# Patient Record
Sex: FEMALE | Race: BLACK OR AFRICAN AMERICAN | NOT HISPANIC OR LATINO
[De-identification: names, ages, dates, MRNs, and addresses within clinical notes are randomized per-mention and may not be internally consistent; named-entity substitution may affect disease eponyms.]

---

## 2018-05-15 ENCOUNTER — APPOINTMENT (OUTPATIENT)
Dept: SURGERY | Facility: CLINIC | Age: 48
End: 2018-05-15
Payer: COMMERCIAL

## 2018-05-15 VITALS
WEIGHT: 293 LBS | HEIGHT: 66.5 IN | BODY MASS INDEX: 46.53 KG/M2 | SYSTOLIC BLOOD PRESSURE: 146 MMHG | DIASTOLIC BLOOD PRESSURE: 86 MMHG

## 2018-05-15 PROCEDURE — SI006: CPT

## 2018-05-22 PROCEDURE — 97802 MEDICAL NUTRITION INDIV IN: CPT

## 2018-06-13 ENCOUNTER — APPOINTMENT (OUTPATIENT)
Dept: SURGERY | Facility: CLINIC | Age: 48
End: 2018-06-13
Payer: COMMERCIAL

## 2018-06-13 VITALS
WEIGHT: 293 LBS | HEIGHT: 66.5 IN | BODY MASS INDEX: 46.53 KG/M2 | SYSTOLIC BLOOD PRESSURE: 138 MMHG | DIASTOLIC BLOOD PRESSURE: 84 MMHG

## 2018-06-13 DIAGNOSIS — Z83.3 FAMILY HISTORY OF DIABETES MELLITUS: ICD-10-CM

## 2018-06-13 DIAGNOSIS — Z82.49 FAMILY HISTORY OF ISCHEMIC HEART DISEASE AND OTHER DISEASES OF THE CIRCULATORY SYSTEM: ICD-10-CM

## 2018-06-13 DIAGNOSIS — Z87.09 PERSONAL HISTORY OF OTHER DISEASES OF THE RESPIRATORY SYSTEM: ICD-10-CM

## 2018-06-13 PROCEDURE — 99204 OFFICE O/P NEW MOD 45 MIN: CPT

## 2018-06-13 RX ORDER — MELOXICAM 7.5 MG/1
7.5 TABLET ORAL
Qty: 14 | Refills: 0 | Status: COMPLETED | COMMUNITY
Start: 2017-11-30

## 2018-06-13 RX ORDER — LIDOCAINE 5 G/100G
5 OINTMENT TOPICAL
Qty: 35 | Refills: 0 | Status: COMPLETED | COMMUNITY
Start: 2018-02-16

## 2018-06-13 RX ORDER — AZITHROMYCIN 250 MG/1
250 TABLET, FILM COATED ORAL
Qty: 6 | Refills: 0 | Status: COMPLETED | COMMUNITY
Start: 2018-01-30

## 2018-06-13 RX ORDER — DULOXETINE HYDROCHLORIDE 20 MG/1
20 CAPSULE, DELAYED RELEASE PELLETS ORAL
Qty: 180 | Refills: 0 | Status: COMPLETED | COMMUNITY
Start: 2018-01-18

## 2018-06-13 RX ORDER — BECLOMETHASONE DIPROPIONATE 80 UG/1
AEROSOL, METERED RESPIRATORY (INHALATION) TWICE DAILY
Refills: 0 | Status: ACTIVE | COMMUNITY

## 2018-06-13 RX ORDER — AMITRIPTYLINE HYDROCHLORIDE 50 MG/1
50 TABLET, FILM COATED ORAL
Qty: 30 | Refills: 0 | Status: COMPLETED | COMMUNITY
Start: 2017-09-14

## 2018-06-13 RX ORDER — METHYLPREDNISOLONE 4 MG/1
4 TABLET ORAL
Qty: 21 | Refills: 0 | Status: COMPLETED | COMMUNITY
Start: 2018-02-01

## 2018-06-13 RX ORDER — IBUPROFEN 800 MG/1
800 TABLET, FILM COATED ORAL
Qty: 90 | Refills: 0 | Status: COMPLETED | COMMUNITY
Start: 2018-03-15

## 2018-06-13 RX ORDER — ALBUTEROL SULFATE 90 UG/1
108 (90 BASE) AEROSOL, METERED RESPIRATORY (INHALATION)
Qty: 18 | Refills: 0 | Status: COMPLETED | COMMUNITY
Start: 2018-02-27

## 2018-06-13 RX ORDER — GABAPENTIN 600 MG/1
600 TABLET, COATED ORAL
Qty: 90 | Refills: 0 | Status: COMPLETED | COMMUNITY
Start: 2018-01-06

## 2018-06-13 RX ORDER — GABAPENTIN 300 MG/1
300 CAPSULE ORAL
Qty: 90 | Refills: 0 | Status: COMPLETED | COMMUNITY
Start: 2017-12-07

## 2018-06-19 ENCOUNTER — APPOINTMENT (OUTPATIENT)
Dept: SURGERY | Facility: CLINIC | Age: 48
End: 2018-06-19
Payer: COMMERCIAL

## 2018-06-19 VITALS — WEIGHT: 293 LBS | BODY MASS INDEX: 46.53 KG/M2 | HEIGHT: 66.5 IN

## 2018-06-19 PROCEDURE — 97802 MEDICAL NUTRITION INDIV IN: CPT

## 2018-06-19 RX ORDER — FLURANDRENOLIDE 4 UG/CM2
4 TAPE TOPICAL
Qty: 1 | Refills: 0 | Status: DISCONTINUED | COMMUNITY
Start: 2017-12-11 | End: 2018-06-19

## 2018-06-19 RX ORDER — HYDROCODONE BITARTRATE AND ACETAMINOPHEN 10; 325 MG/1; MG/1
10-325 TABLET ORAL
Qty: 90 | Refills: 0 | Status: DISCONTINUED | COMMUNITY
Start: 2017-12-07 | End: 2018-06-19

## 2018-07-02 ENCOUNTER — OUTPATIENT (OUTPATIENT)
Dept: OUTPATIENT SERVICES | Facility: HOSPITAL | Age: 48
LOS: 1 days | Discharge: HOME | End: 2018-07-02

## 2018-07-02 DIAGNOSIS — R06.02 SHORTNESS OF BREATH: ICD-10-CM

## 2018-07-31 ENCOUNTER — APPOINTMENT (OUTPATIENT)
Dept: SURGERY | Facility: CLINIC | Age: 48
End: 2018-07-31

## 2018-08-07 ENCOUNTER — OUTPATIENT (OUTPATIENT)
Dept: OUTPATIENT SERVICES | Facility: HOSPITAL | Age: 48
LOS: 1 days | Discharge: HOME | End: 2018-08-07

## 2018-08-07 ENCOUNTER — APPOINTMENT (OUTPATIENT)
Dept: SURGERY | Facility: CLINIC | Age: 48
End: 2018-08-07
Payer: COMMERCIAL

## 2018-08-07 VITALS — WEIGHT: 293 LBS | BODY MASS INDEX: 46.53 KG/M2 | HEIGHT: 66.5 IN

## 2018-08-07 DIAGNOSIS — E66.01 MORBID (SEVERE) OBESITY DUE TO EXCESS CALORIES: ICD-10-CM

## 2018-08-07 PROCEDURE — 99212 OFFICE O/P EST SF 10 MIN: CPT

## 2018-08-21 ENCOUNTER — RESULT REVIEW (OUTPATIENT)
Age: 48
End: 2018-08-21

## 2018-09-25 ENCOUNTER — OUTPATIENT (OUTPATIENT)
Dept: OUTPATIENT SERVICES | Facility: HOSPITAL | Age: 48
LOS: 1 days | Discharge: HOME | End: 2018-09-25

## 2018-09-25 VITALS
WEIGHT: 293 LBS | TEMPERATURE: 99 F | HEART RATE: 90 BPM | OXYGEN SATURATION: 99 % | SYSTOLIC BLOOD PRESSURE: 128 MMHG | HEIGHT: 67 IN | DIASTOLIC BLOOD PRESSURE: 76 MMHG | RESPIRATION RATE: 16 BRPM

## 2018-09-25 DIAGNOSIS — Z98.890 OTHER SPECIFIED POSTPROCEDURAL STATES: Chronic | ICD-10-CM

## 2018-09-25 DIAGNOSIS — E66.01 MORBID (SEVERE) OBESITY DUE TO EXCESS CALORIES: ICD-10-CM

## 2018-09-25 DIAGNOSIS — Z98.891 HISTORY OF UTERINE SCAR FROM PREVIOUS SURGERY: Chronic | ICD-10-CM

## 2018-09-25 DIAGNOSIS — Z01.818 ENCOUNTER FOR OTHER PREPROCEDURAL EXAMINATION: ICD-10-CM

## 2018-09-25 LAB
ALBUMIN SERPL ELPH-MCNC: 4.3 G/DL — SIGNIFICANT CHANGE UP (ref 3.5–5.2)
ALP SERPL-CCNC: 103 U/L — SIGNIFICANT CHANGE UP (ref 30–115)
ALT FLD-CCNC: 13 U/L — SIGNIFICANT CHANGE UP (ref 0–41)
ANION GAP SERPL CALC-SCNC: 15 MMOL/L — HIGH (ref 7–14)
APPEARANCE UR: CLEAR — SIGNIFICANT CHANGE UP
APTT BLD: 36.7 SEC — SIGNIFICANT CHANGE UP (ref 27–39.2)
AST SERPL-CCNC: 12 U/L — SIGNIFICANT CHANGE UP (ref 0–41)
BASOPHILS # BLD AUTO: 0.02 K/UL — SIGNIFICANT CHANGE UP (ref 0–0.2)
BASOPHILS NFR BLD AUTO: 0.3 % — SIGNIFICANT CHANGE UP (ref 0–1)
BILIRUB SERPL-MCNC: 0.3 MG/DL — SIGNIFICANT CHANGE UP (ref 0.2–1.2)
BILIRUB UR-MCNC: NEGATIVE — SIGNIFICANT CHANGE UP
BLD GP AB SCN SERPL QL: SIGNIFICANT CHANGE UP
BUN SERPL-MCNC: 15 MG/DL — SIGNIFICANT CHANGE UP (ref 10–20)
CALCIUM SERPL-MCNC: 9.1 MG/DL — SIGNIFICANT CHANGE UP (ref 8.5–10.1)
CHLORIDE SERPL-SCNC: 97 MMOL/L — LOW (ref 98–110)
CO2 SERPL-SCNC: 28 MMOL/L — SIGNIFICANT CHANGE UP (ref 17–32)
COLOR SPEC: YELLOW — SIGNIFICANT CHANGE UP
CREAT SERPL-MCNC: 0.8 MG/DL — SIGNIFICANT CHANGE UP (ref 0.7–1.5)
DIFF PNL FLD: NEGATIVE — SIGNIFICANT CHANGE UP
EOSINOPHIL # BLD AUTO: 0.3 K/UL — SIGNIFICANT CHANGE UP (ref 0–0.7)
EOSINOPHIL NFR BLD AUTO: 4.3 % — SIGNIFICANT CHANGE UP (ref 0–8)
GLUCOSE SERPL-MCNC: 81 MG/DL — SIGNIFICANT CHANGE UP (ref 70–99)
GLUCOSE UR QL: NEGATIVE MG/DL — SIGNIFICANT CHANGE UP
HCT VFR BLD CALC: 38.1 % — SIGNIFICANT CHANGE UP (ref 37–47)
HGB BLD-MCNC: 11.4 G/DL — LOW (ref 12–16)
IMM GRANULOCYTES NFR BLD AUTO: 0.4 % — HIGH (ref 0.1–0.3)
INR BLD: 1.26 RATIO — SIGNIFICANT CHANGE UP (ref 0.65–1.3)
KETONES UR-MCNC: NEGATIVE — SIGNIFICANT CHANGE UP
LEUKOCYTE ESTERASE UR-ACNC: NEGATIVE — SIGNIFICANT CHANGE UP
LYMPHOCYTES # BLD AUTO: 1.78 K/UL — SIGNIFICANT CHANGE UP (ref 1.2–3.4)
LYMPHOCYTES # BLD AUTO: 25.4 % — SIGNIFICANT CHANGE UP (ref 20.5–51.1)
MCHC RBC-ENTMCNC: 24.2 PG — LOW (ref 27–31)
MCHC RBC-ENTMCNC: 29.9 G/DL — LOW (ref 32–37)
MCV RBC AUTO: 80.9 FL — LOW (ref 81–99)
MONOCYTES # BLD AUTO: 0.53 K/UL — SIGNIFICANT CHANGE UP (ref 0.1–0.6)
MONOCYTES NFR BLD AUTO: 7.6 % — SIGNIFICANT CHANGE UP (ref 1.7–9.3)
NEUTROPHILS # BLD AUTO: 4.34 K/UL — SIGNIFICANT CHANGE UP (ref 1.4–6.5)
NEUTROPHILS NFR BLD AUTO: 62 % — SIGNIFICANT CHANGE UP (ref 42.2–75.2)
NITRITE UR-MCNC: NEGATIVE — SIGNIFICANT CHANGE UP
NRBC # BLD: 0 /100 WBCS — SIGNIFICANT CHANGE UP (ref 0–0)
PH UR: 6.5 — SIGNIFICANT CHANGE UP (ref 5–8)
PLATELET # BLD AUTO: 304 K/UL — SIGNIFICANT CHANGE UP (ref 130–400)
POTASSIUM SERPL-MCNC: 4.2 MMOL/L — SIGNIFICANT CHANGE UP (ref 3.5–5)
POTASSIUM SERPL-SCNC: 4.2 MMOL/L — SIGNIFICANT CHANGE UP (ref 3.5–5)
PROT SERPL-MCNC: 8.2 G/DL — HIGH (ref 6–8)
PROT UR-MCNC: NEGATIVE MG/DL — SIGNIFICANT CHANGE UP
PROTHROM AB SERPL-ACNC: 13.6 SEC — HIGH (ref 9.95–12.87)
RBC # BLD: 4.71 M/UL — SIGNIFICANT CHANGE UP (ref 4.2–5.4)
RBC # FLD: 15.1 % — HIGH (ref 11.5–14.5)
SODIUM SERPL-SCNC: 140 MMOL/L — SIGNIFICANT CHANGE UP (ref 135–146)
SP GR SPEC: 1.01 — SIGNIFICANT CHANGE UP (ref 1.01–1.03)
TYPE + AB SCN PNL BLD: SIGNIFICANT CHANGE UP
UROBILINOGEN FLD QL: 0.2 MG/DL — SIGNIFICANT CHANGE UP (ref 0.2–0.2)
WBC # BLD: 7 K/UL — SIGNIFICANT CHANGE UP (ref 4.8–10.8)
WBC # FLD AUTO: 7 K/UL — SIGNIFICANT CHANGE UP (ref 4.8–10.8)

## 2018-09-25 NOTE — H&P PST ADULT - PMH
Arthritis  CHR PAIN B/L KNEES  Asthma  STABLE, EPISODIC -AVERAGE USE OF ALBUTEROL ABOUT ONCE A MONTH  Fatty liver    GERD (gastroesophageal reflux disease)    HTN (hypertension)    Obesity    Obstructive sleep apnea on CPAP

## 2018-09-25 NOTE — H&P PST ADULT - HISTORY OF PRESENT ILLNESS
48yr old female with morbid obesity presents for sleeve gastrectomy. Denies c/o CP, PALP, SOB, URI ,FEVER ,RASH OR UTI SYMPTOMS. Exercise kirill 2 flat bocks LTD by pain B/ L KNEES.

## 2018-09-25 NOTE — H&P PST ADULT - PSH
H/O arthroscopic knee surgery  LT AND RT  H/O  section    History of tonsillectomy and adenoidectomy  AND REMOVAL UVULA

## 2018-09-26 LAB
ESTIMATED AVERAGE GLUCOSE: 114 MG/DL — SIGNIFICANT CHANGE UP (ref 68–114)
HBA1C BLD-MCNC: 5.6 % — SIGNIFICANT CHANGE UP (ref 4–5.6)

## 2018-09-28 DIAGNOSIS — I10 ESSENTIAL (PRIMARY) HYPERTENSION: ICD-10-CM

## 2018-09-28 DIAGNOSIS — J45.909 UNSPECIFIED ASTHMA, UNCOMPLICATED: ICD-10-CM

## 2018-09-28 DIAGNOSIS — E66.9 OBESITY, UNSPECIFIED: ICD-10-CM

## 2018-09-28 DIAGNOSIS — G47.30 SLEEP APNEA, UNSPECIFIED: ICD-10-CM

## 2018-10-02 PROBLEM — K76.0 FATTY (CHANGE OF) LIVER, NOT ELSEWHERE CLASSIFIED: Chronic | Status: ACTIVE | Noted: 2018-09-25

## 2018-10-02 PROBLEM — I10 ESSENTIAL (PRIMARY) HYPERTENSION: Chronic | Status: ACTIVE | Noted: 2018-09-25

## 2018-10-02 PROBLEM — M19.90 UNSPECIFIED OSTEOARTHRITIS, UNSPECIFIED SITE: Chronic | Status: ACTIVE | Noted: 2018-09-25

## 2018-10-02 PROBLEM — G47.33 OBSTRUCTIVE SLEEP APNEA (ADULT) (PEDIATRIC): Chronic | Status: ACTIVE | Noted: 2018-09-25

## 2018-10-02 PROBLEM — J45.909 UNSPECIFIED ASTHMA, UNCOMPLICATED: Chronic | Status: ACTIVE | Noted: 2018-09-25

## 2018-10-02 PROBLEM — E66.9 OBESITY, UNSPECIFIED: Chronic | Status: ACTIVE | Noted: 2018-09-25

## 2018-10-02 PROBLEM — K21.9 GASTRO-ESOPHAGEAL REFLUX DISEASE WITHOUT ESOPHAGITIS: Chronic | Status: ACTIVE | Noted: 2018-09-25

## 2018-10-03 ENCOUNTER — APPOINTMENT (OUTPATIENT)
Dept: SURGERY | Facility: CLINIC | Age: 48
End: 2018-10-03
Payer: COMMERCIAL

## 2018-10-03 VITALS
HEIGHT: 66.5 IN | WEIGHT: 293 LBS | BODY MASS INDEX: 46.53 KG/M2 | DIASTOLIC BLOOD PRESSURE: 74 MMHG | SYSTOLIC BLOOD PRESSURE: 126 MMHG

## 2018-10-03 PROCEDURE — 99215 OFFICE O/P EST HI 40 MIN: CPT

## 2018-10-03 RX ORDER — METHOCARBAMOL 750 MG/1
750 TABLET, FILM COATED ORAL
Qty: 21 | Refills: 0 | Status: COMPLETED | COMMUNITY
Start: 2018-02-02 | End: 2018-10-03

## 2018-10-03 RX ORDER — LACTOBACILLUS ACIDOPHILUS/PECT 30 MG-20MG
TABLET ORAL
Refills: 0 | Status: COMPLETED | COMMUNITY
End: 2018-10-03

## 2018-10-03 RX ORDER — DICLOFENAC SODIUM 10 MG/G
1 GEL TOPICAL
Qty: 100 | Refills: 0 | Status: COMPLETED | COMMUNITY
Start: 2017-08-17 | End: 2018-10-03

## 2018-10-03 RX ORDER — CLINDAMYCIN PHOSPHATE 1 G/10ML
1 GEL TOPICAL
Qty: 60 | Refills: 0 | Status: COMPLETED | COMMUNITY
Start: 2017-05-08 | End: 2018-10-03

## 2018-10-03 RX ORDER — BETAMETHASONE DIPROPIONATE 0.5 MG/G
0.05 OINTMENT, AUGMENTED TOPICAL
Qty: 50 | Refills: 0 | Status: COMPLETED | COMMUNITY
Start: 2018-02-01 | End: 2018-10-03

## 2018-10-03 RX ORDER — CLOTRIMAZOLE AND BETAMETHASONE DIPROPIONATE 10; .5 MG/G; MG/G
1-0.05 CREAM TOPICAL
Qty: 45 | Refills: 0 | Status: COMPLETED | COMMUNITY
Start: 2018-01-30 | End: 2018-10-03

## 2018-10-03 RX ORDER — CLOBETASOL PROPIONATE 0.5 MG/ML
0.05 SOLUTION TOPICAL
Qty: 50 | Refills: 0 | Status: COMPLETED | COMMUNITY
Start: 2017-12-11 | End: 2018-10-03

## 2018-10-09 ENCOUNTER — INPATIENT (INPATIENT)
Facility: HOSPITAL | Age: 48
LOS: 1 days | Discharge: HOME | End: 2018-10-11
Attending: SURGERY | Admitting: SURGERY
Payer: COMMERCIAL

## 2018-10-09 ENCOUNTER — APPOINTMENT (OUTPATIENT)
Dept: SURGERY | Facility: HOSPITAL | Age: 48
End: 2018-10-09
Payer: COMMERCIAL

## 2018-10-09 ENCOUNTER — RESULT REVIEW (OUTPATIENT)
Age: 48
End: 2018-10-09

## 2018-10-09 VITALS
HEIGHT: 67 IN | WEIGHT: 293 LBS | DIASTOLIC BLOOD PRESSURE: 73 MMHG | SYSTOLIC BLOOD PRESSURE: 117 MMHG | HEART RATE: 86 BPM | RESPIRATION RATE: 20 BRPM | TEMPERATURE: 99 F

## 2018-10-09 DIAGNOSIS — Z98.890 OTHER SPECIFIED POSTPROCEDURAL STATES: Chronic | ICD-10-CM

## 2018-10-09 DIAGNOSIS — Z98.891 HISTORY OF UTERINE SCAR FROM PREVIOUS SURGERY: Chronic | ICD-10-CM

## 2018-10-09 LAB
ANION GAP SERPL CALC-SCNC: 16 MMOL/L — HIGH (ref 7–14)
BASOPHILS # BLD AUTO: 0.01 K/UL — SIGNIFICANT CHANGE UP (ref 0–0.2)
BASOPHILS NFR BLD AUTO: 0.1 % — SIGNIFICANT CHANGE UP (ref 0–1)
BUN SERPL-MCNC: 6 MG/DL — LOW (ref 10–20)
CALCIUM SERPL-MCNC: 8.7 MG/DL — SIGNIFICANT CHANGE UP (ref 8.5–10.1)
CHLORIDE SERPL-SCNC: 100 MMOL/L — SIGNIFICANT CHANGE UP (ref 98–110)
CO2 SERPL-SCNC: 25 MMOL/L — SIGNIFICANT CHANGE UP (ref 17–32)
CREAT SERPL-MCNC: 0.7 MG/DL — SIGNIFICANT CHANGE UP (ref 0.7–1.5)
EOSINOPHIL # BLD AUTO: 0 K/UL — SIGNIFICANT CHANGE UP (ref 0–0.7)
EOSINOPHIL NFR BLD AUTO: 0 % — SIGNIFICANT CHANGE UP (ref 0–8)
GLUCOSE SERPL-MCNC: 108 MG/DL — HIGH (ref 70–99)
HCT VFR BLD CALC: 34.7 % — LOW (ref 37–47)
HGB BLD-MCNC: 10.8 G/DL — LOW (ref 12–16)
IMM GRANULOCYTES NFR BLD AUTO: 0.2 % — SIGNIFICANT CHANGE UP (ref 0.1–0.3)
LYMPHOCYTES # BLD AUTO: 1.06 K/UL — LOW (ref 1.2–3.4)
LYMPHOCYTES # BLD AUTO: 13 % — LOW (ref 20.5–51.1)
MCHC RBC-ENTMCNC: 24.4 PG — LOW (ref 27–31)
MCHC RBC-ENTMCNC: 31.1 G/DL — LOW (ref 32–37)
MCV RBC AUTO: 78.5 FL — LOW (ref 81–99)
MONOCYTES # BLD AUTO: 0.68 K/UL — HIGH (ref 0.1–0.6)
MONOCYTES NFR BLD AUTO: 8.4 % — SIGNIFICANT CHANGE UP (ref 1.7–9.3)
NEUTROPHILS # BLD AUTO: 6.37 K/UL — SIGNIFICANT CHANGE UP (ref 1.4–6.5)
NEUTROPHILS NFR BLD AUTO: 78.3 % — HIGH (ref 42.2–75.2)
NRBC # BLD: 0 /100 WBCS — SIGNIFICANT CHANGE UP (ref 0–0)
PLATELET # BLD AUTO: 310 K/UL — SIGNIFICANT CHANGE UP (ref 130–400)
POTASSIUM SERPL-MCNC: 3.5 MMOL/L — SIGNIFICANT CHANGE UP (ref 3.5–5)
POTASSIUM SERPL-SCNC: 3.5 MMOL/L — SIGNIFICANT CHANGE UP (ref 3.5–5)
RBC # BLD: 4.42 M/UL — SIGNIFICANT CHANGE UP (ref 4.2–5.4)
RBC # FLD: 15.1 % — HIGH (ref 11.5–14.5)
SODIUM SERPL-SCNC: 141 MMOL/L — SIGNIFICANT CHANGE UP (ref 135–146)
WBC # BLD: 8.14 K/UL — SIGNIFICANT CHANGE UP (ref 4.8–10.8)
WBC # FLD AUTO: 8.14 K/UL — SIGNIFICANT CHANGE UP (ref 4.8–10.8)

## 2018-10-09 PROCEDURE — 43775 LAP SLEEVE GASTRECTOMY: CPT

## 2018-10-09 PROCEDURE — 43775 LAP SLEEVE GASTRECTOMY: CPT | Mod: 82

## 2018-10-09 RX ORDER — HEPARIN SODIUM 5000 [USP'U]/ML
5000 INJECTION INTRAVENOUS; SUBCUTANEOUS ONCE
Qty: 0 | Refills: 0 | Status: DISCONTINUED | OUTPATIENT
Start: 2018-10-09 | End: 2018-10-09

## 2018-10-09 RX ORDER — MEPERIDINE HYDROCHLORIDE 50 MG/ML
12.5 INJECTION INTRAMUSCULAR; INTRAVENOUS; SUBCUTANEOUS
Qty: 0 | Refills: 0 | Status: DISCONTINUED | OUTPATIENT
Start: 2018-10-09 | End: 2018-10-10

## 2018-10-09 RX ORDER — MORPHINE SULFATE 50 MG/1
2 CAPSULE, EXTENDED RELEASE ORAL
Qty: 0 | Refills: 0 | Status: DISCONTINUED | OUTPATIENT
Start: 2018-10-09 | End: 2018-10-10

## 2018-10-09 RX ORDER — MORPHINE SULFATE 50 MG/1
30 CAPSULE, EXTENDED RELEASE ORAL
Qty: 0 | Refills: 0 | Status: DISCONTINUED | OUTPATIENT
Start: 2018-10-09 | End: 2018-10-10

## 2018-10-09 RX ORDER — CEFOTETAN DISODIUM 1 G
2 VIAL (EA) INJECTION EVERY 12 HOURS
Qty: 0 | Refills: 0 | Status: COMPLETED | OUTPATIENT
Start: 2018-10-09 | End: 2018-10-10

## 2018-10-09 RX ORDER — ACETAMINOPHEN 500 MG
1000 TABLET ORAL ONCE
Qty: 0 | Refills: 0 | Status: DISCONTINUED | OUTPATIENT
Start: 2018-10-09 | End: 2018-10-09

## 2018-10-09 RX ORDER — SODIUM CHLORIDE 9 MG/ML
1000 INJECTION, SOLUTION INTRAVENOUS
Qty: 0 | Refills: 0 | Status: DISCONTINUED | OUTPATIENT
Start: 2018-10-09 | End: 2018-10-10

## 2018-10-09 RX ORDER — NALOXONE HYDROCHLORIDE 4 MG/.1ML
0.1 SPRAY NASAL
Qty: 0 | Refills: 0 | Status: DISCONTINUED | OUTPATIENT
Start: 2018-10-09 | End: 2018-10-10

## 2018-10-09 RX ORDER — ONDANSETRON 8 MG/1
4 TABLET, FILM COATED ORAL EVERY 6 HOURS
Qty: 0 | Refills: 0 | Status: DISCONTINUED | OUTPATIENT
Start: 2018-10-09 | End: 2018-10-11

## 2018-10-09 RX ORDER — HEPARIN SODIUM 5000 [USP'U]/ML
7500 INJECTION INTRAVENOUS; SUBCUTANEOUS EVERY 8 HOURS
Qty: 0 | Refills: 0 | Status: DISCONTINUED | OUTPATIENT
Start: 2018-10-09 | End: 2018-10-11

## 2018-10-09 RX ORDER — BUPIVACAINE 13.3 MG/ML
20 INJECTION, SUSPENSION, LIPOSOMAL INFILTRATION ONCE
Qty: 0 | Refills: 0 | Status: DISCONTINUED | OUTPATIENT
Start: 2018-10-09 | End: 2018-10-09

## 2018-10-09 RX ORDER — PANTOPRAZOLE SODIUM 20 MG/1
40 TABLET, DELAYED RELEASE ORAL EVERY 24 HOURS
Qty: 0 | Refills: 0 | Status: DISCONTINUED | OUTPATIENT
Start: 2018-10-09 | End: 2018-10-11

## 2018-10-09 RX ORDER — MORPHINE SULFATE 50 MG/1
4 CAPSULE, EXTENDED RELEASE ORAL
Qty: 0 | Refills: 0 | Status: DISCONTINUED | OUTPATIENT
Start: 2018-10-09 | End: 2018-10-10

## 2018-10-09 RX ORDER — ONDANSETRON 8 MG/1
4 TABLET, FILM COATED ORAL ONCE
Qty: 0 | Refills: 0 | Status: DISCONTINUED | OUTPATIENT
Start: 2018-10-09 | End: 2018-10-10

## 2018-10-09 RX ORDER — SODIUM CHLORIDE 9 MG/ML
1000 INJECTION, SOLUTION INTRAVENOUS
Qty: 0 | Refills: 0 | Status: DISCONTINUED | OUTPATIENT
Start: 2018-10-09 | End: 2018-10-09

## 2018-10-09 RX ORDER — ONDANSETRON 8 MG/1
4 TABLET, FILM COATED ORAL EVERY 8 HOURS
Qty: 0 | Refills: 0 | Status: COMPLETED | OUTPATIENT
Start: 2018-10-09 | End: 2018-10-10

## 2018-10-09 RX ADMIN — MORPHINE SULFATE 30 MILLILITER(S): 50 CAPSULE, EXTENDED RELEASE ORAL at 12:01

## 2018-10-09 RX ADMIN — Medication 100 GRAM(S): at 18:14

## 2018-10-09 RX ADMIN — Medication 1.25 MILLIGRAM(S): at 21:55

## 2018-10-09 RX ADMIN — ONDANSETRON 4 MILLIGRAM(S): 8 TABLET, FILM COATED ORAL at 15:20

## 2018-10-09 RX ADMIN — SODIUM CHLORIDE 125 MILLILITER(S): 9 INJECTION, SOLUTION INTRAVENOUS at 12:00

## 2018-10-09 RX ADMIN — MORPHINE SULFATE 4 MILLIGRAM(S): 50 CAPSULE, EXTENDED RELEASE ORAL at 11:23

## 2018-10-09 RX ADMIN — MORPHINE SULFATE 4 MILLIGRAM(S): 50 CAPSULE, EXTENDED RELEASE ORAL at 11:18

## 2018-10-09 RX ADMIN — HEPARIN SODIUM 7500 UNIT(S): 5000 INJECTION INTRAVENOUS; SUBCUTANEOUS at 15:23

## 2018-10-09 RX ADMIN — MORPHINE SULFATE 4 MILLIGRAM(S): 50 CAPSULE, EXTENDED RELEASE ORAL at 11:48

## 2018-10-09 RX ADMIN — SODIUM CHLORIDE 125 MILLILITER(S): 9 INJECTION, SOLUTION INTRAVENOUS at 10:41

## 2018-10-09 RX ADMIN — HEPARIN SODIUM 7500 UNIT(S): 5000 INJECTION INTRAVENOUS; SUBCUTANEOUS at 21:56

## 2018-10-09 RX ADMIN — PANTOPRAZOLE SODIUM 40 MILLIGRAM(S): 20 TABLET, DELAYED RELEASE ORAL at 13:38

## 2018-10-09 RX ADMIN — ONDANSETRON 4 MILLIGRAM(S): 8 TABLET, FILM COATED ORAL at 21:56

## 2018-10-09 RX ADMIN — MORPHINE SULFATE 4 MILLIGRAM(S): 50 CAPSULE, EXTENDED RELEASE ORAL at 11:08

## 2018-10-09 NOTE — CONSULT NOTE ADULT - ASSESSMENT
ASSESSMENT:  48y Female s/p Lap Sleeve Gastrectomy, SCOTT, b/l tap Block presents to ICU for hemodynamic monitoring    PLAN:   Neurologic: Intact. Pain control with Morphine PCA  Respiratory: Hx of DON on cpap. on 3L NC, wean off as tolerated.  Encourage IS. Encourage cpap at night  Cardiovascular: Hx of HTN, takes amlodipine at home. keep normotensive.    Gastrointestinal/Nutrition: NPO, LR @ 125, PPI  Renal/Genitourinary: no de leon, voided & making good urine. f/u lytes and replete as needed  Hematologic: HSQ @ 7500u  Infectious Disease: romel-op Ancef   Lines/Tubes: peripheral IV's   Endocrine: no issues   Disposition: Admit to ICU     --------------------------------------------------------------------------------------    Critical Care Diagnoses:

## 2018-10-09 NOTE — CHART NOTE - NSCHARTNOTEFT_GEN_A_CORE
PA post op note  Pt seen and examined c/o incisional pain.   pt states that she voided and ambulated  Denies chest pain , shortness of breath , dizziness, or vomiting    Vital Signs Last 24 Hrs  T(C): 36.7 (09 Oct 2018 13:00), Max: 37 (09 Oct 2018 06:21)  T(F): 98 (09 Oct 2018 13:00), Max: 98.6 (09 Oct 2018 06:21)  HR: 91 (09 Oct 2018 13:30) (86 - 105)  BP: 128/67 (09 Oct 2018 13:30) (117/73 - 159/83)  BP(mean): 86 (09 Oct 2018 13:30) (86 - 122)  RR: 18 (09 Oct 2018 13:30) (9 - 24)  SpO2: 93% (09 Oct 2018 13:30) (93% - 100%)    On examination pt in no acute distress  CV: [id number]  lungs: + air entry bilat  abd: soft  Obese, incisions:  dressings d/i   ext: no calf tenderness  neuro alert and oriented      A/P 48yFemale s/p laparoscopic sleeve gastrectomy secondary to morbid obesity BMI: 56.7 POD#0  pt doing well   Continue current management   strict I&O  f/u labs tonight   Encourage incentive spirometry use and ambulation  Continue close observation

## 2018-10-09 NOTE — BRIEF OPERATIVE NOTE - PROCEDURE
<<-----Click on this checkbox to enter Procedure Laparoscopic sleeve gastrectomy  10/09/2018    Active  [email or web address]

## 2018-10-09 NOTE — CHART NOTE - NSCHARTNOTEFT_GEN_A_CORE
PACU ANESTHESIA ADMISSION NOTE      Procedure:   Post op diagnosis:      ____  Intubated  TV:______       Rate: ______      FiO2: ______    __x__  Patent Airway    __x__  Full return of protective reflexes    __x__  Full recovery from anesthesia / back to baseline status    Vitals: see anesth record    Mental Status:  ___x_ Awake   _____ Alert   _____ Drowsy   _____ Sedated    Nausea/Vomiting:  ___x_ NO  ______Yes,   See Post - Op Orders          Pain Scale (0-10):  ___0__    Treatment: ____ None    ___x_ See Post - Op/PCA Orders    Post - Operative Fluids:   ____ Oral   ___x_ See Post - Op Orders    Plan: Discharge:   ____Home       _____Floor     ____x_Critical Care    _____  Other:_________________    Comments: no anesthetic related complications. d/c when criteria met.

## 2018-10-09 NOTE — CONSULT NOTE ADULT - SUBJECTIVE AND OBJECTIVE BOX
SICU Consultation Note  =====================================================  HPI:   48y Female with PMhx below, BMI 58.73, GERD, DON on cpap, presented electively for lap sleeve gastrectomy, SCOTT, b/l tap block after failed medical management.  The procedure was uneventful requiring no pushes of pressors. Intra-op pt received 1g IV tylenol, 2g cefotetam. She was easily extubated post-op, seen and examined in pacu. Pt is neurologically intact, in NAD and hemodynamically stable.        Surgery Information  OR time:   2.5h   EBL:   5cc       IV Fluids:   2.3L    Blood Products:  None  UOP:   no de leon       PAST MEDICAL & SURGICAL HISTORY:  Fatty liver  Arthritis: CHR PAIN B/L KNEES  HTN (hypertension)  GERD (gastroesophageal reflux disease)  Obesity  Obstructive sleep apnea on CPAP  Asthma: STABLE, EPISODIC -AVERAGE USE OF ALBUTEROL ABOUT ONCE A MONTH  H/O arthroscopic knee surgery: LT AND RT  History of tonsillectomy and adenoidectomy: AND REMOVAL UVULA  H/O  section    Home Meds: Home Medications:  amLODIPine 10 mg oral tablet: 1 tab(s) orally once a day (09 Oct 2018 13:31)  HYDROcodone 10 mg oral capsule, extended release: 1 cap(s) orally 4 times a day, As Needed - for moderate pain (09 Oct 2018 13:31)  omeprazole 40 mg oral delayed release capsule: 1 cap(s) orally once a day (09 Oct 2018 13:31)  Qvar 80 mcg/inh inhalation aerosol: 2  inhaled 2 times a day (09 Oct 2018 13:31)  tiZANidine 4 mg oral tablet: 4 milligram(s) orally once a day (at bedtime) (09 Oct 2018 13:31)  Ventolin 90 mcg/inh inhalation aerosol with adapter:  (09 Oct 2018 13:31)    Allergies:   No Known Allergies    Soc:   Advanced Directives: Presumed Full Code     ROS:    REVIEW OF SYSTEMS    [X ] A ten-point review of systems was otherwise negative except as noted.      CURRENT MEDICATIONS:   --------------------------------------------------------------------------------------  Neurologic Medications  meperidine     Injectable 12.5 milliGRAM(s) IV Push every 10 minutes PRN Shivering  morphine  - Injectable 4 milliGRAM(s) IV Push every 10 minutes PRN Severe Pain (7 - 10)  morphine  - Injectable 2 milliGRAM(s) IV Push every 10 minutes PRN Moderate Pain (4 - 6)  morphine PCA (5 mG/mL) 30 milliLiter(s) PCA Continuous PCA Continuous  ondansetron Injectable 4 milliGRAM(s) IV Push every 6 hours PRN Nausea  ondansetron Injectable 4 milliGRAM(s) IV Push once PRN Nausea and/or Vomiting  ondansetron Injectable 4 milliGRAM(s) IV Push every 8 hours    Respiratory Medications    Cardiovascular Medications    Gastrointestinal Medications  lactated ringers. 1000 milliLiter(s) IV Continuous <Continuous>  lactated ringers. 1000 milliLiter(s) IV Continuous <Continuous>  pantoprazole  Injectable 40 milliGRAM(s) IV Push every 24 hours    Genitourinary Medications    Hematologic/Oncologic Medications  heparin  Injectable 7500 Unit(s) SubCutaneous every 8 hours    Antimicrobial/Immunologic Medications  cefoTEtan  IVPB 2 Gram(s) IV Intermittent every 12 hours    Endocrine/Metabolic Medications    Topical/Other Medications  naloxone Injectable 0.1 milliGRAM(s) IV Push every 3 minutes PRN For ANY of the following changes in patient status:  A. RR LESS THAN 10 breaths per minute, B. Oxygen saturation LESS THAN 90%, C. Sedation score of 6    --------------------------------------------------------------------------------------    VITAL SIGNS, INS/OUTS (last 24 hours):  --------------------------------------------------------------------------------------  ICU Vital Signs Last 24 Hrs  T(C): 36.7 (09 Oct 2018 13:00), Max: 37 (09 Oct 2018 06:21)  T(F): 98 (09 Oct 2018 13:00), Max: 98.6 (09 Oct 2018 06:21)  HR: 97 (09 Oct 2018 13:00) (86 - 105)  BP: 159/83 (09 Oct 2018 13:00) (117/73 - 159/83)  BP(mean): 111 (09 Oct 2018 13:00) (91 - 122)  ABP: --  ABP(mean): --  RR: 19 (09 Oct 2018 13:00) (9 - 24)  SpO2: 98% (09 Oct 2018 13:00) (95% - 100%)    I&O's Summary    --------------------------------------------------------------------------------------    EXAM:  General/Neuro  Exam: Normal, NAD, alert, oriented x 3, no focal deficits. Follows commands, moving all extremities  GCS: 15  Eye:  PERRLA      Respiratory  Exam: Lungs clear to auscultation, Normal expansion, no increase works of breathing. no respiratory distress    Cardiovascular  Exam: S1, S2.  Regular rate and rhythm.  No Peripheral edema    Cardiac Rhythm: Normal Sinus Rhythm  ECHO: 57% on 18    GI  Exam: Abdomen soft, appropriately tender for post-op procedure Non-distended.    Wound: 5 port-sites c/d/i, no hematoma  Current Diet:  NPO    Extremities  Exam: Extremities warm, pink, well-perfused.  Pulses palpable b/l     :   Exam: No de leon catheter in place.     Tubes/Lines/Drains  ***  [x] Peripheral IV        LABS  --------------------------------------------------------------------------------------  Labs:  CAPILLARY BLOOD GLUCOSE    --------------------------------------------------------------------------------------    OTHER LABS    IMAGING RESULTS

## 2018-10-10 ENCOUNTER — TRANSCRIPTION ENCOUNTER (OUTPATIENT)
Age: 48
End: 2018-10-10

## 2018-10-10 LAB
HCT VFR BLD CALC: 37 % — SIGNIFICANT CHANGE UP (ref 37–47)
HGB BLD-MCNC: 11.2 G/DL — LOW (ref 12–16)
MCHC RBC-ENTMCNC: 24.1 PG — LOW (ref 27–31)
MCHC RBC-ENTMCNC: 30.3 G/DL — LOW (ref 32–37)
MCV RBC AUTO: 79.6 FL — LOW (ref 81–99)
NRBC # BLD: 0 /100 WBCS — SIGNIFICANT CHANGE UP (ref 0–0)
PLATELET # BLD AUTO: 313 K/UL — SIGNIFICANT CHANGE UP (ref 130–400)
RBC # BLD: 4.65 M/UL — SIGNIFICANT CHANGE UP (ref 4.2–5.4)
RBC # FLD: 15.1 % — HIGH (ref 11.5–14.5)
WBC # BLD: 8.22 K/UL — SIGNIFICANT CHANGE UP (ref 4.8–10.8)
WBC # FLD AUTO: 8.22 K/UL — SIGNIFICANT CHANGE UP (ref 4.8–10.8)

## 2018-10-10 PROCEDURE — 99233 SBSQ HOSP IP/OBS HIGH 50: CPT | Mod: 24

## 2018-10-10 RX ORDER — INFLUENZA VIRUS VACCINE 15; 15; 15; 15 UG/.5ML; UG/.5ML; UG/.5ML; UG/.5ML
0.5 SUSPENSION INTRAMUSCULAR ONCE
Qty: 0 | Refills: 0 | Status: COMPLETED | OUTPATIENT
Start: 2018-10-10 | End: 2018-10-10

## 2018-10-10 RX ORDER — POTASSIUM CHLORIDE 20 MEQ
20 PACKET (EA) ORAL ONCE
Qty: 0 | Refills: 0 | Status: COMPLETED | OUTPATIENT
Start: 2018-10-10 | End: 2018-10-10

## 2018-10-10 RX ORDER — POTASSIUM CHLORIDE 20 MEQ
20 PACKET (EA) ORAL
Qty: 0 | Refills: 0 | Status: DISCONTINUED | OUTPATIENT
Start: 2018-10-10 | End: 2018-10-10

## 2018-10-10 RX ORDER — SODIUM CHLORIDE 9 MG/ML
500 INJECTION, SOLUTION INTRAVENOUS ONCE
Qty: 0 | Refills: 0 | Status: COMPLETED | OUTPATIENT
Start: 2018-10-10 | End: 2018-10-10

## 2018-10-10 RX ADMIN — Medication 100 GRAM(S): at 07:23

## 2018-10-10 RX ADMIN — HEPARIN SODIUM 7500 UNIT(S): 5000 INJECTION INTRAVENOUS; SUBCUTANEOUS at 13:44

## 2018-10-10 RX ADMIN — PANTOPRAZOLE SODIUM 40 MILLIGRAM(S): 20 TABLET, DELAYED RELEASE ORAL at 14:24

## 2018-10-10 RX ADMIN — ONDANSETRON 4 MILLIGRAM(S): 8 TABLET, FILM COATED ORAL at 05:36

## 2018-10-10 RX ADMIN — SODIUM CHLORIDE 500 MILLILITER(S): 9 INJECTION, SOLUTION INTRAVENOUS at 05:35

## 2018-10-10 RX ADMIN — HEPARIN SODIUM 7500 UNIT(S): 5000 INJECTION INTRAVENOUS; SUBCUTANEOUS at 22:23

## 2018-10-10 RX ADMIN — Medication 1.25 MILLIGRAM(S): at 23:00

## 2018-10-10 RX ADMIN — Medication 50 MILLIEQUIVALENT(S): at 03:25

## 2018-10-10 RX ADMIN — HEPARIN SODIUM 7500 UNIT(S): 5000 INJECTION INTRAVENOUS; SUBCUTANEOUS at 05:37

## 2018-10-10 NOTE — PROGRESS NOTE ADULT - ASSESSMENT
ASSESSMENT:  48y Female s/p Lap Sleeve Gastrectomy, SCOTT, b/l tap Block presents to ICU for hemodynamic monitoring, POD 1    PLAN:   Neurologic: Intact. Acute post-op pain, Pain control with Morphine PCA  Respiratory: Hx of DON on CPAP overnight. on 3L NC, wean off as tolerated.  Using IS.   Cardiovascular: Hx of HTN, takes amlodipine at home. keep normotensive.  Enalapril IV ordered by primary team post op  Gastrointestinal/Nutrition: NPO, LR @ 125, PPI, will advance as per bariatric protocol in AM  Renal/Genitourinary: no de leon, voided & making good urine. f/u lytes and replete as needed  Hematologic: HSQ @ 7500u q8h, monitor H/H  Infectious Disease: romel-op Ancef, monitor for fevers, leukocytosis  Lines/Tubes: peripheral IV's   Endocrine: no diagnoses  MSK: ambulate as tolerated, OOBTC    Disposition: Possible DG in AM to surgical floor ASSESSMENT:  48y Female s/p Lap Sleeve Gastrectomy, SCOTT, b/l tap Block presents to ICU for hemodynamic monitoring, POD 1    PLAN:   Neurologic: Intact. Acute post-op pain. Pain control with Morphine PCA  Respiratory: Hx of DON on CPAP overnight. on 2L NC, wean off as tolerated.  Pulling 1500 on IS    Cardiovascular: Hx of HTN, takes amlodipine at home. keep normotensive.  Enalapril IV ordered by primary team post op  Pt hypotensive o/n, sbp in the 80's---bolused 500x1    Gastrointestinal/Nutrition: Started on bariatric phase 1 in the am.  LR @ 125, PPI.  Renal/Genitourinary: no de leon, voided & making good urine. Hypokalemia 3.5---repleted   Hematologic: HSQ @ 7500u q8h, H/H 10.8...post-op 11.4  Infectious Disease: romel-op Ancef, afebrile, wbc 8.14   Lines/Tubes: peripheral IV's   Endocrine: no diagnoses  MSK: ambulate as tolerated, OOBTC    Disposition: Possible DG in AM to surgical floor

## 2018-10-10 NOTE — DISCHARGE NOTE ADULT - CARE PLAN
Principal Discharge DX:	S/P laparoscopic sleeve gastrectomy  Goal:	Complete recovery  Assessment and plan of treatment:	As per protocol.

## 2018-10-10 NOTE — DISCHARGE NOTE ADULT - CARE PROVIDER_API CALL
Cally Carrizales), Surgery  [address] [place] [address]  [address] [place] [address]  Phone: [phone]  Fax: [phone]

## 2018-10-10 NOTE — DISCHARGE NOTE ADULT - HOSPITAL COURSE
48y Female with PMhx of HTN (hypertension), GERD (gastroesophageal reflux disease), Obesity, Obstructive sleep apnea on CPAP, Asthma, Fatty liver, of BMI 58.73, presented electively for lap sleeve gastrectomy, SCOTT, b/l tap block after failed medical management. The procedure was uneventful. Pt toleratd procedure well. Following procedure, the bariatric protocol was used and pt tolerated clears, no active complaint, ambulated, pain well controlled and using incentive spirometer (>1L). As per surgery team, pt seems ready to be discharged with instructions and follow up as per protocol.

## 2018-10-10 NOTE — PROGRESS NOTE ADULT - SUBJECTIVE AND OBJECTIVE BOX
ANNMARIE MCCULLOUGH  [id number]  48y Female    Indication for ICU admission: Hemodynamic monitoring   Admit Date:10-09-18  ICU Date: 10-09-18  OR Date: 10-09-18    No Known Allergies    PAST MEDICAL & SURGICAL HISTORY:  Fatty liver  Arthritis: CHR PAIN B/L KNEES  HTN (hypertension)  GERD (gastroesophageal reflux disease)  Obesity  Obstructive sleep apnea on CPAP  Asthma: STABLE, EPISODIC -AVERAGE USE OF ALBUTEROL ABOUT ONCE A MONTH  H/O arthroscopic knee surgery: LT AND RT  History of tonsillectomy and adenoidectomy: AND REMOVAL UVULA  H/O  section    Home Medications:  amLODIPine 10 mg oral tablet: 1 tab(s) orally once a day (09 Oct 2018 13:31)  HYDROcodone 10 mg oral capsule, extended release: 1 cap(s) orally 4 times a day, As Needed - for moderate pain (09 Oct 2018 13:31)  omeprazole 40 mg oral delayed release capsule: 1 cap(s) orally once a day (09 Oct 2018 13:31)  Qvar 80 mcg/inh inhalation aerosol: 2  inhaled 2 times a day (09 Oct 2018 13:31)  tiZANidine 4 mg oral tablet: 4 milligram(s) orally once a day (at bedtime) (09 Oct 2018 13:31)  Ventolin 90 mcg/inh inhalation aerosol with adapter:  (09 Oct 2018 13:31)    24HRS EVENT:  48y Female with PMhx below, BMI 58.73, GERD, DON on cpap, presented electively for lap sleeve gastrectomy, SCOTT, b/l tap block after failed medical management.  The procedure was uneventful. Pt is neurologically intact, in NAD and hemodynamically stable.    Surgery Information  OR time:   2.5h   EBL:   5cc       IV Fluids:   2.3L    Blood Products:  None  UOP:   no de leon     Neurologic: Intact. Pain control with Morphine PCA  Respiratory: Hx of DON on cpap. on 3L NC, wean off as tolerated.  Encourage IS. Encourage cpap at night  Cardiovascular: Hx of HTN, takes amlodipine at home. keep normotensive.    Gastrointestinal/Nutrition: NPO, LR @ 125, PPI.  Ambulated  Renal/Genitourinary: no de leon, voided & making good urine. f/u lytes and replete as needed  Hematologic: HSQ @ 7500u  Infectious Disease: romel-op Ancef   Lines/Tubes: peripheral IV's   Endocrine: no diagnoses    Overnight:  On CPAP, maintaining Sat low 90s, high 80s, switched back to O2NC with improvement in saturation. Patient ambulated multiple times in PACU and ICU overnight. K+ 3.5 repleted. no complaints of nausea or vomiting, no tahcycardia    DVT PTX: HSQ 7500  GI PTX: PPI    ***Tubes/Lines/Drains  ***  Peripheral IV    REVIEW OF SYSTEMS  [ X] A ten-point review of systems was otherwise negative except as noted. ANNMARIE MCCULLOUGH  [id number]  48y Female    Indication for ICU admission: Hemodynamic monitoring   Admit Date:10-09-18  ICU Date: 10-09-18  OR Date: 10-09-18    No Known Allergies    PAST MEDICAL & SURGICAL HISTORY:  Fatty liver  Arthritis: CHR PAIN B/L KNEES  HTN (hypertension)  GERD (gastroesophageal reflux disease)  Obesity  Obstructive sleep apnea on CPAP  Asthma: STABLE, EPISODIC -AVERAGE USE OF ALBUTEROL ABOUT ONCE A MONTH  H/O arthroscopic knee surgery: LT AND RT  History of tonsillectomy and adenoidectomy: AND REMOVAL UVULA  H/O  section    Home Medications:  amLODIPine 10 mg oral tablet: 1 tab(s) orally once a day (09 Oct 2018 13:31)  HYDROcodone 10 mg oral capsule, extended release: 1 cap(s) orally 4 times a day, As Needed - for moderate pain (09 Oct 2018 13:31)  omeprazole 40 mg oral delayed release capsule: 1 cap(s) orally once a day (09 Oct 2018 13:31)  Qvar 80 mcg/inh inhalation aerosol: 2  inhaled 2 times a day (09 Oct 2018 13:31)  tiZANidine 4 mg oral tablet: 4 milligram(s) orally once a day (at bedtime) (09 Oct 2018 13:31)  Ventolin 90 mcg/inh inhalation aerosol with adapter:  (09 Oct 2018 13:31)    24HRS EVENT:  48y Female with PMhx below, BMI 58.73, GERD, DON on cpap, presented electively for lap sleeve gastrectomy, SCOTT, b/l tap block after failed medical management.  The procedure was uneventful. Pt is neurologically intact, in NAD and hemodynamically stable.    Surgery Information  OR time:   2.5h   EBL:   5cc       IV Fluids:   2.3L    Blood Products:  None  UOP:   no de leon     Neurologic: Intact. Pain control with Morphine PCA  Respiratory: Hx of DON on cpap. on 3L NC, wean off as tolerated.  Encourage IS. Encourage cpap at night  Cardiovascular: Hx of HTN, takes amlodipine at home. BP 90s/60s overnight, MAP upper 60s-70s, bolused x1 500mL LR over 1 h for BP 80s/50s, no tacycardia  Gastrointestinal/Nutrition: NPO, LR @ 125, PPI, will advance diet in AM as per bariatric protocol  Renal/Genitourinary: no de leon, voided & making good urine. f/u lytes and replete as needed  Hematologic: HSQ @ 7500u  Infectious Disease: romel-op Ancef   Lines/Tubes: peripheral IV's   Endocrine: no diagnoses    Overnight:  On CPAP, maintaining Sat low 90s, high 80s, switched back to O2NC with improvement in saturation. Patient ambulated multiple times in PACU and ICU overnight. K+ 3.5 repleted. no complaints of nausea or vomiting, no tahcycardia    DVT PTX: HSQ 7500  GI PTX: PPI    ***Tubes/Lines/Drains  ***  Peripheral IV    REVIEW OF SYSTEMS  [ X] A ten-point review of systems was otherwise negative except as noted. ANNMARIE MCCULLOUGH  [id number]  48y Female    Indication for ICU admission: Hemodynamic monitoring   Admit Date:10-09-18  ICU Date: 10-09-18  OR Date: 10-09-18    No Known Allergies    PAST MEDICAL & SURGICAL HISTORY:  Fatty liver  Arthritis: CHR PAIN B/L KNEES  HTN (hypertension)  GERD (gastroesophageal reflux disease)  Obesity  Obstructive sleep apnea on CPAP  Asthma: STABLE, EPISODIC -AVERAGE USE OF ALBUTEROL ABOUT ONCE A MONTH  H/O arthroscopic knee surgery: LT AND RT  History of tonsillectomy and adenoidectomy: AND REMOVAL UVULA  H/O  section    Home Medications:  amLODIPine 10 mg oral tablet: 1 tab(s) orally once a day (09 Oct 2018 13:31)  HYDROcodone 10 mg oral capsule, extended release: 1 cap(s) orally 4 times a day, As Needed - for moderate pain (09 Oct 2018 13:31)  omeprazole 40 mg oral delayed release capsule: 1 cap(s) orally once a day (09 Oct 2018 13:31)  Qvar 80 mcg/inh inhalation aerosol: 2  inhaled 2 times a day (09 Oct 2018 13:31)  tiZANidine 4 mg oral tablet: 4 milligram(s) orally once a day (at bedtime) (09 Oct 2018 13:31)  Ventolin 90 mcg/inh inhalation aerosol with adapter:  (09 Oct 2018 13:31)    24HRS EVENT:  48y Female with PMhx below, BMI 58.73, GERD, DON on cpap, presented electively for lap sleeve gastrectomy, SCOTT, b/l tap block after failed medical management.  The procedure was uneventful. Pt is neurologically intact, in NAD and hemodynamically stable.    Surgery Information  OR time:   2.5h   EBL:   5cc       IV Fluids:   2.3L    Blood Products:  None  UOP:   no de leon     Neurologic: Intact. Pain control with Morphine PCA  Respiratory: Hx of DON on cpap. on 3L NC, wean off as tolerated.  Encourage IS. Encourage cpap at night  Cardiovascular: Hx of HTN, takes amlodipine at home. BP 90s/60s overnight, MAP upper 60s-70s, bolused x1 500mL LR over 1 h for BP 80s/50s, no tacycardia  Gastrointestinal/Nutrition: NPO, LR @ 125, PPI, will advance diet in AM as per bariatric protocol  Renal/Genitourinary: no de leon, voided & making good urine. f/u lytes and replete as needed  Hematologic: HSQ @ 7500u  Infectious Disease: romel-op Ancef   Lines/Tubes: peripheral IV's   Endocrine: no diagnoses    Overnight:  On CPAP, maintaining Sat low 90s, high 80s, switched back to O2NC with improvement in saturation. Patient ambulated multiple times in PACU and ICU overnight. K+ 3.5 repleted. no complaints of nausea or vomiting, no tahcycardia    DVT PTX: HSQ 7500  GI PTX: PPI    ***Tubes/Lines/Drains  ***  Peripheral IV    REVIEW OF SYSTEMS  [ X] A ten-point review of systems was otherwise negative except as noted.    Daily     Daily     Diet, Clear Liquid:   Bariatric Clear Liquid (BARICLLIQ) (10-10-18 @ 07:44)      CURRENT MEDS:  Neurologic Medications  morphine PCA (5 mG/mL) 30 milliLiter(s) PCA Continuous PCA Continuous  ondansetron Injectable 4 milliGRAM(s) IV Push every 6 hours PRN Nausea    Respiratory Medications    Cardiovascular Medications  enalaprilat Injectable 1.25 milliGRAM(s) IV Push every 6 hours PRN SBP > 130    Gastrointestinal Medications  lactated ringers. 1000 milliLiter(s) IV Continuous <Continuous>  pantoprazole  Injectable 40 milliGRAM(s) IV Push every 24 hours    Genitourinary Medications    Hematologic/Oncologic Medications  heparin  Injectable 7500 Unit(s) SubCutaneous every 8 hours  influenza   Vaccine 0.5 milliLiter(s) IntraMuscular once    Antimicrobial/Immunologic Medications    Endocrine/Metabolic Medications    Topical/Other Medications  naloxone Injectable 0.1 milliGRAM(s) IV Push every 3 minutes PRN For ANY of the following changes in patient status:  A. RR LESS THAN 10 breaths per minute, B. Oxygen saturation LESS THAN 90%, C. Sedation score of 6      ICU Vital Signs Last 24 Hrs  T(C): 35.9 (10 Oct 2018 08:00), Max: 36.8 (09 Oct 2018 10:41)  T(F): 96.7 (10 Oct 2018 08:00), Max: 98.2 (09 Oct 2018 10:41)  HR: 82 (10 Oct 2018 08:00) (64 - 105)  BP: 83/49 (10 Oct 2018 08:00) (74/47 - 163/68)  BP(mean): 60 (10 Oct 2018 08:00) (59 - 122)  ABP: --  ABP(mean): --  RR: 21 (10 Oct 2018 08:00) (9 - 35)  SpO2: 99% (10 Oct 2018 08:) (84% - 100%)        I&O's Summary    09 Oct 2018 07:  -  10 Oct 2018 07:00  --------------------------------------------------------  IN: 3175 mL / OUT: 2625 mL / NET: 550 mL    10 Oct 2018 07  -  10 Oct 2018 08:17  --------------------------------------------------------  IN: 155 mL / OUT: 0 mL / NET: 155 mL      I&O's Detail    09 Oct 2018 07:  -  10 Oct 2018 07:00  --------------------------------------------------------  IN:    IV PiggyBack: 150 mL    Lactated Ringers IV Bolus: 500 mL    lactated ringers.: 2375 mL    lactated ringers.: 150 mL  Total IN: 3175 mL    OUT:    Voided: 2625 mL  Total OUT: 2625 mL    Total NET: 550 mL      10 Oct 2018 07  -  10 Oct 2018 08:17  --------------------------------------------------------  IN:    lactated ringers.: 125 mL    Oral Fluid: 30 mL  Total IN: 155 mL    OUT:  Total OUT: 0 mL    Total NET: 155 mL          PHYSICAL EXAM:    General/Neuro  Exam:  alert & oriented x 3, no focal deficits                         Pupils: PERRLA     Lungs: clear to auscultation, Normal expansion/effort. no wheezing, rales and rhonchi    Cardiovascular : S1, S2.  Regular rate and rhythm.  No peripheral edema   Cardiac Rhythm: Normal Sinus Rhythm    GI: Abdomen soft, appropriately tender for post-op status, Non-distended.    Wound: 5 port-site c/d/i, no hematoma,     Extremities: Extremities warm, pink, well-perfused. Pulses: palpable b/l     Derm: Good skin turgor, no skin breakdown.      : no de leon catheter in place.      CXR:     LABS:  CAPILLARY BLOOD GLUCOSE                              10.8   8.14  )-----------( 310      ( 09 Oct 2018 23:00 )             34.7       10-09    141  |  100  |  6<L>  ----------------------------<  108<H>  3.5   |  25  |  0.7    Ca    8.7      09 Oct 2018 23:00 ANNMARIE MCCULLOUGH  [id number]  48y Female    Indication for ICU admission: Hemodynamic monitoring   Admit Date:10-09-18  ICU Date: 10-09-18  OR Date: 10-09-18    No Known Allergies    PAST MEDICAL & SURGICAL HISTORY:  Fatty liver  Arthritis: CHR PAIN B/L KNEES  HTN (hypertension)  GERD (gastroesophageal reflux disease)  Obesity  Obstructive sleep apnea on CPAP  Asthma: STABLE, EPISODIC -AVERAGE USE OF ALBUTEROL ABOUT ONCE A MONTH  H/O arthroscopic knee surgery: LT AND RT  History of tonsillectomy and adenoidectomy: AND REMOVAL UVULA  H/O  section    Home Medications:  amLODIPine 10 mg oral tablet: 1 tab(s) orally once a day (09 Oct 2018 13:31)  HYDROcodone 10 mg oral capsule, extended release: 1 cap(s) orally 4 times a day, As Needed - for moderate pain (09 Oct 2018 13:31)  omeprazole 40 mg oral delayed release capsule: 1 cap(s) orally once a day (09 Oct 2018 13:31)  Qvar 80 mcg/inh inhalation aerosol: 2  inhaled 2 times a day (09 Oct 2018 13:31)  tiZANidine 4 mg oral tablet: 4 milligram(s) orally once a day (at bedtime) (09 Oct 2018 13:31)  Ventolin 90 mcg/inh inhalation aerosol with adapter:  (09 Oct 2018 13:31)    24HRS EVENT:  48y Female with PMhx below, BMI 58.73, GERD, DON on cpap, presented electively for lap sleeve gastrectomy, SCOTT, b/l tap block after failed medical management.  The procedure was uneventful. Pt is neurologically intact, in NAD and hemodynamically stable.    Surgery Information  OR time:   2.5h   EBL:   5cc       IV Fluids:   2.3L    Blood Products:  None  UOP:   no de leon     Overnight:-  - On CPAP, maintaining Sat low 90s, high 80s, switched back to O2NC with improvement in saturation.   - Patient ambulated multiple times in PACU and ICU overnight.   - K+ 3.5 repleted. no complaints of nausea or vomiting, no tahcycardia    DVT PTX: HSQ 7500  GI PTX: PPI    ***Tubes/Lines/Drains  ***  Peripheral IV    REVIEW OF SYSTEMS  [ X] A ten-point review of systems was otherwise negative except as noted.      Diet, Clear Liquid:   Bariatric Clear Liquid (BARICLLIQ) (10-10-18 @ 07:44)      CURRENT MEDS:  Neurologic Medications  morphine PCA (5 mG/mL) 30 milliLiter(s) PCA Continuous PCA Continuous  ondansetron Injectable 4 milliGRAM(s) IV Push every 6 hours PRN Nausea    Respiratory Medications    Cardiovascular Medications  enalaprilat Injectable 1.25 milliGRAM(s) IV Push every 6 hours PRN SBP > 130    Gastrointestinal Medications  lactated ringers. 1000 milliLiter(s) IV Continuous <Continuous>  pantoprazole  Injectable 40 milliGRAM(s) IV Push every 24 hours    Genitourinary Medications    Hematologic/Oncologic Medications  heparin  Injectable 7500 Unit(s) SubCutaneous every 8 hours  influenza   Vaccine 0.5 milliLiter(s) IntraMuscular once    Antimicrobial/Immunologic Medications    Endocrine/Metabolic Medications    Topical/Other Medications  naloxone Injectable 0.1 milliGRAM(s) IV Push every 3 minutes PRN For ANY of the following changes in patient status:  A. RR LESS THAN 10 breaths per minute, B. Oxygen saturation LESS THAN 90%, C. Sedation score of 6      ICU Vital Signs Last 24 Hrs  T(C): 35.9 (10 Oct 2018 08:00), Max: 36.8 (09 Oct 2018 10:41)  T(F): 96.7 (10 Oct 2018 08:00), Max: 98.2 (09 Oct 2018 10:41)  HR: 82 (10 Oct 2018 08:00) (64 - 105)  BP: 83/49 (10 Oct 2018 08:00) (74/47 - 163/68)  BP(mean): 60 (10 Oct 2018 08:00) (59 - 122)  ABP: --  ABP(mean): --  RR: 21 (10 Oct 2018 08:00) (9 - 35)  SpO2: 99% (10 Oct 2018 08:00) (84% - 100%)        I&O's Summary    09 Oct 2018 07:01  -  10 Oct 2018 07:00  --------------------------------------------------------  IN: 3175 mL / OUT: 2625 mL / NET: 550 mL    10 Oct 2018 07:01  -  10 Oct 2018 08:17  --------------------------------------------------------  IN: 155 mL / OUT: 0 mL / NET: 155 mL      I&O's Detail    09 Oct 2018 07:  -  10 Oct 2018 07:00  --------------------------------------------------------  IN:    IV PiggyBack: 150 mL    Lactated Ringers IV Bolus: 500 mL    lactated ringers.: 2375 mL    lactated ringers.: 150 mL  Total IN: 3175 mL    OUT:    Voided: 2625 mL  Total OUT: 2625 mL    Total NET: 550 mL      10 Oct 2018 07:01  -  10 Oct 2018 08:17  --------------------------------------------------------  IN:    lactated ringers.: 125 mL    Oral Fluid: 30 mL  Total IN: 155 mL    OUT:  Total OUT: 0 mL    Total NET: 155 mL          PHYSICAL EXAM:    General/Neuro  Exam:  alert & oriented x 3, no focal deficits                         Pupils: PERRLA     Lungs: clear to auscultation, Normal expansion/effort. no wheezing, rales and rhonchi    Cardiovascular : S1, S2.  Regular rate and rhythm.  No peripheral edema   Cardiac Rhythm: Normal Sinus Rhythm    GI: Abdomen soft, appropriately tender for post-op status, Non-distended.    Wound: 5 port-site c/d/i, no hematoma,     Extremities: Extremities warm, pink, well-perfused. Pulses: palpable b/l     Derm: Good skin turgor, no skin breakdown.      : no de leon catheter in place.      CXR:     LABS:  CAPILLARY BLOOD GLUCOSE                              10.8   8.14  )-----------( 310      ( 09 Oct 2018 23:00 )             34.7       10    141  |  100  |  6<L>  ----------------------------<  108<H>  3.5   |  25  |  0.7    Ca    8.7      09 Oct 2018 23:00

## 2018-10-10 NOTE — PROGRESS NOTE ADULT - SUBJECTIVE AND OBJECTIVE BOX
Progress Note: Surgery  Patient: ANNMARIE MCCULLOUGH , 48y (1970)Female   MRN: [id number]  Location: [place] [address] A  Visit: 10-09-18 Inpatient  Date: 10-10-18 @ 00:45    Hospital Day: 2  Post-op Day: 1    Procedure/Injury: laparoscopic sleeve gastrectomy   Events over 24h: HUONG, Patient doing well, Pain is well controlled, She is ambulating and pulling almost 1 L on IS, no gas or BM     Vitals: T(F): 97.8 (10-09-18 @ 22:00), Max: 98.6 (10-09-18 @ 06:21)  HR: 78 (10-09-18 @ 23:00)  BP: 145/78 (10-09-18 @ 22:00) (117/73 - 163/68)  RR: 18 (10-09-18 @ 23:00)  SpO2: 97% (10-09-18 @ 23:00)    Diet: Diet, NPO (10-09-18 @ 10:56)      Bowel function:   Bowel movement []   Flatus []      In:   10-09-18 @ 07:01  -  10-10-18 @ 00:45  --------------------------------------------------------  IN: 1525 mL      Out:   10-09-18 @ 07:01  -  10-10-18 @ 00:45  --------------------------------------------------------  OUT:    Voided: 1525 mL  Total OUT: 1525 mL        Net:   10-09-18 @ 07:01  -  10-10-18 @ 00:45  --------------------------------------------------------  NET: 0 mL          Physical Examination:  General: NAD, lying in bed comfortably   HEENT: NCAT,   Heart: S1 S2, No MRG RRR   Lungs: CTAB  Abdomen: Soft, nondistended, tender   MSK/Extremities: ROM intact BL UE/LE. Normal gait. No significant deformity or joint abnormality.   Incisions/Wounds: Dressings in place, clean, dry and intact, no signs of infection/active bleeding/drainage    Medications: [Standing]  cefoTEtan  IVPB 2 Gram(s) IV Intermittent every 12 hours  heparin  Injectable 7500 Unit(s) SubCutaneous every 8 hours  lactated ringers. 1000 milliLiter(s) (125 mL/Hr) IV Continuous <Continuous>  morphine PCA (5 mG/mL) 30 milliLiter(s) PCA Continuous PCA Continuous  ondansetron Injectable 4 milliGRAM(s) IV Push every 8 hours  pantoprazole  Injectable 40 milliGRAM(s) IV Push every 24 hours    Medications:[PRN]  enalaprilat Injectable 1.25 milliGRAM(s) IV Push every 6 hours PRN  naloxone Injectable 0.1 milliGRAM(s) IV Push every 3 minutes PRN  ondansetron Injectable 4 milliGRAM(s) IV Push every 6 hours PRN    Labs:                        10.8   8.14  )-----------( 310      ( 09 Oct 2018 23:00 )             34.7     10-09    141  |  100  |  6<L>  ----------------------------<  108<H>  3.5   |  25  |  0.7    Ca    8.7      09 Oct 2018 23:00      Imaging:  None/24h    Assessment:  48y Female patient admitted S/P lap sleeve gastrectomy doing well     Plan:  - advance to isis phase 1 diet this AM  - Pain control   - Ambulate  - IS   - If tolerating diet will switch pain control to crushed percocet on 3rd row of 3oz  - if tolerating diet well can go home later this afternoon     Dispo:  Seen and evaluated by PT: Yes [] No []  Physiatry reccomendations: Home with VNS [  ] , SNF/STR [  ] , Inpatient rehab [  ] , No needs + D/C home [  ]  SW:     Date/Time: 10-10-18 @ 00:45 Progress Note: Surgery  Patient: ANNMARIE MCCULLOUGH , 48y (1970)Female   MRN: [id number]  Location: [place] [address] A  Visit: 10-09-18 Inpatient  Date: 10-10-18 @ 00:45    Hospital Day: 2  Post-op Day: 1    Procedure/Injury: laparoscopic sleeve gastrectomy   Events over 24h: HUONG, Patient doing well, Pain is well controlled, She is ambulating and pulling almost 1 L on IS.    Vitals: T(F): 97.8 (10-09-18 @ 22:00), Max: 98.6 (10-09-18 @ 06:21)  HR: 78 (10-09-18 @ 23:00)  BP: 145/78 (10-09-18 @ 22:00) (117/73 - 163/68)  RR: 18 (10-09-18 @ 23:00)  SpO2: 97% (10-09-18 @ 23:00)    Diet: Diet, NPO (10-09-18 @ 10:56)      In:   10-09-18 @ 07:01  -  10-10-18 @ 00:45  --------------------------------------------------------  IN: 1525 mL      Out:   10-09-18 @ 07:01  -  10-10-18 @ 00:45  --------------------------------------------------------  OUT:    Voided: 1525 mL  Total OUT: 1525 mL        Net:   10-09-18 @ 07:01  -  10-10-18 @ 00:45  --------------------------------------------------------  NET: 0 mL          Physical Examination:  General: NAD, lying in bed comfortably   HEENT: NCAT,   Heart: S1 S2, No MRG RRR   Lungs: CTAB  Abdomen: Soft, nondistended, mild incisional tenderness, no guarding or rebound   MSK/Extremities: ROM intact BL UE/LE. Normal gait. No significant deformity or joint abnormality.   Incisions/Wounds: Dressings in place, clean, dry and intact, no signs of infection/active bleeding/drainage    Medications: [Standing]  cefoTEtan  IVPB 2 Gram(s) IV Intermittent every 12 hours  heparin  Injectable 7500 Unit(s) SubCutaneous every 8 hours  lactated ringers. 1000 milliLiter(s) (125 mL/Hr) IV Continuous <Continuous>  morphine PCA (5 mG/mL) 30 milliLiter(s) PCA Continuous PCA Continuous  ondansetron Injectable 4 milliGRAM(s) IV Push every 8 hours  pantoprazole  Injectable 40 milliGRAM(s) IV Push every 24 hours    Medications:[PRN]  enalaprilat Injectable 1.25 milliGRAM(s) IV Push every 6 hours PRN  naloxone Injectable 0.1 milliGRAM(s) IV Push every 3 minutes PRN  ondansetron Injectable 4 milliGRAM(s) IV Push every 6 hours PRN    Labs:                        10.8   8.14  )-----------( 310      ( 09 Oct 2018 23:00 )             34.7     10-09    141  |  100  |  6<L>  ----------------------------<  108<H>  3.5   |  25  |  0.7    Ca    8.7      09 Oct 2018 23:00            Assessment:  48y Female patient admitted POD#1 s/p lap sleeve gastrectomy doing well     Plan:  - advance to isis phase 1 diet this AM  - Pain control   - Ambulate  - IS   - If tolerating diet will switch pain control to crushed percocet on 3rd row of 3oz          Date/Time: 10-10-18 @ 00:45

## 2018-10-10 NOTE — DISCHARGE NOTE ADULT - MEDICATION SUMMARY - MEDICATIONS TO TAKE
I will START or STAY ON the medications listed below when I get home from the hospital:    HYDROcodone 10 mg oral capsule, extended release  -- 1 cap(s) by mouth 4 times a day, As Needed - for moderate pain  -- Indication: For Pain    Ventolin 90 mcg/inh inhalation aerosol with adapter  -- Indication: For Obstructive airway dis    amLODIPine 10 mg oral tablet  -- 1 tab(s) by mouth once a day  -- Indication: For HTN    tiZANidine 4 mg oral tablet  -- 4 milligram(s) by mouth once a day (at bedtime)  -- Indication: For HTN    omeprazole 40 mg oral delayed release capsule  -- 1 cap(s) by mouth once a day  -- Indication: For Stomach upset    Qvar 80 mcg/inh inhalation aerosol  -- 2  inhaled 2 times a day  -- Indication: For Obstructive airway dis

## 2018-10-10 NOTE — CHART NOTE - NSCHARTNOTEFT_GEN_A_CORE
ANNMARIE MCCULLOUGH  [id number]  48F BMI 58.7, DON on CPAPA with PMH below present s/p elective sleeve gastrectomy, SCOTT, b/l Tap Block.  The patient was observed in ICU overnight.  The patient ambulated and voided  multiple times, no complaints of nausea or vomiting, no tachycardia.  Her SBP was reading in the 70-80's, she was bolused with 500 nsx1 and sbp improved onernight. During AM rounds, we changed the blood pressure cuff, her SBP reading improved to 120. She was started on phase 1 bariatric diet at 6:30 am and currently drinking 2 cups.  Repeat cbc was stable at Hgb ------------.  The patient is doing well and hemodynamically stable for transfer.     Plan  Neuro: pain control with Morphine PCA  Resp: pulling 1500 on IS. sating well on 2L  CVS: Hx of HTN; Enalapril IV ordered   GI: started on phase 1 bariatric diet at 6:30AM.  Currently on ---row of ----cups  Renal: Hypokalemic 3.5 - repleted   : Voided  Heme: HSQ @ 7500.  repeat cbc   Endo: no diagnosis  MSK: ambulating  Lines: peripheral IV's    Disposition:  Transfer to floor    Indication for ICU admission:  Admit Date:10-09-18  ICU Date: 10-09-18  OR Date: 10-09-18    No Known Allergies    PAST MEDICAL & SURGICAL HISTORY:  Fatty liver  Arthritis: CHR PAIN B/L KNEES  HTN (hypertension)  GERD (gastroesophageal reflux disease)  Obesity  Obstructive sleep apnea on CPAP  Asthma: STABLE, EPISODIC -AVERAGE USE OF ALBUTEROL ABOUT ONCE A MONTH  H/O arthroscopic knee surgery: LT AND RT  History of tonsillectomy and adenoidectomy: AND REMOVAL UVULA  H/O  section    Home Medications:  amLODIPine 10 mg oral tablet: 1 tab(s) orally once a day (10 Oct 2018 11:33)  HYDROcodone 10 mg oral capsule, extended release: 1 cap(s) orally 4 times a day, As Needed - for moderate pain (10 Oct 2018 11:33)  omeprazole 40 mg oral delayed release capsule: 1 cap(s) orally once a day (10 Oct 2018 11:33)  Qvar 80 mcg/inh inhalation aerosol: 2  inhaled 2 times a day (10 Oct 2018 11:33)  tiZANidine 4 mg oral tablet: 4 milligram(s) orally once a day (at bedtime) (10 Oct 2018 11:33)  Ventolin 90 mcg/inh inhalation aerosol with adapter:  (10 Oct 2018 11:            DVT PTX: HSQ 7500     GI PTX: PPI    ***Tubes/Lines/Drains  ***  Peripheral IV's ANNMARIE MCCULLOUGH  [id number]  48F BMI 58.7, DON on CPAPA with PMH below present s/p elective sleeve gastrectomy, SCOTT, b/l Tap Block.  The patient was observed in ICU overnight.  The patient ambulated and voided  multiple times, no complaints of nausea or vomiting, no tachycardia.  Her SBP was reading in the 70-80's, she was bolused with 500 nsx1 and sbp improved onernight. During AM rounds, we changed the blood pressure cuff, her SBP reading improved to 120. She was started on phase 1 bariatric diet at 6:30 am. Repeat cbc stable at Hgb 11.2.  The patient is doing well and hemodynamically stable for transfer.     Plan  Neuro: pain control with Morphine PCA  Resp: pulling 1500 on IS. sating well on 2L  CVS: Hx of HTN; Enalapril IV ordered   GI: started on phase 1 bariatric diet at 6:30AM.  Currently on 3 row of 1 cup  Renal: Hypokalemic 3.5 - replete   : Voided  Heme: HSQ @ 7500.  repeat cbc 11.2 ( admission Hg 11.4)  Endo: no diagnosis  MSK: ambulating  Lines: peripheral IV's    Disposition:  Stable for transfer to floor.  Sign out to (----)     Indication for ICU admission:  Admit Date:10-09-18  ICU Date: 10-09-18  OR Date: 10-09-18    No Known Allergies    PAST MEDICAL & SURGICAL HISTORY:  Fatty liver  Arthritis: CHR PAIN B/L KNEES  HTN (hypertension)  GERD (gastroesophageal reflux disease)  Obesity  Obstructive sleep apnea on CPAP  Asthma: STABLE, EPISODIC -AVERAGE USE OF ALBUTEROL ABOUT ONCE A MONTH  H/O arthroscopic knee surgery: LT AND RT  History of tonsillectomy and adenoidectomy: AND REMOVAL UVULA  H/O  section    Home Medications:  amLODIPine 10 mg oral tablet: 1 tab(s) orally once a day (10 Oct 2018 11:33)  HYDROcodone 10 mg oral capsule, extended release: 1 cap(s) orally 4 times a day, As Needed - for moderate pain (10 Oct 2018 11:33)  omeprazole 40 mg oral delayed release capsule: 1 cap(s) orally once a day (10 Oct 2018 11:33)  Qvar 80 mcg/inh inhalation aerosol: 2  inhaled 2 times a day (10 Oct 2018 11:33)  tiZANidine 4 mg oral tablet: 4 milligram(s) orally once a day (at bedtime) (10 Oct 2018 11:33)  Ventolin 90 mcg/inh inhalation aerosol with adapter:  (10 Oct 2018 11:            DVT PTX: HSQ 7500     GI PTX: PPI    ***Tubes/Lines/Drains  ***  Peripheral IV's ANNMARIE MCCULLOUGH  [id number]  48F BMI 58.7, DON on CPAPA with PMH below present s/p elective sleeve gastrectomy, SCOTT, b/l Tap Block.  The patient was observed in ICU overnight.  The patient ambulated and voided  multiple times, no complaints of nausea or vomiting, no tachycardia.  Her SBP was reading in the 70-80's, she was bolused with 500 nsx1 and sbp improved overnight. During AM rounds, we changed the blood pressure cuff, her SBP reading improved to 120. She was started on phase 1 bariatric diet at 6:30 am. Repeat cbc stable at Hgb 11.2.  The patient is doing well and hemodynamically stable for transfer.     Plan  Neuro: pain control with Morphine PCA  Resp: pulling 1500 on IS. sating well on 2L  CVS: Hx of HTN; Enalapril IV ordered   GI: started on phase 1 bariatric diet at 6:30AM.  Currently on 3 row of 1 cup  Renal: Hypokalemic 3.5 - replete   : Voided  Heme: HSQ @ 7500.  repeat cbc 11.2 ( admission Hg 11.4)  Endo: no diagnosis  MSK: ambulating  Lines: peripheral IV's    Disposition:  Stable for transfer to floor.  Sign out to Danielle SOLORZANO at 1:40pm    Indication for ICU admission:  Admit Date:10-09-18  ICU Date: 10-09-18  OR Date: 10-09-18    No Known Allergies    PAST MEDICAL & SURGICAL HISTORY:  Fatty liver  Arthritis: CHR PAIN B/L KNEES  HTN (hypertension)  GERD (gastroesophageal reflux disease)  Obesity  Obstructive sleep apnea on CPAP  Asthma: STABLE, EPISODIC -AVERAGE USE OF ALBUTEROL ABOUT ONCE A MONTH  H/O arthroscopic knee surgery: LT AND RT  History of tonsillectomy and adenoidectomy: AND REMOVAL UVULA  H/O  section    Home Medications:  amLODIPine 10 mg oral tablet: 1 tab(s) orally once a day (10 Oct 2018 11:33)  HYDROcodone 10 mg oral capsule, extended release: 1 cap(s) orally 4 times a day, As Needed - for moderate pain (10 Oct 2018 11:33)  omeprazole 40 mg oral delayed release capsule: 1 cap(s) orally once a day (10 Oct 2018 11:33)  Qvar 80 mcg/inh inhalation aerosol: 2  inhaled 2 times a day (10 Oct 2018 11:33)  tiZANidine 4 mg oral tablet: 4 milligram(s) orally once a day (at bedtime) (10 Oct 2018 11:33)  Ventolin 90 mcg/inh inhalation aerosol with adapter:  (10 Oct 2018 11:      DVT PTX: HSQ 7500     GI PTX: PPI

## 2018-10-10 NOTE — CHART NOTE - NSCHARTNOTEFT_GEN_A_CORE
47 YO F s/p lap sleeve gastrectomy - POD #1.  Tolerating isis clear liquid diet at 2 oz/hr - c/o mild nausea.  Has protein shakes and chewable vits/mins available at home.  Patient verbalized understanding of Phase I diet to begin upon discharge.  DROP sheet reviewed with patient and all questions were answered.  Will follow up in office next week.  Call [phone] with questions/concerns.

## 2018-10-10 NOTE — DISCHARGE NOTE ADULT - PATIENT PORTAL LINK FT
You can access the [facility], offered by [facility], by registering with the following website: [email or web address]

## 2018-10-10 NOTE — PROGRESS NOTE ADULT - ATTENDING COMMENTS
Patient clinically stable, has now started bariatric diet.  Will follow.
Doing well.  Headed to floor.

## 2018-10-11 VITALS
DIASTOLIC BLOOD PRESSURE: 58 MMHG | HEART RATE: 84 BPM | RESPIRATION RATE: 20 BRPM | TEMPERATURE: 98 F | SYSTOLIC BLOOD PRESSURE: 117 MMHG

## 2018-10-11 LAB
ANION GAP SERPL CALC-SCNC: 16 MMOL/L — HIGH (ref 7–14)
BUN SERPL-MCNC: 5 MG/DL — LOW (ref 10–20)
CALCIUM SERPL-MCNC: 8.9 MG/DL — SIGNIFICANT CHANGE UP (ref 8.5–10.1)
CHLORIDE SERPL-SCNC: 97 MMOL/L — LOW (ref 98–110)
CO2 SERPL-SCNC: 26 MMOL/L — SIGNIFICANT CHANGE UP (ref 17–32)
CREAT SERPL-MCNC: 0.7 MG/DL — SIGNIFICANT CHANGE UP (ref 0.7–1.5)
GLUCOSE SERPL-MCNC: 83 MG/DL — SIGNIFICANT CHANGE UP (ref 70–99)
HCT VFR BLD CALC: 35.6 % — LOW (ref 37–47)
HGB BLD-MCNC: 10.8 G/DL — LOW (ref 12–16)
MAGNESIUM SERPL-MCNC: 1.7 MG/DL — LOW (ref 1.8–2.4)
MCHC RBC-ENTMCNC: 24.3 PG — LOW (ref 27–31)
MCHC RBC-ENTMCNC: 30.3 G/DL — LOW (ref 32–37)
MCV RBC AUTO: 80 FL — LOW (ref 81–99)
NRBC # BLD: 0 /100 WBCS — SIGNIFICANT CHANGE UP (ref 0–0)
PHOSPHATE SERPL-MCNC: 3 MG/DL — SIGNIFICANT CHANGE UP (ref 2.1–4.9)
PLATELET # BLD AUTO: 298 K/UL — SIGNIFICANT CHANGE UP (ref 130–400)
POTASSIUM SERPL-MCNC: 3.5 MMOL/L — SIGNIFICANT CHANGE UP (ref 3.5–5)
POTASSIUM SERPL-SCNC: 3.5 MMOL/L — SIGNIFICANT CHANGE UP (ref 3.5–5)
RBC # BLD: 4.45 M/UL — SIGNIFICANT CHANGE UP (ref 4.2–5.4)
RBC # FLD: 15.2 % — HIGH (ref 11.5–14.5)
SODIUM SERPL-SCNC: 139 MMOL/L — SIGNIFICANT CHANGE UP (ref 135–146)
SURGICAL PATHOLOGY STUDY: SIGNIFICANT CHANGE UP
WBC # BLD: 7.51 K/UL — SIGNIFICANT CHANGE UP (ref 4.8–10.8)
WBC # FLD AUTO: 7.51 K/UL — SIGNIFICANT CHANGE UP (ref 4.8–10.8)

## 2018-10-11 RX ORDER — ONDANSETRON 8 MG/1
1 TABLET, FILM COATED ORAL
Qty: 12 | Refills: 0 | OUTPATIENT
Start: 2018-10-11

## 2018-10-11 RX ORDER — MORPHINE SULFATE 50 MG/1
2 CAPSULE, EXTENDED RELEASE ORAL ONCE
Qty: 0 | Refills: 0 | Status: DISCONTINUED | OUTPATIENT
Start: 2018-10-11 | End: 2018-10-11

## 2018-10-11 RX ORDER — POTASSIUM CHLORIDE 20 MEQ
20 PACKET (EA) ORAL
Qty: 0 | Refills: 0 | Status: DISCONTINUED | OUTPATIENT
Start: 2018-10-11 | End: 2018-10-11

## 2018-10-11 RX ORDER — KETOROLAC TROMETHAMINE 30 MG/ML
15 SYRINGE (ML) INJECTION ONCE
Qty: 0 | Refills: 0 | Status: DISCONTINUED | OUTPATIENT
Start: 2018-10-11 | End: 2018-10-11

## 2018-10-11 RX ORDER — ONDANSETRON 8 MG/1
8 TABLET, FILM COATED ORAL DAILY
Qty: 0 | Refills: 0 | Status: DISCONTINUED | OUTPATIENT
Start: 2018-10-11 | End: 2018-10-11

## 2018-10-11 RX ORDER — ONDANSETRON 8 MG/1
1 TABLET, FILM COATED ORAL
Qty: 12 | Refills: 0
Start: 2018-10-11

## 2018-10-11 RX ADMIN — MORPHINE SULFATE 2 MILLIGRAM(S): 50 CAPSULE, EXTENDED RELEASE ORAL at 04:23

## 2018-10-11 RX ADMIN — Medication 50 MILLIEQUIVALENT(S): at 06:14

## 2018-10-11 RX ADMIN — ONDANSETRON 8 MILLIGRAM(S): 8 TABLET, FILM COATED ORAL at 12:17

## 2018-10-11 RX ADMIN — ONDANSETRON 4 MILLIGRAM(S): 8 TABLET, FILM COATED ORAL at 04:55

## 2018-10-11 RX ADMIN — Medication 15 MILLIGRAM(S): at 06:52

## 2018-10-11 RX ADMIN — MORPHINE SULFATE 2 MILLIGRAM(S): 50 CAPSULE, EXTENDED RELEASE ORAL at 06:49

## 2018-10-11 RX ADMIN — HEPARIN SODIUM 7500 UNIT(S): 5000 INJECTION INTRAVENOUS; SUBCUTANEOUS at 05:00

## 2018-10-11 NOTE — PROGRESS NOTE ADULT - SUBJECTIVE AND OBJECTIVE BOX
Progress Note: Surgery  Patient: ANNMARIE MCCULLOUGH , 48y (1970)Female   MRN: [id number]  Location: [address]  Visit: 10-09-18 Inpatient  Date: 10-11-18 @ 05:32    Hospital Day: 3  Post-op Day: 2    Procedure/Injury:  laparoscopic sleeve gastrectomy   Events over 24h: HUONG, patient is doing well. she reached the 3rd row of her 3oz. and her PCA and IVF was d/c'd. She was ordered for crushed percocet for pain control but she declined as she prefers vicodin as percocet gives her icthyness and she didn't ewant to be scrathing her incisions. Vicodin is non-formulary so she got a 1x dose of 2 mg morphine which helped control her pain overnight. She is ambulating well and pulling 1L on IS      Vitals: T(F): 97.5 (10-11-18 @ 02:22), Max: 98.5 (10-10-18 @ 20:05)  HR: 80 (10-11-18 @ 02:22)  BP: 119/65 (10-11-18 @ 02:22) (62/42 - 154/104)  RR: 18 (10-11-18 @ 02:22)  SpO2: 96% (10-10-18 @ 23:00)    Diet: Diet, Clear Liquid:   Bariatric Clear Liquid (BARICLLIQ) (10-10-18 @ 07:44)      Bowel function:   Bowel movement []   Flatus []      In:   10-09-18 @ 07:01  -  10-10-18 @ 07:00  --------------------------------------------------------  IN: 3175 mL    10-10-18 @ 07:01  -  10-11-18 @ 05:32  --------------------------------------------------------  IN: 2243.5 mL      Out:   10-09-18 @ 07:01  -  10-10-18 @ 07:00  --------------------------------------------------------  OUT:    Voided: 2625 mL  Total OUT: 2625 mL      10-10-18 @ 07:01  -  10-11-18 @ 05:32  --------------------------------------------------------  OUT:    Voided: 2100 mL  Total OUT: 2100 mL        Net:   10-09-18 @ 07:01  -  10-10-18 @ 07:00  --------------------------------------------------------  NET: 550 mL    10-10-18 @ 07:01  -  10-11-18 @ 05:32  --------------------------------------------------------  NET: 143.5 mL      Physical Examination:  General: NAD, lying in bed comfortably   HEENT: NCAT,   Heart: S1 S2, No MRG RRR   Lungs: CTAB  Abdomen: Soft, nondistended, mild incisional tenderness, no guarding or rebound   MSK/Extremities: ROM intact BL UE/LE. Normal gait. No significant deformity or joint abnormality.   Incisions/Wounds: Dressings in place, clean, dry and intact, no signs of infection/active bleeding/drainage      Medications: [Standing]  heparin  Injectable 7500 Unit(s) SubCutaneous every 8 hours  influenza   Vaccine 0.5 milliLiter(s) IntraMuscular once  pantoprazole  Injectable 40 milliGRAM(s) IV Push every 24 hours  potassium chloride  20 mEq/100 mL IVPB 20 milliEquivalent(s) IV Intermittent every 2 hours    Medications:[PRN]  enalaprilat Injectable 1.25 milliGRAM(s) IV Push every 6 hours PRN  ondansetron Injectable 4 milliGRAM(s) IV Push every 6 hours PRN    Labs:                        10.8   7.51  )-----------( 298      ( 10 Oct 2018 23:34 )             35.6     10-10    139  |  97<L>  |  5<L>  ----------------------------<  83  3.5   |  26  |  0.7    Ca    8.9      10 Oct 2018 23:34  Phos  3.0     10-10  Mg     1.7     10-10      Assessment:  48y Female patient admitted POD#2 s/p lap sleeve gastrectomy doing well     Plan:  - continue isis phase 1 diet  - Pain control   - Ambulate  - IS   - If tolerating diet and pain under control may go home later today      Dispo:  Seen and evaluated by PT: Yes [] No []  Physiatry reccomendations: Home with VNS [  ] , SNF/STR [  ] , Inpatient rehab [  ] , No needs + D/C home [  ]  SW:     Date/Time: 10-11-18 @ 05:32 Progress Note: Surgery  Patient: ANNMARIE MCCULLOUGH , 48y (1970)Female   MRN: [id number]  Location: [address]  Visit: 10-09-18 Inpatient  Date: 10-11-18 @ 05:32    Hospital Day: 3  Post-op Day: 2    Procedure/Injury:  laparoscopic sleeve gastrectomy   Events over 24h: HUONG, patient is doing well. she reached the 3rd row of her 3oz. and her PCA and IVF was d/c'd. She was ordered for crushed percocet for pain control but she declined as she prefers vicodin as percocet gives her icthyness and she didn't ewant to be scrathing her incisions. Vicodin is non-formulary so she got a 1x dose of 2 mg morphine which helped control her pain overnight. She is ambulating well and pulling 1L on IS      Vitals: T(F): 97.5 (10-11-18 @ 02:22), Max: 98.5 (10-10-18 @ 20:05)  HR: 80 (10-11-18 @ 02:22)  BP: 119/65 (10-11-18 @ 02:22) (62/42 - 154/104)  RR: 18 (10-11-18 @ 02:22)  SpO2: 96% (10-10-18 @ 23:00)    Diet: Diet, Clear Liquid:   Bariatric Clear Liquid (BARICLLIQ) (10-10-18 @ 07:44)    In:   10-09-18 @ 07:01  -  10-10-18 @ 07:00  --------------------------------------------------------  IN: 3175 mL    10-10-18 @ 07:01  -  10-11-18 @ 05:32  --------------------------------------------------------  IN: 2243.5 mL      Out:   10-09-18 @ 07:01  -  10-10-18 @ 07:00  --------------------------------------------------------  OUT:    Voided: 2625 mL  Total OUT: 2625 mL      10-10-18 @ 07:01  -  10-11-18 @ 05:32  --------------------------------------------------------  OUT:    Voided: 2100 mL  Total OUT: 2100 mL        Net:   10-09-18 @ 07:01  -  10-10-18 @ 07:00  --------------------------------------------------------  NET: 550 mL    10-10-18 @ 07:01  -  10-11-18 @ 05:32  --------------------------------------------------------  NET: 143.5 mL      Physical Examination:  General: NAD, lying in bed comfortably   HEENT: NCAT,   Heart: S1 S2, No MRG RRR   Lungs: CTAB  Abdomen: Soft, nondistended, mild incisional tenderness, no guarding or rebound   MSK/Extremities: ROM intact BL UE/LE. Normal gait. No significant deformity or joint abnormality.   Incisions/Wounds: Dressings in place, clean, dry and intact, no signs of infection/active bleeding/drainage      Medications: [Standing]  heparin  Injectable 7500 Unit(s) SubCutaneous every 8 hours  influenza   Vaccine 0.5 milliLiter(s) IntraMuscular once  pantoprazole  Injectable 40 milliGRAM(s) IV Push every 24 hours  potassium chloride  20 mEq/100 mL IVPB 20 milliEquivalent(s) IV Intermittent every 2 hours    Medications:[PRN]  enalaprilat Injectable 1.25 milliGRAM(s) IV Push every 6 hours PRN  ondansetron Injectable 4 milliGRAM(s) IV Push every 6 hours PRN    Labs:                        10.8   7.51  )-----------( 298      ( 10 Oct 2018 23:34 )             35.6     10-10    139  |  97<L>  |  5<L>  ----------------------------<  83  3.5   |  26  |  0.7    Ca    8.9      10 Oct 2018 23:34  Phos  3.0     10-10  Mg     1.7     10-10      Assessment:  48y Female patient admitted POD#2 s/p lap sleeve gastrectomy doing well     Plan:  - continue isis phase 1 diet  - Pain control   - Ambulate  - IS   - If tolerating diet and pain under control may go home later today      Dispo:  Seen and evaluated by PT: Yes [] No []  Physiatry reccomendations: Home with VNS [  ] , SNF/STR [  ] , Inpatient rehab [  ] , No needs + D/C home [  ]  SW:     Date/Time: 10-11-18 @ 05:32

## 2018-10-11 NOTE — PROGRESS NOTE ADULT - ASSESSMENT
Patient clinically stable, tolerating diet. Toradol given for pain.  Doing well overall. Plan for discharge.    Plan of care d/w patient.

## 2018-10-12 ENCOUNTER — CLINICAL ADVICE (OUTPATIENT)
Age: 48
End: 2018-10-12

## 2018-10-15 ENCOUNTER — FORM ENCOUNTER (OUTPATIENT)
Age: 48
End: 2018-10-15

## 2018-10-16 ENCOUNTER — OUTPATIENT (OUTPATIENT)
Dept: OUTPATIENT SERVICES | Facility: HOSPITAL | Age: 48
LOS: 1 days | Discharge: HOME | End: 2018-10-16

## 2018-10-16 DIAGNOSIS — E66.01 MORBID (SEVERE) OBESITY DUE TO EXCESS CALORIES: ICD-10-CM

## 2018-10-16 DIAGNOSIS — Z98.891 HISTORY OF UTERINE SCAR FROM PREVIOUS SURGERY: Chronic | ICD-10-CM

## 2018-10-16 DIAGNOSIS — Z98.890 OTHER SPECIFIED POSTPROCEDURAL STATES: Chronic | ICD-10-CM

## 2018-10-17 ENCOUNTER — APPOINTMENT (OUTPATIENT)
Dept: SURGERY | Facility: CLINIC | Age: 48
End: 2018-10-17
Payer: COMMERCIAL

## 2018-10-17 VITALS — HEIGHT: 66.5 IN | WEIGHT: 293 LBS | BODY MASS INDEX: 46.53 KG/M2

## 2018-10-17 DIAGNOSIS — K76.0 FATTY (CHANGE OF) LIVER, NOT ELSEWHERE CLASSIFIED: ICD-10-CM

## 2018-10-17 DIAGNOSIS — I10 ESSENTIAL (PRIMARY) HYPERTENSION: ICD-10-CM

## 2018-10-17 DIAGNOSIS — M17.0 BILATERAL PRIMARY OSTEOARTHRITIS OF KNEE: ICD-10-CM

## 2018-10-17 DIAGNOSIS — K21.9 GASTRO-ESOPHAGEAL REFLUX DISEASE WITHOUT ESOPHAGITIS: ICD-10-CM

## 2018-10-17 DIAGNOSIS — G47.33 OBSTRUCTIVE SLEEP APNEA (ADULT) (PEDIATRIC): ICD-10-CM

## 2018-10-17 DIAGNOSIS — Z99.89 DEPENDENCE ON OTHER ENABLING MACHINES AND DEVICES: ICD-10-CM

## 2018-10-17 DIAGNOSIS — E66.01 MORBID (SEVERE) OBESITY DUE TO EXCESS CALORIES: ICD-10-CM

## 2018-10-17 DIAGNOSIS — J45.909 UNSPECIFIED ASTHMA, UNCOMPLICATED: ICD-10-CM

## 2018-10-17 PROCEDURE — 99024 POSTOP FOLLOW-UP VISIT: CPT

## 2018-10-17 RX ORDER — VITAMIN A 10000 UNIT
TABLET ORAL
Refills: 0 | Status: COMPLETED | COMMUNITY
End: 2018-10-17

## 2018-11-07 ENCOUNTER — APPOINTMENT (OUTPATIENT)
Dept: SURGERY | Facility: CLINIC | Age: 48
End: 2018-11-07
Payer: COMMERCIAL

## 2018-11-07 VITALS — HEIGHT: 66.5 IN | BODY MASS INDEX: 46.53 KG/M2 | WEIGHT: 293 LBS

## 2018-11-07 PROCEDURE — 99024 POSTOP FOLLOW-UP VISIT: CPT

## 2018-11-07 RX ORDER — OXYCODONE AND ACETAMINOPHEN 10; 325 MG/1; MG/1
10-325 TABLET ORAL
Refills: 0 | Status: DISCONTINUED | COMMUNITY
Start: 2018-06-19 | End: 2018-11-07

## 2018-11-07 RX ORDER — CICLOPIROX 10 MG/.96ML
1 SHAMPOO TOPICAL
Qty: 120 | Refills: 0 | Status: DISCONTINUED | COMMUNITY
Start: 2017-12-11 | End: 2018-11-07

## 2018-11-07 RX ORDER — IRON/IRON ASP GLY/FA/MV-MIN 38 125-25-1MG
TABLET ORAL
Refills: 0 | Status: DISCONTINUED | COMMUNITY
End: 2018-11-07

## 2018-11-08 ENCOUNTER — TRANSCRIPTION ENCOUNTER (OUTPATIENT)
Age: 48
End: 2018-11-08

## 2018-11-12 ENCOUNTER — OTHER (OUTPATIENT)
Age: 48
End: 2018-11-12

## 2018-12-07 ENCOUNTER — APPOINTMENT (OUTPATIENT)
Dept: SURGERY | Facility: CLINIC | Age: 48
End: 2018-12-07
Payer: COMMERCIAL

## 2018-12-07 VITALS — HEIGHT: 66.5 IN | BODY MASS INDEX: 46.53 KG/M2 | WEIGHT: 293 LBS

## 2018-12-07 DIAGNOSIS — Z00.00 ENCOUNTER FOR GENERAL ADULT MEDICAL EXAMINATION W/OUT ABNORMAL FINDINGS: ICD-10-CM

## 2018-12-07 PROCEDURE — 99024 POSTOP FOLLOW-UP VISIT: CPT

## 2019-01-11 ENCOUNTER — APPOINTMENT (OUTPATIENT)
Dept: SURGERY | Facility: CLINIC | Age: 49
End: 2019-01-11
Payer: COMMERCIAL

## 2019-01-11 VITALS — BODY MASS INDEX: 46.53 KG/M2 | WEIGHT: 293 LBS | HEIGHT: 66.5 IN

## 2019-01-11 DIAGNOSIS — N39.3 STRESS INCONTINENCE (FEMALE) (MALE): ICD-10-CM

## 2019-01-11 PROCEDURE — 99213 OFFICE O/P EST LOW 20 MIN: CPT

## 2019-01-11 RX ORDER — IRON SUCROSE 20 MG/ML
20 INJECTION, SOLUTION INTRAVENOUS WEEKLY
Refills: 0 | Status: COMPLETED | COMMUNITY
Start: 2018-11-07

## 2019-01-11 NOTE — REVIEW OF SYSTEMS
Problem: Patient Care Overview  Goal: Plan of Care Review  Outcome: Ongoing (interventions implemented as appropriate)   10/17/18 2023   Coping/Psychosocial   Plan of Care Reviewed With patient   OTHER   Outcome Summary Pain well controlled. Standby assist with ambulation. Patient doing exercises in bed independently. Will maintain pain control throughout the night.   Plan of Care Review   Progress improving     Goal: Individualization and Mutuality  Outcome: Ongoing (interventions implemented as appropriate)   10/17/18 2023   Individualization   Patient Specific Goals (Include Timeframe) pain to stay managed throughout the night   Patient Specific Interventions RN to give pt pain meds q4h to keep pain under control for therapy in the morning     Goal: Discharge Needs Assessment  Outcome: Ongoing (interventions implemented as appropriate)   10/17/18 2023   Discharge Needs Assessment   Concerns to be Addressed denies needs/concerns at this time     Goal: Interprofessional Rounds/Family Conf  Outcome: Ongoing (interventions implemented as appropriate)      Problem: Fall Risk (Adult)  Goal: Identify Related Risk Factors and Signs and Symptoms  Outcome: Outcome(s) achieved Date Met: 10/17/18   10/17/18 2023   Fall Risk (Adult)   Related Risk Factors (Fall Risk) gait/mobility problems;environment unfamiliar;polypharmacy   Signs and Symptoms (Fall Risk) presence of risk factors     Goal: Absence of Fall  Outcome: Ongoing (interventions implemented as appropriate)   10/17/18 2023   Fall Risk (Adult)   Absence of Fall making progress toward outcome       Problem: Knee Arthroplasty (Total, Partial) (Adult)  Goal: Signs and Symptoms of Listed Potential Problems Will be Absent, Minimized or Managed (Knee Arthroplasty)  Outcome: Ongoing (interventions implemented as appropriate)   10/17/18 2023   Goal/Outcome Evaluation   Problems Assessed (Knee Arthroplasty) all   Problems Present (Knee Arthroplasty) functional  deficit;pain;situational response     Goal: Anesthesia/Sedation Recovery  Outcome: Outcome(s) achieved Date Met: 10/17/18   10/17/18 2023   Goal/Outcome Evaluation   Anesthesia/Sedation Recovery criteria met for transfer;criteria met for discharge       Problem: Skin Injury Risk (Adult)  Goal: Identify Related Risk Factors and Signs and Symptoms  Outcome: Outcome(s) achieved Date Met: 10/17/18   10/17/18 2023   Skin Injury Risk (Adult)   Related Risk Factors (Skin Injury Risk) advanced age;medical devices;medication;mobility impaired     Goal: Skin Health and Integrity  Outcome: Ongoing (interventions implemented as appropriate)   10/17/18 2023   Skin Injury Risk (Adult)   Skin Health and Integrity making progress toward outcome          Dry/Warm [Joint Pain] : joint pain [Joint Stiffness] : joint stiffness [Negative] : Allergic/Immunologic

## 2019-01-15 NOTE — ASSESSMENT
[FreeTextEntry1] : ANNMARIE MCCULLOUGH is a 48 year female seen today for Bariatric follow up visit. Patient states that she feels well.\par Patient is compliant with dietary guidelines.\par She eats 3 meals/day with an occasional healthful snack. Breakfast 1 egg/oatmeal/yogurt.  Lunch tuna fish salad/chicken.  Dinner - chicken/fish/ground beef with softly cooked vegetables.   Snack - protein shake.\par Fluid intake is adequate with mainly water. diluted juice and teas.\par She use a  2 days/week for 45 minutes and goes to the gym 2 more days on her own.  She added weight bearing exercises.\par \par

## 2019-01-15 NOTE — REASON FOR VISIT
[Follow-Up Visit] : a follow-up visit for [Morbid Obesity (BMI>40)] : morbid obesity (bmi>40) [FreeTextEntry2] : Sleeve Gastrectomy on 10/9/2018

## 2019-01-15 NOTE — PLAN
[FreeTextEntry1] : PLAN:  Return to office in 3 months with blood work.\par             Call with concerns.

## 2019-01-15 NOTE — HISTORY OF PRESENT ILLNESS
[Procedure: ___] : Procedure performed: [unfilled]  [Date of Surgery: ___] : Date of Surgery:   [unfilled] [Pre-Op Weight ___] : Pre-op weight was [unfilled] lbs [___ Months Post Op] : [unfilled] months [de-identified] : Patient had surgery approximately 3 months ago. \par She denies heartburn/reflux/vomiting and food intolerance. She is c/o  constipation and gets relief with MiraLax\par Her hypertension knee and back pain improved. She is not using her CPAP machine.  Patient states that she followed up with pulmonary who told her that it was her choice whether or not she wanted to use the machine.  He will retest her in April.

## 2019-01-15 NOTE — DATA REVIEWED
[FreeTextEntry1] : Wt. change since last visit: -10.8 lbs\par %EWL: 24\par TWL: 56 lbs\par BMI: 48.6\par \par Blood work reviewed - Potassium 3.3, will give patient 3 days of potassium.  Will monitor her cholesterol.

## 2019-04-09 ENCOUNTER — TRANSCRIPTION ENCOUNTER (OUTPATIENT)
Age: 49
End: 2019-04-09

## 2019-04-12 ENCOUNTER — APPOINTMENT (OUTPATIENT)
Dept: SURGERY | Facility: CLINIC | Age: 49
End: 2019-04-12
Payer: COMMERCIAL

## 2019-04-12 VITALS
DIASTOLIC BLOOD PRESSURE: 80 MMHG | WEIGHT: 286.3 LBS | HEIGHT: 66.5 IN | BODY MASS INDEX: 45.47 KG/M2 | SYSTOLIC BLOOD PRESSURE: 130 MMHG

## 2019-04-12 DIAGNOSIS — K59.00 CONSTIPATION, UNSPECIFIED: ICD-10-CM

## 2019-04-12 PROCEDURE — 99213 OFFICE O/P EST LOW 20 MIN: CPT

## 2019-04-12 RX ORDER — IRON/IRON ASP GLY/FA/MV-MIN 38 125-25-1MG
TABLET ORAL DAILY
Refills: 0 | Status: ACTIVE | COMMUNITY

## 2019-04-12 RX ORDER — POTASSIUM CHLORIDE 1.5 G/1
20 POWDER, FOR SOLUTION ORAL DAILY
Qty: 3 | Refills: 0 | Status: DISCONTINUED | COMMUNITY
Start: 2019-01-11 | End: 2019-04-12

## 2019-04-12 RX ORDER — URSODIOL 300 MG/1
300 CAPSULE ORAL
Qty: 60 | Refills: 5 | Status: DISCONTINUED | COMMUNITY
Start: 2018-11-07 | End: 2019-04-12

## 2019-04-17 NOTE — DATA REVIEWED
[FreeTextEntry1] : \par Wt. change since last visit: -19.9 lbs\par %EWL: 33\par TWL: 76 lbs\par BMI: 45.5\par \par Blood work reviewed with patient.  Except for a slight elevation in his LDL and PTH all other values were normal. Will monitor.

## 2019-04-17 NOTE — ASSESSMENT
[FreeTextEntry1] : ANNMARIE MCCULLOUGH is a 48 year female seen today for Bariatric follow up visit. Patient is not at expected weight loss for surgery but she appears to be compliant with diet and exercise.\par Patient is compliant with dietary guidelines.\par She eats 3 meals/day with an occasional healthful snack. Breakfast - oatmeal, yogurt or an egg.  Lunch - chicken or fish with vegetables.  Dinner is similar to lunch except that she would have a green salad with shrimp or tuna fish. Snack - protein bar, nuts, a piece of fruit or a sugar free pudding \par Fluid intake is adequate with mainly water and zero calorie drinks.\par She uses a  2 days week for 45 minutes and then she walks daily for 30 minutes.  She added weight bearing exercises.\par \par

## 2019-04-17 NOTE — HISTORY OF PRESENT ILLNESS
[Procedure: ___] : Procedure performed: [unfilled]  [Date of Surgery: ___] : Date of Surgery:   [unfilled] [Pre-Op Weight ___] : Pre-op weight was [unfilled] lbs [___ Months Post Op] : [unfilled] months [de-identified] : Patient had surgery approximately 6 months ago. \par She denies heartburn/reflux/vomiting and food intolerance.\par Her hypertension and back pain improved.  Her insurance company took her CPAP machine as she was not using the appliance.  She has a pulmonary appointment scheduled.

## 2019-04-17 NOTE — PLAN
[FreeTextEntry1] : PLAN:  Return to office in 3 months with blood work.\par             Pb with concerns.

## 2019-07-12 ENCOUNTER — APPOINTMENT (OUTPATIENT)
Dept: SURGERY | Facility: CLINIC | Age: 49
End: 2019-07-12
Payer: COMMERCIAL

## 2019-07-12 VITALS
BODY MASS INDEX: 43.86 KG/M2 | SYSTOLIC BLOOD PRESSURE: 136 MMHG | WEIGHT: 276.2 LBS | DIASTOLIC BLOOD PRESSURE: 72 MMHG | HEIGHT: 66.5 IN

## 2019-07-12 PROCEDURE — 99213 OFFICE O/P EST LOW 20 MIN: CPT

## 2019-07-12 RX ORDER — OMEPRAZOLE 40 MG/1
40 CAPSULE, DELAYED RELEASE ORAL
Refills: 0 | Status: DISCONTINUED | COMMUNITY
End: 2019-07-12

## 2019-07-12 NOTE — PLAN
[FreeTextEntry1] : PLAN:  Add proteins at each meal.\par             Increase fluids to 64 ounces/day.\par             Increase exercise.\par             Follow up with pulmonary.\par             RTO in 3 months - her preference as she is travelling.\par             Call with concerns.

## 2019-07-12 NOTE — ASSESSMENT
[FreeTextEntry1] : ANNMARIE MCCULLOUGH is a 48 year female seen today for Bariatric follow up visit. Patient states that she feels well but is not at expected weight loss for surgery.\par Patient is compliant with dietary guidelines.\par She eats 3 meals/day with an occasional healthful snack. Breakfast - oatmeal 4-5 days week, egg or a piece of toast with jelly. Lunch - tuna fish or turkey with spinach salad.  Dinner - chicken, fish or hamburger with vegetables and a small serving of rice.  Snacks - nuts, fruits or protein bar.\par Recommend that she decrease oatmeal to no more than 2 days/week and to add a slice of cheese or peanut butter to bread instead of jelly.\par Fluid intake is not adequate.  Recommend increasing fluids to 64 ounces of calorie free drinks daily.\par She is exercising 2 days/week for 45 minutes with a . Made patient aware that she has a very limited amount of time in which to lose weight and she has to increase her exercise to 4-5 days/week for 45 minutes and to incorporate weight bearing exercises.\par \par

## 2019-07-12 NOTE — HISTORY OF PRESENT ILLNESS
[Date of Surgery: ___] : Date of Surgery:   [unfilled] [Procedure: ___] : Procedure performed: [unfilled]  [Pre-Op Weight ___] : Pre-op weight was [unfilled] lbs [___ Months Post Op] : [unfilled] months [de-identified] : Patient had surgery approximately 9 months ago. \par She denies heartburn/reflux/vomiting and food intolerance. Constipation improved.\par Hypertension, asthma, lower back and knee pain improved.  She is still not using her CPAP machine she is in the process of rescheduling a sleep study.

## 2019-07-12 NOTE — DATA REVIEWED
[FreeTextEntry1] : \par Wt. change since last visit: -10.1 lbs\par %EWL: 37\par TWL: 85.8 lbs\par BMI: 43\par \par Blood work reviewed, recommend that she add a protein shake daily as her albumin was slightly below our recommended value.

## 2019-07-12 NOTE — REASON FOR VISIT
[Follow-Up Visit] : a follow-up visit for [Morbid Obesity (BMI>40)] : morbid obesity (bmi>40) [FreeTextEntry2] : Sleeve Gastrectomy on 10/9/1018

## 2019-10-16 ENCOUNTER — RESULT REVIEW (OUTPATIENT)
Age: 49
End: 2019-10-16

## 2019-10-18 ENCOUNTER — APPOINTMENT (OUTPATIENT)
Dept: SURGERY | Facility: CLINIC | Age: 49
End: 2019-10-18

## 2019-11-01 ENCOUNTER — APPOINTMENT (OUTPATIENT)
Dept: SURGERY | Facility: CLINIC | Age: 49
End: 2019-11-01
Payer: COMMERCIAL

## 2019-11-01 VITALS
SYSTOLIC BLOOD PRESSURE: 158 MMHG | WEIGHT: 274.8 LBS | BODY MASS INDEX: 43.64 KG/M2 | HEIGHT: 66.5 IN | DIASTOLIC BLOOD PRESSURE: 96 MMHG

## 2019-11-01 PROCEDURE — 99213 OFFICE O/P EST LOW 20 MIN: CPT

## 2019-11-01 NOTE — PLAN
[FreeTextEntry1] : PLAN: RTO in 2 months.\par            Follow up with pulmonary.\par            Call with concerns.

## 2019-11-01 NOTE — ASSESSMENT
[FreeTextEntry1] : ANNMARIE MCCULLOUGH is a 49 year female seen today for Bariatric follow up visit. Patient is not at expected weight loss for surgery. She would like to lose more weight but also feels that she needs to enjoy her life as her weight was a barrier for many years.  Made patient aware that she has a very narrow window in which to lose the weight and that it gets harder the further out she gets from surgery. \par Patient states that she is compliant with dietary guidelines at home but has been travelling and socializing quite a bit and this is when it becomes challenging. \par She eats 2-3 meals/day with an occasional healthful snack. Breakfast - 1 egg, oatmeal or Greek yogurt.  Lunch - mixed greens with beef or chicken.  Dinner - fish, chicken or ground beef with vegetables and a small serving of rice. Snack - protein bars, apple or 2 pretzel sticks. \par She drinks mainly water,1 cup of coffee and teas.\par She walks 5 days/week for 20-30 minutes and she will use the stationary bike at work 1-2 days week.  She added weight bearing exercise.\par \par

## 2019-11-01 NOTE — HISTORY OF PRESENT ILLNESS
[Procedure: ___] : Procedure performed: [unfilled]  [Date of Surgery: ___] : Date of Surgery:   [unfilled] [Pre-Op Weight ___] : Pre-op weight was [unfilled] lbs [___ Years Post Op] : [unfilled] years [de-identified] : Patient had surgery approximately 1 year ago. \par She denies heartburn/reflux/vomiting and food intolerance.\par Hypertension, asthma, lower back pain and knee pain all improved.  She did not follow up with pulmonary and is still not using her CPAP machine.  I again discussed the risks of DON extensively with patient.

## 2019-11-01 NOTE — DATA REVIEWED
[FreeTextEntry1] : \par Wt. change since last visit: -1.4 lbs\par %EWL: 38\par TWL: 87.4 lbs\par BMI: 43.6\par \par

## 2019-11-01 NOTE — REASON FOR VISIT
[Follow-Up Visit] : a follow-up visit for [Morbid Obesity (BMI>40)] : morbid obesity (bmi>40) [FreeTextEntry2] : Sleeve Gastrectomy 10/9/2018

## 2020-01-07 ENCOUNTER — APPOINTMENT (OUTPATIENT)
Dept: SURGERY | Facility: CLINIC | Age: 50
End: 2020-01-07
Payer: COMMERCIAL

## 2020-01-07 VITALS — WEIGHT: 258 LBS | HEIGHT: 66.5 IN | BODY MASS INDEX: 40.98 KG/M2

## 2020-01-07 DIAGNOSIS — E56.9 VITAMIN DEFICIENCY, UNSPECIFIED: ICD-10-CM

## 2020-01-07 PROCEDURE — 99213 OFFICE O/P EST LOW 20 MIN: CPT

## 2020-01-07 NOTE — REVIEW OF SYSTEMS
[Joint Stiffness] : joint stiffness [Muscle Pain] : muscle pain [Negative] : Heme/Lymph [FreeTextEntry9] : lower back

## 2020-01-07 NOTE — DATA REVIEWED
[FreeTextEntry1] : \par Wt. change since last visit: -16 lbs\par %EWL: 46\par TWL: 104.2 lbs\par BMI: 41\par \par

## 2020-01-07 NOTE — HISTORY OF PRESENT ILLNESS
[Date of Surgery: ___] : Date of Surgery:   [unfilled] [Pre-Op Weight ___] : Pre-op weight was [unfilled] lbs [Procedure: ___] : Procedure performed: [unfilled]  [___ Years Post Op] : [unfilled] years [de-identified] : Patient had surgery approximately 1+ years ago. She had lower abdominal pain over 1 month ago which resolved without intervention. Patient states that she has been suffering from multiple stressors on the job.\par She denies heartburn/reflux/vomiting and food intolerance.\par Hypertension, asthma, lower back pain and knee pain all improved. She did not follow up with pulmonary and is still not using her CPAP machine. I again discussed the risks of DON extensively with patient. \par \par

## 2020-01-07 NOTE — ASSESSMENT
[FreeTextEntry1] : ANNMARIE MCCULLOUGH is a 49 year female seen today for Bariatric follow up visit. Patient lost 16 lbs since her last visit.\par Patient states that she is compliant with dietary guidelines. \par She eats 3 meals/day with an occasional healthful snack. Breakfast - 1 egg, oatmeal or Greek yogurt. Lunch - mixed greens with meatball or chicken. Dinner - fish, chicken or ground beef with vegetables or a protein bar. She has an occasional serving of rice. Snacks - protein bars and peanuts. \par She drinks mainly water, 1/2 cup of coffee and teas.\par She walks 5 days/week for 20-30 minutes and she also use the stationary bike at work 1-2 days week. She added weight bearing exercises.

## 2020-01-07 NOTE — PLAN
[FreeTextEntry1] : PLAN:  Continue with diet and exercise.\par             RTO in 3 months with blood work.\par             Call with concerns.

## 2020-02-28 ENCOUNTER — OUTPATIENT (OUTPATIENT)
Dept: OUTPATIENT SERVICES | Facility: HOSPITAL | Age: 50
LOS: 1 days | Discharge: HOME | End: 2020-02-28

## 2020-02-28 DIAGNOSIS — Z98.890 OTHER SPECIFIED POSTPROCEDURAL STATES: Chronic | ICD-10-CM

## 2020-02-28 DIAGNOSIS — Z98.891 HISTORY OF UTERINE SCAR FROM PREVIOUS SURGERY: Chronic | ICD-10-CM

## 2020-03-02 DIAGNOSIS — G47.33 OBSTRUCTIVE SLEEP APNEA (ADULT) (PEDIATRIC): ICD-10-CM

## 2020-04-01 NOTE — ASU PATIENT PROFILE, ADULT - NS PRO LAST MENSTRUAL
COVID-19 Screening Follow-up Note    Patient contacted regarding COVID-19 risk and screening. Care Transition Nurse/ Ambulatory Care Manager contacted the patient by telephone to perform follow-up assessment. Verified name and  with patient as identifiers. Symptoms reviewed with patient who verbalized the following symptoms: no new/worsening symptoms       Due to no new or worsening symptoms encounter was not routed to provider for escalation. Reinforced exposure protocols and quarantine with CDC Guidelines What to do if you are sick with coronavirus disease 2019 Patient who was given an opportunity for questions and concerns. Education provided regarding infection prevention, and signs and symptoms of COVID-19 and when to seek medical attention with patient who verbalized understanding. Discussed exposure protocols and quarantine from [address] you at higher risk for severe illness 2019 and given an opportunity for questions and concerns. The patient agrees to contact the COVID-19 hotline [phone] or PCP office for questions related to their healthcare. CTN/ACM provided contact information for future reference. From CDC: Are you at higher risk for severe illness?  Wash your hands often.  Avoid close contact (6 feet, which is about two arm lengths) with people who are sick.  Put distance between yourself and other people if COVID-19 is spreading in your community.  Clean and disinfect frequently touched surfaces.  Avoid all cruise travel and non-essential air travel.  Call your healthcare professional if you have concerns about COVID-19 and your underlying condition or if you are sick.     For more information on steps you can take to protect yourself, see CDC's How to [place]har[place] for follow-up call in 3-5 days based on severity of symptoms and risk factors unknown

## 2020-06-02 ENCOUNTER — APPOINTMENT (OUTPATIENT)
Dept: SURGERY | Facility: CLINIC | Age: 50
End: 2020-06-02
Payer: COMMERCIAL

## 2020-06-02 VITALS
SYSTOLIC BLOOD PRESSURE: 130 MMHG | WEIGHT: 239 LBS | HEIGHT: 66.5 IN | TEMPERATURE: 98.6 F | DIASTOLIC BLOOD PRESSURE: 90 MMHG | BODY MASS INDEX: 37.96 KG/M2

## 2020-06-02 DIAGNOSIS — Z86.69 PERSONAL HISTORY OF OTHER DISEASES OF THE NERVOUS SYSTEM AND SENSE ORGANS: ICD-10-CM

## 2020-06-02 DIAGNOSIS — E66.01 MORBID (SEVERE) OBESITY DUE TO EXCESS CALORIES: ICD-10-CM

## 2020-06-02 PROCEDURE — 99213 OFFICE O/P EST LOW 20 MIN: CPT

## 2020-06-02 NOTE — ASSESSMENT
[FreeTextEntry1] : ANNMARIE MCCULLOUGH is a 49 year female seen today for Bariatric follow up visit. Patient states that she feels well.\par She has been focusing more on meal preparation now that she has been working from home. \par She eats 3 meals/day with an occasional healthful snack. Breakfast - 1 egg, oatmeal, multigrain cereal with high protein milk or Greek yogurt. Lunch - mixed greens with meatball or chicken. Dinner - fish, chicken or ground beef with vegetables or a protein bar. She has an occasional serving of rice. Snacks - protein bars and peanuts. \par She drinks mainly water, 1/2 cup of coffee and teas.\par She walks 3-4 days week for approximately 1 hour and she plans to start to ride her bike 2-3 days/week. She added weight bearing exercises. \par \par

## 2020-06-02 NOTE — PLAN
[FreeTextEntry1] : PLAN:  Continue with diet and exercise.\par             RTO in October with blood work.\par             Call with concerns.

## 2020-06-02 NOTE — HISTORY OF PRESENT ILLNESS
[de-identified] : Patient had surgery approximately 1 1/2 years ago. She had another episode of right lower abdominal pain and followed up with Dr. Rodriguez who did an EGD. She will email a copy of the results. \par She denies heartburn/reflux/vomiting and food intolerance.\par Hypertension, asthma, lower back pain and knee pain all improved. She followed up with pulmonary and had a sleep study which showed resolution of DON as per patient. [Procedure: ___] : Procedure performed: [unfilled]  [Date of Surgery: ___] : Date of Surgery:   [unfilled] [Pre-Op Weight ___] : Pre-op weight was [unfilled] lbs [___ Years Post Op] : [unfilled] years

## 2020-06-02 NOTE — REASON FOR VISIT
[Follow-Up Visit] : a follow-up visit for [FreeTextEntry2] : Sleeve Gastrectomy on 10/9/2018 [Other___] : [unfilled]

## 2020-06-02 NOTE — DATA REVIEWED
[FreeTextEntry1] : \par Wt. change since last visit: -19 lbs\par %EWL: 54\par TWL: 123.2 lbs\par BMI: 38\par \par

## 2020-10-13 ENCOUNTER — APPOINTMENT (OUTPATIENT)
Dept: SURGERY | Facility: CLINIC | Age: 50
End: 2020-10-13
Payer: COMMERCIAL

## 2020-10-13 VITALS — BODY MASS INDEX: 36.84 KG/M2 | WEIGHT: 232 LBS | TEMPERATURE: 97.1 F | HEIGHT: 66.5 IN

## 2020-10-13 DIAGNOSIS — M25.561 PAIN IN RIGHT KNEE: ICD-10-CM

## 2020-10-13 PROCEDURE — 99213 OFFICE O/P EST LOW 20 MIN: CPT

## 2020-10-13 RX ORDER — SUCRALFATE 1 G/1
1 TABLET ORAL
Refills: 0 | Status: ACTIVE | COMMUNITY

## 2020-10-13 NOTE — ASSESSMENT
[FreeTextEntry1] : ANNMARIE MCCULLOUGH is a 50 year female seen today for Bariatric follow up visit. Patient is doing well with her weight loss goals.\par She has been focusing on meal preparation now that she has been working from home. \par She eats 3 meals/day with an occasional healthful snack. Breakfast - 1 egg, oatmeal, multigrain cereal with high protein milk or Greek yogurt. Lunch - mixed greens with meatball or chicken. Dinner - fish, chicken or ground beef with vegetables or a protein bar. She has an occasional serving of rice. Snacks - protein bars and peanuts. \par She drinks mainly water, 1/2 cup of coffee and teas.\par She walks/jog daily either across the bridges or in the park for approximately 1 hour and she was riding her bike over the summer as she planned. She added weight bearing exercises and stays motivated as she also works out with a [employer].\par

## 2020-10-13 NOTE — HISTORY OF PRESENT ILLNESS
[Procedure: ___] : Procedure performed: [unfilled]  [Date of Surgery: ___] : Date of Surgery:   [unfilled] [Pre-Op Weight ___] : Pre-op weight was [unfilled] lbs [___ Years Post Op] : [unfilled] years [de-identified] : Patient had surgery approximately 2 years ago. Patient states that she feels great and more motivated that ever to reach her weight loss goals. She is considering plastic surgery in the near future. \par She denies heartburn/reflux/vomiting and food intolerance.\par Hypertension, asthma, lower back pain and knee pain all improved. DON resolved.

## 2020-10-13 NOTE — DATA REVIEWED
[FreeTextEntry1] : Wt. change since last visit: - 7 lbs\par %EWL: 57\par TWL: 130 lbs\par BMI: 36\par \par

## 2020-10-13 NOTE — PLAN
[FreeTextEntry1] : PLAN: Continue with behavioral changes.\par            RTO in 6 months.\par            Blood work is up to date.\par            Call with concerns. \par

## 2020-10-13 NOTE — REASON FOR VISIT
[Follow-Up Visit] : a follow-up visit for [S/P Bariatric Surgery] : s/p bariatric surgery [FreeTextEntry2] : Sleeve Gastrectomy on 10/9/2018

## 2020-10-21 DIAGNOSIS — Z82.5 FAMILY HISTORY OF ASTHMA AND OTHER CHRONIC LOWER RESPIRATORY DISEASES: ICD-10-CM

## 2020-10-21 RX ORDER — PNV NO.95/FERROUS FUM/FOLIC AC 28MG-0.8MG
TABLET ORAL
Refills: 0 | Status: ACTIVE | COMMUNITY

## 2020-11-20 ENCOUNTER — APPOINTMENT (OUTPATIENT)
Dept: PEDIATRIC ALLERGY IMMUNOLOGY | Facility: CLINIC | Age: 50
End: 2020-11-20

## 2020-12-15 ENCOUNTER — APPOINTMENT (OUTPATIENT)
Dept: PLASTIC SURGERY | Facility: CLINIC | Age: 50
End: 2020-12-15
Payer: COMMERCIAL

## 2020-12-15 VITALS — WEIGHT: 232 LBS | BODY MASS INDEX: 36.41 KG/M2 | HEIGHT: 67 IN

## 2020-12-15 DIAGNOSIS — E65 LOCALIZED ADIPOSITY: ICD-10-CM

## 2020-12-15 PROCEDURE — 99203 OFFICE O/P NEW LOW 30 MIN: CPT

## 2020-12-15 PROCEDURE — 99072 ADDL SUPL MATRL&STAF TM PHE: CPT

## 2020-12-15 NOTE — ASSESSMENT
[FreeTextEntry1] : 49 yo F with h/o morbid obesity with MWL s/p sleeve gastrectomy who is a good candidate for panniculectomy. \par \par - recommend additional weight loss\par \par max weight 395lbs--currently 231 lbs\par here for panniculectomy consult\par \par as above\par had CT scan that she will drop off (not at [facility])\par \par Patient is a good candidate for a panniculectomy.  Regarding the procedure, we discussed the risks of poor wound healing, seroma formation, infection, bleeding, keloid/hypertrophic scarring, dissatisfaction with the outcome, need for readmission, venous thromboembolic complications with possible pulmonary embolism.  We discussed the need for postop use of an abdominal binder and the limited activity after surgery.\par All the patient's questions were answered and all risks were well understood by the patient.\par \par pt to call and let us know when she would like surgery in the new year--has to coordinate after her sons LE orthopedic surgery\par \par photos taken\par \par all ?s answered

## 2020-12-15 NOTE — HISTORY OF PRESENT ILLNESS
[FreeTextEntry1] : 49 yo F with PMHx of HTN,  Asthma, Anemia, PUD/GERD and morbid obesity with max weight of 395lbs s/p sleeve gastrectomy with Dr. Carrizales in 2018 now at 231 lbs stable for over 1 year who presents today to discuss panniculectomy. Patient is concerned with hanging abdominal pannus causing back pain, poor posture and difficulty with personal hygiene, skin changes and frequent rash in skin folds. \par Inquiring about body contouring surgery. \par \par Occupation- \par Nonsmoker

## 2020-12-15 NOTE — PHYSICAL EXAM
[de-identified] : well-developed female, NAD [de-identified] : NC/AT [de-identified] : PERRL [de-identified] : supple [de-identified] : unlabored breathing, good inspiratory effort  [de-identified] : PATRICIAR [de-identified] : Abdominal pannus hanging below symphysis pubis with vertical and horizontal skin laxity, extensive striae, no palpable hernia or rectus diastasis, port site healed with hypertrophic scars [de-identified] : FROM

## 2021-01-05 ENCOUNTER — NON-APPOINTMENT (OUTPATIENT)
Age: 51
End: 2021-01-05

## 2021-02-26 ENCOUNTER — INPATIENT (INPATIENT)
Facility: HOSPITAL | Age: 51
LOS: 8 days | Discharge: NURSING, REHAB OR LONG-TERM CARE | End: 2021-03-07
Attending: INTERNAL MEDICINE | Admitting: INTERNAL MEDICINE
Payer: COMMERCIAL

## 2021-02-26 VITALS
OXYGEN SATURATION: 96 % | RESPIRATION RATE: 18 BRPM | TEMPERATURE: 99 F | WEIGHT: 227.96 LBS | HEIGHT: 67 IN | HEART RATE: 88 BPM | DIASTOLIC BLOOD PRESSURE: 89 MMHG | SYSTOLIC BLOOD PRESSURE: 184 MMHG

## 2021-02-26 DIAGNOSIS — Z99.89 DEPENDENCE ON OTHER ENABLING MACHINES AND DEVICES: ICD-10-CM

## 2021-02-26 DIAGNOSIS — G95.9 DISEASE OF SPINAL CORD, UNSPECIFIED: ICD-10-CM

## 2021-02-26 DIAGNOSIS — K21.9 GASTRO-ESOPHAGEAL REFLUX DISEASE WITHOUT ESOPHAGITIS: ICD-10-CM

## 2021-02-26 DIAGNOSIS — Z98.890 OTHER SPECIFIED POSTPROCEDURAL STATES: Chronic | ICD-10-CM

## 2021-02-26 DIAGNOSIS — G62.9 POLYNEUROPATHY, UNSPECIFIED: ICD-10-CM

## 2021-02-26 DIAGNOSIS — K59.00 CONSTIPATION, UNSPECIFIED: ICD-10-CM

## 2021-02-26 DIAGNOSIS — Z87.891 PERSONAL HISTORY OF NICOTINE DEPENDENCE: ICD-10-CM

## 2021-02-26 DIAGNOSIS — R53.1 WEAKNESS: ICD-10-CM

## 2021-02-26 DIAGNOSIS — E66.9 OBESITY, UNSPECIFIED: ICD-10-CM

## 2021-02-26 DIAGNOSIS — G43.909 MIGRAINE, UNSPECIFIED, NOT INTRACTABLE, WITHOUT STATUS MIGRAINOSUS: ICD-10-CM

## 2021-02-26 DIAGNOSIS — Z98.891 HISTORY OF UTERINE SCAR FROM PREVIOUS SURGERY: Chronic | ICD-10-CM

## 2021-02-26 DIAGNOSIS — E87.6 HYPOKALEMIA: ICD-10-CM

## 2021-02-26 DIAGNOSIS — G47.33 OBSTRUCTIVE SLEEP APNEA (ADULT) (PEDIATRIC): ICD-10-CM

## 2021-02-26 DIAGNOSIS — R29.898 OTHER SYMPTOMS AND SIGNS INVOLVING THE MUSCULOSKELETAL SYSTEM: ICD-10-CM

## 2021-02-26 DIAGNOSIS — I10 ESSENTIAL (PRIMARY) HYPERTENSION: ICD-10-CM

## 2021-02-26 DIAGNOSIS — J45.909 UNSPECIFIED ASTHMA, UNCOMPLICATED: ICD-10-CM

## 2021-02-26 LAB
ALBUMIN SERPL ELPH-MCNC: 4 G/DL — SIGNIFICANT CHANGE UP (ref 3.5–5.2)
ALP SERPL-CCNC: 83 U/L — SIGNIFICANT CHANGE UP (ref 30–115)
ALT FLD-CCNC: 13 U/L — SIGNIFICANT CHANGE UP (ref 0–41)
ANION GAP SERPL CALC-SCNC: 12 MMOL/L — SIGNIFICANT CHANGE UP (ref 7–14)
APTT BLD: 35.4 SEC — SIGNIFICANT CHANGE UP (ref 27–39.2)
AST SERPL-CCNC: 23 U/L — SIGNIFICANT CHANGE UP (ref 0–41)
BASOPHILS # BLD AUTO: 0.02 K/UL — SIGNIFICANT CHANGE UP (ref 0–0.2)
BASOPHILS NFR BLD AUTO: 0.3 % — SIGNIFICANT CHANGE UP (ref 0–1)
BILIRUB SERPL-MCNC: 0.4 MG/DL — SIGNIFICANT CHANGE UP (ref 0.2–1.2)
BUN SERPL-MCNC: 14 MG/DL — SIGNIFICANT CHANGE UP (ref 10–20)
CALCIUM SERPL-MCNC: 9.2 MG/DL — SIGNIFICANT CHANGE UP (ref 8.5–10.1)
CHLORIDE SERPL-SCNC: 107 MMOL/L — SIGNIFICANT CHANGE UP (ref 98–110)
CO2 SERPL-SCNC: 22 MMOL/L — SIGNIFICANT CHANGE UP (ref 17–32)
CREAT SERPL-MCNC: 0.9 MG/DL — SIGNIFICANT CHANGE UP (ref 0.7–1.5)
EOSINOPHIL # BLD AUTO: 0.06 K/UL — SIGNIFICANT CHANGE UP (ref 0–0.7)
EOSINOPHIL NFR BLD AUTO: 1 % — SIGNIFICANT CHANGE UP (ref 0–8)
GLUCOSE SERPL-MCNC: 88 MG/DL — SIGNIFICANT CHANGE UP (ref 70–99)
HCG SERPL QL: NEGATIVE — SIGNIFICANT CHANGE UP
HCT VFR BLD CALC: 37.4 % — SIGNIFICANT CHANGE UP (ref 37–47)
HGB BLD-MCNC: 12.1 G/DL — SIGNIFICANT CHANGE UP (ref 12–16)
IMM GRANULOCYTES NFR BLD AUTO: 0.2 % — SIGNIFICANT CHANGE UP (ref 0.1–0.3)
INR BLD: 1.23 RATIO — SIGNIFICANT CHANGE UP (ref 0.65–1.3)
LYMPHOCYTES # BLD AUTO: 1.08 K/UL — LOW (ref 1.2–3.4)
LYMPHOCYTES # BLD AUTO: 18.8 % — LOW (ref 20.5–51.1)
MCHC RBC-ENTMCNC: 28.6 PG — SIGNIFICANT CHANGE UP (ref 27–31)
MCHC RBC-ENTMCNC: 32.4 G/DL — SIGNIFICANT CHANGE UP (ref 32–37)
MCV RBC AUTO: 88.4 FL — SIGNIFICANT CHANGE UP (ref 81–99)
MONOCYTES # BLD AUTO: 0.37 K/UL — SIGNIFICANT CHANGE UP (ref 0.1–0.6)
MONOCYTES NFR BLD AUTO: 6.4 % — SIGNIFICANT CHANGE UP (ref 1.7–9.3)
NEUTROPHILS # BLD AUTO: 4.22 K/UL — SIGNIFICANT CHANGE UP (ref 1.4–6.5)
NEUTROPHILS NFR BLD AUTO: 73.3 % — SIGNIFICANT CHANGE UP (ref 42.2–75.2)
NRBC # BLD: 0 /100 WBCS — SIGNIFICANT CHANGE UP (ref 0–0)
PLATELET # BLD AUTO: 222 K/UL — SIGNIFICANT CHANGE UP (ref 130–400)
POTASSIUM SERPL-MCNC: 3.3 MMOL/L — LOW (ref 3.5–5)
POTASSIUM SERPL-SCNC: 3.3 MMOL/L — LOW (ref 3.5–5)
PROT SERPL-MCNC: 7.1 G/DL — SIGNIFICANT CHANGE UP (ref 6–8)
PROTHROM AB SERPL-ACNC: 14.2 SEC — HIGH (ref 9.95–12.87)
RAPID RVP RESULT: SIGNIFICANT CHANGE UP
RBC # BLD: 4.23 M/UL — SIGNIFICANT CHANGE UP (ref 4.2–5.4)
RBC # FLD: 13.2 % — SIGNIFICANT CHANGE UP (ref 11.5–14.5)
SARS-COV-2 RNA SPEC QL NAA+PROBE: SIGNIFICANT CHANGE UP
SODIUM SERPL-SCNC: 141 MMOL/L — SIGNIFICANT CHANGE UP (ref 135–146)
WBC # BLD: 5.76 K/UL — SIGNIFICANT CHANGE UP (ref 4.8–10.8)
WBC # FLD AUTO: 5.76 K/UL — SIGNIFICANT CHANGE UP (ref 4.8–10.8)

## 2021-02-26 PROCEDURE — 99285 EMERGENCY DEPT VISIT HI MDM: CPT

## 2021-02-26 PROCEDURE — 93010 ELECTROCARDIOGRAM REPORT: CPT

## 2021-02-26 PROCEDURE — 71045 X-RAY EXAM CHEST 1 VIEW: CPT | Mod: 26

## 2021-02-26 PROCEDURE — 99223 1ST HOSP IP/OBS HIGH 75: CPT | Mod: AI

## 2021-02-26 RX ORDER — GABAPENTIN 400 MG/1
100 CAPSULE ORAL THREE TIMES A DAY
Refills: 0 | Status: DISCONTINUED | OUTPATIENT
Start: 2021-02-26 | End: 2021-03-07

## 2021-02-26 RX ORDER — DEXAMETHASONE 0.5 MG/5ML
10 ELIXIR ORAL ONCE
Refills: 0 | Status: COMPLETED | OUTPATIENT
Start: 2021-02-26 | End: 2021-02-26

## 2021-02-26 RX ORDER — PANTOPRAZOLE SODIUM 20 MG/1
40 TABLET, DELAYED RELEASE ORAL
Refills: 0 | Status: DISCONTINUED | OUTPATIENT
Start: 2021-02-26 | End: 2021-03-07

## 2021-02-26 RX ORDER — AMLODIPINE BESYLATE 2.5 MG/1
10 TABLET ORAL DAILY
Refills: 0 | Status: DISCONTINUED | OUTPATIENT
Start: 2021-02-26 | End: 2021-03-05

## 2021-02-26 RX ORDER — ALBUTEROL 90 UG/1
1 AEROSOL, METERED ORAL EVERY 6 HOURS
Refills: 0 | Status: DISCONTINUED | OUTPATIENT
Start: 2021-02-26 | End: 2021-03-07

## 2021-02-26 RX ORDER — POTASSIUM CHLORIDE 20 MEQ
20 PACKET (EA) ORAL
Refills: 0 | Status: COMPLETED | OUTPATIENT
Start: 2021-02-26 | End: 2021-02-26

## 2021-02-26 RX ORDER — TIZANIDINE 4 MG/1
4 TABLET ORAL
Qty: 0 | Refills: 0 | DISCHARGE

## 2021-02-26 RX ORDER — HYDROCODONE BITARTRATE 50 MG/1
1 CAPSULE, EXTENDED RELEASE ORAL
Qty: 0 | Refills: 0 | DISCHARGE

## 2021-02-26 RX ORDER — CHLORHEXIDINE GLUCONATE 213 G/1000ML
1 SOLUTION TOPICAL
Refills: 0 | Status: DISCONTINUED | OUTPATIENT
Start: 2021-02-26 | End: 2021-03-07

## 2021-02-26 RX ORDER — DEXAMETHASONE 0.5 MG/5ML
4 ELIXIR ORAL EVERY 6 HOURS
Refills: 0 | Status: DISCONTINUED | OUTPATIENT
Start: 2021-02-26 | End: 2021-03-02

## 2021-02-26 RX ORDER — ENOXAPARIN SODIUM 100 MG/ML
40 INJECTION SUBCUTANEOUS DAILY
Refills: 0 | Status: DISCONTINUED | OUTPATIENT
Start: 2021-02-26 | End: 2021-03-01

## 2021-02-26 RX ORDER — TOPIRAMATE 25 MG
200 TABLET ORAL
Refills: 0 | Status: DISCONTINUED | OUTPATIENT
Start: 2021-02-26 | End: 2021-03-07

## 2021-02-26 RX ORDER — BUDESONIDE AND FORMOTEROL FUMARATE DIHYDRATE 160; 4.5 UG/1; UG/1
2 AEROSOL RESPIRATORY (INHALATION)
Refills: 0 | Status: DISCONTINUED | OUTPATIENT
Start: 2021-02-26 | End: 2021-03-07

## 2021-02-26 RX ADMIN — Medication 20 MILLIEQUIVALENT(S): at 21:00

## 2021-02-26 RX ADMIN — Medication 20 MILLIEQUIVALENT(S): at 23:35

## 2021-02-26 RX ADMIN — Medication 102 MILLIGRAM(S): at 21:00

## 2021-02-26 NOTE — CONSULT NOTE ADULT - SUBJECTIVE AND OBJECTIVE BOX
HPI: Patient is a 50 year-old female w PMH HTN, DON, and asthma who is presenting s/p b/l LE weakness x 2 weeks. Patient claims she began having weakness about two weeks ago that she describes as beginning in her feet and coming up her legs b/l. She claims that the weakness has been progressively getting worse and says it's so bad that she can barely move her right leg and has to drag it when walking. Patient was seen and examined at bedside in ED. She is sitting upright in bed and following all commands. She endorses LE weakness and also claims to have decreased sensation in her LE's R>L. Patient claims she had her first dose of COVID vaccine a few weeks ago. Denies back pain, being sick recently, denies n/v/d, vision problems (pain, diplopia, blurry vision), family history of neurological conditions.         PAST MEDICAL & SURGICAL HISTORY:  Fatty liver    Arthritis  CHR PAIN B/L KNEES    HTN (hypertension)    GERD (gastroesophageal reflux disease)    Obesity    Obstructive sleep apnea on CPAP    Asthma  STABLE, EPISODIC -AVERAGE USE OF ALBUTEROL ABOUT ONCE A MONTH    H/O arthroscopic knee surgery  LT AND RT    History of tonsillectomy and adenoidectomy  AND REMOVAL UVULA    H/O  section        Home Medications:  amLODIPine 10 mg oral tablet: 1 tab(s) orally once a day (10 Oct 2018 17:05)  HYDROcodone 10 mg oral capsule, extended release: 1 cap(s) orally 4 times a day, As Needed - for moderate pain (10 Oct 2018 17:05)  omeprazole 40 mg oral delayed release capsule: 1 cap(s) orally once a day (10 Oct 2018 17:05)  Qvar 80 mcg/inh inhalation aerosol: 2  inhaled 2 times a day (10 Oct 2018 17:05)  tiZANidine 4 mg oral tablet: 4 milligram(s) orally once a day (at bedtime) (10 Oct 2018 17:05)  Ventolin 90 mcg/inh inhalation aerosol with adapter:  (10 Oct 2018 17:05)      Allergies    No Known Allergies    Intolerances        ROS:  [X] A ten-point review of systems is negative except as noted   [  ] Due to altered mental status/intubation, subjective information were not able to be obtained from the patient. History was obtained, to the extent possible, from review of the chart and collateral sources of information    MEDICATIONS  (STANDING):    MEDICATIONS  (PRN):      ICU Vital Signs Last 24 Hrs  T(C): 37 (2021 17:22), Max: 37 (2021 17:22)  T(F): 98.6 (2021 17:22), Max: 98.6 (2021 17:22)  HR: 88 (2021 17:22) (88 - 88)  BP: 184/89 (2021 17:22) (184/89 - 184/89)  BP(mean): --  ABP: --  ABP(mean): --  RR: 18 (2021 17:22) (18 - 18)  SpO2: 96% (2021 17:22) (96% - 96%)      I&O's Detail                      Physical Exam:  General: Lying in bed, following all commands, NAD  AAOX3. Verbal function intact  Facial motions symmetric  EOMI  Motor: 5/5 power in b/l UE's and LLE  Decreased movement of RLE - unable to plantar/dorsiflex  Reflexes: 2+ patellar b/l and symmetric   Sensation: decreased sensation to RLE          Assessment/Plan:  50 year-old female presenting w 2 weeks of b/l LE weakness R>L.   -MRI thoracic and lumbar spine with and without contrast if no contraindications  -Neurology consult  -Discuss with attending

## 2021-02-26 NOTE — H&P ADULT - ASSESSMENT
ANNMARIE MCCULLOUGH is a 50y Female with a past medical history as described above who presented for weakness    # Weakness   - Differential includes lumbar radiculopathy, disc herniation with cord impingement, vaccine side-effect/GBS (less likely), and transverse myelitis, or other demyelinating neuropathy   - Follow-up MRI T-spine and L-spine  - Follow-up ESR, CRP, AURORA, RPR, and TSH  - Decadron 4mg PO q6   - Follow-up Neurology and Neurosurgery   - PT/OT/Rehab eval     # HTN  - Amlodipine 10mg daily  - DASH diet    # Neuropathy  - Check hbA1c  - Continue Topamax 200mg twice daily and gabapentin 100mg three times daily     # DON  - CPAP if needed at night  - Outpt follow-up     # Asthma  - Continue Symbicort twice daily and Ventolin PRN    # GERD  - Protonix 40mg PO daily     DVT ppx: Lovenox 40 mg SubQ daily    GI ppx: Protonix 40mg daily   Diet: DASH/TLC  Activity: IAT   Dispo: From home   Code: FULL

## 2021-02-26 NOTE — H&P ADULT - HISTORY OF PRESENT ILLNESS
Chief Complaint: Weakness    Past Medical History: Lumbar radiculopathy, HTN, GERD, asthma, DON    Past Surgical History: None Relevant     Ms. Collins is a 50F with a past medical history as described above who presented to the hospital for weakness in both of her legs. She states that she began to experience mild weakness about 2 weeks ago. Her right side is significantly worse than her left. She described it in an ascending manner. The weakness got progressively worse, now ambulating by dragging her right leg. She says the weakness stops in upper abdomen/lower chest. It is associated with neuralgia, and occasional jolts of pain. She complains of upper extremity/hand neuralgia as well. She did have urinary incontinence 2-3 times, and fecal incontinence once. Of note, she received the Moderna COVID-19 vaccination (only 1 dose) on 1/18/21, and was concerned if this was a side-effect.      In the ED, she was hypertensive, other vitals were stable. Neurology and neurosurgery were consulted MRI T and L-spine to be ordered. Started on decadron. Admitted to medicine for further management.     ROS: Denies headache, changes in vision, chest pain, palpitations, shortness of breath, cough, fever, chills, nausea, vomiting, diarrhea, and constipation. +weakness, +"numbness/pin&needles" BLE, +fecal/urinary incontinence

## 2021-02-26 NOTE — H&P ADULT - NSICDXPASTMEDICALHX_GEN_ALL_CORE_FT
PAST MEDICAL HISTORY:  Arthritis CHR PAIN B/L KNEES    Asthma STABLE, EPISODIC -AVERAGE USE OF ALBUTEROL ABOUT ONCE A MONTH    Fatty liver     GERD (gastroesophageal reflux disease)     HTN (hypertension)     Obesity     Obstructive sleep apnea on CPAP

## 2021-02-26 NOTE — ED PROVIDER NOTE - PHYSICAL EXAMINATION
HYSICAL EXAM:  GENERAL: NAD, lying in bed comfortably  HEAD:  Atraumatic, Normocephalic  EYES: EOMI, PERRLA, conjunctiva and sclera clear  ENT: Moist mucous membranes  NECK: Supple, No JVD  CHEST/LUNG: Clear to auscultation bilaterally; No rales, rhonchi, wheezing, or rubs. Unlabored respirations  HEART: Regular rate and rhythm; No murmurs, rubs, or gallops  ABDOMEN: Bowel sounds present; Soft, Nontender, Nondistended. No hepatomegaly  EXTREMITIES:  2+ Peripheral Pulses, brisk capillary refill. No clubbing, cyanosis, or edema  NERVOUS SYSTEM:  Alert & Oriented X3, speech clear. No deficits   MSK: BUE FROM, LLE 4/5, RLE 1-2/5  SKIN: No rashes or lesions

## 2021-02-26 NOTE — ED PROVIDER NOTE - ATTENDING CONTRIBUTION TO CARE
I personally evaluated the patient. I reviewed the Resident’s or Physician Assistant’s note (as assigned above), and agree with the findings and plan except as documented in my note.  51 yo woman with recent back injury and exacerbation of her pain over time that led to weakness and decreased sensation in her right leg.  Neurology and neurosurgery called.  IV dexamethasone and admission for inpatient MRI and definitive care.

## 2021-02-26 NOTE — ED PROVIDER NOTE - OBJECTIVE STATEMENT
Ms. Collins is a 50F with a past medical history as described above who presented to the hospital for weakness in both of her legs. She states that she began to experience mild weakness about 2 weeks ago. Her right side is significantly worse than her left. She described it in an ascending manner. The weakness got progressively worse, now ambulating by dragging her right leg. She says the weakness stops in upper abdomen/lower chest. It is associated with neuralgia, and occasional jolts of pain. She complains of upper extremity/hand neuralgia as well. She did have urinary incontinence 2-3 times, and fecal incontinence once. Of note, she received the Moderna COVID-19 vaccination (only 1 dose) on 1/18/21, and was concerned if this was a side-effect.

## 2021-02-26 NOTE — ED ADULT NURSE NOTE - OBJECTIVE STATEMENT
C/o bilateral LE weakness and numbness x 2 weeks.  weakness is ascending. denies fever/chills. reports 2 episodes of urinary incontinence

## 2021-02-26 NOTE — H&P ADULT - NSHPLABSRESULTS_GEN_ALL_CORE
(02-26 @ 18:23)                      12.1  5.76 )-----------( 222                 37.4    Neutrophils = 4.22 (73.3%)  Lymphocytes = 1.08 (18.8%)  Eosinophils = 0.06 (1.0%)  Basophils = 0.02 (0.3%)  Monocytes = 0.37 (6.4%)  Bands = --%    02-26    141  |  107  |  14  ----------------------------<  88  3.3<L>   |  22  |  0.9    Ca    9.2      26 Feb 2021 18:23    TPro  7.1  /  Alb  4.0  /  TBili  0.4  /  DBili  x   /  AST  23  /  ALT  13  /  AlkPhos  83  02-26    ( 26 Feb 2021 18:23 )   PT: 14.20 sec;   INR: 1.23 ratio;       PTT:35.4 sec      RVP:(02-26 @ 18:26)  Wilverte            Tox:

## 2021-02-26 NOTE — H&P ADULT - NSICDXPASTSURGICALHX_GEN_ALL_CORE_FT
PAST SURGICAL HISTORY:  H/O arthroscopic knee surgery LT AND RT    H/O  section     History of tonsillectomy and adenoidectomy AND REMOVAL UVULA

## 2021-02-26 NOTE — CONSULT NOTE ADULT - SUBJECTIVE AND OBJECTIVE BOX
Neurology Consult    Patient is a 50y old  Female who presents with a chief complaint of Weakness (2021 22:09)      HPI:  Chief Complaint: Weakness    Past Medical History: Lumbar radiculopathy, HTN, GERD, asthma, DON    Past Surgical History: None Relevant     Ms. Collins is a 50F with a past medical history as described above who presented to the hospital for weakness in both of her legs. She states that she began to experience mild weakness about 2 weeks ago. Her right side is significantly worse than her left. She described it in an ascending manner. The weakness got progressively worse, now ambulating by dragging her right leg. She says the weakness stops in upper abdomen/lower chest. It is associated with neuralgia, and occasional jolts of pain. She complains of upper extremity/hand neuralgia as well. She did have urinary incontinence 2-3 times, and fecal incontinence once. Of note, she received the Moderna COVID-19 vaccination (only 1 dose) on 21, and was concerned if this was a side-effect.      In the ED, she was hypertensive, other vitals were stable. Neurology and neurosurgery were consulted MRI T and L-spine to be ordered. Started on decadron. Admitted to medicine for further management.     ROS: Denies headache, changes in vision, chest pain, palpitations, shortness of breath, cough, fever, chills, nausea, vomiting, diarrhea, and constipation. +weakness, +"numbness/pin&needles" BLE, +fecal/urinary incontinence  (2021 22:09)      PAST MEDICAL & SURGICAL HISTORY:  Fatty liver    Arthritis  CHR PAIN B/L KNEES    HTN (hypertension)    GERD (gastroesophageal reflux disease)    Obesity    Obstructive sleep apnea on CPAP    Asthma  STABLE, EPISODIC -AVERAGE USE OF ALBUTEROL ABOUT ONCE A MONTH    H/O arthroscopic knee surgery  LT AND RT    History of tonsillectomy and adenoidectomy  AND REMOVAL UVULA    H/O  section        FAMILY HISTORY:  No pertinent family history in first degree relatives        Social History: (-) x 3    Allergies    No Known Allergies    Intolerances        MEDICATIONS  (STANDING):  amLODIPine   Tablet 10 milliGRAM(s) Oral daily  budesonide  80 MICROgram(s)/formoterol 4.5 MICROgram(s) Inhaler 2 Puff(s) Inhalation two times a day  chlorhexidine 4% Liquid 1 Application(s) Topical <User Schedule>  enoxaparin Injectable 40 milliGRAM(s) SubCutaneous daily  gabapentin 100 milliGRAM(s) Oral three times a day  pantoprazole    Tablet 40 milliGRAM(s) Oral before breakfast  potassium chloride    Tablet ER 20 milliEquivalent(s) Oral every 2 hours  topiramate 200 milliGRAM(s) Oral two times a day    MEDICATIONS  (PRN):  ALBUTerol    90 MICROgram(s) HFA Inhaler 1 Puff(s) Inhalation every 6 hours PRN Shortness of Breath and/or Wheezing      Review of systems:    Constitutional: No fever, weight loss or fatigue    Eyes: No eye pain or discharge  ENMT:  No difficulty hearing; No sinus or throat pain  Neck: No pain or stiffness  Respiratory: No cough, wheezing, chills or hemoptysis  Cardiovascular: No chest pain, palpitations, shortness of breath, dyspnea on exertion  Gastrointestinal: No abdominal pain, nausea, vomiting or hematemesis; No diarrhea or constipation.   Genitourinary: No dysuria, frequency, hematuria or incontinence  Neurological: As per HPI  Skin: No rashes or lesions   Endocrine: No heat or cold intolerance; No hair loss  Musculoskeletal: No joint pain or swelling  Psychiatric: No depression, anxiety, mood swings  Heme/Lymph: No easy bruising or bleeding gums    Vital Signs Last 24 Hrs  T(C): 36.8 (2021 22:00), Max: 37 (2021 17:22)  T(F): 98.3 (2021 22:00), Max: 98.6 (2021 17:22)  HR: 73 (2021 22:00) (73 - 88)  BP: 116/68 (2021 22:00) (116/68 - 184/89)  BP(mean): --  RR: 19 (2021 22:00) (18 - 19)  SpO2: 98% (2021 22:00) (96% - 98%)    Neurologic Examination:  General:  Appearance is consistent with chronologic age.  No abnormal facies.   General: The patient is oriented to person, place, time and date.  Recent and remote memory intact.  Fund of knowledge is intact and normal.  Language with normal repetition, comprehension and naming.  Nondysarthric.    Cranial nerves: intact VA, VFF.  EOMI w/o nystagmus, skew or reported double vision.  PERRL.  No ptosis/weakness of eyelid closure.  Facial sensation is normal with normal bite.  No facial asymmetry.  Hearing grossly intact b/l.  Palate elevates midline.  Tongue midline.  Motor examination:   Normal tone, bulk and range of motion.  No tenderness, twitching, tremors or involuntary movements.  Formal Muscle Strength Testing: (MRC grade R/L) RLE: limited ROM due to weakness 3/5 proximal, distal 1/5 not able to plantar or dorsiflex. LLE 5/5  Reflexes:   2+ b/l pectoralis, biceps, triceps, brachioradialis, patella and Achilles.  Plantar response downgoing b/l.  Jaw jerk, S[place], clonus absent.  Sensory examination:   diminished to light touch and pinprick, pain, temperature and proprioception and vibration in Left LE in comparison to the right  Cerebellum:   FTN/HKS intact with normal MILE in all limbs.  No dysmetria or dysdiadokinesia.      Labs:   CBC Full  -  ( 2021 18:23 )  WBC Count : 5.76 K/uL  RBC Count : 4.23 M/uL  Hemoglobin : 12.1 g/dL  Hematocrit : 37.4 %  Platelet Count - Automated : 222 K/uL  Mean Cell Volume : 88.4 fL  Mean Cell Hemoglobin : 28.6 pg  Mean Cell Hemoglobin Concentration : 32.4 g/dL  Auto Neutrophil # : 4.22 K/uL  Auto Lymphocyte # : 1.08 K/uL  Auto Monocyte # : 0.37 K/uL  Auto Eosinophil # : 0.06 K/uL  Auto Basophil # : 0.02 K/uL  Auto Neutrophil % : 73.3 %  Auto Lymphocyte % : 18.8 %  Auto Monocyte % : 6.4 %  Auto Eosinophil % : 1.0 %  Auto Basophil % : 0.3 %        141  |  107  |  14  ----------------------------<  88  3.3<L>   |  22  |  0.9    Ca    9.2      2021 18:23    TPro  7.1  /  Alb  4.0  /  TBili  0.4  /  DBili  x   /  AST  23  /  ALT  13  /  AlkPhos  83      LIVER FUNCTIONS - ( 2021 18:23 )  Alb: 4.0 g/dL / Pro: 7.1 g/dL / ALK PHOS: 83 U/L / ALT: 13 U/L / AST: 23 U/L / GGT: x           PT/INR - ( 2021 18:23 )   PT: 14.20 sec;   INR: 1.23 ratio         PTT - ( 2021 18:23 )  PTT:35.4 sec        Neuroimaging:  NCHCT:   CT Angiography/Perfusion:  MRI Brain NC:  MRA Head/Neck:  EEG:    Assessment:  This is a 50y Female with h/o     Plan:   21 @ 22:44

## 2021-02-26 NOTE — H&P ADULT - ATTENDING COMMENTS
49 YO M with a PMH of Lumbar radiculopathy, HTN, GERD, asthma, obesity, and DON who presents to the hospital with a c/o B/L LE weakness (R>>L). Associated with decreased sensation (R>>L). Pt states she slipped on ice ~ 2 weeks ago, landing on right hip but the pain didn't start until she felt a "twist" in her back while doing Jazzercise ~1.5 weeks ago. Denies any bowel/bladder incontinence or saddle paresthesia. No recent viral infections or episodes of diarrhea. Denies any fevers/chills, CP, SOB, ABD pain, N/V/D, or rashes. Of note, pt has been seen multiple times at [facility] for same and MRIS only showed lumbar herniation (as per pt).     In the ED, seen by neurology and started on IV decadron, recommending MRIs of lumbar spine, and neurology consult.     Physical exam shows pt in NAD. VSS, afebrile, not hypoxic on RA. A&Ox3. 5/5 strength in B/L UEs and LLE, 1/5 strength in RLE. Sensation intact in all extremities except for RLE. CTA B/L with no W/C/R. RRR, no M/G/R. ABD is soft and non-tender, normoactive BSs. LEs without swelling. No rashes. Labs and radiology as above.     RLE weakness/numbness, suspect lumbar radiculopathy vs transverse myelitis; less likely demyelinating disorder vs GBS. Doubt cord compression. Neurosurgery and Neurology are following. Obtain MRIs. Send ESR/CRP, [facility]A, RPR, and TSH. IV Steroids. PRN pain meds. PT eval. Fall precautions.    Hypokalemia. Replace. Repeat BMP in the AM.     HX of Lumbar radiculopathy, HTN, GERD, asthma, obesity, and DON.

## 2021-02-26 NOTE — CONSULT NOTE ADULT - ATTENDING COMMENTS
I have personally seen and examined this patient on 2/28.  I have fully participated in the care of this patient.  I have reviewed all pertinent clinical information, including history, physical exam, plan and note. Patient with history of lumbar radiculopathy presents with right leg weakness for the past month. MRI thoracic spine showed thoracic spine increased signa suspected for transverse myelitis. Differentials include infectious, inflammatory, demyelinating, autoimmune and vaccination.   Recommend CSF study:   Check wbc, rbc, Protein, Glu, gram stain and culture, lyme ab, ACE, encephalitis panel including HSV. EBV, CMV and [place].  Check CSF for OCB, oligoclonal band, IgG index, NMO and anti MOG.    Check serum for HIV, Hepatitis B, ACE level, anti aquaporin 4 and myelin oligodendrocyte glycoprotein (MOG)  Brain MRI w.wo contrast   Steroid treatment once infectious etiology ruled out.    I have reviewed all pertinent clinical information and reviewed all relevant imaging and diagnostic studies personally.  Recommendations as above.  Agree with above assessment except as noted.

## 2021-02-26 NOTE — H&P ADULT - NSHPPHYSICALEXAM_GEN_ALL_CORE
PHYSICAL EXAM:  GENERAL: NAD, lying in bed comfortably  HEAD:  Atraumatic, Normocephalic  EYES: EOMI, PERRLA, conjunctiva and sclera clear  ENT: Moist mucous membranes  NECK: Supple, No JVD  CHEST/LUNG: Clear to auscultation bilaterally; No rales, rhonchi, wheezing, or rubs. Unlabored respirations  HEART: Regular rate and rhythm; No murmurs, rubs, or gallops  ABDOMEN: Bowel sounds present; Soft, Nontender, Nondistended. No hepatomegaly  EXTREMITIES:  2+ Peripheral Pulses, brisk capillary refill. No clubbing, cyanosis, or edema  NERVOUS SYSTEM:  Alert & Oriented X3, speech clear. No deficits   MSK: BUE FROM, LLE 4/5, RLE 1-2/5  SKIN: No rashes or lesions

## 2021-02-26 NOTE — ED PROVIDER NOTE - NS ED ROS FT
Eyes:  No visual changes, eye pain or discharge.  ENMT:  No hearing changes, pain, no sore throat or runny nose, no difficulty swallowing  Cardiac:  No chest pain, SOB or edema. No chest pain with exertion.  Respiratory:  No cough or respiratory distress. No hemoptysis. No history of asthma or RAD.  GI:  No nausea, vomiting, diarrhea or abdominal pain.  :  No dysuria, frequency or burning.  MS:  see HPI  Neuro:  see HPI  Endocrine: No history of thyroid disease or diabetes.

## 2021-02-26 NOTE — ED PROVIDER NOTE - CLINICAL SUMMARY MEDICAL DECISION MAKING FREE TEXT BOX
51 yo woman with recent back injury.  Progressive worsening eventually leading to weakness and numbness to right leg.  Will need admission for neurology and neurosurgery evaluations.  IV dexamethasone.

## 2021-02-27 LAB
A1C WITH ESTIMATED AVERAGE GLUCOSE RESULT: 5.1 % — SIGNIFICANT CHANGE UP (ref 4–5.6)
ALBUMIN SERPL ELPH-MCNC: 4.2 G/DL — SIGNIFICANT CHANGE UP (ref 3.5–5.2)
ALP SERPL-CCNC: 89 U/L — SIGNIFICANT CHANGE UP (ref 30–115)
ALT FLD-CCNC: 12 U/L — SIGNIFICANT CHANGE UP (ref 0–41)
ANION GAP SERPL CALC-SCNC: 11 MMOL/L — SIGNIFICANT CHANGE UP (ref 7–14)
AST SERPL-CCNC: 14 U/L — SIGNIFICANT CHANGE UP (ref 0–41)
BASOPHILS # BLD AUTO: 0.01 K/UL — SIGNIFICANT CHANGE UP (ref 0–0.2)
BASOPHILS NFR BLD AUTO: 0.2 % — SIGNIFICANT CHANGE UP (ref 0–1)
BILIRUB SERPL-MCNC: 0.4 MG/DL — SIGNIFICANT CHANGE UP (ref 0.2–1.2)
BUN SERPL-MCNC: 12 MG/DL — SIGNIFICANT CHANGE UP (ref 10–20)
CALCIUM SERPL-MCNC: 9.5 MG/DL — SIGNIFICANT CHANGE UP (ref 8.5–10.1)
CHLORIDE SERPL-SCNC: 108 MMOL/L — SIGNIFICANT CHANGE UP (ref 98–110)
CO2 SERPL-SCNC: 24 MMOL/L — SIGNIFICANT CHANGE UP (ref 17–32)
CREAT SERPL-MCNC: 0.8 MG/DL — SIGNIFICANT CHANGE UP (ref 0.7–1.5)
CRP SERPL-MCNC: 0.16 MG/DL — SIGNIFICANT CHANGE UP (ref 0–0.4)
EOSINOPHIL # BLD AUTO: 0 K/UL — SIGNIFICANT CHANGE UP (ref 0–0.7)
EOSINOPHIL NFR BLD AUTO: 0 % — SIGNIFICANT CHANGE UP (ref 0–8)
ERYTHROCYTE [SEDIMENTATION RATE] IN BLOOD: 20 MM/HR — SIGNIFICANT CHANGE UP (ref 0–20)
ESTIMATED AVERAGE GLUCOSE: 100 MG/DL — SIGNIFICANT CHANGE UP (ref 68–114)
GLUCOSE SERPL-MCNC: 98 MG/DL — SIGNIFICANT CHANGE UP (ref 70–99)
HCT VFR BLD CALC: 38.8 % — SIGNIFICANT CHANGE UP (ref 37–47)
HGB BLD-MCNC: 12.5 G/DL — SIGNIFICANT CHANGE UP (ref 12–16)
IMM GRANULOCYTES NFR BLD AUTO: 0.2 % — SIGNIFICANT CHANGE UP (ref 0.1–0.3)
LYMPHOCYTES # BLD AUTO: 0.71 K/UL — LOW (ref 1.2–3.4)
LYMPHOCYTES # BLD AUTO: 16.6 % — LOW (ref 20.5–51.1)
MAGNESIUM SERPL-MCNC: 2.3 MG/DL — SIGNIFICANT CHANGE UP (ref 1.8–2.4)
MCHC RBC-ENTMCNC: 28.8 PG — SIGNIFICANT CHANGE UP (ref 27–31)
MCHC RBC-ENTMCNC: 32.2 G/DL — SIGNIFICANT CHANGE UP (ref 32–37)
MCV RBC AUTO: 89.4 FL — SIGNIFICANT CHANGE UP (ref 81–99)
MONOCYTES # BLD AUTO: 0.18 K/UL — SIGNIFICANT CHANGE UP (ref 0.1–0.6)
MONOCYTES NFR BLD AUTO: 4.2 % — SIGNIFICANT CHANGE UP (ref 1.7–9.3)
NEUTROPHILS # BLD AUTO: 3.38 K/UL — SIGNIFICANT CHANGE UP (ref 1.4–6.5)
NEUTROPHILS NFR BLD AUTO: 78.8 % — HIGH (ref 42.2–75.2)
NRBC # BLD: 0 /100 WBCS — SIGNIFICANT CHANGE UP (ref 0–0)
PLATELET # BLD AUTO: 232 K/UL — SIGNIFICANT CHANGE UP (ref 130–400)
POTASSIUM SERPL-MCNC: 4 MMOL/L — SIGNIFICANT CHANGE UP (ref 3.5–5)
POTASSIUM SERPL-SCNC: 4 MMOL/L — SIGNIFICANT CHANGE UP (ref 3.5–5)
PROT SERPL-MCNC: 7.3 G/DL — SIGNIFICANT CHANGE UP (ref 6–8)
RBC # BLD: 4.34 M/UL — SIGNIFICANT CHANGE UP (ref 4.2–5.4)
RBC # FLD: 13.2 % — SIGNIFICANT CHANGE UP (ref 11.5–14.5)
SODIUM SERPL-SCNC: 143 MMOL/L — SIGNIFICANT CHANGE UP (ref 135–146)
T PALLIDUM AB TITR SER: NEGATIVE — SIGNIFICANT CHANGE UP
TSH SERPL-MCNC: 1.51 UIU/ML — SIGNIFICANT CHANGE UP (ref 0.27–4.2)
WBC # BLD: 4.29 K/UL — LOW (ref 4.8–10.8)
WBC # FLD AUTO: 4.29 K/UL — LOW (ref 4.8–10.8)

## 2021-02-27 PROCEDURE — 72141 MRI NECK SPINE W/O DYE: CPT | Mod: 26

## 2021-02-27 PROCEDURE — 72158 MRI LUMBAR SPINE W/O & W/DYE: CPT | Mod: 26

## 2021-02-27 PROCEDURE — 72157 MRI CHEST SPINE W/O & W/DYE: CPT | Mod: 26

## 2021-02-27 RX ORDER — KETOROLAC TROMETHAMINE 30 MG/ML
15 SYRINGE (ML) INJECTION ONCE
Refills: 0 | Status: DISCONTINUED | OUTPATIENT
Start: 2021-02-27 | End: 2021-02-27

## 2021-02-27 RX ORDER — NYSTATIN CREAM 100000 [USP'U]/G
1 CREAM TOPICAL
Refills: 0 | Status: DISCONTINUED | OUTPATIENT
Start: 2021-02-27 | End: 2021-03-07

## 2021-02-27 RX ORDER — METHOCARBAMOL 500 MG/1
500 TABLET, FILM COATED ORAL AT BEDTIME
Refills: 0 | Status: DISCONTINUED | OUTPATIENT
Start: 2021-02-27 | End: 2021-02-28

## 2021-02-27 RX ADMIN — CHLORHEXIDINE GLUCONATE 1 APPLICATION(S): 213 SOLUTION TOPICAL at 06:09

## 2021-02-27 RX ADMIN — ENOXAPARIN SODIUM 40 MILLIGRAM(S): 100 INJECTION SUBCUTANEOUS at 11:19

## 2021-02-27 RX ADMIN — Medication 4 MILLIGRAM(S): at 18:34

## 2021-02-27 RX ADMIN — Medication 200 MILLIGRAM(S): at 00:19

## 2021-02-27 RX ADMIN — GABAPENTIN 100 MILLIGRAM(S): 400 CAPSULE ORAL at 06:10

## 2021-02-27 RX ADMIN — GABAPENTIN 100 MILLIGRAM(S): 400 CAPSULE ORAL at 12:23

## 2021-02-27 RX ADMIN — Medication 2 MILLIGRAM(S): at 15:38

## 2021-02-27 RX ADMIN — Medication 200 MILLIGRAM(S): at 18:34

## 2021-02-27 RX ADMIN — GABAPENTIN 100 MILLIGRAM(S): 400 CAPSULE ORAL at 00:19

## 2021-02-27 RX ADMIN — METHOCARBAMOL 500 MILLIGRAM(S): 500 TABLET, FILM COATED ORAL at 00:58

## 2021-02-27 RX ADMIN — Medication 4 MILLIGRAM(S): at 06:10

## 2021-02-27 RX ADMIN — Medication 15 MILLIGRAM(S): at 06:35

## 2021-02-27 RX ADMIN — Medication 4 MILLIGRAM(S): at 11:19

## 2021-02-27 RX ADMIN — AMLODIPINE BESYLATE 10 MILLIGRAM(S): 2.5 TABLET ORAL at 06:12

## 2021-02-27 RX ADMIN — NYSTATIN CREAM 1 APPLICATION(S): 100000 CREAM TOPICAL at 11:15

## 2021-02-27 RX ADMIN — Medication 200 MILLIGRAM(S): at 06:12

## 2021-02-27 RX ADMIN — GABAPENTIN 100 MILLIGRAM(S): 400 CAPSULE ORAL at 21:34

## 2021-02-27 RX ADMIN — PANTOPRAZOLE SODIUM 40 MILLIGRAM(S): 20 TABLET, DELAYED RELEASE ORAL at 06:12

## 2021-02-27 NOTE — PHYSICAL THERAPY INITIAL EVALUATION ADULT - MANUAL MUSCLE TESTING RESULTS, REHAB EVAL
RLE DF 1/5, knee ext 2-/5 hip flexion 2/5, LLE grossly 4-/5, light touch sensation to right foot diminished

## 2021-02-27 NOTE — PHYSICAL THERAPY INITIAL EVALUATION ADULT - PERTINENT HX OF CURRENT PROBLEM, REHAB EVAL
Pt was admitted with LE weakness, reports that she hurt her back a week ago walking in the snow.  She has a history of low back pain

## 2021-02-27 NOTE — CONSULT NOTE ADULT - SUBJECTIVE AND OBJECTIVE BOX
HPI:  Chief Complaint: Weakness    Past Medical History: Lumbar radiculopathy, HTN, GERD, asthma, DON    Past Surgical History: None Relevant     Ms. Collins is a 50F with a past medical history as described above who presented to the hospital for weakness in both of her legs. She states that she began to experience mild weakness about 2 weeks ago. Her right side is significantly worse than her left. She described it in an ascending manner. The weakness got progressively worse, now ambulating by dragging her right leg. She says the weakness stops in upper abdomen/lower chest. It is associated with neuralgia, and occasional jolts of pain. She complains of upper extremity/hand neuralgia as well. She did have urinary incontinence 2-3 times, and fecal incontinence once. Of note, she received the Moderna COVID-19 vaccination (only 1 dose) on 21, and was concerned if this was a side-effect.      In the ED, she was hypertensive, other vitals were stable. Neurology and neurosurgery were consulted MRI T and L-spine to be ordered. Started on decadron. Admitted to medicine for further management.     ROS: Denies headache, changes in vision, chest pain, palpitations, shortness of breath, cough, fever, chills, nausea, vomiting, diarrhea, and constipation. +weakness, +"numbness/pin&needles" BLE, +fecal/urinary incontinence  (2021 22:09)      PAST MEDICAL & SURGICAL HISTORY:  Fatty liver    Arthritis  CHR PAIN B/L KNEES    HTN (hypertension)    GERD (gastroesophageal reflux disease)    Obesity    Obstructive sleep apnea on CPAP    Asthma  STABLE, EPISODIC -AVERAGE USE OF ALBUTEROL ABOUT ONCE A MONTH    H/O arthroscopic knee surgery  LT AND RT    History of tonsillectomy and adenoidectomy  AND REMOVAL UVULA    H/O  section        Hospital Course:  Neurosurgery and Neurology consults appreciated. For MRI's of T and L spine. She had had an outpatient lumbar MRI that was OK. She felt onset of low back pain on  after walking in the snow and agrivated when more active.   She has midback pain.   TODAY'S SUBJECTIVE & REVIEW OF SYMPTOMS:     Constitutional WNL   Cardio WNL   Resp WNL   GI WNL  Heme WNL  Endo WNL  Skin WNL  MSK midback and low back pain  Neuro WNL  Cognitive WNL  Psych WNL      MEDICATIONS  (STANDING):  amLODIPine   Tablet 10 milliGRAM(s) Oral daily  budesonide  80 MICROgram(s)/formoterol 4.5 MICROgram(s) Inhaler 2 Puff(s) Inhalation two times a day  chlorhexidine 4% Liquid 1 Application(s) Topical <User Schedule>  dexAMETHasone     Tablet 4 milliGRAM(s) Oral every 6 hours  enoxaparin Injectable 40 milliGRAM(s) SubCutaneous daily  gabapentin 100 milliGRAM(s) Oral three times a day  pantoprazole    Tablet 40 milliGRAM(s) Oral before breakfast  topiramate 200 milliGRAM(s) Oral two times a day    MEDICATIONS  (PRN):  ALBUTerol    90 MICROgram(s) HFA Inhaler 1 Puff(s) Inhalation every 6 hours PRN Shortness of Breath and/or Wheezing  methocarbamol 500 milliGRAM(s) Oral at bedtime PRN Muscle Spasm  nystatin Powder 1 Application(s) Topical two times a day PRN itching      FAMILY HISTORY:  No pertinent family history in first degree relatives        Allergies    No Known Allergies    Intolerances        SOCIAL HISTORY:    [  ] Etoh  [  ] Smoking  [  ] Substance abuse     Home Environment:  [  ] Home Alone  [  ] Lives with Family  [  ] Home Health Aid    Dwelling:  [  ] Apartment  [  ] Private House  [  ] Adult Home  [  ] Skilled Nursing Facility      [  ] Short Term  [  ] Long Term  [  ] Stairs       Elevator [  ]    FUNCTIONAL STATUS PTA: (Check all that apply)  Ambulation: [   ]Independent    [  ] Dependent     [  ] Non-Ambulatory  Assistive Device: [  ] SA Cane  [  ]  Q Cane  [  ] Walker  [  ]  Wheelchair  ADL : [  ] Independent  [  ]  Dependent       Vital Signs Last 24 Hrs  T(C): 37 (2021 14:00), Max: 37 (2021 17:22)  T(F): 98.6 (2021 14:00), Max: 98.6 (2021 17:22)  HR: 84 (2021 14:00) (73 - 88)  BP: 101/61 (2021 14:00) (101/61 - 184/89)  BP(mean): 75 (2021 14:00) (75 - 75)  RR: 19 (2021 14:00) (18 - 19)  SpO2: 100% (2021 00:20) (96% - 100%)      PHYSICAL EXAM: Alert & Oriented X3  GENERAL: NAD, well-groomed, well-developed  HEAD:  Atraumatic, Normocephalic  EYES: EOMI, PERRLA, conjunctiva and sclera clear  NECK: Supple, No JVD, Normal thyroid  CHEST/LUNG: Clear to percussion bilaterally; No rales, rhonchi, wheezing, or rubs  HEART: Regular rate and rhythm; No murmurs, rubs, or gallops  ABDOMEN: Soft, Nontender, Nondistended; Bowel sounds present  EXTREMITIES:  2+ Peripheral Pulses, No clubbing, cyanosis, or edema    NERVOUS SYSTEM:  Cranial Nerves 2-12 intact [  ] Abnormal  [  ]  ROM: WFL all extremities [  ]  Abnormal [  ]  Motor Strength: WFL all extremities  [  ]  Abnormal [  ]  Sensation: intact to light touch [  ] Abnormal [x  ] decreased PP LLE  Reflexes: Symmetric [  ]  Abnormal [  ]    FUNCTIONAL STATUS:  Bed Mobility: Independent [  ]  Supervision [  ]  Needs Assistance [  ]  N/A [  ]  Transfers: Independent [  ]  Supervision [  ]  Needs Assistance [  ]  N/A [  ]   Ambulation: Independent [  ]  Supervision [  ]  Needs Assistance [  ]  N/A [  ]  ADL: Independent [  ] Requires Assistance [  ] N/A [  ]      LABS:                        12.5   4.29  )-----------( 232      ( 2021 08:05 )             38.8         143  |  108  |  12  ----------------------------<  98  4.0   |  24  |  0.8    Ca    9.5      2021 08:05  Mg     2.3         TPro  7.3  /  Alb  4.2  /  TBili  0.4  /  DBili  x   /  AST  14  /  ALT  12  /  AlkPhos  89      PT/INR - ( 2021 18:23 )   PT: 14.20 sec;   INR: 1.23 ratio         PTT - ( 2021 18:23 )  PTT:35.4 sec      RADIOLOGY & ADDITIONAL STUDIES:    Assesment: HPI:  Chief Complaint: Weakness    Past Medical History: Lumbar radiculopathy, HTN, GERD, asthma, DON    Past Surgical History: None Relevant     Ms. Collins is a 50F with a past medical history as described above who presented to the hospital for weakness in both of her legs. She states that she began to experience mild weakness about 2 weeks ago. Her right side is significantly worse than her left. She described it in an ascending manner. The weakness got progressively worse, now ambulating by dragging her right leg. She says the weakness stops in upper abdomen/lower chest. It is associated with neuralgia, and occasional jolts of pain. She complains of upper extremity/hand neuralgia as well. She did have urinary incontinence 2-3 times, and fecal incontinence once. Of note, she received the Moderna COVID-19 vaccination (only 1 dose) on 21, and was concerned if this was a side-effect.      In the ED, she was hypertensive, other vitals were stable. Neurology and neurosurgery were consulted MRI T and L-spine to be ordered. Started on decadron. Admitted to medicine for further management.     ROS: Denies headache, changes in vision, chest pain, palpitations, shortness of breath, cough, fever, chills, nausea, vomiting, diarrhea, and constipation. +weakness, +"numbness/pin&needles" BLE, +fecal/urinary incontinence  (2021 22:09)      PAST MEDICAL & SURGICAL HISTORY:  Fatty liver    Arthritis  CHR PAIN B/L KNEES    HTN (hypertension)    GERD (gastroesophageal reflux disease)    Obesity    Obstructive sleep apnea on CPAP    Asthma  STABLE, EPISODIC -AVERAGE USE OF ALBUTEROL ABOUT ONCE A MONTH    H/O arthroscopic knee surgery  LT AND RT    History of tonsillectomy and adenoidectomy  AND REMOVAL UVULA    H/O  section        Hospital Course:  Neurosurgery and Neurology consults appreciated. For MRI's of T and L spine. She had had an outpatient lumbar MRI that was OK. She felt onset of low back pain on  after walking in the snow and agrivated when more active.   She has midback pain.   TODAY'S SUBJECTIVE & REVIEW OF SYMPTOMS:     Constitutional WNL   Cardio WNL   Resp WNL   GI WNL  Heme WNL  Endo WNL  Skin WNL  MSK midback and low back pain  Neuro WNL  Cognitive WNL  Psych WNL      MEDICATIONS  (STANDING):  amLODIPine   Tablet 10 milliGRAM(s) Oral daily  budesonide  80 MICROgram(s)/formoterol 4.5 MICROgram(s) Inhaler 2 Puff(s) Inhalation two times a day  chlorhexidine 4% Liquid 1 Application(s) Topical <User Schedule>  dexAMETHasone     Tablet 4 milliGRAM(s) Oral every 6 hours  enoxaparin Injectable 40 milliGRAM(s) SubCutaneous daily  gabapentin 100 milliGRAM(s) Oral three times a day  pantoprazole    Tablet 40 milliGRAM(s) Oral before breakfast  topiramate 200 milliGRAM(s) Oral two times a day    MEDICATIONS  (PRN):  ALBUTerol    90 MICROgram(s) HFA Inhaler 1 Puff(s) Inhalation every 6 hours PRN Shortness of Breath and/or Wheezing  methocarbamol 500 milliGRAM(s) Oral at bedtime PRN Muscle Spasm  nystatin Powder 1 Application(s) Topical two times a day PRN itching      FAMILY HISTORY:  No pertinent family history in first degree relatives        Allergies    No Known Allergies    Intolerances        SOCIAL HISTORY:    [  ] Etoh  [  ] Smoking  [  ] Substance abuse     Home Environment:  [  ] Home Alone  [  ] Lives with Family  [  ] Home Health Aid    Dwelling:  [  ] Apartment  [  ] Private House  [  ] Adult Home  [  ] Skilled Nursing Facility      [  ] Short Term  [  ] Long Term  [ x ] Stairs-2 to enter and 26 inside       Elevator [  ]    FUNCTIONAL STATUS PTA: (Check all that apply)  Ambulation: [ x  ]Independent    [  ] Dependent     [  ] Non-Ambulatory  Assistive Device: [x  ] SA Cane  [  ]  Q Cane  [  ] Walker  [  ]  Wheelchair  ADL : [  ] Independent  [  ]  Dependent       Vital Signs Last 24 Hrs  T(C): 37 (2021 14:00), Max: 37 (2021 17:22)  T(F): 98.6 (2021 14:00), Max: 98.6 (2021 17:22)  HR: 84 (2021 14:00) (73 - 88)  BP: 101/61 (2021 14:00) (101/61 - 184/89)  BP(mean): 75 (2021 14:00) (75 - 75)  RR: 19 (2021 14:00) (18 - 19)  SpO2: 100% (2021 00:20) (96% - 100%)      PHYSICAL EXAM: Alert & Oriented X3  GENERAL: NAD, well-groomed, well-developed  HEAD:  Atraumatic, Normocephalic  EYES: EOMI, PERRLA, conjunctiva and sclera clear  NECK: Supple, No JVD, Normal thyroid  CHEST/LUNG: Clear bilaterally; No rales, rhonchi, wheezing, or rubs  HEART: Regular rate and rhythm; No murmurs, rubs, or gallops  ABDOMEN: Soft, Nontender, Nondistended; Bowel sounds present  EXTREMITIES:  2+ Peripheral Pulses, No clubbing, cyanosis, or edema    NERVOUS SYSTEM:  Cranial Nerves 2-12 intact [  ] Abnormal  [  ]  ROM: WFL all extremities [  ]  Abnormal [  ]  Motor Strength: WFL all extremities  [  ]  Abnormal [  ]  Sensation: intact to light touch [  ] Abnormal [x  ] decreased PP LLE  Reflexes: Symmetric [  ]  Abnormal [  ]    FUNCTIONAL STATUS:  Bed Mobility: Independent [  ]  Supervision [  ]  Needs Assistance [  ]  N/A [  ]  Transfers: Independent [  ]  Supervision [  ]  Needs Assistance [  ]  N/A [  ]   Ambulation: Independent [  ]  Supervision [  ]  Needs Assistance [  ]  N/A [  ]  ADL: Independent [  ] Requires Assistance [  ] N/A [  ]      LABS:                        12.5   4.29  )-----------( 232      ( 2021 08:05 )             38.8         143  |  108  |  12  ----------------------------<  98  4.0   |  24  |  0.8    Ca    9.5      2021 08:05  Mg     2.3         TPro  7.3  /  Alb  4.2  /  TBili  0.4  /  DBili  x   /  AST  14  /  ALT  12  /  AlkPhos  89      PT/INR - ( 2021 18:23 )   PT: 14.20 sec;   INR: 1.23 ratio         PTT - ( 2021 18:23 )  PTT:35.4 sec      RADIOLOGY & ADDITIONAL STUDIES:    Assesment: HPI:  Chief Complaint: Weakness    Past Medical History: Lumbar radiculopathy, HTN, GERD, asthma, DON    Past Surgical History: None Relevant     Ms. Collins is a 50F with a past medical history as described above who presented to the hospital for weakness in both of her legs. She states that she began to experience mild weakness about 2 weeks ago. Her right side is significantly worse than her left. She described it in an ascending manner. The weakness got progressively worse, now ambulating by dragging her right leg. She says the weakness stops in upper abdomen/lower chest. It is associated with neuralgia, and occasional jolts of pain. She complains of upper extremity/hand neuralgia as well. She did have urinary incontinence 2-3 times, and fecal incontinence once. Of note, she received the Moderna COVID-19 vaccination (only 1 dose) on 21, and was concerned if this was a side-effect.      In the ED, she was hypertensive, other vitals were stable. Neurology and neurosurgery were consulted MRI T and L-spine to be ordered. Started on decadron. Admitted to medicine for further management.     ROS: Denies headache, changes in vision, chest pain, palpitations, shortness of breath, cough, fever, chills, nausea, vomiting, diarrhea, and constipation. +weakness, +"numbness/pin&needles" BLE, +fecal/urinary incontinence  (2021 22:09)      PAST MEDICAL & SURGICAL HISTORY:  Fatty liver    Arthritis  CHR PAIN B/L KNEES    HTN (hypertension)    GERD (gastroesophageal reflux disease)    Obesity    Obstructive sleep apnea on CPAP    Asthma  STABLE, EPISODIC -AVERAGE USE OF ALBUTEROL ABOUT ONCE A MONTH    H/O arthroscopic knee surgery  LT AND RT    History of tonsillectomy and adenoidectomy  AND REMOVAL UVULA    H/O  section        Hospital Course:  Neurosurgery and Neurology consults appreciated. For MRI's of T and L spine. She had had an outpatient lumbar MRI that was OK. She felt onset of low back pain on  after walking in the snow and agrivated when more active.   She has midback pain.   TODAY'S SUBJECTIVE & REVIEW OF SYMPTOMS:     Constitutional WNL   Cardio WNL   Resp WNL   GI WNL  Heme WNL  Endo WNL  Skin WNL  MSK midback and low back pain  Neuro WNL  Cognitive WNL  Psych WNL      MEDICATIONS  (STANDING):  amLODIPine   Tablet 10 milliGRAM(s) Oral daily  budesonide  80 MICROgram(s)/formoterol 4.5 MICROgram(s) Inhaler 2 Puff(s) Inhalation two times a day  chlorhexidine 4% Liquid 1 Application(s) Topical <User Schedule>  dexAMETHasone     Tablet 4 milliGRAM(s) Oral every 6 hours  enoxaparin Injectable 40 milliGRAM(s) SubCutaneous daily  gabapentin 100 milliGRAM(s) Oral three times a day  pantoprazole    Tablet 40 milliGRAM(s) Oral before breakfast  topiramate 200 milliGRAM(s) Oral two times a day    MEDICATIONS  (PRN):  ALBUTerol    90 MICROgram(s) HFA Inhaler 1 Puff(s) Inhalation every 6 hours PRN Shortness of Breath and/or Wheezing  methocarbamol 500 milliGRAM(s) Oral at bedtime PRN Muscle Spasm  nystatin Powder 1 Application(s) Topical two times a day PRN itching      FAMILY HISTORY:  No pertinent family history in first degree relatives        Allergies    No Known Allergies    Intolerances        SOCIAL HISTORY:    [  ] Etoh  [  ] Smoking  [  ] Substance abuse     Home Environment:  [  ] Home Alone  [  ] Lives with Family  [  ] Home Health Aid    Dwelling:  [  ] Apartment  [  ] Private House  [  ] Adult Home  [  ] Skilled Nursing Facility      [  ] Short Term  [  ] Long Term  [ x ] Stairs-2 to enter and 26 inside       Elevator [  ]    FUNCTIONAL STATUS PTA: (Check all that apply)  Ambulation: [ x  ]Independent    [  ] Dependent     [  ] Non-Ambulatory  Assistive Device: [x  ] SA Cane  [  ]  Q Cane  [  ] Walker  [  ]  Wheelchair  ADL : [  ] Independent  [  ]  Dependent       Vital Signs Last 24 Hrs  T(C): 37 (2021 14:00), Max: 37 (2021 17:22)  T(F): 98.6 (2021 14:00), Max: 98.6 (2021 17:22)  HR: 84 (2021 14:00) (73 - 88)  BP: 101/61 (2021 14:00) (101/61 - 184/89)  BP(mean): 75 (2021 14:00) (75 - 75)  RR: 19 (2021 14:00) (18 - 19)  SpO2: 100% (2021 00:20) (96% - 100%)      PHYSICAL EXAM: Alert & Oriented X3  GENERAL: NAD, well-groomed, well-developed  HEAD:  Atraumatic, Normocephalic  EYES: EOMI, PERRLA, conjunctiva and sclera clear  NECK: Supple, No JVD, Normal thyroid  CHEST/LUNG: Clear bilaterally; No rales, rhonchi, wheezing, or rubs  HEART: Regular rate and rhythm; No murmurs, rubs, or gallops  ABDOMEN: Soft, Nontender, Nondistended; Bowel sounds present  EXTREMITIES:  2+ Peripheral Pulses, No clubbing, cyanosis, or edema    NERVOUS SYSTEM:  Cranial Nerves 2-12 intact [  ] Abnormal  [  ]  ROM: WFL all extremities [  ]  Abnormal [  ]  Motor Strength: WFL all extremities  [  ]  Abnormal [ x ] right ankle DF/PF 1 to 2-/5; right knee ext 4-/5  Sensation: intact to light touch [  ] Abnormal [x  ] decreased PP LLE  Reflexes: Symmetric [  ]  Abnormal [  ]    FUNCTIONAL STATUS:  Bed Mobility: Independent [  ]  Supervision [  ]  Needs Assistance [  ]  N/A [  ]  Transfers: Independent [  ]  Supervision [  ]  Needs Assistance [  ]  N/A [  ]   Ambulation: Independent [  ]  Supervision [  ]  Needs Assistance [  ]  N/A [  ]  ADL: Independent [  ] Requires Assistance [  ] N/A [  ]      LABS:                        12.5   4.29  )-----------( 232      ( 2021 08:05 )             38.8         143  |  108  |  12  ----------------------------<  98  4.0   |  24  |  0.8    Ca    9.5      2021 08:05  Mg     2.3         TPro  7.3  /  Alb  4.2  /  TBili  0.4  /  DBili  x   /  AST  14  /  ALT  12  /  AlkPhos  89      PT/INR - ( 2021 18:23 )   PT: 14.20 sec;   INR: 1.23 ratio         PTT - ( 2021 18:23 )  PTT:35.4 sec      RADIOLOGY & ADDITIONAL STUDIES:    Assesment:

## 2021-02-27 NOTE — PROGRESS NOTE ADULT - ASSESSMENT
ANNMARIE MCCULLOUGH is a 50y Female with a past medical history as described above who presented for weakness    # Weakness   - Differential includes lumbar radiculopathy, disc herniation with cord impingement, vaccine side-effect/GBS (less likely), and transverse myelitis, or other demyelinating neuropathy   - Follow-up MRI T-spine and L-spine  - ESR 20, f/u CRP, AURORA, RPR, and TSH  - c/w Decadron 4mg PO q6   - Neurology and Neurosurgery is following  - c/w Robaxin 500 mg at night time  - PT/OT/Rehab eval     # HTN  - c/w Amlodipine 10mg daily  - DASH diet    # Neuropathy  - Check hbA1c  - Continue Topamax 200mg twice daily and gabapentin 100mg three times daily     # DON  - CPAP if needed at night  - Outpt follow-up     # Asthma  - Continue Symbicort twice daily and Ventolin PRN    # GERD  - Protonix 40mg PO daily     DVT ppx: Lovenox 40 mg SubQ daily    GI ppx: Protonix 40mg daily   Diet: DASH/TLC  Activity: IAT   Dispo: From home   Code: FULL     Pending: MRI of T and L spine, PT/ Rehab eval

## 2021-02-27 NOTE — CHART NOTE - NSCHARTNOTEFT_GEN_A_CORE
Spoke to Zee( ) - updated him about patient's medical status, answered all questions related to medical care.

## 2021-02-27 NOTE — PROGRESS NOTE ADULT - SUBJECTIVE AND OBJECTIVE BOX
SUBJECTIVE:    Patient is a 50y old Female who presents with a chief complaint of Weakness (2021 22:09)    Currently admitted to medicine with the primary diagnosis of Weakness.     Today is hospital day 1d. This morning she is resting comfortably in bed and reports that her spasms are better controlled with Robaxin. Pt still reports that she is not able to move her right leg. She reports that her spasms are coming from upper back, and travels down to her legs. No overnight events.     PAST MEDICAL & SURGICAL HISTORY  Fatty liver    Arthritis  CHR PAIN B/L KNEES    HTN (hypertension)    GERD (gastroesophageal reflux disease)    Obesity    Obstructive sleep apnea on CPAP    Asthma  STABLE, EPISODIC -AVERAGE USE OF ALBUTEROL ABOUT ONCE A MONTH    H/O arthroscopic knee surgery  LT AND RT    History of tonsillectomy and adenoidectomy  AND REMOVAL UVULA    H/O  section    ALLERGIES:  No Known Allergies    MEDICATIONS:  STANDING MEDICATIONS  amLODIPine   Tablet 10 milliGRAM(s) Oral daily  budesonide  80 MICROgram(s)/formoterol 4.5 MICROgram(s) Inhaler 2 Puff(s) Inhalation two times a day  chlorhexidine 4% Liquid 1 Application(s) Topical <User Schedule>  dexAMETHasone     Tablet 4 milliGRAM(s) Oral every 6 hours  enoxaparin Injectable 40 milliGRAM(s) SubCutaneous daily  gabapentin 100 milliGRAM(s) Oral three times a day  pantoprazole    Tablet 40 milliGRAM(s) Oral before breakfast  topiramate 200 milliGRAM(s) Oral two times a day    PRN MEDICATIONS  ALBUTerol    90 MICROgram(s) HFA Inhaler 1 Puff(s) Inhalation every 6 hours PRN  methocarbamol 500 milliGRAM(s) Oral at bedtime PRN  nystatin Powder 1 Application(s) Topical two times a day PRN    VITALS:   T(F): 96.3  HR: 73  BP: 129/70  RR: 18  SpO2: 100%    LABS:                        12.1   5.76  )-----------( 222      ( 2021 18:23 )             37.4     02-    141  |  107  |  14  ----------------------------<  88  3.3<L>   |  22  |  0.9    Ca    9.2      2021 18:23    TPro  7.1  /  Alb  4.0  /  TBili  0.4  /  DBili  x   /  AST  23  /  ALT  13  /  AlkPhos  83      PT/INR - ( 2021 18:23 )   PT: 14.20 sec;   INR: 1.23 ratio         PTT - ( 2021 18:23 )  PTT:35.4 sec      Sedimentation Rate, Erythrocyte: 20 mm/Hr (21 @ 23:30)      PHYSICAL EXAM:  GEN: No acute distress  LUNGS: Clear to auscultation bilaterally , no wheezes, rales, rhonchi  HEART: Regular rate and rhythm, +s1 s2  ABD: Soft, non-tender, non-distended.   EXT: no edema, no cyanosis on LE. 2+dorsalis pedes pulse b/l. Skin Intact. muscle strength 2/5 on RLE, 4/5 on LLE. Muscle strength 5/5 on Upper extremities b/l.   NEURO: AAOX3

## 2021-02-27 NOTE — PHYSICAL THERAPY INITIAL EVALUATION ADULT - GAIT TRAINING, PT EVAL
pt will ambulate 25 feet with RW and min assist by discharge.  Pt will climb 14 steps with moderate assistance by discharge

## 2021-02-28 LAB
APTT BLD: 33.9 SEC — SIGNIFICANT CHANGE UP (ref 27–39.2)
INR BLD: 1.18 RATIO — SIGNIFICANT CHANGE UP (ref 0.65–1.3)
PROTHROM AB SERPL-ACNC: 13.6 SEC — HIGH (ref 9.95–12.87)

## 2021-02-28 PROCEDURE — 99233 SBSQ HOSP IP/OBS HIGH 50: CPT

## 2021-02-28 PROCEDURE — 99221 1ST HOSP IP/OBS SF/LOW 40: CPT

## 2021-02-28 RX ORDER — ACETAMINOPHEN 500 MG
325 TABLET ORAL ONCE
Refills: 0 | Status: COMPLETED | OUTPATIENT
Start: 2021-02-28 | End: 2021-02-28

## 2021-02-28 RX ORDER — POLYETHYLENE GLYCOL 3350 17 G/17G
17 POWDER, FOR SOLUTION ORAL DAILY
Refills: 0 | Status: DISCONTINUED | OUTPATIENT
Start: 2021-02-28 | End: 2021-03-07

## 2021-02-28 RX ORDER — METHOCARBAMOL 500 MG/1
500 TABLET, FILM COATED ORAL THREE TIMES A DAY
Refills: 0 | Status: DISCONTINUED | OUTPATIENT
Start: 2021-02-28 | End: 2021-03-05

## 2021-02-28 RX ORDER — ACETAMINOPHEN 500 MG
650 TABLET ORAL EVERY 6 HOURS
Refills: 0 | Status: DISCONTINUED | OUTPATIENT
Start: 2021-02-28 | End: 2021-03-07

## 2021-02-28 RX ADMIN — ENOXAPARIN SODIUM 40 MILLIGRAM(S): 100 INJECTION SUBCUTANEOUS at 11:10

## 2021-02-28 RX ADMIN — METHOCARBAMOL 500 MILLIGRAM(S): 500 TABLET, FILM COATED ORAL at 17:44

## 2021-02-28 RX ADMIN — CHLORHEXIDINE GLUCONATE 1 APPLICATION(S): 213 SOLUTION TOPICAL at 05:44

## 2021-02-28 RX ADMIN — Medication 4 MILLIGRAM(S): at 17:05

## 2021-02-28 RX ADMIN — GABAPENTIN 100 MILLIGRAM(S): 400 CAPSULE ORAL at 13:46

## 2021-02-28 RX ADMIN — METHOCARBAMOL 500 MILLIGRAM(S): 500 TABLET, FILM COATED ORAL at 00:14

## 2021-02-28 RX ADMIN — Medication 4 MILLIGRAM(S): at 00:14

## 2021-02-28 RX ADMIN — Medication 200 MILLIGRAM(S): at 17:44

## 2021-02-28 RX ADMIN — GABAPENTIN 100 MILLIGRAM(S): 400 CAPSULE ORAL at 21:21

## 2021-02-28 RX ADMIN — Medication 4 MILLIGRAM(S): at 11:11

## 2021-02-28 RX ADMIN — AMLODIPINE BESYLATE 10 MILLIGRAM(S): 2.5 TABLET ORAL at 05:44

## 2021-02-28 RX ADMIN — PANTOPRAZOLE SODIUM 40 MILLIGRAM(S): 20 TABLET, DELAYED RELEASE ORAL at 05:44

## 2021-02-28 RX ADMIN — Medication 200 MILLIGRAM(S): at 05:44

## 2021-02-28 RX ADMIN — Medication 325 MILLIGRAM(S): at 11:10

## 2021-02-28 RX ADMIN — BUDESONIDE AND FORMOTEROL FUMARATE DIHYDRATE 2 PUFF(S): 160; 4.5 AEROSOL RESPIRATORY (INHALATION) at 21:21

## 2021-02-28 RX ADMIN — Medication 4 MILLIGRAM(S): at 05:44

## 2021-02-28 RX ADMIN — GABAPENTIN 100 MILLIGRAM(S): 400 CAPSULE ORAL at 05:44

## 2021-02-28 RX ADMIN — Medication 650 MILLIGRAM(S): at 08:20

## 2021-02-28 RX ADMIN — Medication 4 MILLIGRAM(S): at 23:38

## 2021-02-28 RX ADMIN — BUDESONIDE AND FORMOTEROL FUMARATE DIHYDRATE 2 PUFF(S): 160; 4.5 AEROSOL RESPIRATORY (INHALATION) at 11:09

## 2021-02-28 NOTE — CONSULT NOTE ADULT - ASSESSMENT
49yo female presents with 2 week history of progressive R>L lower extremities weakness with  associated with neuralgia and spasming/cramping. Admits to breaking a fall 2 weeks ago and twisting her back. On current exam with marked distal>proximal R LE weakness, diminished sensory on the Left LE but +reflexes.      Plan:  MRI C and T spine No lorie  MRI L spine wwo LORIE  PT eval  may use Robaxin 500 mg at night time for spasming discomfort        Plan discussed with Dr. Plata
IMPRESSION: Rehab of RLE weakness; Low back pain; decreased PP left lower extremity; R/O Brown-Sequard Syndrome Thorasic spine ;  MRI's of Thorasic and Lumbar spines are pending     PRECAUTIONS: [ x ] Cardiac  [  ] Respiratory  [  ] Seizures [  ] Contact Isolation  [  ] Droplet Isolation  [  ] Other    Weight Bearing Status:     RECOMMENDATION:   Neurosurgery is following.    Out of Bed to Chair     DVT/Decubiti Prophylaxis    REHAB PLAN:     [x   ] Bedside P/T 3-5 times a week   [   ]   Bedside O/T  2-3 times a week             [   ] No Rehab Therapy Indicated                   [   ]  Speech Therapy   Conditioning/ROM                                    ADL  Bed Mobility                                               Conditioning/ROM  Transfers                                                     Bed Mobility  Sitting /Standing Balance                         Transfers                                        Gait Training                                               Sitting/Standing Balance  Stair Training [   ]Applicable                    Home equipment Eval                                                                        Splinting  [   ] Only      GOALS:   ADL   [ x  ]   Independent                    Transfers  [ x  ] Independent                          Ambulation  [  x ] Independent     [  x ] With device                            [   ]  CG                                                         [   ]  CG                                                                  [   ] CG                            [    ] Min A                                                   [   ] Min A                                                              [   ] Min  A          DISCHARGE PLAN:   [   ]  Good candidate for Intensive Rehabilitation/Hospital based-[facility]A [facility]                                             Will tolerate 3hrs Intensive Rehab Daily                                       [  X  ]  Short Term Rehab in Skilled Nursing Facility; inpatient rehab is currently closed.                                       [    ]  Home with Outpatient or VN services                                         [    ]  Possible Candidate for Intensive Hospital based Rehab                                       
Ms. Collins is a 50F with a past medical history lumbar radiculopathy, HTN, GERD, asthma, DON who presented to the hospital for weakness in both of her legs. Radiological evidence Increased spinal cord signal to T2-T3 level without mass effect, which may be compatible with acute transverse myelitis or viral myelitis.  .    - Continue oral steroid and topiramate regimen  - May consider increasing gabapentin to 300mg TID  - May consider increasing methocarbamol to 500mg TID PRN  - The above plans were discussed with the patient  - Pain team will follow

## 2021-02-28 NOTE — CHART NOTE - NSCHARTNOTEFT_GEN_A_CORE
called by neurology for patient's symptoms:  neuro want LP under IR.   unable to contact IR to place the patient on the schedule.  please call tomorrow morning as soon as possible.   will get coags and rest of labs requested by Neurology. called by neurology for patient's symptoms:  neuro want LP under IR.   unable to contact IR to place the patient on the schedule.  please call tomorrow morning as soon as possible.   will get coags.   please order rest of labs requested by Neurology ( note not in chart yet)

## 2021-02-28 NOTE — PROGRESS NOTE ADULT - ASSESSMENT
A/P:  B/L LE weakness with associated sensory loss - MRI of spine without evidence of cord compression.  However, there is increase SC signal T2 to T3 level without mass effect and ddx includes acute transverse myelitis or viral myelitis.  Neuro and pain management evaluations and recommendations as noted - to f/up with increase in Gabapentin to 300 mg po TID and methocarbanol to 500 mg TID prn.  Continue decadron with rest of txs.  F/up abn labs.  PT to continue to f/up and SW to assist with dc planning as STR is needed upon dc. A/P:  B/L LE weakness with associated sensory loss - MRI of spine without evidence of cord compression.  However, there is increase SC signal T2 to T3 level without mass effect and ddx includes acute transverse myelitis or viral myelitis.  Neuro and pain management evaluations and recommendations as noted - to f/up with adjustment of meds. Continue decadron with rest of txs.  F/up abn labs.  PT to continue to f/up and SW to assist with dc planning as STR is needed upon dc.

## 2021-02-28 NOTE — CONSULT NOTE ADULT - SUBJECTIVE AND OBJECTIVE BOX
HPI:  Chief Complaint: Weakness    Past Medical History: Lumbar radiculopathy, HTN, GERD, asthma, DON    Past Surgical History: None Relevant     Ms. Collins is a 50F with a past medical history as described above who presented to the hospital for weakness in both of her legs. She states that she began to experience mild weakness about 2 weeks ago. Her right side is significantly worse than her left. She described it in an ascending manner. The weakness got progressively worse, now ambulating by dragging her right leg. She says the weakness stops in upper abdomen/lower chest. It is associated with neuralgia, and occasional jolts of pain. She complains of upper extremity/hand neuralgia as well. She did have urinary incontinence 2-3 times, and fecal incontinence once. Of note, she received the Moderna COVID-19 vaccination (only 1 dose) on 1/18/21, and was concerned if this was a side-effect.      In the ED, she was hypertensive, other vitals were stable. Neurology and neurosurgery were consulted MRI T and L-spine to be ordered. Started on decadron. Admitted to medicine for further management.     ROS: Denies headache, changes in vision, chest pain, palpitations, shortness of breath, cough, fever, chills, nausea, vomiting, diarrhea, and constipation. +weakness, +"numbness/pin&needles" BLE (R>>L), -fecal/urinary incontinence      Interval Hx: The patient was seen and examined at the beside. The patient reported lumbar radicular pain into bilateral lower extremities and intermittent spasm in the her low and mid back. She was previously followed by Dr. Ramirez at [facility] for neuropathy management. She had previously tolerated gabapentin but due to increased appetite, she was started on topiramate 200mg BID. Robaxin 500 QHS PRN helps reducing her spasm intensity    Home Medications:  amLODIPine 10 mg oral tablet: 1 tab(s) orally once a day (26 Feb 2021 22:30)  gabapentin 100 mg oral capsule: 1 cap(s) orally 3 times a day (26 Feb 2021 22:30)  omeprazole 40 mg oral delayed release capsule: 1 cap(s) orally once a day (26 Feb 2021 22:30)  Qvar 80 mcg/inh inhalation aerosol: 2  inhaled 2 times a day (26 Feb 2021 22:30)  Topamax 200 mg oral tablet: 1 tab(s) orally 2 times a day (26 Feb 2021 22:30)  Ventolin 90 mcg/inh inhalation aerosol with adapter:  (26 Feb 2021 22:30)    MEDICATIONS  (STANDING):  acetaminophen   Tablet .. 325 milliGRAM(s) Oral once  amLODIPine   Tablet 10 milliGRAM(s) Oral daily  budesonide  80 MICROgram(s)/formoterol 4.5 MICROgram(s) Inhaler 2 Puff(s) Inhalation two times a day  chlorhexidine 4% Liquid 1 Application(s) Topical <User Schedule>  dexAMETHasone     Tablet 4 milliGRAM(s) Oral every 6 hours  enoxaparin Injectable 40 milliGRAM(s) SubCutaneous daily  gabapentin 100 milliGRAM(s) Oral three times a day  pantoprazole    Tablet 40 milliGRAM(s) Oral before breakfast  topiramate 200 milliGRAM(s) Oral two times a day    MEDICATIONS  (PRN):  acetaminophen   Tablet .. 650 milliGRAM(s) Oral every 6 hours PRN Moderate Pain (4 - 6)  ALBUTerol    90 MICROgram(s) HFA Inhaler 1 Puff(s) Inhalation every 6 hours PRN Shortness of Breath and/or Wheezing  methocarbamol 500 milliGRAM(s) Oral at bedtime PRN Muscle Spasm  nystatin Powder 1 Application(s) Topical two times a day PRN itching    EXAM:  MR SPINE LUMBAR WAW IC        EXAM:  MR SPINE CERVICAL        EXAM:  MR SPINE THORACIC WAW IC            PROCEDURE DATE:  02/27/2021        INTERPRETATION:  CLINICAL INDICATION: Demyelinating disease. Bilateral leg weakness.    TECHNIQUE: Noncontrast cervical MRI, pre and postcontrast thoracic and lumbar spine was performed.    Sagittal T1, T2, and STIR, and axial T2 and T1 sequences were obtained of the cervical, thoracic, and lumbar spine. Postcontrast axial sagittal T1-weighted images were obtained of the thoracic and lumbar spine. 10 cc Gadavist was administered. 0 cc was discarded.    COMPARISON: None.    FINDINGS:    CERVICAL SPINE:  Structures at the craniocervical and cervicomedullary junction are intact. Vertebrae demonstrate normal height and signal without fracture, dislocation or marrow edema.    The spinal cord demonstrates normal course and caliber without intrinsic cord signal abnormality.    There is multilevel disc desiccation. Evaluation of individual levels demonstrates:    C2-C3: There is no spinal canal or neuroforaminal narrowing.    C3-C4: There is mild disc bulging as well as right uncinate joint hypertrophy resulting in mild right neuroforaminal narrowing. There is no spinal canal stenosis at this level.    C4-C5: There is mild disc bulging as well as bilateral uncinate joint hypertrophy and mild bilateral facet arthrosis resulting in mild right neuroforaminal narrowing. There is no spinal canal stenosis at this level.    C5-C6: There is mild disc bulging as well as bilateral uncinate joint hypertrophy and mild bilateral facet arthrosis without significant spinal canal or neuroforaminal narrowing.    C6-C7: There is mild disc bulging indenting on the ventral thecal sac without significant spinal canal or neuroforaminal narrowing.    C7-T1: There is no spinal canal or neuroforaminal narrowing.    There is no soft tissue neck mass or fluid collection.      THORACIC SPINE:  VERTEBRAL BODIES/ALIGNMENT: Normal.    SPINAL CORD: There is abnormally increased signal within the spinal cord which involves the central and right aspect of the cord predominantly without cord expansion from the level of T1-T3. There is no discrete abnormal enhancement associated with this region of signal abnormality.    SPINAL CANAL:  The T10-T11 there is disc bulging as well as right facet arthrosis with posterior ligamentous spurring resulting in moderate spinal canal stenosis and mass effect upon the right dorsal aspect of the spinal cord.    At T11-T12 there is bilateral facet arthrosis with mild bilateral posterior ligamentous hypertrophy resulting in mild spinal canal stenosis.    INTERVERTEBRAL DISCS:  Normal.    MISCELLANEOUS:  None      LUMBAR SPINE:  VERTEBRAL BODIES/ALIGNMENT:  There are endplate degenerative changes noted at L4-5 and L5-S1.    CONUS/CAUDA EQUINA: Unremarkable without abnormal signal.    INTERVERTEBRAL DISCS: There is multilevel disc desiccation involving L3-L4, L4-5 and L5-S1.    L1-L2:  Normal.  L2-L3:  There is mild disc bulging as well as posterior ligamentous hypertrophy and mild bilateral facet arthrosis resulting in mild spinal canal stenosis. There is no neuroforaminal narrowing..  L3-L4:  There is disc bulging as well as bilateral facet arthrosis as well as posterior ligamentous hypertrophy resulting in moderate spinal canal stenosis and mild bilateral neuroforaminal narrowing.  L4-L5:  There is disc bulging with superimposed central disc herniation resulting in moderate spinal canal stenosis and mild bilateral neuroforaminal narrowing.  L5-S1:  There is disc bulging with superimposed central herniation resulting in mild spinal canal stenosis and mild bilateral neuroforaminal narrowing.    MISCELLANEOUS:  None.      IMPRESSION:    CERVICAL SPINE:  No evidence of abnormal spinal cord signal.    Mild multilevel degenerative changes without significant spinal canal or neuroforaminal narrowing.    THORACIC SPINE:  Abnormal signal noted in the spinal cord from T1 through T3 without evidence of enhancement or cord expansion. Finding is nonspecific and may represent demyelination, acute transverse myelitis or viral myelitis.    At T10-T11 there is moderate spinal canal stenosis as well as mild mass effect upon the right dorsal aspect of the spinal cord due to osseous spurring involving the ligamentum flavum.    LUMBAR SPINE:  Multilevel degenerative changes and disc bulges disc bulges with superimposed central herniations at L4-5 and L5-S1 resulting in mild to moderate spinal canal stenosis as well as mild bilateral neuroforaminal narrowing.    No abnormal signal or enhancement noted of the cauda equina or nerve roots.    Dr. Tim Mayfield discussed preliminary findings with Dr. CINDY PUGA on 2/28/2021 4:32 AM with readback.    TIM MAYFIELD M.D., RESIDENT RADIOLOGIST  This document has been electronically signed.  JENNIFER DEVINE M.D., ATTENDING RADIOLOGIST  This document has been electronically signed. Feb 28 2021 10:24AM    Vital Signs Last 24 Hrs  T(C): 36 (28 Feb 2021 07:50), Max: 37 (27 Feb 2021 14:00)  T(F): 96.8 (28 Feb 2021 07:50), Max: 98.6 (27 Feb 2021 14:00)  HR: 55 (28 Feb 2021 07:50) (55 - 84)  BP: 122/77 (28 Feb 2021 07:50) (101/61 - 137/79)  BP(mean): 75 (27 Feb 2021 14:00) (75 - 75)  RR: 18 (28 Feb 2021 07:50) (18 - 19)  SpO2: --  NAD, A+Ox3  EMOI  Bilateral UE MS and LLE 5/5. RLE at least 2/5  Sensation to light touch reduced in the right LE

## 2021-02-28 NOTE — PROGRESS NOTE ADULT - SUBJECTIVE AND OBJECTIVE BOX
T(C): 36 (02-28-21 @ 07:50), Max: 37 (02-27-21 @ 14:00)  HR: 55 (02-28-21 @ 07:50) (55 - 84)  BP: 122/77 (02-28-21 @ 07:50) (101/61 - 137/79)  RR: 18 (02-28-21 @ 07:50) (18 - 19)  SpO2: --  Wt(kg): --    CBC Full  -  ( 27 Feb 2021 08:05 )  WBC Count : 4.29 K/uL  RBC Count : 4.34 M/uL  Hemoglobin : 12.5 g/dL  Hematocrit : 38.8 %  Platelet Count - Automated : 232 K/uL  Mean Cell Volume : 89.4 fL  Mean Cell Hemoglobin : 28.8 pg  Mean Cell Hemoglobin Concentration : 32.2 g/dL  Auto Neutrophil # : 3.38 K/uL  Auto Lymphocyte # : 0.71 K/uL  Auto Monocyte # : 0.18 K/uL  Auto Eosinophil # : 0.00 K/uL  Auto Basophil # : 0.01 K/uL  Auto Neutrophil % : 78.8 %  Auto Lymphocyte % : 16.6 %  Auto Monocyte % : 4.2 %  Auto Eosinophil % : 0.0 %  Auto Basophil % : 0.2 %    02-27    143  |  108  |  12  ----------------------------<  98  4.0   |  24  |  0.8    Ca    9.5      27 Feb 2021 08:05  Mg     2.3     02-27    TPro  7.3  /  Alb  4.2  /  TBili  0.4  /  DBili  x   /  AST  14  /  ALT  12  /  AlkPhos  89  02-27    PT/INR - ( 26 Feb 2021 18:23 )   PT: 14.20 sec;   INR: 1.23 ratio         PTT - ( 26 Feb 2021 18:23 )  PTT:35.4     Imaging: < from: MR Cervical Spine No Cont (02.27.21 @ 17:29) >  IMPRESSION:    CERVICAL SPINE:  No evidence of abnormal spinal cord signal.    Mild multilevel degenerative changes without significant spinal canal or neuroforaminal narrowing.    THORACIC SPINE:  Abnormal signal noted in the spinal cord from T1 through T3 without evidence of enhancement or cord expansion. Finding is nonspecific and may represent demyelination, acute transverse myelitis or viral myelitis.    At T10-T11 there is moderate spinal canal stenosis as well as mild mass effect upon the right dorsal aspect of the spinal cord due to osseous spurring involving the ligamentum flavum.    LUMBAR SPINE:  Multilevel degenerative changes and disc bulges disc bulges with superimposed central herniations at L4-5 and L5-S1 resulting in mild to moderate spinal canal stenosis as well as mild bilateral neuroforaminal narrowing.    No abnormal signal or enhancement noted of the cauda equina or nerve roots.    < end of copied text >      Assessment/Plan: Case discussed, MRI reviewed with Dr Camarillo, No Neurosurgical intervention, Neurology for further management.

## 2021-02-28 NOTE — PROGRESS NOTE ADULT - ASSESSMENT
49yo female presents with 2 week history of progressive R>L lower extremities weakness with  associated with neuralgia and spasming/cramping. Admits to breaking a fall 2 weeks ago and twisting her back. On current exam with marked distal>proximal R LE weakness, diminished sensory on the Left LE but +reflexes.      Plan:  MRI C and T spine No lorie  MRI L spine wwo LORIE  PT eval  may use Robaxin 500 mg at night time for spasming discomfort        Plan discussed with Dr. Plata  51yo female presents with 2 week history of progressive R>L lower extremities weakness with  associated with neuralgia and spasming/cramping. Admits to breaking a fall 2 weeks ago and twisting her back. On current exam with marked distal>proximal R LE weakness, diminished sensory on the Left LE but +reflexes.  MRI thoracic spine showed evidence of transverse myelitis.   may use Robaxin 500 mg at night time for spasming discomfort  Recommend CSF study:   Check wbc, rbc, Protein, Glu, gram stain and culture, lyme ab, ACE, encephalitis panel including HSV. EBV, CMV and [place].  Check CSF for OCB, oligoclonal band, IgG index, NMO and anti MOG.    Check serum for HIV, Hepatitis B, ACE level, anti aquaporin 4 and myelin oligodendrocyte glycoprotein (MOG)  Brain MRI w.wo contrast   Steroid treatment once infectious etiology ruled out.

## 2021-02-28 NOTE — PROGRESS NOTE ADULT - SUBJECTIVE AND OBJECTIVE BOX
Follow up of this 49 yo obese F with hx of lumbar radiculopathy, OA, chronic knee pain, HTN, GERD, asthma and DON on cpap at home admitted with progressive weakness of b/l LE with RLE worst than LLE.  + Reported urinary and fecal incontinence as well as neuralgia of both UE and LE.  Prior hx of falls with difficulty ambulating due to above.  Rec'd Moderna covid-19 vaccine on 1/18 and she was concerned whether her symptoms are related to vaccination.  No fever.  No other c/o's noted.    ICU Vital Signs Last 24 Hrs  T(C): 36 (28 Feb 2021 07:50), Max: 37 (27 Feb 2021 14:00)  T(F): 96.8 (28 Feb 2021 07:50), Max: 98.6 (27 Feb 2021 14:00)  HR: 55 (28 Feb 2021 07:50) (55 - 84)  BP: 122/77 (28 Feb 2021 07:50) (101/61 - 137/79)  BP(mean): 75 (27 Feb 2021 14:00) (75 - 75)  RR: 18 (28 Feb 2021 07:50) (18 - 19)    EOMI.  No facial weakness.  Neck supple.  No c-spine tenderness.  Back no point tenderness.  + SLR LLE.  Unable to lift RLE.  Motor LLE 4-5/5; RLE 3/5.  Sensory overall dec b/l LE with RLE worst than LLE.  DTRs equal b/l.  Chest CTA b/l.  Cor Reg S1S2.  Abd not distended, + BS, soft and NT.                          12.5   4.29  )-----------( 232      ( 27 Feb 2021 08:05 )             38.8   02-27    143  |  108  |  12  ----------------------------<  98  4.0   |  24  |  0.8    Ca    9.5      27 Feb 2021 08:05  Mg     2.3     02-27    TPro  7.3  /  Alb  4.2  /  TBili  0.4  /  DBili  x   /  AST  14  /  ALT  12  /  AlkPhos  89  02-27    MRI of C / T/ L spine reviewed.

## 2021-02-28 NOTE — PROGRESS NOTE ADULT - SUBJECTIVE AND OBJECTIVE BOX
Neurology   Patient is a 50y old  Female who presents with a chief complaint of Weakness (26 Feb 2021 22:09)    Interval: Reviewed MRI images, then discussed with radiology overnight.    HPI:  Chief Complaint: Weakness    Past Medical History: Lumbar radiculopathy, HTN, GERD, asthma, DON    Past Surgical History: None Relevant     Ms. Collins is a 50F with a past medical history as described above who presented to the hospital for weakness in both of her legs. She states that she began to experience mild weakness about 2 weeks ago. Her right side is significantly worse than her left. She described it in an ascending manner. The weakness got progressively worse, now ambulating by dragging her right leg. She says the weakness stops in upper abdomen/lower chest. It is associated with neuralgia, and occasional jolts of pain. She complains of upper extremity/hand neuralgia as well. She did have urinary incontinence 2-3 times, and fecal incontinence once. Of note, she received the Moderna COVID-19 vaccination (only 1 dose) on 1/18/21, and was concerned if this was a side-effect.      In the ED, she was hypertensive, other vitals were stable. Neurology and neurosurgery were consulted MRI T and L-spine to be ordered. Started on decadron. Admitted to medicine for further management.     ROS: Denies headache, changes in vision, chest pain, palpitations, shortness of breath, cough, fever, chills, nausea, vomiting, diarrhea, and constipation. +weakness, +"numbness/pin&needles" BLE, +fecal/urinary incontinence  (26 Feb 2021 22:09)      MEDICATIONS  (STANDING):  amLODIPine   Tablet 10 milliGRAM(s) Oral daily  budesonide  80 MICROgram(s)/formoterol 4.5 MICROgram(s) Inhaler 2 Puff(s) Inhalation two times a day  chlorhexidine 4% Liquid 1 Application(s) Topical <User Schedule>  enoxaparin Injectable 40 milliGRAM(s) SubCutaneous daily  gabapentin 100 milliGRAM(s) Oral three times a day  pantoprazole    Tablet 40 milliGRAM(s) Oral before breakfast  potassium chloride    Tablet ER 20 milliEquivalent(s) Oral every 2 hours  topiramate 200 milliGRAM(s) Oral two times a day    MEDICATIONS  (PRN):  ALBUTerol    90 MICROgram(s) HFA Inhaler 1 Puff(s) Inhalation every 6 hours PRN Shortness of Breath and/or Wheezing      Review of systems:    Constitutional: No fever, weight loss or fatigue    Eyes: No eye pain or discharge  ENMT:  No difficulty hearing; No sinus or throat pain  Neck: No pain or stiffness  Respiratory: No cough, wheezing, chills or hemoptysis  Cardiovascular: No chest pain, palpitations, shortness of breath, dyspnea on exertion  Gastrointestinal: No abdominal pain, nausea, vomiting or hematemesis; No diarrhea or constipation.   Genitourinary: No dysuria, frequency, hematuria or incontinence  Neurological: As per HPI  Skin: No rashes or lesions   Endocrine: No heat or cold intolerance; No hair loss  Musculoskeletal: No joint pain or swelling  Psychiatric: No depression, anxiety, mood swings  Heme/Lymph: No easy bruising or bleeding gums    Vital Signs Last 24 Hrs  T(C): 36.8 (26 Feb 2021 22:00), Max: 37 (26 Feb 2021 17:22)  T(F): 98.3 (26 Feb 2021 22:00), Max: 98.6 (26 Feb 2021 17:22)  HR: 73 (26 Feb 2021 22:00) (73 - 88)  BP: 116/68 (26 Feb 2021 22:00) (116/68 - 184/89)  BP(mean): --  RR: 19 (26 Feb 2021 22:00) (18 - 19)  SpO2: 98% (26 Feb 2021 22:00) (96% - 98%)    Neurologic Examination:  General:  Appearance is consistent with chronologic age.  No abnormal facies.   General: The patient is oriented to person, place, time and date.  Recent and remote memory intact.  Fund of knowledge is intact and normal.  Language with normal repetition, comprehension and naming.  Nondysarthric.    Cranial nerves: intact VA, VFF.  EOMI w/o nystagmus, skew or reported double vision.  PERRL.  No ptosis/weakness of eyelid closure.  Facial sensation is normal with normal bite.  No facial asymmetry.  Hearing grossly intact b/l.  Palate elevates midline.  Tongue midline.  Motor examination:   Normal tone, bulk and range of motion.  No tenderness, twitching, tremors or involuntary movements.  Formal Muscle Strength Testing: (MRC grade R/L) RLE: limited ROM due to weakness 3/5 proximal, distal 1/5 not able to plantar or dorsiflex. LLE 5/5  Reflexes:   2+ b/l pectoralis, biceps, triceps, brachioradialis, patella and Achilles.  Plantar response downgoing b/l.  Jaw jerk, Sintia, clonus absent.  Sensory examination:   diminished to light touch and pinprick, pain, temperature and proprioception and vibration in Left LE in comparison to the right  Cerebellum:   FTN/HKS intact with normal MILE in all limbs.  No dysmetria or dysdiadokinesia.        Labs:   CBC Full  -  ( 26 Feb 2021 18:23 )  WBC Count : 5.76 K/uL  RBC Count : 4.23 M/uL  Hemoglobin : 12.1 g/dL  Hematocrit : 37.4 %  Platelet Count - Automated : 222 K/uL  Mean Cell Volume : 88.4 fL  Mean Cell Hemoglobin : 28.6 pg  Mean Cell Hemoglobin Concentration : 32.4 g/dL  Auto Neutrophil # : 4.22 K/uL  Auto Lymphocyte # : 1.08 K/uL  Auto Monocyte # : 0.37 K/uL  Auto Eosinophil # : 0.06 K/uL  Auto Basophil # : 0.02 K/uL  Auto Neutrophil % : 73.3 %  Auto Lymphocyte % : 18.8 %  Auto Monocyte % : 6.4 %  Auto Eosinophil % : 1.0 %  Auto Basophil % : 0.3 %    02-26    141  |  107  |  14  ----------------------------<  88  3.3<L>   |  22  |  0.9    Ca    9.2      26 Feb 2021 18:23    TPro  7.1  /  Alb  4.0  /  TBili  0.4  /  DBili  x   /  AST  23  /  ALT  13  /  AlkPhos  83  02-26    LIVER FUNCTIONS - ( 26 Feb 2021 18:23 )  Alb: 4.0 g/dL / Pro: 7.1 g/dL / ALK PHOS: 83 U/L / ALT: 13 U/L / AST: 23 U/L / GGT: x           PT/INR - ( 26 Feb 2021 18:23 )   PT: 14.20 sec;   INR: 1.23 ratio         PTT - ( 26 Feb 2021 18:23 )  PTT:35.4 sec        Neuroimaging:  NCHCT:   CT Angiography/Perfusion:  MRI   < from: MR Thoracic Spine w/wo IV Cont (02.27.21 @ 17:56) >  Impression:    Increased spinal cord signal to T2-T3 level without mass effect, which may be compatible with demyelinating disease.      < end of copied text >        Assessment:      - T2/T3 level Transverse Myelitis    Plan:   - Pulse dose steroids; Increase steroids to Solu-medrol 1000 mg IV for 5 days or Decadron 200 mg IV daily for 5 days  - Continue neuro checks  - Please start SSI and Protonix while on pulse dose steroids  - PT/OT  - Continue Robaxin

## 2021-02-28 NOTE — CONSULT NOTE ADULT - CONSULT REASON
LE weakness
mid back and low back pain  RLE weakness
Lower extremity and back spasms
RLE weakness

## 2021-03-01 LAB
ALBUMIN SERPL ELPH-MCNC: 4 G/DL — SIGNIFICANT CHANGE UP (ref 3.5–5.2)
ALP SERPL-CCNC: 86 U/L — SIGNIFICANT CHANGE UP (ref 30–115)
ALT FLD-CCNC: 13 U/L — SIGNIFICANT CHANGE UP (ref 0–41)
ANA PAT FLD IF-IMP: ABNORMAL
ANA TITR SER: ABNORMAL
ANION GAP SERPL CALC-SCNC: 12 MMOL/L — SIGNIFICANT CHANGE UP (ref 7–14)
AST SERPL-CCNC: 13 U/L — SIGNIFICANT CHANGE UP (ref 0–41)
BASOPHILS # BLD AUTO: 0.01 K/UL — SIGNIFICANT CHANGE UP (ref 0–0.2)
BASOPHILS NFR BLD AUTO: 0.2 % — SIGNIFICANT CHANGE UP (ref 0–1)
BILIRUB SERPL-MCNC: 0.3 MG/DL — SIGNIFICANT CHANGE UP (ref 0.2–1.2)
BUN SERPL-MCNC: 17 MG/DL — SIGNIFICANT CHANGE UP (ref 10–20)
CALCIUM SERPL-MCNC: 9.5 MG/DL — SIGNIFICANT CHANGE UP (ref 8.5–10.1)
CHLORIDE SERPL-SCNC: 107 MMOL/L — SIGNIFICANT CHANGE UP (ref 98–110)
CO2 SERPL-SCNC: 24 MMOL/L — SIGNIFICANT CHANGE UP (ref 17–32)
CREAT SERPL-MCNC: 0.8 MG/DL — SIGNIFICANT CHANGE UP (ref 0.7–1.5)
EOSINOPHIL # BLD AUTO: 0.01 K/UL — SIGNIFICANT CHANGE UP (ref 0–0.7)
EOSINOPHIL NFR BLD AUTO: 0.2 % — SIGNIFICANT CHANGE UP (ref 0–8)
GLUCOSE SERPL-MCNC: 96 MG/DL — SIGNIFICANT CHANGE UP (ref 70–99)
HCT VFR BLD CALC: 38.6 % — SIGNIFICANT CHANGE UP (ref 37–47)
HGB BLD-MCNC: 12.3 G/DL — SIGNIFICANT CHANGE UP (ref 12–16)
HIV 1+2 AB+HIV1 P24 AG SERPL QL IA: SIGNIFICANT CHANGE UP
IMM GRANULOCYTES NFR BLD AUTO: 0.5 % — HIGH (ref 0.1–0.3)
LYMPHOCYTES # BLD AUTO: 0.8 K/UL — LOW (ref 1.2–3.4)
LYMPHOCYTES # BLD AUTO: 13.4 % — LOW (ref 20.5–51.1)
MAGNESIUM SERPL-MCNC: 2.2 MG/DL — SIGNIFICANT CHANGE UP (ref 1.8–2.4)
MCHC RBC-ENTMCNC: 28.9 PG — SIGNIFICANT CHANGE UP (ref 27–31)
MCHC RBC-ENTMCNC: 31.9 G/DL — LOW (ref 32–37)
MCV RBC AUTO: 90.6 FL — SIGNIFICANT CHANGE UP (ref 81–99)
MONOCYTES # BLD AUTO: 0.35 K/UL — SIGNIFICANT CHANGE UP (ref 0.1–0.6)
MONOCYTES NFR BLD AUTO: 5.8 % — SIGNIFICANT CHANGE UP (ref 1.7–9.3)
NEUTROPHILS # BLD AUTO: 4.79 K/UL — SIGNIFICANT CHANGE UP (ref 1.4–6.5)
NEUTROPHILS NFR BLD AUTO: 79.9 % — HIGH (ref 42.2–75.2)
NRBC # BLD: 0 /100 WBCS — SIGNIFICANT CHANGE UP (ref 0–0)
PLATELET # BLD AUTO: 210 K/UL — SIGNIFICANT CHANGE UP (ref 130–400)
POTASSIUM SERPL-MCNC: 4.4 MMOL/L — SIGNIFICANT CHANGE UP (ref 3.5–5)
POTASSIUM SERPL-SCNC: 4.4 MMOL/L — SIGNIFICANT CHANGE UP (ref 3.5–5)
PROT SERPL-MCNC: 6.8 G/DL — SIGNIFICANT CHANGE UP (ref 6–8)
RBC # BLD: 4.26 M/UL — SIGNIFICANT CHANGE UP (ref 4.2–5.4)
RBC # FLD: 13.2 % — SIGNIFICANT CHANGE UP (ref 11.5–14.5)
SODIUM SERPL-SCNC: 143 MMOL/L — SIGNIFICANT CHANGE UP (ref 135–146)
WBC # BLD: 5.99 K/UL — SIGNIFICANT CHANGE UP (ref 4.8–10.8)
WBC # FLD AUTO: 5.99 K/UL — SIGNIFICANT CHANGE UP (ref 4.8–10.8)

## 2021-03-01 PROCEDURE — 99231 SBSQ HOSP IP/OBS SF/LOW 25: CPT

## 2021-03-01 PROCEDURE — 70553 MRI BRAIN STEM W/O & W/DYE: CPT | Mod: 26

## 2021-03-01 PROCEDURE — 99232 SBSQ HOSP IP/OBS MODERATE 35: CPT

## 2021-03-01 RX ORDER — SENNA PLUS 8.6 MG/1
2 TABLET ORAL AT BEDTIME
Refills: 0 | Status: DISCONTINUED | OUTPATIENT
Start: 2021-03-01 | End: 2021-03-07

## 2021-03-01 RX ADMIN — SENNA PLUS 2 TABLET(S): 8.6 TABLET ORAL at 00:33

## 2021-03-01 RX ADMIN — GABAPENTIN 100 MILLIGRAM(S): 400 CAPSULE ORAL at 21:57

## 2021-03-01 RX ADMIN — AMLODIPINE BESYLATE 10 MILLIGRAM(S): 2.5 TABLET ORAL at 05:35

## 2021-03-01 RX ADMIN — PANTOPRAZOLE SODIUM 40 MILLIGRAM(S): 20 TABLET, DELAYED RELEASE ORAL at 05:35

## 2021-03-01 RX ADMIN — GABAPENTIN 100 MILLIGRAM(S): 400 CAPSULE ORAL at 13:14

## 2021-03-01 RX ADMIN — Medication 200 MILLIGRAM(S): at 05:35

## 2021-03-01 RX ADMIN — CHLORHEXIDINE GLUCONATE 1 APPLICATION(S): 213 SOLUTION TOPICAL at 05:36

## 2021-03-01 RX ADMIN — Medication 4 MILLIGRAM(S): at 23:03

## 2021-03-01 RX ADMIN — Medication 650 MILLIGRAM(S): at 21:59

## 2021-03-01 RX ADMIN — Medication 650 MILLIGRAM(S): at 11:39

## 2021-03-01 RX ADMIN — Medication 30 MILLILITER(S): at 00:33

## 2021-03-01 RX ADMIN — METHOCARBAMOL 500 MILLIGRAM(S): 500 TABLET, FILM COATED ORAL at 22:58

## 2021-03-01 RX ADMIN — Medication 4 MILLIGRAM(S): at 11:10

## 2021-03-01 RX ADMIN — METHOCARBAMOL 500 MILLIGRAM(S): 500 TABLET, FILM COATED ORAL at 10:00

## 2021-03-01 RX ADMIN — Medication 200 MILLIGRAM(S): at 17:39

## 2021-03-01 RX ADMIN — Medication 0.5 MILLIGRAM(S): at 15:07

## 2021-03-01 RX ADMIN — Medication 4 MILLIGRAM(S): at 05:35

## 2021-03-01 RX ADMIN — Medication 4 MILLIGRAM(S): at 17:39

## 2021-03-01 RX ADMIN — SENNA PLUS 2 TABLET(S): 8.6 TABLET ORAL at 22:02

## 2021-03-01 RX ADMIN — GABAPENTIN 100 MILLIGRAM(S): 400 CAPSULE ORAL at 05:35

## 2021-03-01 NOTE — PROGRESS NOTE ADULT - ASSESSMENT
A/P:    B/L LE weakness with associated sensory loss - MRI of spine without evidence of cord compression.    However, there is increase SC signal T2 to T3 level without mass effect and ddx includes acute transverse myelitis or viral myelitis.    Neuro and pain management evaluations and recommendations as noted   F/u with adjustment of meds  Continue decadron with rest of txs. F/up abn labs  PT f/u     Dispo: Acute, Needs LP - will be done tomorrow (d/c Lovenox today), MRI Brain pending

## 2021-03-01 NOTE — PROGRESS NOTE ADULT - ASSESSMENT
49yo female presents with 2 week history of progressive R>L lower extremities weakness with  associated with neuralgia and spasming/cramping. Admits to breaking a fall 2 weeks ago and twisting her back. On current exam strength and sensation equal in b/l UE, sensation RLE>LLE, sensation reduced in LE compared to UE, RLE weaker than LLE, R patellar reflex 3+, L Patellar reflex 2+      Assessment/Plan    Perform LP   Check wbc, rbc, Protein, Glu, gram stain and culture, lyme ab, ACE, encephalitis panel including HSV. EBV, CMV and [place].  Check CSF for OCB, oligoclonal band, IgG index, NMO and anti MOG.    Check serum for HIV, Hepatitis B, ACE level, anti aquaporin 4 and myelin oligodendrocyte glycoprotein (MOG)  Brain MRI w.wo contrast   Continue steroid treatment   Call Neuro PRN

## 2021-03-01 NOTE — PROGRESS NOTE ADULT - SUBJECTIVE AND OBJECTIVE BOX
Patient is a 50y old  Female who presents with a chief complaint of Weakness (28 Feb 2021 12:04)    INTERVAL HPI/OVERNIGHT EVENTS: No acute overnight events. Pt notes continued pain and weakness, especially in the R LE and back area that has not significantly improved. Pt is able to ambulate but is limited by R>L LE weakness. Pt notes having had BM yesterday and no issues with urination. Pt denies any other sxs on ros or any other active complaints at this time.     REVIEW OF SYSTEMS:  CONSTITUTIONAL: No fever, weight loss, or fatigue  RESPIRATORY: No cough, wheezing, chills or hemoptysis; No shortness of breath  CARDIOVASCULAR: No chest pain, palpitations, dizziness, or leg swelling  GASTROINTESTINAL: No abdominal or epigastric pain. No nausea, vomiting, or hematemesis; No diarrhea or constipation. No melena or hematochezia.  GENITOURINARY: No dysuria, frequency, hematuria, or incontinence  NEUROLOGICAL: +LE tremors, weakness R>LLE  SKIN: + itching  MUSCULOSKELETAL: LE pain radiating to lower back   FAMILY HISTORY:  No pertinent family history in first degree relatives      T(C): 36.3 (03-01-21 @ 07:20), Max: 36.3 (02-28-21 @ 17:02)  HR: 60 (03-01-21 @ 07:20) (56 - 71)  BP: 117/59 (03-01-21 @ 07:20) (107/77 - 129/69)  RR: 20 (03-01-21 @ 07:20) (19 - 20)  SpO2: --  Wt(kg): --Vital Signs Last 24 Hrs  T(C): 36.3 (01 Mar 2021 07:20), Max: 36.3 (28 Feb 2021 17:02)  T(F): 97.3 (01 Mar 2021 07:20), Max: 97.4 (28 Feb 2021 17:02)  HR: 60 (01 Mar 2021 07:20) (56 - 71)  BP: 117/59 (01 Mar 2021 07:20) (107/77 - 129/69)  BP(mean): --  RR: 20 (01 Mar 2021 07:20) (19 - 20)  SpO2: --    PHYSICAL EXAM:  GENERAL: NAD, well-groomed, well-developed  HEAD:  Atraumatic, Normocephalic head; EOMI, PERRLA, conjunctiva and sclera clear; Moist mucous membranes, No lesions  NECK: Supple, No JVD  NERVOUS SYSTEM:  Alert & Oriented X3, Good concentration; Motor Strength 5/5 B/L upper and lower extremities; DTRs 2+ intact and symmetric  CHEST/LUNG: Clear to percussion bilaterally; No rales, rhonchi, wheezing, or rubs  HEART: Regular rate and rhythm; No murmurs, rubs, or gallops  ABDOMEN: Soft, Nontender, Nondistended; Bowel sounds present  EXTREMITIES:  2+ Peripheral Pulses, No clubbing, cyanosis, or edema  LYMPH: No lymphadenopathy noted  SKIN: No rashes or lesions    Consultant(s) Notes Reviewed:  [x ] YES  [ ] NO  Care Discussed with Consultants/Other Providers [ x] YES  [ ] NO    LABS:          RADIOLOGY & ADDITIONAL TESTS:    Imaging Personally Reviewed:  [ ] YES  [ ] NO  acetaminophen   Tablet .. 650 milliGRAM(s) Oral every 6 hours PRN  ALBUTerol    90 MICROgram(s) HFA Inhaler 1 Puff(s) Inhalation every 6 hours PRN  aluminum hydroxide/magnesium hydroxide/simethicone Suspension 30 milliLiter(s) Oral every 4 hours PRN  amLODIPine   Tablet 10 milliGRAM(s) Oral daily  budesonide  80 MICROgram(s)/formoterol 4.5 MICROgram(s) Inhaler 2 Puff(s) Inhalation two times a day  chlorhexidine 4% Liquid 1 Application(s) Topical <User Schedule>  dexAMETHasone     Tablet 4 milliGRAM(s) Oral every 6 hours  gabapentin 100 milliGRAM(s) Oral three times a day  methocarbamol 500 milliGRAM(s) Oral three times a day PRN  nystatin Powder 1 Application(s) Topical two times a day PRN  pantoprazole    Tablet 40 milliGRAM(s) Oral before breakfast  polyethylene glycol 3350 17 Gram(s) Oral daily PRN  senna 2 Tablet(s) Oral at bedtime  topiramate 200 milliGRAM(s) Oral two times a day      HEALTH ISSUES - PROBLEM Dx:         Patient is a 50y old  Female who presents with a chief complaint of Weakness (28 Feb 2021 12:04)    INTERVAL HPI/OVERNIGHT EVENTS: No acute overnight events. Pt notes continued pain and weakness, especially in the R LE and back area that has not significantly improved. Pt is able to ambulate but is limited by R>L LE weakness. Pt notes having had BM yesterday and no issues with urination. Pt denies any other sxs on ros or any other active complaints at this time.     REVIEW OF SYSTEMS:  CONSTITUTIONAL: No fever, weight loss, or fatigue  RESPIRATORY: No cough, wheezing, chills or hemoptysis; No shortness of breath  CARDIOVASCULAR: No chest pain, palpitations, dizziness, or leg swelling  GASTROINTESTINAL: No abdominal or epigastric pain. No nausea, vomiting, or hematemesis; No diarrhea or constipation. No melena or hematochezia.  GENITOURINARY: No dysuria, frequency, hematuria, or incontinence  NEUROLOGICAL: +LE tremors, weakness R>LLE  SKIN: + itching  MUSCULOSKELETAL: LE pain radiating to lower back   FAMILY HISTORY:  No pertinent family history in first degree relatives      T(C): 36.3 (03-01-21 @ 07:20), Max: 36.3 (02-28-21 @ 17:02)  HR: 60 (03-01-21 @ 07:20) (56 - 71)  BP: 117/59 (03-01-21 @ 07:20) (107/77 - 129/69)  RR: 20 (03-01-21 @ 07:20) (19 - 20)  SpO2: --  Wt(kg): --Vital Signs Last 24 Hrs  T(C): 36.3 (01 Mar 2021 07:20), Max: 36.3 (28 Feb 2021 17:02)  T(F): 97.3 (01 Mar 2021 07:20), Max: 97.4 (28 Feb 2021 17:02)  HR: 60 (01 Mar 2021 07:20) (56 - 71)  BP: 117/59 (01 Mar 2021 07:20) (107/77 - 129/69)  BP(mean): --  RR: 20 (01 Mar 2021 07:20) (19 - 20)  SpO2: --    PHYSICAL EXAM:  GENERAL: NAD, well-groomed, well-developed  HEAD:  Atraumatic, Normocephalic head; EOMI, PERRLA, conjunctiva and sclera clear; Moist mucous membranes, No lesions  NECK: Supple, No JVD  NERVOUS SYSTEM:  Alert & Oriented X3, Good concentration; Motor Strength 5/5 B/L upper and lower extremities; DTRs 2+ intact and symmetric  CHEST/LUNG: Clear to percussion bilaterally; No rales, rhonchi, wheezing, or rubs  HEART: Regular rate and rhythm; No murmurs, rubs, or gallops  ABDOMEN: Soft, Nontender, Nondistended; Bowel sounds present  EXTREMITIES:  2+ Peripheral Pulses, No clubbing, cyanosis, or edema  LYMPH: No lymphadenopathy noted  SKIN: No rashes or lesions    Consultant(s) Notes Reviewed:  [x ] YES  [ ] NO  Care Discussed with Consultants/Other Providers [ x] YES  [ ] NO    LABS:             12.3   5.99  )-----------( 210      ( 01 Mar 2021 06:20 )             38.6     03-01    143  |  107  |  17  ----------------------------<  96  4.4   |  24  |  0.8    Ca    9.5      01 Mar 2021 06:20  Mg     2.2     03-01    TPro  6.8  /  Alb  4.0  /  TBili  0.3  /  DBili  x   /  AST  13  /  ALT  13  /  AlkPhos  86  03-01    PT/INR - ( 28 Feb 2021 20:41 )   PT: 13.60 sec;   INR: 1.18 ratio         PTT - ( 28 Feb 2021 20:41 )  PTT:33.9 sec    RADIOLOGY & ADDITIONAL TESTS:  < from: MR Lumbar Spine w/wo IV Cont (02.27.21 @ 17:56) >  IMPRESSION:    CERVICAL SPINE:  No evidence of abnormal spinal cord signal.    Mild multilevel degenerative changes without significant spinal canal or neuroforaminal narrowing.    THORACIC SPINE:  Abnormal signal noted in the spinal cord from T1 through T3 without evidence of enhancement or cord expansion. Finding is nonspecific and may represent demyelination, acute transverse myelitis or viral myelitis.    At T10-T11 there is moderate spinal canal stenosis as well as mild mass effect upon the right dorsal aspect of the spinal cord due to osseous spurring involving the ligamentum flavum.    LUMBAR SPINE:  Multilevel degenerative changes and disc bulges disc bulges with superimposed central herniations at L4-5 and L5-S1 resulting in mild to moderate spinal canal stenosis as well as mild bilateral neuroforaminal narrowing.    No abnormal signal or enhancement noted of the cauda equina or nerve roots.    < end of copied text >      < from: Xray Chest 1 View AP/PA (02.26.21 @ 18:49) >  Impression:    No radiographic evidence of acute cardiopulmonary disease.      < end of copied text >  < from: 12 Lead ECG (02.26.21 @ 17:49) >  Diagnosis Line Normal sinus rhythm  Normal ECG      < end of copied text >      Imaging Personally Reviewed:  [ ] YES  [ ] NO  acetaminophen   Tablet .. 650 milliGRAM(s) Oral every 6 hours PRN  ALBUTerol    90 MICROgram(s) HFA Inhaler 1 Puff(s) Inhalation every 6 hours PRN  aluminum hydroxide/magnesium hydroxide/simethicone Suspension 30 milliLiter(s) Oral every 4 hours PRN  amLODIPine   Tablet 10 milliGRAM(s) Oral daily  budesonide  80 MICROgram(s)/formoterol 4.5 MICROgram(s) Inhaler 2 Puff(s) Inhalation two times a day  chlorhexidine 4% Liquid 1 Application(s) Topical <User Schedule>  dexAMETHasone     Tablet 4 milliGRAM(s) Oral every 6 hours  gabapentin 100 milliGRAM(s) Oral three times a day  methocarbamol 500 milliGRAM(s) Oral three times a day PRN  nystatin Powder 1 Application(s) Topical two times a day PRN  pantoprazole    Tablet 40 milliGRAM(s) Oral before breakfast  polyethylene glycol 3350 17 Gram(s) Oral daily PRN  senna 2 Tablet(s) Oral at bedtime  topiramate 200 milliGRAM(s) Oral two times a day

## 2021-03-01 NOTE — PROGRESS NOTE ADULT - ASSESSMENT
ANNMARIE MCCULLOUGH is a 50y Female with a past medical history as described above who presented for weakness    # LE Weakness  - MRI T-spine and L-spine: Abnormal signal noted in the spinal cord from T1 through T3 without evidence of enhancement or cord expansion  - CRP: 0.16, AURORA: 1:640 , RPR: negative, TSH: 1.51   - c/w Decadron 4mg PO q6   - Neurology: LP under IR, brain MRI wwo contrast   - Neurosurgery: no surgical intervention at this time   - c/w Robaxin 500 mg at night time  - PT/OT/Rehab eval     # HTN  - c/w Amlodipine 10mg daily  - DASH diet    # Neuropathy  - Check hbA1c  - Continue Topamax 200mg twice daily and gabapentin 100mg three times daily     # DON  - CPAP if needed at night  - Outpt follow-up     # Asthma  - Continue Symbicort twice daily and Ventolin PRN    # GERD  - Protonix 40mg PO daily     DVT ppx: Lovenox 40 mg SubQ daily    GI ppx: Protonix 40mg daily   Diet: DASH/TLC  Activity: IAT   Dispo: From home   Code: FULL     Pending: MRI of T and L spine, PT/ Rehab eval ANNMARIE MCCULLOUGH is a 50y Female with a past medical history as described above who presented for weakness    # LE Weakness  - MRI T-spine and L-spine: Abnormal signal noted in the spinal cord from T1 through T3 without evidence of enhancement or cord expansion  - CRP: 0.16, AURORA: 1:640 , RPR: negative, TSH: 1.51   - c/w Decadron 4mg PO q6   - Neurology: LP under IR, brain MRI wwo contrast   - HIV, Hepatitis B and ACE level pending   - Neurosurgery: no surgical intervention at this time   - c/w Robaxin 500 mg at night time  - PT/OT/Rehab eval     # HTN  - c/w Amlodipine 10mg daily  - DASH diet    # Neuropathy  - Check hbA1c  - Continue Topamax 200mg twice daily and gabapentin 100mg three times daily     # DON  - CPAP if needed at night  - Outpt follow-up     # Asthma  - Continue Symbicort twice daily and Ventolin PRN    # GERD  - Protonix 40mg PO daily     DVT ppx: Lovenox 40 mg SubQ daily    GI ppx: Protonix 40mg daily   Diet: DASH/TLC  Activity: IAT   Dispo: From home   Code: FULL     Pending: MRI of Brain, PT/ Rehab eval, LP

## 2021-03-01 NOTE — PRE-OP CHECKLIST - BP NONINVASIVE SYSTOLIC (MM HG)
He went to [facility] emergency room on 1/4/2019 complaining of abdominal pain and loose stools.  He was recently hospitalized in [place] at the end of November for GI bleed and  colitis.  He was treated with Cipro and Flagyl.  Upon returning to his home he saw Dr. Marv Trent for GI evaluation.  There was no evidence of colitis on his review of the CT scan.  He had a CT scan which revealed incidental renal mass.  There was no fever no chills.  He felt better in the emergency room.  Laboratory evaluation was unremarkable.  No radiologic procedures were performed.  He discharged home with outpatient follow-up.   115

## 2021-03-01 NOTE — PROGRESS NOTE ADULT - ATTENDING COMMENTS
Patient examined this morning and exam shows weakness and numbness in lower extremities along with hyperreflexia.  Recommend 5 days IV solumedrol 1,000 mg.  LP with CSF studies as above.  MRI brain w/wo contrast * will need sedation with Ativan first.
Patient seen and examined, unable to lift RLE, endorses continued pressure like pain and numbness as well as RUE chronic paresthesias states this has happened previously 2 years ago, requests pain management, sees Dr. Renee as outpatient.    #RLE Neuropathy/Radiculopathy: Neurology recommendations noted, awaiting MRI cervical, thoracic and lumbar spine, PT/OT, fall precautions, awaiting pain management evaluation     #HTN: controlled continue home antihypertensives    #GERD: continue Protonix     Disposition: Remains acute
I have personally seen and examined this patient on 2/28.  I have fully participated in the care of this patient.  I have reviewed all pertinent clinical information, including history, physical exam, plan and note. Patient has right ankle weakness in all directions. Recommend LP with IR and check CSF study to r/o infectious, inflammatory and demyelinating process. Brain MRI w/wo contrast     I have reviewed all pertinent clinical information and reviewed all relevant imaging and diagnostic studies personally.  Recommendations as above.  Agree with above assessment except as noted.

## 2021-03-01 NOTE — PROGRESS NOTE ADULT - SUBJECTIVE AND OBJECTIVE BOX
Patient is a 50y old  Female who presents with a chief complaint of Weakness (01 Mar 2021 09:46)      HPI:  Chief Complaint: Weakness    Past Medical History: Lumbar radiculopathy, HTN, GERD, asthma, DON    Past Surgical History: None Relevant     Ms. Collins is a 50F with a past medical history as described above who presented to the hospital for weakness in both of her legs. She states that she began to experience mild weakness about 2 weeks ago. Her right side is significantly worse than her left. She described it in an ascending manner. The weakness got progressively worse, now ambulating by dragging her right leg. She says the weakness stops in upper abdomen/lower chest. It is associated with neuralgia, and occasional jolts of pain. She complains of upper extremity/hand neuralgia as well. She did have urinary incontinence 2-3 times, and fecal incontinence once. Of note, she received the Moderna COVID-19 vaccination (only 1 dose) on 21, and was concerned if this was a side-effect.      In the ED, she was hypertensive, other vitals were stable. Neurology and neurosurgery were consulted MRI T and L-spine to be ordered. Started on decadron. Admitted to medicine for further management.     ROS: Denies headache, changes in vision, chest pain, palpitations, shortness of breath, cough, fever, chills, nausea, vomiting, diarrhea, and constipation. +weakness, +"numbness/pin&needles" BLE, +fecal/urinary incontinence  (2021 22:09)      PAST MEDICAL & SURGICAL HISTORY:  Fatty liver    Arthritis  CHR PAIN B/L KNEES    HTN (hypertension)    GERD (gastroesophageal reflux disease)    Obesity    Obstructive sleep apnea on CPAP    Asthma  STABLE, EPISODIC -AVERAGE USE OF ALBUTEROL ABOUT ONCE A MONTH    H/O arthroscopic knee surgery  LT AND RT    History of tonsillectomy and adenoidectomy  AND REMOVAL UVULA    H/O  section        MEDICATIONS  (STANDING):  amLODIPine   Tablet 10 milliGRAM(s) Oral daily  budesonide  80 MICROgram(s)/formoterol 4.5 MICROgram(s) Inhaler 2 Puff(s) Inhalation two times a day  chlorhexidine 4% Liquid 1 Application(s) Topical <User Schedule>  dexAMETHasone     Tablet 4 milliGRAM(s) Oral every 6 hours  gabapentin 100 milliGRAM(s) Oral three times a day  pantoprazole    Tablet 40 milliGRAM(s) Oral before breakfast  senna 2 Tablet(s) Oral at bedtime  topiramate 200 milliGRAM(s) Oral two times a day    MEDICATIONS  (PRN):  acetaminophen   Tablet .. 650 milliGRAM(s) Oral every 6 hours PRN Moderate Pain (4 - 6)  ALBUTerol    90 MICROgram(s) HFA Inhaler 1 Puff(s) Inhalation every 6 hours PRN Shortness of Breath and/or Wheezing  aluminum hydroxide/magnesium hydroxide/simethicone Suspension 30 milliLiter(s) Oral every 4 hours PRN Dyspepsia  methocarbamol 500 milliGRAM(s) Oral three times a day PRN spam  nystatin Powder 1 Application(s) Topical two times a day PRN itching  polyethylene glycol 3350 17 Gram(s) Oral daily PRN Constipation      Allergies    No Known Allergies    Intolerances        SOCIAL HISTORY:    No h/o Smoking.   No h/o alcohol use.  Ex Smoker. Quite in past.  Pt smokes.  Pt does drink socially.  Pt drinks alcohol heavily.    FAMILY HISTORY:  No pertinent family history in first degree relatives        REVIEW OF SYSTEMS:    CONSTITUTIONAL: No fever  EYES: No eye pain,   ENMT:  No sinus or throat pain  NECK: No pain or stiffness  RESPIRATORY: No cough, No hemoptysis; No shortness of breath  CARDIOVASCULAR: No acute chest pain, palpitations,  or leg swelling  GASTROINTESTINAL: No abdominal pain. No nausea, vomiting, or hematemesis;  No melena or hematochezia.  GENITOURINARY: No  hematuria, or incontinence  MUSCULOSKELETAL: No joint swelling; No extremity pain  SKIN: No itching, rashes, or lesions   LYMPH NODES: No enlarged glands  NEUROLOGICAL: No headaches, memory loss,   PSYCHIATRIC: No depression, anxiety, mood swings, or difficulty sleeping  ENDOCRINE: No heat or cold intolerance;   HEME/LYMPH: No easy bruising, or bleeding gums  Allergy/Immunology. No medication allergy. No seasonal allergies.    PHYSICAL EXAM:  Vital Signs Last 24 Hrs  T(F): 97.3 (21 @ 07:20)  HR: 60 (21 @ 07:20)  BP: 117/59 (21 @ 07:20)  RR: 20 (21 @ 07:20)    GENERAL: NAD, well-groomed, well-developed  HEAD:  Atraumatic, Normocephalic  EYES: EOMI, PERRLA, conjunctiva and sclera clear  NECK: Supple, No JVD, thyroid non-palpable    On Neurological Examination:    Mental Status - Pt is alert, awake, oriented X3. Higher functions are intact. Follows commands well and able to answer questions appropriately.    Speech -  Normal. Pt has no aphasia.    Cranial Nerves - Pupils 3 mm equal and reactive to light, extraocular eye movements intact. Pt has no visual field deficit.  Pt has no right left facial asymmetry. Tongue - is in midline.    strength and sensation equal in b/l UE, sensation RLE>LLE, sensation reduced in LE compared to UE, RLE weaker than LLE, R patellar reflex 3+, L Patellar reflex 2+    LABS:                        12.3   5.99  )-----------( 210      ( 01 Mar 2021 06:20 )             38.6     03-    143  |  107  |  17  ----------------------------<  96  4.4   |  24  |  0.8    Ca    9.5      01 Mar 2021 06:20  Mg     2.2     03-01    TPro  6.8  /  Alb  4.0  /  TBili  0.3  /  DBili  x   /  AST  13  /  ALT  13  /  AlkPhos  86  03-01    PT/INR - ( 2021 20:41 )   PT: 13.60 sec;   INR: 1.18 ratio         PTT - ( 2021 20:41 )  PTT:33.9 sec      RADIOLOGY & ADDITIONAL STUDIES:

## 2021-03-01 NOTE — PROGRESS NOTE ADULT - SUBJECTIVE AND OBJECTIVE BOX
12.3   5.99  )-----------( 210      ( 01 Mar 2021 06:20 )             38.6   Follow up of this 49 yo obese F with hx of lumbar radiculopathy, OA, chronic knee pain, HTN, GERD, asthma and DON on cpap at home admitted with progressive weakness of b/l LE with RLE worst than LLE.  + Reported urinary and fecal incontinence as well as neuralgia of both UE and LE.  Prior hx of falls with difficulty ambulating due to above.  Rec'd Moderna covid-19 vaccine on 1/18 and she was concerned whether her symptoms are related to vaccination.  No fever.  No other c/o's noted.    ICU Vital Signs Last 24 Hrs  T(C): 36 (28 Feb 2021 07:50), Max: 37 (27 Feb 2021 14:00)  T(F): 96.8 (28 Feb 2021 07:50), Max: 98.6 (27 Feb 2021 14:00)  HR: 55 (28 Feb 2021 07:50) (55 - 84)  BP: 122/77 (28 Feb 2021 07:50) (101/61 - 137/79)  BP(mean): 75 (27 Feb 2021 14:00) (75 - 75)  RR: 18 (28 Feb 2021 07:50) (18 - 19)    EOMI.  No facial weakness.  Neck supple.  No c-spine tenderness.  Back no point tenderness.  + SLR LLE.  Unable to lift RLE.  Motor LLE 4-5/5; RLE 3/5.  Sensory overall dec b/l LE with RLE worst than LLE.  DTRs equal b/l.  Chest CTA b/l.  Cor Reg S1S2.  Abd not distended, + BS, soft and NT.      03-01    143  |  107  |  17  ----------------------------<  96  4.4   |  24  |  0.8    Ca    9.5      01 Mar 2021 06:20  Mg     2.2     03-01    TPro  6.8  /  Alb  4.0  /  TBili  0.3  /  DBili  x   /  AST  13  /  ALT  13  /  AlkPhos  86  03-01                          12.3   5.99  )-----------( 210      ( 01 Mar 2021 06:20 )             38.6       TPro  7.3  /  Alb  4.2  /  TBili  0.4  /  DBili  x   /  AST  14  /  ALT  12  /  AlkPhos  89  02-27    MRI of C / T/ L spine reviewed.

## 2021-03-02 LAB
ALBUMIN SERPL ELPH-MCNC: 3.8 G/DL — SIGNIFICANT CHANGE UP (ref 3.5–5.2)
ALP SERPL-CCNC: 87 U/L — SIGNIFICANT CHANGE UP (ref 30–115)
ALT FLD-CCNC: 15 U/L — SIGNIFICANT CHANGE UP (ref 0–41)
ANION GAP SERPL CALC-SCNC: 13 MMOL/L — SIGNIFICANT CHANGE UP (ref 7–14)
APPEARANCE CSF: CLEAR — SIGNIFICANT CHANGE UP
AST SERPL-CCNC: 15 U/L — SIGNIFICANT CHANGE UP (ref 0–41)
BASOPHILS # BLD AUTO: 0.01 K/UL — SIGNIFICANT CHANGE UP (ref 0–0.2)
BASOPHILS NFR BLD AUTO: 0.1 % — SIGNIFICANT CHANGE UP (ref 0–1)
BILIRUB SERPL-MCNC: 0.3 MG/DL — SIGNIFICANT CHANGE UP (ref 0.2–1.2)
BUN SERPL-MCNC: 16 MG/DL — SIGNIFICANT CHANGE UP (ref 10–20)
CALCIUM SERPL-MCNC: 9.3 MG/DL — SIGNIFICANT CHANGE UP (ref 8.5–10.1)
CHLORIDE SERPL-SCNC: 108 MMOL/L — SIGNIFICANT CHANGE UP (ref 98–110)
CO2 SERPL-SCNC: 23 MMOL/L — SIGNIFICANT CHANGE UP (ref 17–32)
COLOR CSF: SIGNIFICANT CHANGE UP
CREAT SERPL-MCNC: 0.8 MG/DL — SIGNIFICANT CHANGE UP (ref 0.7–1.5)
EOSINOPHIL # BLD AUTO: 0 K/UL — SIGNIFICANT CHANGE UP (ref 0–0.7)
EOSINOPHIL NFR BLD AUTO: 0 % — SIGNIFICANT CHANGE UP (ref 0–8)
GLUCOSE CSF-MCNC: 62 MG/DL — SIGNIFICANT CHANGE UP (ref 45–75)
GLUCOSE SERPL-MCNC: 95 MG/DL — SIGNIFICANT CHANGE UP (ref 70–99)
GRAM STN FLD: SIGNIFICANT CHANGE UP
HAV IGM SER-ACNC: SIGNIFICANT CHANGE UP
HBV CORE IGM SER-ACNC: SIGNIFICANT CHANGE UP
HBV SURFACE AG SER-ACNC: SIGNIFICANT CHANGE UP
HCT VFR BLD CALC: 39.5 % — SIGNIFICANT CHANGE UP (ref 37–47)
HCV AB S/CO SERPL IA: 0.07 S/CO — SIGNIFICANT CHANGE UP (ref 0–0.99)
HCV AB SERPL-IMP: SIGNIFICANT CHANGE UP
HGB BLD-MCNC: 12.3 G/DL — SIGNIFICANT CHANGE UP (ref 12–16)
IMM GRANULOCYTES NFR BLD AUTO: 0.4 % — HIGH (ref 0.1–0.3)
LYMPHOCYTES # BLD AUTO: 1.02 K/UL — LOW (ref 1.2–3.4)
LYMPHOCYTES # BLD AUTO: 14.3 % — LOW (ref 20.5–51.1)
MAGNESIUM SERPL-MCNC: 2.2 MG/DL — SIGNIFICANT CHANGE UP (ref 1.8–2.4)
MCHC RBC-ENTMCNC: 28.3 PG — SIGNIFICANT CHANGE UP (ref 27–31)
MCHC RBC-ENTMCNC: 31.1 G/DL — LOW (ref 32–37)
MCV RBC AUTO: 90.8 FL — SIGNIFICANT CHANGE UP (ref 81–99)
MONOCYTES # BLD AUTO: 0.44 K/UL — SIGNIFICANT CHANGE UP (ref 0.1–0.6)
MONOCYTES NFR BLD AUTO: 6.2 % — SIGNIFICANT CHANGE UP (ref 1.7–9.3)
NEUTROPHILS # BLD AUTO: 5.62 K/UL — SIGNIFICANT CHANGE UP (ref 1.4–6.5)
NEUTROPHILS # CSF: SIGNIFICANT CHANGE UP % (ref 0–6)
NEUTROPHILS NFR BLD AUTO: 79 % — HIGH (ref 42.2–75.2)
NRBC # BLD: 0 /100 WBCS — SIGNIFICANT CHANGE UP (ref 0–0)
NRBC NFR CSF: 2 /UL — SIGNIFICANT CHANGE UP (ref 0–5)
PLATELET # BLD AUTO: 215 K/UL — SIGNIFICANT CHANGE UP (ref 130–400)
POTASSIUM SERPL-MCNC: 4 MMOL/L — SIGNIFICANT CHANGE UP (ref 3.5–5)
POTASSIUM SERPL-SCNC: 4 MMOL/L — SIGNIFICANT CHANGE UP (ref 3.5–5)
PROT CSF-MCNC: 79 MG/DL — HIGH (ref 15–45)
PROT SERPL-MCNC: 6.7 G/DL — SIGNIFICANT CHANGE UP (ref 6–8)
RBC # BLD: 4.35 M/UL — SIGNIFICANT CHANGE UP (ref 4.2–5.4)
RBC # CSF: 8 /UL — SIGNIFICANT CHANGE UP (ref 0–0)
RBC # FLD: 13.2 % — SIGNIFICANT CHANGE UP (ref 11.5–14.5)
SODIUM SERPL-SCNC: 144 MMOL/L — SIGNIFICANT CHANGE UP (ref 135–146)
SPECIMEN SOURCE: SIGNIFICANT CHANGE UP
TUBE TYPE: SIGNIFICANT CHANGE UP
WBC # BLD: 7.12 K/UL — SIGNIFICANT CHANGE UP (ref 4.8–10.8)
WBC # FLD AUTO: 7.12 K/UL — SIGNIFICANT CHANGE UP (ref 4.8–10.8)

## 2021-03-02 PROCEDURE — 99231 SBSQ HOSP IP/OBS SF/LOW 25: CPT

## 2021-03-02 RX ORDER — ENOXAPARIN SODIUM 100 MG/ML
40 INJECTION SUBCUTANEOUS DAILY
Refills: 0 | Status: DISCONTINUED | OUTPATIENT
Start: 2021-03-02 | End: 2021-03-07

## 2021-03-02 RX ORDER — ALPRAZOLAM 0.25 MG
0.5 TABLET ORAL ONCE
Refills: 0 | Status: DISCONTINUED | OUTPATIENT
Start: 2021-03-02 | End: 2021-03-02

## 2021-03-02 RX ADMIN — SENNA PLUS 2 TABLET(S): 8.6 TABLET ORAL at 21:23

## 2021-03-02 RX ADMIN — Medication 4 MILLIGRAM(S): at 05:15

## 2021-03-02 RX ADMIN — METHOCARBAMOL 500 MILLIGRAM(S): 500 TABLET, FILM COATED ORAL at 18:51

## 2021-03-02 RX ADMIN — Medication 200 MILLIGRAM(S): at 05:15

## 2021-03-02 RX ADMIN — Medication 116 MILLIGRAM(S): at 17:25

## 2021-03-02 RX ADMIN — METHOCARBAMOL 500 MILLIGRAM(S): 500 TABLET, FILM COATED ORAL at 05:17

## 2021-03-02 RX ADMIN — PANTOPRAZOLE SODIUM 40 MILLIGRAM(S): 20 TABLET, DELAYED RELEASE ORAL at 05:15

## 2021-03-02 RX ADMIN — Medication 200 MILLIGRAM(S): at 17:25

## 2021-03-02 RX ADMIN — Medication 650 MILLIGRAM(S): at 18:52

## 2021-03-02 RX ADMIN — Medication 0.5 MILLIGRAM(S): at 10:11

## 2021-03-02 RX ADMIN — CHLORHEXIDINE GLUCONATE 1 APPLICATION(S): 213 SOLUTION TOPICAL at 05:18

## 2021-03-02 RX ADMIN — ENOXAPARIN SODIUM 40 MILLIGRAM(S): 100 INJECTION SUBCUTANEOUS at 14:23

## 2021-03-02 RX ADMIN — GABAPENTIN 100 MILLIGRAM(S): 400 CAPSULE ORAL at 21:23

## 2021-03-02 RX ADMIN — GABAPENTIN 100 MILLIGRAM(S): 400 CAPSULE ORAL at 14:21

## 2021-03-02 RX ADMIN — METHOCARBAMOL 500 MILLIGRAM(S): 500 TABLET, FILM COATED ORAL at 23:12

## 2021-03-02 RX ADMIN — GABAPENTIN 100 MILLIGRAM(S): 400 CAPSULE ORAL at 05:15

## 2021-03-02 RX ADMIN — AMLODIPINE BESYLATE 10 MILLIGRAM(S): 2.5 TABLET ORAL at 05:15

## 2021-03-02 NOTE — PROCEDURE NOTE - NSPROCDETAILS_GEN_ALL_CORE
location identified, draped/prepped, sterile technique used, needle inserted/introduced
supine position/ultrasound guidance

## 2021-03-02 NOTE — PROGRESS NOTE ADULT - SUBJECTIVE AND OBJECTIVE BOX
Patient is a 50y old  Female who presents with a chief complaint of Weakness (01 Mar 2021 16:16)    INTERVAL HPI/OVERNIGHT EVENTS:      REVIEW OF SYSTEMS:  CONSTITUTIONAL: No fever, weight loss, or fatigue  RESPIRATORY: No cough, wheezing, chills or hemoptysis; No shortness of breath  CARDIOVASCULAR: No chest pain, palpitations, dizziness, or leg swelling  GASTROINTESTINAL: No abdominal or epigastric pain. No nausea, vomiting, or hematemesis; No diarrhea or constipation. No melena or hematochezia.  GENITOURINARY: No dysuria, frequency, hematuria, or incontinence  NEUROLOGICAL: No headaches, memory loss, loss of strength, numbness, or tremors  SKIN: No itching, burning, rashes, or lesions   MUSCULOSKELETAL: No joint pain or swelling; No muscle, back, or extremity pain  FAMILY HISTORY:  No pertinent family history in first degree relatives      T(C): 35.7 (03-02-21 @ 00:08), Max: 36.5 (03-01-21 @ 16:50)  HR: 50 (03-02-21 @ 00:08) (50 - 71)  BP: 143/74 (03-02-21 @ 00:08) (117/59 - 143/74)  RR: 20 (03-02-21 @ 00:08) (20 - 20)  SpO2: --  Wt(kg): --Vital Signs Last 24 Hrs  T(C): 35.7 (02 Mar 2021 00:08), Max: 36.5 (01 Mar 2021 16:50)  T(F): 96.3 (02 Mar 2021 00:08), Max: 97.7 (01 Mar 2021 16:50)  HR: 50 (02 Mar 2021 00:08) (50 - 71)  BP: 143/74 (02 Mar 2021 00:08) (117/59 - 143/74)  BP(mean): --  RR: 20 (02 Mar 2021 00:08) (20 - 20)  SpO2: --    PHYSICAL EXAM:  GENERAL: NAD, well-groomed, well-developed  HEENT:  Atraumatic, Normocephalic head; EOMI, PERRLA, conjunctiva and sclera clear; Moist mucous membranes, No lesions  NECK: Supple, No JVD  NERVOUS SYSTEM:  Alert & Oriented X3, Good concentration; Motor Strength 5/5 B/L upper and lower extremities; DTRs 2+ intact and symmetric  CHEST/LUNG: Clear to percussion bilaterally; No rales, rhonchi, wheezing, or rubs  HEART: Regular rate and rhythm; No murmurs, rubs, or gallops  ABDOMEN: Soft, Nontender, Nondistended; Bowel sounds present  EXTREMITIES:  2+ Peripheral Pulses, No clubbing, cyanosis, or edema  SKIN: No rashes or lesions    Consultant(s) Notes Reviewed:  [x ] YES  [ ] NO  Care Discussed with Consultants/Other Providers [ x] YES  [ ] NO    LABS:             12.3   7.12  )-----------( 215      ( 02 Mar 2021 05:51 )             39.5     03-01    143  |  107  |  17  ----------------------------<  96  4.4   |  24  |  0.8    Ca    9.5      01 Mar 2021 06:20  Mg     2.2     03-01    TPro  6.8  /  Alb  4.0  /  TBili  0.3  /  DBili  x   /  AST  13  /  ALT  13  /  AlkPhos  86  03-01    PT/INR - ( 28 Feb 2021 20:41 )   PT: 13.60 sec;   INR: 1.18 ratio         PTT - ( 28 Feb 2021 20:41 )  PTT:33.9 sec    RADIOLOGY & ADDITIONAL TESTS:  < from: MR Head w/wo IV Cont (03.01.21 @ 16:32) >  IMPRESSION:    No acute intracranial pathology.    Mild chronic microvascular ischemic changes.    < end of copied text >    < from: MR Lumbar Spine w/wo IV Cont (02.27.21 @ 17:56) >  IMPRESSION:    CERVICAL SPINE:  No evidence of abnormal spinal cord signal.    Mild multilevel degenerative changes without significant spinal canal or neuroforaminal narrowing.    THORACIC SPINE:  Abnormal signal noted in the spinal cord from T1 through T3 without evidence of enhancement or cord expansion. Finding is nonspecific and may represent demyelination, acute transverse myelitis or viral myelitis.    At T10-T11 there is moderate spinal canal stenosis as well as mild mass effect upon the right dorsal aspect of the spinal cord due to osseous spurring involving the ligamentum flavum.    LUMBAR SPINE:  Multilevel degenerative changes and disc bulges disc bulges with superimposed central herniations at L4-5 and L5-S1 resulting in mild to moderate spinal canal stenosis as well as mild bilateral neuroforaminal narrowing.    No abnormal signal or enhancement noted of the cauda equina or nerve roots.    < end of copied text >        Imaging Personally Reviewed:  [ ] YES  [ ] NO  acetaminophen   Tablet .. 650 milliGRAM(s) Oral every 6 hours PRN  ALBUTerol    90 MICROgram(s) HFA Inhaler 1 Puff(s) Inhalation every 6 hours PRN  aluminum hydroxide/magnesium hydroxide/simethicone Suspension 30 milliLiter(s) Oral every 4 hours PRN  amLODIPine   Tablet 10 milliGRAM(s) Oral daily  budesonide  80 MICROgram(s)/formoterol 4.5 MICROgram(s) Inhaler 2 Puff(s) Inhalation two times a day  chlorhexidine 4% Liquid 1 Application(s) Topical <User Schedule>  dexAMETHasone     Tablet 4 milliGRAM(s) Oral every 6 hours  gabapentin 100 milliGRAM(s) Oral three times a day  methocarbamol 500 milliGRAM(s) Oral three times a day PRN  nystatin Powder 1 Application(s) Topical two times a day PRN  pantoprazole    Tablet 40 milliGRAM(s) Oral before breakfast  polyethylene glycol 3350 17 Gram(s) Oral daily PRN  senna 2 Tablet(s) Oral at bedtime  topiramate 200 milliGRAM(s) Oral two times a day         Patient is a 50y old  Female who presents with a chief complaint of Weakness (01 Mar 2021 16:16)    INTERVAL HPI/OVERNIGHT EVENTS:      REVIEW OF SYSTEMS:  CONSTITUTIONAL: No fever, weight loss, or fatigue  RESPIRATORY: No cough, wheezing, chills or hemoptysis; No shortness of breath  CARDIOVASCULAR: No chest pain, palpitations, dizziness, or leg swelling  GASTROINTESTINAL: No abdominal or epigastric pain. No nausea, vomiting, or hematemesis; No diarrhea or constipation. No melena or hematochezia.  GENITOURINARY: No dysuria, frequency, hematuria, or incontinence  NEUROLOGICAL: No headaches, memory loss, loss of strength, numbness, or tremors  SKIN: No itching, burning, rashes, or lesions   MUSCULOSKELETAL: No joint pain or swelling; No muscle, back, or extremity pain  FAMILY HISTORY:  No pertinent family history in first degree relatives      T(C): 35.7 (03-02-21 @ 00:08), Max: 36.5 (03-01-21 @ 16:50)  HR: 50 (03-02-21 @ 00:08) (50 - 71)  BP: 143/74 (03-02-21 @ 00:08) (117/59 - 143/74)  RR: 20 (03-02-21 @ 00:08) (20 - 20)  SpO2: --  Wt(kg): --Vital Signs Last 24 Hrs  T(C): 35.7 (02 Mar 2021 00:08), Max: 36.5 (01 Mar 2021 16:50)  T(F): 96.3 (02 Mar 2021 00:08), Max: 97.7 (01 Mar 2021 16:50)  HR: 50 (02 Mar 2021 00:08) (50 - 71)  BP: 143/74 (02 Mar 2021 00:08) (117/59 - 143/74)  BP(mean): --  RR: 20 (02 Mar 2021 00:08) (20 - 20)  SpO2: --    PHYSICAL EXAM:  GENERAL: NAD, well-groomed, well-developed  HEENT:  Atraumatic, Normocephalic head; EOMI, PERRLA, conjunctiva and sclera clear; Moist mucous membranes, No lesions  NERVOUS SYSTEM:  Alert & Oriented X3, Good concentration; sensation and motor preserved in UE; sensation decreased in LE, R > L LE; Motor Strength decreased in b/l LE R>L   CHEST/LUNG: Clear to percussion bilaterally; No rales, rhonchi, wheezing, or rubs  HEART: Regular rate and rhythm; No murmurs, rubs, or gallops  ABDOMEN: Soft, Nontender, Nondistended; Bowel sounds present  EXTREMITIES:  2+ Peripheral Pulses, No clubbing, cyanosis, or edema  SKIN: No rashes or lesions    Consultant(s) Notes Reviewed:  [x ] YES  [ ] NO  Care Discussed with Consultants/Other Providers [ x] YES  [ ] NO    LABS:             12.3   7.12  )-----------( 215      ( 02 Mar 2021 05:51 )             39.5     03-01    143  |  107  |  17  ----------------------------<  96  4.4   |  24  |  0.8    Ca    9.5      01 Mar 2021 06:20  Mg     2.2     03-01    TPro  6.8  /  Alb  4.0  /  TBili  0.3  /  DBili  x   /  AST  13  /  ALT  13  /  AlkPhos  86  03-01    PT/INR - ( 28 Feb 2021 20:41 )   PT: 13.60 sec;   INR: 1.18 ratio         PTT - ( 28 Feb 2021 20:41 )  PTT:33.9 sec    RADIOLOGY & ADDITIONAL TESTS:  < from: MR Head w/wo IV Cont (03.01.21 @ 16:32) >  IMPRESSION:    No acute intracranial pathology.    Mild chronic microvascular ischemic changes.    < end of copied text >    < from: MR Lumbar Spine w/wo IV Cont (02.27.21 @ 17:56) >  IMPRESSION:    CERVICAL SPINE:  No evidence of abnormal spinal cord signal.    Mild multilevel degenerative changes without significant spinal canal or neuroforaminal narrowing.    THORACIC SPINE:  Abnormal signal noted in the spinal cord from T1 through T3 without evidence of enhancement or cord expansion. Finding is nonspecific and may represent demyelination, acute transverse myelitis or viral myelitis.    At T10-T11 there is moderate spinal canal stenosis as well as mild mass effect upon the right dorsal aspect of the spinal cord due to osseous spurring involving the ligamentum flavum.    LUMBAR SPINE:  Multilevel degenerative changes and disc bulges disc bulges with superimposed central herniations at L4-5 and L5-S1 resulting in mild to moderate spinal canal stenosis as well as mild bilateral neuroforaminal narrowing.    No abnormal signal or enhancement noted of the cauda equina or nerve roots.    < end of copied text >        Imaging Personally Reviewed:  [ ] YES  [ ] NO  acetaminophen   Tablet .. 650 milliGRAM(s) Oral every 6 hours PRN  ALBUTerol    90 MICROgram(s) HFA Inhaler 1 Puff(s) Inhalation every 6 hours PRN  aluminum hydroxide/magnesium hydroxide/simethicone Suspension 30 milliLiter(s) Oral every 4 hours PRN  amLODIPine   Tablet 10 milliGRAM(s) Oral daily  budesonide  80 MICROgram(s)/formoterol 4.5 MICROgram(s) Inhaler 2 Puff(s) Inhalation two times a day  chlorhexidine 4% Liquid 1 Application(s) Topical <User Schedule>  dexAMETHasone     Tablet 4 milliGRAM(s) Oral every 6 hours  gabapentin 100 milliGRAM(s) Oral three times a day  methocarbamol 500 milliGRAM(s) Oral three times a day PRN  nystatin Powder 1 Application(s) Topical two times a day PRN  pantoprazole    Tablet 40 milliGRAM(s) Oral before breakfast  polyethylene glycol 3350 17 Gram(s) Oral daily PRN  senna 2 Tablet(s) Oral at bedtime  topiramate 200 milliGRAM(s) Oral two times a day         Patient is a 50y old  Female who presents with a chief complaint of Weakness (01 Mar 2021 16:16)    INTERVAL HPI/OVERNIGHT EVENTS: No acute overnight event. Pt lying in bed in NAD on exam. Pt noted continued pressure and muscle spasms in legs and back with no improvement. Pt also noted feeling "itchy" on back legs. Pt is able to ambulate with RW, is tolerating diet, and has had BM. Pt denies any other sxs on ros and no other active complaints at this time. Pt noted being tearful due to anxiety over test results.       REVIEW OF SYSTEMS:  CONSTITUTIONAL: No fever, weight loss, or fatigue  RESPIRATORY: No cough, wheezing, chills or hemoptysis; No shortness of breath  CARDIOVASCULAR: No chest pain, palpitations, dizziness, or leg swelling  GASTROINTESTINAL: No abdominal or epigastric pain. No nausea, vomiting, or hematemesis; No diarrhea or constipation. No melena or hematochezia.  GENITOURINARY: No dysuria, frequency, hematuria, or incontinence  NEUROLOGICAL: +loss of strength, + muscle spasms  SKIN: + itching and burning in back and legs; no rashes or lesions   MUSCULOSKELETAL: No joint pain or swelling; No muscle, back, or extremity pain  FAMILY HISTORY:  No pertinent family history in first degree relatives      T(C): 35.7 (03-02-21 @ 00:08), Max: 36.5 (03-01-21 @ 16:50)  HR: 50 (03-02-21 @ 00:08) (50 - 71)  BP: 143/74 (03-02-21 @ 00:08) (117/59 - 143/74)  RR: 20 (03-02-21 @ 00:08) (20 - 20)  SpO2: --  Wt(kg): --Vital Signs Last 24 Hrs  T(C): 35.7 (02 Mar 2021 00:08), Max: 36.5 (01 Mar 2021 16:50)  T(F): 96.3 (02 Mar 2021 00:08), Max: 97.7 (01 Mar 2021 16:50)  HR: 50 (02 Mar 2021 00:08) (50 - 71)  BP: 143/74 (02 Mar 2021 00:08) (117/59 - 143/74)  BP(mean): --  RR: 20 (02 Mar 2021 00:08) (20 - 20)  SpO2: --    PHYSICAL EXAM:  GENERAL: NAD, well-groomed, well-developed  HEENT:  Atraumatic, Normocephalic head; EOMI, PERRLA, conjunctiva and sclera clear; Moist mucous membranes, No lesions  NERVOUS SYSTEM:  Alert & Oriented X3, Good concentration; sensation and motor preserved in UE; sensation decreased in LE, R > L LE; Motor Strength decreased in b/l LE R>L   CHEST/LUNG: Clear to percussion bilaterally; No rales, rhonchi, wheezing, or rubs  HEART: Regular rate and rhythm; No murmurs, rubs, or gallops  ABDOMEN: Soft, Nontender, Nondistended; Bowel sounds present  EXTREMITIES:  2+ Peripheral Pulses, No clubbing, cyanosis, or edema  SKIN: No rashes or lesions    Consultant(s) Notes Reviewed:  [x ] YES  [ ] NO  Care Discussed with Consultants/Other Providers [ x] YES  [ ] NO    LABS:             12.3   7.12  )-----------( 215      ( 02 Mar 2021 05:51 )             39.5     03-01    143  |  107  |  17  ----------------------------<  96  4.4   |  24  |  0.8    Ca    9.5      01 Mar 2021 06:20  Mg     2.2     03-01    TPro  6.8  /  Alb  4.0  /  TBili  0.3  /  DBili  x   /  AST  13  /  ALT  13  /  AlkPhos  86  03-01    PT/INR - ( 28 Feb 2021 20:41 )   PT: 13.60 sec;   INR: 1.18 ratio         PTT - ( 28 Feb 2021 20:41 )  PTT:33.9 sec    RADIOLOGY & ADDITIONAL TESTS:  < from: MR Head w/wo IV Cont (03.01.21 @ 16:32) >  IMPRESSION:    No acute intracranial pathology.    Mild chronic microvascular ischemic changes.    < end of copied text >    < from: MR Lumbar Spine w/wo IV Cont (02.27.21 @ 17:56) >  IMPRESSION:    CERVICAL SPINE:  No evidence of abnormal spinal cord signal.    Mild multilevel degenerative changes without significant spinal canal or neuroforaminal narrowing.    THORACIC SPINE:  Abnormal signal noted in the spinal cord from T1 through T3 without evidence of enhancement or cord expansion. Finding is nonspecific and may represent demyelination, acute transverse myelitis or viral myelitis.    At T10-T11 there is moderate spinal canal stenosis as well as mild mass effect upon the right dorsal aspect of the spinal cord due to osseous spurring involving the ligamentum flavum.    LUMBAR SPINE:  Multilevel degenerative changes and disc bulges disc bulges with superimposed central herniations at L4-5 and L5-S1 resulting in mild to moderate spinal canal stenosis as well as mild bilateral neuroforaminal narrowing.    No abnormal signal or enhancement noted of the cauda equina or nerve roots.    < end of copied text >        Imaging Personally Reviewed:  [ ] YES  [ ] NO  acetaminophen   Tablet .. 650 milliGRAM(s) Oral every 6 hours PRN  ALBUTerol    90 MICROgram(s) HFA Inhaler 1 Puff(s) Inhalation every 6 hours PRN  aluminum hydroxide/magnesium hydroxide/simethicone Suspension 30 milliLiter(s) Oral every 4 hours PRN  amLODIPine   Tablet 10 milliGRAM(s) Oral daily  budesonide  80 MICROgram(s)/formoterol 4.5 MICROgram(s) Inhaler 2 Puff(s) Inhalation two times a day  chlorhexidine 4% Liquid 1 Application(s) Topical <User Schedule>  dexAMETHasone     Tablet 4 milliGRAM(s) Oral every 6 hours  gabapentin 100 milliGRAM(s) Oral three times a day  methocarbamol 500 milliGRAM(s) Oral three times a day PRN  nystatin Powder 1 Application(s) Topical two times a day PRN  pantoprazole    Tablet 40 milliGRAM(s) Oral before breakfast  polyethylene glycol 3350 17 Gram(s) Oral daily PRN  senna 2 Tablet(s) Oral at bedtime  topiramate 200 milliGRAM(s) Oral two times a day

## 2021-03-02 NOTE — PROGRESS NOTE ADULT - SUBJECTIVE AND OBJECTIVE BOX
12.3   5.99  )-----------( 210      ( 01 Mar 2021 06:20 )             38.6   Follow up of this 51 yo obese F with hx of lumbar radiculopathy, OA, chronic knee pain, HTN, GERD, asthma and DON on cpap at home admitted with progressive weakness of b/l LE with RLE worst than LLE.  + Reported urinary and fecal incontinence as well as neuralgia of both UE and LE.  Prior hx of falls with difficulty ambulating due to above.  Rec'd Moderna covid-19 vaccine on 1/18 and she was concerned whether her symptoms are related to vaccination.  No fever.  No other c/o's noted.    Vital Signs Last 24 Hrs  T(C): 36.1 (02 Mar 2021 07:24), Max: 36.5 (01 Mar 2021 16:50)  T(F): 97 (02 Mar 2021 07:24), Max: 97.7 (01 Mar 2021 16:50)  HR: 52 (02 Mar 2021 07:24) (50 - 71)  BP: 127/76 (02 Mar 2021 07:24) (117/73 - 143/74)  RR: 18 (02 Mar 2021 07:24) (18 - 20)    EOMI.  No facial weakness.  Neck supple.  No c-spine tenderness.  Back no point tenderness.  + SLR LLE.  Unable to lift RLE.  Motor LLE 4-5/5; RLE 3/5.  Sensory overall dec b/l LE with RLE worst than LLE.  DTRs equal b/l.  Chest CTA b/l.  Cor Reg S1S2.  Abd not distended, + BS, soft and NT.    LABS:                         12.3   7.12  )-----------( 215      ( 02 Mar 2021 05:51 )             39.5       03-02    144  |  108  |  16  ----------------------------<  95  4.0   |  23  |  0.8    Ca    9.3      02 Mar 2021 05:51  Mg     2.2     03-02    TPro  6.7  /  Alb  3.8  /  TBili  0.3  /  DBili  x   /  AST  15  /  ALT  15  /  AlkPhos  87  03-02    MRI of Brain / C / T/ L spine reviewed.

## 2021-03-02 NOTE — PROGRESS NOTE ADULT - ASSESSMENT
Assessment and Plan:   B/L LE weakness with associated sensory loss - MRI of spine without evidence of cord compression.    However, there is increase SC signal T2 to T3 level without mass effect and ddx includes acute transverse myelitis or viral myelitis.    MRI: Brain WNL   LP: done today: f/u analysis and cultures (elevated protein)   Neurology f/u   c/w Steroids   c/w PT   Pain Management / Neuro care appreciated     Dispo: Acute f/u LP analysis and cxs

## 2021-03-02 NOTE — PROGRESS NOTE ADULT - ASSESSMENT
Pt is a 50y Female with a past medical history as described above who presented for weakness    # LE Weakness  - MRI T-spine and L-spine: Abnormal signal noted in the spinal cord from T1 through T3 without evidence of enhancement or cord expansion  - MRI brain wwo: No acute pathology; mild chronic microvascular changes   - CRP: 0.16, AURORA: 1:640 , RPR: negative, TSH: 1.51   - neuro: Recommend 5 days IV solumedrol 1,000 mg  - bedside LP today; will send CSF studies as per neuro   Check wbc, rbc, Protein, Glu, gram stain and culture, lyme ab, ACE, encephalitis panel including HSV. EBV, CMV and [place].  Check CSF for OCB, oligoclonal band, IgG index, NMO and anti MOG.    - HIV, Hepatitis B and ACE level pending   - Neurosurgery: no surgical intervention at this time   - c/w Robaxin 500 mg at night time  - PT/OT/Rehab eval     # HTN  - c/w Amlodipine 10mg daily  - DASH diet    # Neuropathy  - hbA1c: 5.1   - Continue Topamax 200mg twice daily and gabapentin 100mg three times daily     # DON  - CPAP if needed at night  - Outpt follow-up     # Asthma  - Continue Symbicort twice daily and Ventolin PRN    # GERD  - Protonix 40mg PO daily     DVT ppx: Lovenox 40 mg SubQ daily    GI ppx: Protonix 40mg daily   Diet: NPO for procedure; DASH/TLC  Activity: IAT   Dispo: From home   Code: FULL     Pending: PT/ Rehab eval, LP  Pt is a 50y Female with a past medical history as described above who presented for weakness    # LE Weakness  - MRI T-spine and L-spine: Abnormal signal noted in the spinal cord from T1 through T3 without evidence of enhancement or cord expansion  - MRI brain wwo: No acute pathology; mild chronic microvascular changes   - CRP: 0.16, AURORA: 1:640 , RPR: negative, TSH: 1.51   - neuro: Recommend 5 days IV solumedrol 1,000 mg  - bedside LP today; will send CSF studies as per neuro   Check wbc, rbc, Protein, Glu, gram stain and culture, lyme ab, ACE, encephalitis panel including HSV. EBV, CMV and [place].  Check CSF for OCB, oligoclonal band, IgG index, NMO and anti MOG.    - HIV neg, Hepatitis B neg; ACE level pending  - Neurosurgery: no surgical intervention at this time   - c/w Robaxin 500 mg at night time  - PT/OT/Rehab eval     # HTN  - c/w Amlodipine 10mg daily  - DASH diet    # Neuropathy  - hbA1c: 5.1   - Continue Topamax 200mg twice daily and gabapentin 100mg three times daily     # DON  - CPAP if needed at night  - Outpt follow-up     # Asthma  - Continue Symbicort twice daily and Ventolin PRN    # GERD  - Protonix 40mg PO daily     DVT ppx: Lovenox 40 mg SubQ daily    GI ppx: Protonix 40mg daily   Diet: NPO for procedure; DASH/TLC  Activity: IAT   Dispo: From home   Code: FULL     Pending: PT/ Rehab eval, LP  Pt is a 50y Female with a past medical history as described above who presented for weakness    # LE Weakness  - MRI T-spine and L-spine: Abnormal signal noted in the spinal cord from T1 through T3 without evidence of enhancement or cord expansion  - MRI brain wwo: No acute pathology; mild chronic microvascular changes   - CRP: 0.16, AURORA: 1:640 , RPR: negative, TSH: 1.51   - neuro: Recommend 5 days IV solumedrol 1,000 mg (Started 03/02)  - bedside LP today; will send CSF studies as per neuro (20 mL CSF collected)  Check wbc, rbc, Protein, Glu, gram stain and culture, lyme ab, ACE, encephalitis panel including HSV. EBV, CMV and [place].  Check CSF for OCB, oligoclonal band, IgG index, NMO and anti MOG.    - HIV neg, Hepatitis B neg; ACE level pending  - Neurosurgery: no surgical intervention at this time   - c/w Robaxin 500 mg at night time  - PT/OT/Rehab eval     # HTN  - c/w Amlodipine 10mg daily  - DASH diet    # Neuropathy  - hbA1c: 5.1   - Continue Topamax 200mg twice daily and gabapentin 100mg three times daily     # DON  - CPAP if needed at night  - Outpt follow-up     # Asthma  - Continue Symbicort twice daily and Ventolin PRN    # GERD  - Protonix 40mg PO daily     DVT ppx: Lovenox 40 mg SubQ daily    GI ppx: Protonix 40mg daily   Diet: DASH/TLC  Activity: IAT   Dispo: From home   Code: FULL     Pending: PT/ Rehab eval, Solumedrol Treatment

## 2021-03-03 LAB
ACE SERPL-CCNC: 21 U/L — SIGNIFICANT CHANGE UP (ref 14–82)
ALBUMIN CSF-MCNC: 42.5 MG/DL — HIGH (ref 14–25)
ALBUMIN SERPL ELPH-MCNC: 3884 MG/DL — SIGNIFICANT CHANGE UP (ref 3500–5200)
ALBUMIN SERPL ELPH-MCNC: 4 G/DL — SIGNIFICANT CHANGE UP (ref 3.5–5.2)
ALP SERPL-CCNC: 85 U/L — SIGNIFICANT CHANGE UP (ref 30–115)
ALT FLD-CCNC: 17 U/L — SIGNIFICANT CHANGE UP (ref 0–41)
ANION GAP SERPL CALC-SCNC: 12 MMOL/L — SIGNIFICANT CHANGE UP (ref 7–14)
AST SERPL-CCNC: 11 U/L — SIGNIFICANT CHANGE UP (ref 0–41)
BASOPHILS # BLD AUTO: 0 K/UL — SIGNIFICANT CHANGE UP (ref 0–0.2)
BASOPHILS NFR BLD AUTO: 0 % — SIGNIFICANT CHANGE UP (ref 0–1)
BILIRUB SERPL-MCNC: 0.4 MG/DL — SIGNIFICANT CHANGE UP (ref 0.2–1.2)
BUN SERPL-MCNC: 19 MG/DL — SIGNIFICANT CHANGE UP (ref 10–20)
CALCIUM SERPL-MCNC: 9.5 MG/DL — SIGNIFICANT CHANGE UP (ref 8.5–10.1)
CHLORIDE SERPL-SCNC: 106 MMOL/L — SIGNIFICANT CHANGE UP (ref 98–110)
CO2 SERPL-SCNC: 25 MMOL/L — SIGNIFICANT CHANGE UP (ref 17–32)
CREAT SERPL-MCNC: 0.8 MG/DL — SIGNIFICANT CHANGE UP (ref 0.7–1.5)
CSF PCR RESULT: SIGNIFICANT CHANGE UP
EOSINOPHIL # BLD AUTO: 0 K/UL — SIGNIFICANT CHANGE UP (ref 0–0.7)
EOSINOPHIL NFR BLD AUTO: 0 % — SIGNIFICANT CHANGE UP (ref 0–8)
GLUCOSE SERPL-MCNC: 111 MG/DL — HIGH (ref 70–99)
HCT VFR BLD CALC: 41.8 % — SIGNIFICANT CHANGE UP (ref 37–47)
HGB BLD-MCNC: 12.9 G/DL — SIGNIFICANT CHANGE UP (ref 12–16)
IGG CSF-MCNC: 10 MG/DL — HIGH
IGG FLD-MCNC: 1407 MG/DL — SIGNIFICANT CHANGE UP (ref 610–1660)
IGG SYNTH RATE SER+CSF CALC-MRATE: 11 MG/DAY — HIGH
IGG/ALB CLEAR SER+CSF-RTO: 0.6 — SIGNIFICANT CHANGE UP
IGG/ALB CSF: 0.24 RATIO — SIGNIFICANT CHANGE UP
IGG/ALB SER: 0.36 RATIO — SIGNIFICANT CHANGE UP
IMM GRANULOCYTES NFR BLD AUTO: 0.5 % — HIGH (ref 0.1–0.3)
LABORATORY COMMENT REPORT: SIGNIFICANT CHANGE UP
LDH CSF L TO P-CCNC: 28 U/L — SIGNIFICANT CHANGE UP
LDH FLD-CCNC: 28 U/L — SIGNIFICANT CHANGE UP
LYMPHOCYTES # BLD AUTO: 0.61 K/UL — LOW (ref 1.2–3.4)
LYMPHOCYTES # BLD AUTO: 9.2 % — LOW (ref 20.5–51.1)
MAGNESIUM SERPL-MCNC: 2.3 MG/DL — SIGNIFICANT CHANGE UP (ref 1.8–2.4)
MCHC RBC-ENTMCNC: 28 PG — SIGNIFICANT CHANGE UP (ref 27–31)
MCHC RBC-ENTMCNC: 30.9 G/DL — LOW (ref 32–37)
MCV RBC AUTO: 90.9 FL — SIGNIFICANT CHANGE UP (ref 81–99)
MONOCYTES # BLD AUTO: 0.1 K/UL — SIGNIFICANT CHANGE UP (ref 0.1–0.6)
MONOCYTES NFR BLD AUTO: 1.5 % — LOW (ref 1.7–9.3)
NEUTROPHILS # BLD AUTO: 5.89 K/UL — SIGNIFICANT CHANGE UP (ref 1.4–6.5)
NEUTROPHILS NFR BLD AUTO: 88.8 % — HIGH (ref 42.2–75.2)
NIGHT BLUE STAIN TISS: SIGNIFICANT CHANGE UP
NRBC # BLD: 0 /100 WBCS — SIGNIFICANT CHANGE UP (ref 0–0)
PLATELET # BLD AUTO: 226 K/UL — SIGNIFICANT CHANGE UP (ref 130–400)
POTASSIUM SERPL-MCNC: 3.8 MMOL/L — SIGNIFICANT CHANGE UP (ref 3.5–5)
POTASSIUM SERPL-SCNC: 3.8 MMOL/L — SIGNIFICANT CHANGE UP (ref 3.5–5)
PROT SERPL-MCNC: 7.1 G/DL — SIGNIFICANT CHANGE UP (ref 6–8)
RBC # BLD: 4.6 M/UL — SIGNIFICANT CHANGE UP (ref 4.2–5.4)
RBC # FLD: 13.3 % — SIGNIFICANT CHANGE UP (ref 11.5–14.5)
SODIUM SERPL-SCNC: 143 MMOL/L — SIGNIFICANT CHANGE UP (ref 135–146)
SOURCE HSV 1/2: SIGNIFICANT CHANGE UP
SPECIMEN SOURCE: SIGNIFICANT CHANGE UP
WBC # BLD: 6.63 K/UL — SIGNIFICANT CHANGE UP (ref 4.8–10.8)
WBC # FLD AUTO: 6.63 K/UL — SIGNIFICANT CHANGE UP (ref 4.8–10.8)

## 2021-03-03 PROCEDURE — 99233 SBSQ HOSP IP/OBS HIGH 50: CPT

## 2021-03-03 RX ORDER — POLYETHYLENE GLYCOL 3350 17 G/17G
17 POWDER, FOR SOLUTION ORAL DAILY
Refills: 0 | Status: DISCONTINUED | OUTPATIENT
Start: 2021-03-03 | End: 2021-03-03

## 2021-03-03 RX ORDER — FERROUS SULFATE 325(65) MG
325 TABLET ORAL DAILY
Refills: 0 | Status: DISCONTINUED | OUTPATIENT
Start: 2021-03-03 | End: 2021-03-07

## 2021-03-03 RX ADMIN — CHLORHEXIDINE GLUCONATE 1 APPLICATION(S): 213 SOLUTION TOPICAL at 05:09

## 2021-03-03 RX ADMIN — AMLODIPINE BESYLATE 10 MILLIGRAM(S): 2.5 TABLET ORAL at 05:08

## 2021-03-03 RX ADMIN — GABAPENTIN 100 MILLIGRAM(S): 400 CAPSULE ORAL at 14:26

## 2021-03-03 RX ADMIN — Medication 200 MILLIGRAM(S): at 05:08

## 2021-03-03 RX ADMIN — Medication 30 MILLILITER(S): at 10:04

## 2021-03-03 RX ADMIN — Medication 650 MILLIGRAM(S): at 11:13

## 2021-03-03 RX ADMIN — Medication 116 MILLIGRAM(S): at 05:36

## 2021-03-03 RX ADMIN — METHOCARBAMOL 500 MILLIGRAM(S): 500 TABLET, FILM COATED ORAL at 18:38

## 2021-03-03 RX ADMIN — Medication 650 MILLIGRAM(S): at 10:03

## 2021-03-03 RX ADMIN — METHOCARBAMOL 500 MILLIGRAM(S): 500 TABLET, FILM COATED ORAL at 10:03

## 2021-03-03 RX ADMIN — Medication 325 MILLIGRAM(S): at 14:26

## 2021-03-03 RX ADMIN — GABAPENTIN 100 MILLIGRAM(S): 400 CAPSULE ORAL at 05:08

## 2021-03-03 RX ADMIN — BUDESONIDE AND FORMOTEROL FUMARATE DIHYDRATE 2 PUFF(S): 160; 4.5 AEROSOL RESPIRATORY (INHALATION) at 21:40

## 2021-03-03 RX ADMIN — PANTOPRAZOLE SODIUM 40 MILLIGRAM(S): 20 TABLET, DELAYED RELEASE ORAL at 05:08

## 2021-03-03 RX ADMIN — Medication 1 TABLET(S): at 14:26

## 2021-03-03 RX ADMIN — GABAPENTIN 100 MILLIGRAM(S): 400 CAPSULE ORAL at 21:39

## 2021-03-03 RX ADMIN — ENOXAPARIN SODIUM 40 MILLIGRAM(S): 100 INJECTION SUBCUTANEOUS at 14:26

## 2021-03-03 RX ADMIN — Medication 650 MILLIGRAM(S): at 22:03

## 2021-03-03 RX ADMIN — Medication 200 MILLIGRAM(S): at 17:09

## 2021-03-03 RX ADMIN — Medication 650 MILLIGRAM(S): at 21:50

## 2021-03-03 NOTE — PROGRESS NOTE ADULT - SUBJECTIVE AND OBJECTIVE BOX
ANNMARIE MCCULLOUGH    Patient is a 50y old  Female who presents with a chief complaint of Weakness (03 Mar 2021 06:58)    INTERVAL HPI/OVERNIGHT EVENTS: no events overnight. Pt was able to walk with a walker today. She thinks that LE weakness is somewhat better, but not significantly. + Muscle spasms in LE. No other complaints.     ROS: All ROS negative except as documented above. Constipation resolved.     PHYSICAL EXAM:  T(C): 35.8, Max: 36.1 (03-03-21 @ 00:00)  HR: 85 (59 - 85)  BP: 108/60 (108/60 - 115/60)  RR: 19 (18 - 20)  SpO2: --    GENERAL: NAD, obese  PULMONARY/CHEST: No rales, rhonchi, or wheezing  CARDIOVASC: Regular rate and rhythm; No murmurs  GI/ABDOMEN: obese, Soft, Nontender, Nondistended; Bowel sounds present  EXTREMITIES: LE no edema, no calf tenderness b/l.  SKIN: No rashes or lesions  NERVOUS SYSTEM:  Alert & Oriented X3, motor RLE 2+/5, LLE 4+/5, sensation intact    Consultant(s) Notes Reviewed by me.     LABS:                        12.9   6.63  )-----------( 226      ( 03 Mar 2021 08:33 )             41.8     03-03    143  |  106  |  19  ----------------------------<  111<H>  3.8   |  25  |  0.8    Ca    9.5      03 Mar 2021 08:33  Mg     2.3     03-03    TPro  7.1  /  Alb  4.0  /  TBili  0.4  /  DBili  x   /  AST  11  /  ALT  17  /  AlkPhos  85  03-03      Culture - Acid Fast - CSF (collected 02 Mar 2021 10:49)  Source: .CSF CSF    Culture - CSF with Gram Stain (collected 02 Mar 2021 10:49)  Source: .CSF CSF  Gram Stain (02 Mar 2021 22:31):    polymorphonuclear leukocytes seen    No organisms seen    by cytocentrifuge  Preliminary Report (03 Mar 2021 17:33):    No growth      RADIOLOGY & ADDITIONAL TESTS:  no new tests      MEDICATIONS  (STANDING):  amLODIPine   Tablet 10 milliGRAM(s) Oral daily  budesonide  80 MICROgram(s)/formoterol 4.5 MICROgram(s) Inhaler 2 Puff(s) Inhalation two times a day  chlorhexidine 4% Liquid 1 Application(s) Topical <User Schedule>  enoxaparin Injectable 40 milliGRAM(s) SubCutaneous daily  ferrous    sulfate 325 milliGRAM(s) Oral daily  gabapentin 100 milliGRAM(s) Oral three times a day  methylPREDNISolone sodium succinate IVPB 1000 milliGRAM(s) IV Intermittent daily  multivitamin 1 Tablet(s) Oral daily  pantoprazole    Tablet 40 milliGRAM(s) Oral before breakfast  senna 2 Tablet(s) Oral at bedtime  topiramate 200 milliGRAM(s) Oral two times a day    MEDICATIONS  (PRN):  acetaminophen   Tablet .. 650 milliGRAM(s) Oral every 6 hours PRN Moderate Pain (4 - 6)  ALBUTerol    90 MICROgram(s) HFA Inhaler 1 Puff(s) Inhalation every 6 hours PRN Shortness of Breath and/or Wheezing  aluminum hydroxide/magnesium hydroxide/simethicone Suspension 30 milliLiter(s) Oral every 4 hours PRN Dyspepsia  methocarbamol 500 milliGRAM(s) Oral three times a day PRN spam  nystatin Powder 1 Application(s) Topical two times a day PRN itching  polyethylene glycol 3350 17 Gram(s) Oral daily PRN Constipation  Ubrelvy 100 milliGRAM(s) 100 milliGRAM(s) Oral two times a day PRN migrane

## 2021-03-03 NOTE — PROGRESS NOTE ADULT - ASSESSMENT
50F with a past medical history of HTN, obesity, lumbar radiculopathy who presented to the hospital for gradual worsening of weakness in both of her legs for about 2 weeks.     # B/L LE weakness with associated sensory loss   - MRI of spine without evidence of cord compression. However, there is increase SC signal T2 to T3 level without mass effect and ddx includes acute transverse myelitis or viral myelitis or demyelinating diseaes.    - MRI: Brain WNL   - LP done 3/02: elevated protein, rest of studies pending   - neuro recommended Steroids 1 gr IV QD for 5 days, today day 2/5.  - cont PT   - cont pain management with muscle relaxants    # Pt had first shot for COVID vaccine, but missed her second shot, will clarify if it can be given inpatient now.     DVT ppx

## 2021-03-03 NOTE — PROGRESS NOTE ADULT - SUBJECTIVE AND OBJECTIVE BOX
Patient is a 50y old  Female who presents with a chief complaint of Weakness (02 Mar 2021 15:18)    INTERVAL HPI/OVERNIGHT EVENTS:      REVIEW OF SYSTEMS:  CONSTITUTIONAL: No fever, weight loss, or fatigue  RESPIRATORY: No cough, wheezing, chills or hemoptysis; No shortness of breath  CARDIOVASCULAR: No chest pain, palpitations, dizziness, or leg swelling  GASTROINTESTINAL: No abdominal or epigastric pain. No nausea, vomiting, or hematemesis; No diarrhea or constipation. No melena or hematochezia.  GENITOURINARY: No dysuria, frequency, hematuria, or incontinence  NEUROLOGICAL: No headaches, memory loss, loss of strength, numbness, or tremors  SKIN: No itching, burning, rashes, or lesions   MUSCULOSKELETAL: No joint pain or swelling; No muscle, back, or extremity pain  FAMILY HISTORY:  No pertinent family history in first degree relatives      T(C): 36.1 (03-03-21 @ 00:00), Max: 36.4 (03-02-21 @ 15:55)  HR: 68 (03-03-21 @ 00:00) (52 - 80)  BP: 112/57 (03-03-21 @ 00:00) (100/60 - 127/76)  RR: 18 (03-03-21 @ 00:00) (18 - 18)  SpO2: --  Wt(kg): --Vital Signs Last 24 Hrs  T(C): 36.1 (03 Mar 2021 00:00), Max: 36.4 (02 Mar 2021 15:55)  T(F): 96.9 (03 Mar 2021 00:00), Max: 97.6 (02 Mar 2021 15:55)  HR: 68 (03 Mar 2021 00:00) (52 - 80)  BP: 112/57 (03 Mar 2021 00:00) (100/60 - 127/76)  BP(mean): --  RR: 18 (03 Mar 2021 00:00) (18 - 18)  SpO2: --    PHYSICAL EXAM:  GENERAL: NAD, well-groomed, well-developed  NERVOUS SYSTEM:  Alert & Oriented X3, Good concentration; Motor Strength 5/5 B/L upper and lower extremities; DTRs 2+ intact and symmetric  CHEST/LUNG: Clear to percussion bilaterally; No rales, rhonchi, wheezing, or rubs  HEART: Regular rate and rhythm; No murmurs, rubs, or gallops  ABDOMEN: Soft, Nontender, Nondistended; Bowel sounds present  EXTREMITIES:  2+ Peripheral Pulses, No clubbing, cyanosis, or edema  SKIN: No rashes or lesions    Consultant(s) Notes Reviewed:  [x ] YES  [ ] NO  Care Discussed with Consultants/Other Providers [ x] YES  [ ] NO    LABS:             12.3   7.12  )-----------( 215      ( 02 Mar 2021 05:51 )             39.5     03-02    144  |  108  |  16  ----------------------------<  95  4.0   |  23  |  0.8    Ca    9.3      02 Mar 2021 05:51  Mg     2.2     03-02    TPro  6.7  /  Alb  3.8  /  TBili  0.3  /  DBili  x   /  AST  15  /  ALT  15  /  AlkPhos  87  03-02                    Culture - CSF with Gram Stain (collected 03-02-21 @ 10:49)  Source: .CSF CSF  Gram Stain (03-02-21 @ 22:31):    polymorphonuclear leukocytes seen    No organisms seen    by cytocentrifuge      RADIOLOGY & ADDITIONAL TESTS:  < from: MR Head w/wo IV Cont (03.01.21 @ 16:32) >    IMPRESSION:    No acute intracranial pathology.    Incidentally noted 0.5 cm round lesion in the right lateral pituitary gland could reflect a proteinaceous Rathke's cleft cyst versus cystic microadenoma. This can be further followed up with MRI of the pituitary gland with contrast.    Mild chronic microvascular ischemic changes.    < end of copied text >  < from: MR Lumbar Spine w/wo IV Cont (02.27.21 @ 17:56) >  IMPRESSION:    CERVICAL SPINE:  No evidence of abnormal spinal cord signal.    Mild multilevel degenerative changes without significant spinal canal or neuroforaminal narrowing.    THORACIC SPINE:  Abnormal signal noted in the spinal cord from T1 through T3 without evidence of enhancement or cord expansion. Finding is nonspecific and may represent demyelination, acute transverse myelitis or viral myelitis.    At T10-T11 there is moderate spinal canal stenosis as well as mild mass effect upon the right dorsal aspect of the spinal cord due to osseous spurring involving the ligamentum flavum.    LUMBAR SPINE:  Multilevel degenerative changes and disc bulges disc bulges with superimposed central herniations at L4-5 and L5-S1 resulting in mild to moderate spinal canal stenosis as well as mild bilateral neuroforaminal narrowing.    No abnormal signal or enhancement noted of the cauda equina or nerve roots.    < end of copied text >      Imaging Personally Reviewed:  [ ] YES  [ ] NO  acetaminophen   Tablet .. 650 milliGRAM(s) Oral every 6 hours PRN  ALBUTerol    90 MICROgram(s) HFA Inhaler 1 Puff(s) Inhalation every 6 hours PRN  aluminum hydroxide/magnesium hydroxide/simethicone Suspension 30 milliLiter(s) Oral every 4 hours PRN  amLODIPine   Tablet 10 milliGRAM(s) Oral daily  budesonide  80 MICROgram(s)/formoterol 4.5 MICROgram(s) Inhaler 2 Puff(s) Inhalation two times a day  chlorhexidine 4% Liquid 1 Application(s) Topical <User Schedule>  enoxaparin Injectable 40 milliGRAM(s) SubCutaneous daily  gabapentin 100 milliGRAM(s) Oral three times a day  methocarbamol 500 milliGRAM(s) Oral three times a day PRN  methylPREDNISolone sodium succinate IVPB 1000 milliGRAM(s) IV Intermittent daily  nystatin Powder 1 Application(s) Topical two times a day PRN  pantoprazole    Tablet 40 milliGRAM(s) Oral before breakfast  polyethylene glycol 3350 17 Gram(s) Oral daily PRN  senna 2 Tablet(s) Oral at bedtime  topiramate 200 milliGRAM(s) Oral two times a day  Ubrelvy 100 milliGRAM(s) 100 milliGRAM(s) Oral two times a day PRN       Patient is a 50y old  Female who presents with a chief complaint of Weakness (02 Mar 2021 15:18)    INTERVAL HPI/OVERNIGHT EVENTS: No acute overnight events. Pt lying in bed awake in NAD on exam. Pt notes continued lower extremity and back "pressure" that is unchanged in character or localization that keeps pt from sleeping. Pt also notes continued muscle spasms in the lower extremities that last ~10min in duration with no change since her hospitalization. Pt notes no BM for the past ~3d. Pt is tolerating diet and is able to ambulate w/ some difficulty due to LE weakness with a RW. Pt denies any other sxs on ros or any other active complaints at this time.       REVIEW OF SYSTEMS:  CONSTITUTIONAL: No fever, weight loss, or fatigue  RESPIRATORY: No cough, wheezing, chills or hemoptysis; No shortness of breath  CARDIOVASCULAR: No chest pain, palpitations, dizziness, or leg swelling  GASTROINTESTINAL: +no BM for past 3 days; No abdominal or epigastric pain. No nausea, vomiting, or hematemesis; No diarrhea; No melena or hematochezia.  GENITOURINARY: No dysuria, frequency, hematuria, or incontinence  NEUROLOGICAL: +LE and back weakness, muscle spasms; No headaches  SKIN: No itching, burning, rashes, or lesions   MUSCULOSKELETAL: +"pressure" sensation in LE and back; No joint pain or swelling  FAMILY HISTORY:  No pertinent family history in first degree relatives      T(C): 36.1 (03-03-21 @ 00:00), Max: 36.4 (03-02-21 @ 15:55)  HR: 68 (03-03-21 @ 00:00) (52 - 80)  BP: 112/57 (03-03-21 @ 00:00) (100/60 - 127/76)  RR: 18 (03-03-21 @ 00:00) (18 - 18)  SpO2: --  Wt(kg): --Vital Signs Last 24 Hrs  T(C): 36.1 (03 Mar 2021 00:00), Max: 36.4 (02 Mar 2021 15:55)  T(F): 96.9 (03 Mar 2021 00:00), Max: 97.6 (02 Mar 2021 15:55)  HR: 68 (03 Mar 2021 00:00) (52 - 80)  BP: 112/57 (03 Mar 2021 00:00) (100/60 - 127/76)  BP(mean): --  RR: 18 (03 Mar 2021 00:00) (18 - 18)  SpO2: --    PHYSICAL EXAM:  GENERAL: NAD, well-groomed, well-developed  NERVOUS SYSTEM:  Alert & Oriented X3, Good concentration; Motor Strength and sensation 5/5 B/L upper extremities; LE motor strength decreased b/l R >L LE with some improvement in LLE mobility, sensation intact  CHEST/LUNG: Clear to percussion bilaterally; No rales, rhonchi, wheezing, or rubs  HEART: Regular rate and rhythm; No murmurs, rubs, or gallops  ABDOMEN: Soft, Nontender, Nondistended; Bowel sounds present  EXTREMITIES:  2+ Peripheral Pulses, No clubbing, cyanosis, or edema  SKIN: No rashes or lesions    Consultant(s) Notes Reviewed:  [x ] YES  [ ] NO  Care Discussed with Consultants/Other Providers [ x] YES  [ ] NO    LABS:             12.3   7.12  )-----------( 215      ( 02 Mar 2021 05:51 )             39.5     03-02    144  |  108  |  16  ----------------------------<  95  4.0   |  23  |  0.8    Ca    9.3      02 Mar 2021 05:51  Mg     2.2     03-02    TPro  6.7  /  Alb  3.8  /  TBili  0.3  /  DBili  x   /  AST  15  /  ALT  15  /  AlkPhos  87  03-02      Culture - CSF with Gram Stain (collected 03-02-21 @ 10:49)  Source: .CSF CSF  Gram Stain (03-02-21 @ 22:31):    polymorphonuclear leukocytes seen    No organisms seen    by cytocentrifuge      RADIOLOGY & ADDITIONAL TESTS:  < from: MR Head w/wo IV Cont (03.01.21 @ 16:32) >    IMPRESSION:    No acute intracranial pathology.    Incidentally noted 0.5 cm round lesion in the right lateral pituitary gland could reflect a proteinaceous Rathke's cleft cyst versus cystic microadenoma. This can be further followed up with MRI of the pituitary gland with contrast.    Mild chronic microvascular ischemic changes.    < end of copied text >  < from: MR Lumbar Spine w/wo IV Cont (02.27.21 @ 17:56) >  IMPRESSION:    CERVICAL SPINE:  No evidence of abnormal spinal cord signal.    Mild multilevel degenerative changes without significant spinal canal or neuroforaminal narrowing.    THORACIC SPINE:  Abnormal signal noted in the spinal cord from T1 through T3 without evidence of enhancement or cord expansion. Finding is nonspecific and may represent demyelination, acute transverse myelitis or viral myelitis.    At T10-T11 there is moderate spinal canal stenosis as well as mild mass effect upon the right dorsal aspect of the spinal cord due to osseous spurring involving the ligamentum flavum.    LUMBAR SPINE:  Multilevel degenerative changes and disc bulges disc bulges with superimposed central herniations at L4-5 and L5-S1 resulting in mild to moderate spinal canal stenosis as well as mild bilateral neuroforaminal narrowing.    No abnormal signal or enhancement noted of the cauda equina or nerve roots.    < end of copied text >      Imaging Personally Reviewed:  [ ] YES  [ ] NO  acetaminophen   Tablet .. 650 milliGRAM(s) Oral every 6 hours PRN  ALBUTerol    90 MICROgram(s) HFA Inhaler 1 Puff(s) Inhalation every 6 hours PRN  aluminum hydroxide/magnesium hydroxide/simethicone Suspension 30 milliLiter(s) Oral every 4 hours PRN  amLODIPine   Tablet 10 milliGRAM(s) Oral daily  budesonide  80 MICROgram(s)/formoterol 4.5 MICROgram(s) Inhaler 2 Puff(s) Inhalation two times a day  chlorhexidine 4% Liquid 1 Application(s) Topical <User Schedule>  enoxaparin Injectable 40 milliGRAM(s) SubCutaneous daily  gabapentin 100 milliGRAM(s) Oral three times a day  methocarbamol 500 milliGRAM(s) Oral three times a day PRN  methylPREDNISolone sodium succinate IVPB 1000 milliGRAM(s) IV Intermittent daily  nystatin Powder 1 Application(s) Topical two times a day PRN  pantoprazole    Tablet 40 milliGRAM(s) Oral before breakfast  polyethylene glycol 3350 17 Gram(s) Oral daily PRN  senna 2 Tablet(s) Oral at bedtime  topiramate 200 milliGRAM(s) Oral two times a day  Ubrelvy 100 milliGRAM(s) 100 milliGRAM(s) Oral two times a day PRN

## 2021-03-03 NOTE — PROGRESS NOTE ADULT - ASSESSMENT
Pt is a 50y Female with a past medical history as described above who presented for weakness    # LE Weakness  - MRI T-spine and L-spine: Abnormal signal noted in the spinal cord from T1 through T3 without evidence of enhancement or cord expansion  - MRI brain wwo: No acute pathology; mild chronic microvascular changes   - CRP: 0.16, AURORA: 1:640 , RPR: negative, TSH: 1.51   - neuro following  - started 5 days IV solumedrol 1,000 mg on 03/02 as per neuro   - f/u LP results:   Elevated protein, polymorphonuclear leukocytes seen on gram stain, no organisms on gram stain; normal wbc, rbc, normal glucose, HSV negative, meningitis/enchephalitis panel negative; culture, lyme ab, ACE  f/u CSF results for oligoclonal band, IgG index, NMO and anti MOG.    - HIV neg, Hepatitis B neg; ACE level pending  - Neurosurgery: no surgical intervention at this time   - c/w Robaxin 500 mg at night time  - PT/OT/Rehab eval     # HTN  - c/w Amlodipine 10mg daily  - DASH diet    # Neuropathy  - hbA1c: 5.1   - Continue Topamax 200mg twice daily and gabapentin 100mg three times daily     # DON  - CPAP if needed at night  - Outpt follow-up     # Asthma  - Continue Symbicort twice daily and Ventolin PRN    # GERD  - Protonix 40mg PO daily     DVT ppx: Lovenox 40 mg SubQ daily    GI ppx: Protonix 40mg daily   Diet: DASH/TLC  Activity: IAT   Dispo: From home   Code: FULL     Pending: PT/ Rehab eval, Solumedrol Treatment    Pt is a 50y Female with a past medical history as described above who presented for weakness    # LE Weakness  - MRI T-spine and L-spine: Abnormal signal noted in the spinal cord from T1 through T3 without evidence of enhancement or cord expansion  - MRI brain wwo: No acute pathology; mild chronic microvascular changes   - CRP: 0.16, AURORA: 1:640 , RPR: negative, TSH: 1.51   - neuro following  - day 2 of 5 IV solumedrol 1,000 mg (started on 03/02 as per neuro recommendation)  - f/u LP results:   Elevated CSF protein; polymorphonuclear leukocytes seen on gram stain, no organisms on gram stain; normal wbc, rbc, normal glucose, HSV negative, meningitis/enchephalitis panel negative; culture, lyme ab, ACE  f/u CSF results for oligoclonal band, IgG index, NMO and anti MOG.    - HIV neg, Hepatitis B neg; ACE level pending  - Neurosurgery: no surgical intervention at this time   - c/w Robaxin 500 mg at night time  - PT/OT/Rehab eval     # HTN  - c/w Amlodipine 10mg daily  - DASH diet    # Neuropathy  - hbA1c: 5.1   - Continue Topamax 200mg twice daily and gabapentin 100mg three times daily     # DON  - CPAP if needed at night  - Outpt follow-up     # Asthma  - Continue Symbicort twice daily and Ventolin PRN    # GERD  - Protonix 40mg PO daily     DVT ppx: Lovenox 40 mg SubQ daily    GI ppx: Protonix 40mg daily   Diet: DASH/TLC  Activity: IAT   Dispo: From home   Code: FULL     Pending: PT/ Rehab eval, Solumedrol Treatment    Pt is a 50y Female with a past medical history as described above who presented for weakness    # LE Weakness  - MRI T-spine and L-spine: Abnormal signal noted in the spinal cord from T1 through T3 without evidence of enhancement or cord expansion  - MRI brain wwo: No acute pathology; mild chronic microvascular changes   - CRP: 0.16, AURORA: 1:640 , RPR: negative, TSH: 1.51   - neuro following  - day 2 of 5 IV solumedrol 1,000 mg (started on 03/02 as per neuro recommendation)  - f/u LP results:   Elevated CSF protein; polymorphonuclear leukocytes seen on gram stain, no organisms on gram stain; normal wbc, rbc, normal glucose, HSV negative, meningitis/enchephalitis panel negative; pending culture, lyme ab, ACE  f/u CSF results for oligoclonal band, IgG index, NMO and anti MOG (pending)  - HIV neg, Hepatitis B neg; ACE level pending  - Neurosurgery: no surgical intervention at this time   - c/w Robaxin 500 mg at night time  - PT/OT/Rehab eval     # HTN  - c/w Amlodipine 10mg daily  - DASH diet    # Neuropathy  - hbA1c: 5.1   - Continue Topamax 200mg twice daily and gabapentin 100mg three times daily     # DON  - CPAP if needed at night  - Outpt follow-up     # Asthma  - Continue Symbicort twice daily and Ventolin PRN    # GERD  - Protonix 40mg PO daily     # Constipation   - Continue Senna 2 tabs qhs. Miralax 17G daily added     DVT ppx: Lovenox 40 mg SubQ daily    GI ppx: Protonix 40mg daily   Diet: DASH/TLC  Activity: IAT   Dispo: From home   Code: FULL     Pending: PT/ Rehab eval, Solumedrol Treatment

## 2021-03-04 LAB
ALBUMIN SERPL ELPH-MCNC: 3.7 G/DL — SIGNIFICANT CHANGE UP (ref 3.5–5.2)
ALP SERPL-CCNC: 79 U/L — SIGNIFICANT CHANGE UP (ref 30–115)
ALT FLD-CCNC: 16 U/L — SIGNIFICANT CHANGE UP (ref 0–41)
ANION GAP SERPL CALC-SCNC: 9 MMOL/L — SIGNIFICANT CHANGE UP (ref 7–14)
AST SERPL-CCNC: 13 U/L — SIGNIFICANT CHANGE UP (ref 0–41)
BASOPHILS # BLD AUTO: 0.01 K/UL — SIGNIFICANT CHANGE UP (ref 0–0.2)
BASOPHILS NFR BLD AUTO: 0.1 % — SIGNIFICANT CHANGE UP (ref 0–1)
BILIRUB SERPL-MCNC: 0.3 MG/DL — SIGNIFICANT CHANGE UP (ref 0.2–1.2)
BUN SERPL-MCNC: 27 MG/DL — HIGH (ref 10–20)
CALCIUM SERPL-MCNC: 8.9 MG/DL — SIGNIFICANT CHANGE UP (ref 8.5–10.1)
CHLORIDE SERPL-SCNC: 106 MMOL/L — SIGNIFICANT CHANGE UP (ref 98–110)
CMV DNA # UR NAA DL=200: SIGNIFICANT CHANGE UP IU/ML
CO2 SERPL-SCNC: 25 MMOL/L — SIGNIFICANT CHANGE UP (ref 17–32)
CREAT SERPL-MCNC: 0.9 MG/DL — SIGNIFICANT CHANGE UP (ref 0.7–1.5)
EOSINOPHIL # BLD AUTO: 0 K/UL — SIGNIFICANT CHANGE UP (ref 0–0.7)
EOSINOPHIL NFR BLD AUTO: 0 % — SIGNIFICANT CHANGE UP (ref 0–8)
GLUCOSE SERPL-MCNC: 115 MG/DL — HIGH (ref 70–99)
HCT VFR BLD CALC: 38.1 % — SIGNIFICANT CHANGE UP (ref 37–47)
HGB BLD-MCNC: 12 G/DL — SIGNIFICANT CHANGE UP (ref 12–16)
IMM GRANULOCYTES NFR BLD AUTO: 0.4 % — HIGH (ref 0.1–0.3)
LYMPHOCYTES # BLD AUTO: 0.69 K/UL — LOW (ref 1.2–3.4)
LYMPHOCYTES # BLD AUTO: 7.6 % — LOW (ref 20.5–51.1)
MAGNESIUM SERPL-MCNC: 2.2 MG/DL — SIGNIFICANT CHANGE UP (ref 1.8–2.4)
MCHC RBC-ENTMCNC: 28.5 PG — SIGNIFICANT CHANGE UP (ref 27–31)
MCHC RBC-ENTMCNC: 31.5 G/DL — LOW (ref 32–37)
MCV RBC AUTO: 90.5 FL — SIGNIFICANT CHANGE UP (ref 81–99)
MONOCYTES # BLD AUTO: 0.38 K/UL — SIGNIFICANT CHANGE UP (ref 0.1–0.6)
MONOCYTES NFR BLD AUTO: 4.2 % — SIGNIFICANT CHANGE UP (ref 1.7–9.3)
NEUTROPHILS # BLD AUTO: 7.93 K/UL — HIGH (ref 1.4–6.5)
NEUTROPHILS NFR BLD AUTO: 87.7 % — HIGH (ref 42.2–75.2)
NRBC # BLD: 0 /100 WBCS — SIGNIFICANT CHANGE UP (ref 0–0)
PLATELET # BLD AUTO: 207 K/UL — SIGNIFICANT CHANGE UP (ref 130–400)
POTASSIUM SERPL-MCNC: 4.1 MMOL/L — SIGNIFICANT CHANGE UP (ref 3.5–5)
POTASSIUM SERPL-SCNC: 4.1 MMOL/L — SIGNIFICANT CHANGE UP (ref 3.5–5)
PROT SERPL-MCNC: 6.2 G/DL — SIGNIFICANT CHANGE UP (ref 6–8)
RBC # BLD: 4.21 M/UL — SIGNIFICANT CHANGE UP (ref 4.2–5.4)
RBC # FLD: 13.5 % — SIGNIFICANT CHANGE UP (ref 11.5–14.5)
SODIUM SERPL-SCNC: 140 MMOL/L — SIGNIFICANT CHANGE UP (ref 135–146)
VDRL CSF-TITR: SIGNIFICANT CHANGE UP
WBC # BLD: 9.05 K/UL — SIGNIFICANT CHANGE UP (ref 4.8–10.8)
WBC # FLD AUTO: 9.05 K/UL — SIGNIFICANT CHANGE UP (ref 4.8–10.8)

## 2021-03-04 PROCEDURE — 99232 SBSQ HOSP IP/OBS MODERATE 35: CPT

## 2021-03-04 RX ADMIN — GABAPENTIN 100 MILLIGRAM(S): 400 CAPSULE ORAL at 13:47

## 2021-03-04 RX ADMIN — Medication 325 MILLIGRAM(S): at 13:47

## 2021-03-04 RX ADMIN — SENNA PLUS 2 TABLET(S): 8.6 TABLET ORAL at 21:13

## 2021-03-04 RX ADMIN — ENOXAPARIN SODIUM 40 MILLIGRAM(S): 100 INJECTION SUBCUTANEOUS at 13:47

## 2021-03-04 RX ADMIN — Medication 30 MILLILITER(S): at 19:16

## 2021-03-04 RX ADMIN — Medication 1 TABLET(S): at 13:47

## 2021-03-04 RX ADMIN — Medication 650 MILLIGRAM(S): at 19:40

## 2021-03-04 RX ADMIN — METHOCARBAMOL 500 MILLIGRAM(S): 500 TABLET, FILM COATED ORAL at 19:16

## 2021-03-04 RX ADMIN — BUDESONIDE AND FORMOTEROL FUMARATE DIHYDRATE 2 PUFF(S): 160; 4.5 AEROSOL RESPIRATORY (INHALATION) at 07:47

## 2021-03-04 RX ADMIN — ALBUTEROL 1 PUFF(S): 90 AEROSOL, METERED ORAL at 21:13

## 2021-03-04 RX ADMIN — Medication 650 MILLIGRAM(S): at 19:17

## 2021-03-04 RX ADMIN — PANTOPRAZOLE SODIUM 40 MILLIGRAM(S): 20 TABLET, DELAYED RELEASE ORAL at 05:21

## 2021-03-04 RX ADMIN — GABAPENTIN 100 MILLIGRAM(S): 400 CAPSULE ORAL at 21:13

## 2021-03-04 RX ADMIN — METHOCARBAMOL 500 MILLIGRAM(S): 500 TABLET, FILM COATED ORAL at 07:46

## 2021-03-04 RX ADMIN — GABAPENTIN 100 MILLIGRAM(S): 400 CAPSULE ORAL at 05:20

## 2021-03-04 RX ADMIN — Medication 116 MILLIGRAM(S): at 05:20

## 2021-03-04 RX ADMIN — Medication 200 MILLIGRAM(S): at 05:20

## 2021-03-04 RX ADMIN — Medication 200 MILLIGRAM(S): at 17:21

## 2021-03-04 NOTE — PROGRESS NOTE ADULT - SUBJECTIVE AND OBJECTIVE BOX
Patient is a 50y old  Female who presents with a chief complaint of Weakness (03 Mar 2021 17:48)    INTERVAL HPI/OVERNIGHT EVENTS:      REVIEW OF SYSTEMS:  CONSTITUTIONAL: No fever, weight loss, or fatigue  RESPIRATORY: No cough, wheezing, chills or hemoptysis; No shortness of breath  CARDIOVASCULAR: No chest pain, palpitations, dizziness, or leg swelling  GASTROINTESTINAL: No abdominal or epigastric pain. No nausea, vomiting, or hematemesis; No diarrhea or constipation. No melena or hematochezia.  GENITOURINARY: No dysuria, frequency, hematuria, or incontinence  NEUROLOGICAL: No headaches, memory loss, loss of strength, numbness, or tremors  MUSCULOSKELETAL: No joint pain or swelling; No muscle, back, or extremity pain  FAMILY HISTORY:  No pertinent family history in first degree relatives      T(C): 36.1 (21 @ 05:30), Max: 36.1 (21 @ 05:30)  HR: 71 (21 @ 05:30) (59 - 85)  BP: 115/74 (21 @ 05:30) (108/60 - 115/74)  RR: 19 (21 @ 05:30) (19 - 20)  SpO2: --  Wt(kg): --Vital Signs Last 24 Hrs  T(C): 36.1 (04 Mar 2021 05:30), Max: 36.1 (04 Mar 2021 05:30)  T(F): 97 (04 Mar 2021 05:30), Max: 97 (04 Mar 2021 05:30)  HR: 71 (04 Mar 2021 05:30) (59 - 85)  BP: 115/74 (04 Mar 2021 05:30) (108/60 - 115/74)  BP(mean): --  RR: 19 (04 Mar 2021 05:30) (19 - 20)  SpO2: --    PHYSICAL EXAM:  GENERAL: NAD, well-groomed, well-developed  NERVOUS SYSTEM:  Alert & Oriented X3, Good concentration; Motor Strength 5/5 B/L upper and lower extremities; DTRs 2+ intact and symmetric  CHEST/LUNG: Clear to percussion bilaterally; No rales, rhonchi, wheezing, or rubs  HEART: Regular rate and rhythm; No murmurs, rubs, or gallops  ABDOMEN: Soft, Nontender, Nondistended; Bowel sounds present  EXTREMITIES:  2+ Peripheral Pulses, No clubbing, cyanosis, or edema  SKIN: No rashes or lesions    Consultant(s) Notes Reviewed:  [x ] YES  [ ] NO  Care Discussed with Consultants/Other Providers [ x] YES  [ ] NO    LABS:             12.9   6.63  )-----------( 226      ( 03 Mar 2021 08:33 )             41.8     -    143  |  106  |  19  ----------------------------<  111<H>  3.8   |  25  |  0.8    Ca    9.5      03 Mar 2021 08:33  Mg     2.3     -    TPro  7.1  /  Alb  4.0  /  TBili  0.4  /  DBili  x   /  AST  11  /  ALT  17  /  AlkPhos  85  -    CSF IgG Index (21 @ 10:49)   Quantitative Ig mg/dL   Albumin, Serum: 3884 mg/dL   IgG CSF: 10.0 mg/dL   CSF ALBU: 42.5 mg/dL   IgG/Albumin Ratio, Serum: 0.36 Ratio   IgG/Albumin Ratio, CSF: 0.24 Ratio   IgG Index: 0.6   IgG Synthesis: 11.0 mg/day   CSF PCR Panel (21 @ 10:49)   CSF PCR Result: NotDete: The meningitis/encephalitis (ME) panel (CSFPCR) is a PCR based assay that   screens for: Escherichia coli; Haemophilus influenzae; Listeria   monocytogenes; Neisseria meningitidis; Streptococcus agalactiae;   Streptococcus pneumoniae; CMV; Enterovirus; HSV-1; HSV-2; Human   herpesvirus 6; Parechovirus; VZV and Cryptococcus. Result should be   interpreted in context of clinical presentation, imaging and other lab   tests. Positive predictive value may be lower in patients with normal CSF   chemistry and cell count.     Herpes Simplex Virus 1/2 PCR, CSF (21 @ 10:49)   Source HSV 1/2: CSF   HSV 1/2 PCR: [facility]: HSV1/2 PCR assay is a real-time PCR test that detects and differentiates   HSV-1 and/or HSV-2 DNA in CSF (Vitreous Fluid). The results of this test   should be interpreted with consideration of all clinical and laboratory   findings.     Culture - Acid Fast - CSF (21 @ 10:49)   Specimen Source: .CSF CSF   Acid Fast Bacilli Smear:   Less than 5 cc of CSF received,   unable to perform AFB smear.   Culture - CSF with Gram Stain . (21 @ 10:49)   Gram Stain:   polymorphonuclear leukocytes seen   No organisms seen   by cytocentrifuge   Specimen Source: .CSF CSF   Culture Results:   No growth   Lactate Dehydrogenase, CSF (21 @ 10:49)   Lactate Dehydrogenase, CSF: 28: Test Repeated   Protein, CSF (21 @ 10:49)   Protein, CSF: 79 mg/dL     Glucose, CSF (21 @ 10:49)   Glucose, CSF: 62 mg/dL   Cerebrospinal Fluid Cell Count-1 (21 @ 10:49)   Tube Type: Tube 1   RBC Count - Spinal Fluid: 8 /uL   Total Nucleated Cell Count, CSF: 2 /uL   CSF Color: No Color   CSF Appearance: Clear   CSF Segmented Neutrophils: N/A %   RADIOLOGY & ADDITIONAL TESTS:  < from: MR Head w/wo IV Cont (21 @ 16:32) >  IMPRESSION:    No acute intracranial pathology.    Incidentally noted 0.5 cm round lesion in the right lateral pituitary gland could reflect a proteinaceous Rathke's cleft cyst versus cystic microadenoma. This can be further followed up with MRI of the pituitary gland with contrast.    Mild chronic microvascular ischemic changes.        < end of copied text >  < from: MR Lumbar Spine w/wo IV Cont (21 @ 17:56) >  IMPRESSION:    CERVICAL SPINE:  No evidence of abnormal spinal cord signal.    Mild multilevel degenerative changes without significant spinal canal or neuroforaminal narrowing.    THORACIC SPINE:  Abnormal signal noted in the spinal cord from T1 through T3 without evidence of enhancement or cord expansion. Finding is nonspecific and may represent demyelination, acute transverse myelitis or viral myelitis.    At T10-T11 there is moderate spinal canal stenosis as well as mild mass effect upon the right dorsal aspect of the spinal cord due to osseous spurring involving the ligamentum flavum.    LUMBAR SPINE:  Multilevel degenerative changes and disc bulges disc bulges with superimposed central herniations at L4-5 and L5-S1 resulting in mild to moderate spinal canal stenosis as well as mild bilateral neuroforaminal narrowing.    No abnormal signal or enhancement noted of the cauda equina or nerve roots.    < end of copied text >      Imaging Personally Reviewed:  [ ] YES  [ ] NO  acetaminophen   Tablet .. 650 milliGRAM(s) Oral every 6 hours PRN  ALBUTerol    90 MICROgram(s) HFA Inhaler 1 Puff(s) Inhalation every 6 hours PRN  aluminum hydroxide/magnesium hydroxide/simethicone Suspension 30 milliLiter(s) Oral every 4 hours PRN  amLODIPine   Tablet 10 milliGRAM(s) Oral daily  budesonide  80 MICROgram(s)/formoterol 4.5 MICROgram(s) Inhaler 2 Puff(s) Inhalation two times a day  chlorhexidine 4% Liquid 1 Application(s) Topical <User Schedule>  enoxaparin Injectable 40 milliGRAM(s) SubCutaneous daily  ferrous    sulfate 325 milliGRAM(s) Oral daily  gabapentin 100 milliGRAM(s) Oral three times a day  methocarbamol 500 milliGRAM(s) Oral three times a day PRN  methylPREDNISolone sodium succinate IVPB 1000 milliGRAM(s) IV Intermittent daily  multivitamin 1 Tablet(s) Oral daily  nystatin Powder 1 Application(s) Topical two times a day PRN  pantoprazole    Tablet 40 milliGRAM(s) Oral before breakfast  polyethylene glycol 3350 17 Gram(s) Oral daily PRN  senna 2 Tablet(s) Oral at bedtime  topiramate 200 milliGRAM(s) Oral two times a day  Ubrelvy 100 milliGRAM(s) 100 milliGRAM(s) Oral two times a day PRN      HEALTH ISSUES - PROBLEM Dx:         Patient is a 50y old  Female who presents with a chief complaint of Weakness (03 Mar 2021 17:48)    INTERVAL HPI/OVERNIGHT EVENTS: No acute overnight events. Pt lying in bed in NAD on exam. Pt notes that she was able to sleep better as she had less muscle spasms. Pt notes pressure sensation in legs and back has decreased, but notes continued muscle soreness in upper abdomen and back area. Pt was able to have BM yesterday, is tolerating diet, and is able to ambulate with RW.       REVIEW OF SYSTEMS:  CONSTITUTIONAL: No fever, weight loss, or fatigue  RESPIRATORY: No cough, wheezing, chills or hemoptysis; No shortness of breath  CARDIOVASCULAR: No chest pain, palpitations, dizziness, or leg swelling  GASTROINTESTINAL: No abdominal or epigastric pain. No nausea, vomiting, or hematemesis; No diarrhea or constipation. No melena or hematochezia.  GENITOURINARY: No dysuria, frequency, hematuria, or incontinence  NEUROLOGICAL: +loss of strength LE, muscle spasm in LE and back; No headaches  MUSCULOSKELETAL: No joint pain or swelling  FAMILY HISTORY:  No pertinent family history in first degree relatives      T(C): 36.1 (21 @ 05:30), Max: 36.1 (21 @ 05:30)  HR: 71 (21 @ 05:30) (59 - 85)  BP: 115/74 (21 @ 05:30) (108/60 - 115/74)  RR: 19 (21 @ 05:30) (19 - 20)  SpO2: --  Wt(kg): --Vital Signs Last 24 Hrs  T(C): 36.1 (04 Mar 2021 05:30), Max: 36.1 (04 Mar 2021 05:30)  T(F): 97 (04 Mar 2021 05:30), Max: 97 (04 Mar 2021 05:30)  HR: 71 (04 Mar 2021 05:30) (59 - 85)  BP: 115/74 (04 Mar 2021 05:30) (108/60 - 115/74)  BP(mean): --  RR: 19 (04 Mar 2021 05:30) (19 - 20)  SpO2: --    PHYSICAL EXAM:  GENERAL: NAD, well-groomed, well-developed  NERVOUS SYSTEM:  Alert & Oriented X3, Good concentration; Motor Strength and sensation 5/5 B/L upper extremities; improved strength in RLE but still less than LLE, decreased sensation in LLE as compared to RLL.    CHEST/LUNG: Clear to percussion bilaterally; No rales, rhonchi, wheezing, or rubs  HEART: Regular rate and rhythm; No murmurs, rubs, or gallops  ABDOMEN: Soft, Nontender, Nondistended; Bowel sounds present  EXTREMITIES:  2+ Peripheral Pulses, No clubbing, cyanosis, or edema  SKIN: No rashes or lesions    Consultant(s) Notes Reviewed:  [x ] YES  [ ] NO  Care Discussed with Consultants/Other Providers [ x] YES  [ ] NO    LABS:             12.9   6.63  )-----------( 226      ( 03 Mar 2021 08:33 )             41.8     -    143  |  106  |  19  ----------------------------<  111<H>  3.8   |  25  |  0.8    Ca    9.5      03 Mar 2021 08:33  Mg     2.3     -    TPro  7.1  /  Alb  4.0  /  TBili  0.4  /  DBili  x   /  AST  11  /  ALT  17  /  AlkPhos  85  -    CSF IgG Index (21 @ 10:49)   Quantitative Ig mg/dL   Albumin, Serum: 3884 mg/dL   IgG CSF: 10.0 mg/dL   CSF ALBU: 42.5 mg/dL   IgG/Albumin Ratio, Serum: 0.36 Ratio   IgG/Albumin Ratio, CSF: 0.24 Ratio   IgG Index: 0.6   IgG Synthesis: 11.0 mg/day   CSF PCR Panel (21 @ 10:49)   CSF PCR Result: NotDetec: The meningitis/encephalitis (ME) panel (CSFPCR) is a PCR based assay that   screens for: Escherichia coli; Haemophilus influenzae; Listeria   monocytogenes; Neisseria meningitidis; Streptococcus agalactiae;   Streptococcus pneumoniae; CMV; Enterovirus; HSV-1; HSV-2; Human   herpesvirus 6; Parechovirus; VZV and Cryptococcus. Result should be   interpreted in context of clinical presentation, imaging and other lab   tests. Positive predictive value may be lower in patients with normal CSF   chemistry and cell count.     Herpes Simplex Virus 1/2 PCR, CSF (21 @ 10:49)   Source HSV 1/2: CSF   HSV 1/2 PCR: [facility]: HSV1/2 PCR assay is a real-time PCR test that detects and differentiates   HSV-1 and/or HSV-2 DNA in CSF (Vitreous Fluid). The results of this test   should be interpreted with consideration of all clinical and laboratory   findings.     Culture - Acid Fast - CSF (21 @ 10:49)   Specimen Source: .CSF CSF   Acid Fast Bacilli Smear:   Less than 5 cc of CSF received,   unable to perform AFB smear.   Culture - CSF with Gram Stain . (21 @ 10:49)   Gram Stain:   polymorphonuclear leukocytes seen   No organisms seen   by cytocentrifuge   Specimen Source: .CSF CSF   Culture Results:   No growth   Lactate Dehydrogenase, CSF (21 @ 10:49)   Lactate Dehydrogenase, CSF: 28: Test Repeated   Protein, CSF (21 @ 10:49)   Protein, CSF: 79 mg/dL     Glucose, CSF (21 @ 10:49)   Glucose, CSF: 62 mg/dL   Cerebrospinal Fluid Cell Count-1 (21 @ 10:49)   Tube Type: Tube 1   RBC Count - Spinal Fluid: 8 /uL   Total Nucleated Cell Count, CSF: 2 /uL   CSF Color: No Color   CSF Appearance: Clear   CSF Segmented Neutrophils: N/A %   RADIOLOGY & ADDITIONAL TESTS:  < from: MR Head w/wo IV Cont (21 @ 16:32) >  IMPRESSION:    No acute intracranial pathology.    Incidentally noted 0.5 cm round lesion in the right lateral pituitary gland could reflect a proteinaceous Rathke's cleft cyst versus cystic microadenoma. This can be further followed up with MRI of the pituitary gland with contrast.    Mild chronic microvascular ischemic changes.        < end of copied text >  < from: MR Lumbar Spine w/wo IV Cont (21 @ 17:56) >  IMPRESSION:    CERVICAL SPINE:  No evidence of abnormal spinal cord signal.    Mild multilevel degenerative changes without significant spinal canal or neuroforaminal narrowing.    THORACIC SPINE:  Abnormal signal noted in the spinal cord from T1 through T3 without evidence of enhancement or cord expansion. Finding is nonspecific and may represent demyelination, acute transverse myelitis or viral myelitis.    At T10-T11 there is moderate spinal canal stenosis as well as mild mass effect upon the right dorsal aspect of the spinal cord due to osseous spurring involving the ligamentum flavum.    LUMBAR SPINE:  Multilevel degenerative changes and disc bulges disc bulges with superimposed central herniations at L4-5 and L5-S1 resulting in mild to moderate spinal canal stenosis as well as mild bilateral neuroforaminal narrowing.    No abnormal signal or enhancement noted of the cauda equina or nerve roots.    < end of copied text >      Imaging Personally Reviewed:  [ ] YES  [ ] NO  acetaminophen   Tablet .. 650 milliGRAM(s) Oral every 6 hours PRN  ALBUTerol    90 MICROgram(s) HFA Inhaler 1 Puff(s) Inhalation every 6 hours PRN  aluminum hydroxide/magnesium hydroxide/simethicone Suspension 30 milliLiter(s) Oral every 4 hours PRN  amLODIPine   Tablet 10 milliGRAM(s) Oral daily  budesonide  80 MICROgram(s)/formoterol 4.5 MICROgram(s) Inhaler 2 Puff(s) Inhalation two times a day  chlorhexidine 4% Liquid 1 Application(s) Topical <User Schedule>  enoxaparin Injectable 40 milliGRAM(s) SubCutaneous daily  ferrous    sulfate 325 milliGRAM(s) Oral daily  gabapentin 100 milliGRAM(s) Oral three times a day  methocarbamol 500 milliGRAM(s) Oral three times a day PRN  methylPREDNISolone sodium succinate IVPB 1000 milliGRAM(s) IV Intermittent daily  multivitamin 1 Tablet(s) Oral daily  nystatin Powder 1 Application(s) Topical two times a day PRN  pantoprazole    Tablet 40 milliGRAM(s) Oral before breakfast  polyethylene glycol 3350 17 Gram(s) Oral daily PRN  senna 2 Tablet(s) Oral at bedtime  topiramate 200 milliGRAM(s) Oral two times a day  Ubrelvy 100 milliGRAM(s) 100 milliGRAM(s) Oral two times a day PRN

## 2021-03-04 NOTE — PROGRESS NOTE ADULT - ASSESSMENT
50F with a past medical history of HTN, obesity, lumbar radiculopathy who presented to the hospital for gradual worsening of weakness in both of her legs for about 2 weeks.     # B/L LE weakness with associated sensory loss   - MRI of spine without evidence of cord compression. However, there is increase SC signal T2 to T3 level without mass effect and ddx includes acute transverse myelitis or viral myelitis or demyelinating diseaes.    - MRI: Brain WNL   - LP done 3/02: elevated protein, rest of studies pending   - neuro recommended Steroids 1 gr IV QD for 5 days, today day 3/5.  - cont PT   - cont pain management with muscle relaxants    # Pt had first shot for COVID vaccine, but missed her second shot, will clarify if it can be given inpatient now.     DVT ppx     Pt likely will need STR

## 2021-03-04 NOTE — PROGRESS NOTE ADULT - ASSESSMENT
Pt is a 50y Female with a past medical history as described above who presented for weakness    # LE Weakness  - MRI T-spine and L-spine: Abnormal signal noted in the spinal cord from T1 through T3 without evidence of enhancement or cord expansion  - MRI brain wwo: No acute pathology; mild chronic microvascular changes   - CRP: 0.16, AURORA: 1:640 , RPR: negative, TSH: 1.51   - neuro following  - day 3 of 5 IV solumedrol 1,000 mg (started on 03/02 as per neuro recommendation)  - f/u LP results:   Elevated CSF protein; polymorphonuclear leukocytes seen on gram stain, no organisms on gram stain; normal wbc, rbc, normal glucose, HSV negative, meningitis/enchephalitis panel negative, culture negative, lyme Ab neg; pending ACE  elevated IgG CSF, elevated IgG synthesis; normal IgG index,   f/u CSF results for oligoclonal band, NMO and anti MOG (pending)  - HIV neg, Hepatitis B neg; ACE level pending  - Neurosurgery: no surgical intervention at this time   - c/w Robaxin 500 mg at night time  - PT eval: home w/ home PT/rehab facility; need for RW     # HTN  - c/w Amlodipine 10mg daily  - DASH diet    # Neuropathy  - hbA1c: 5.1   - Continue Topamax 200mg twice daily and gabapentin 100mg three times daily     # DON  - CPAP if needed at night  - Outpt follow-up     # Asthma  - Continue Symbicort twice daily and Ventolin PRN    # GERD  - Protonix 40mg PO daily     # Constipation   - Continue Senna 2 tabs qhs. Miralax 17G daily added     DVT ppx: Lovenox 40 mg SubQ daily    GI ppx: Protonix 40mg daily   Diet: DASH/TLC  Activity: IAT   Dispo: From home   Code: FULL     Pending: Solumedrol Treatment    Pt is a 50y Female with a past medical history as described above who presented for weakness    # LE Weakness  - MRI T-spine and L-spine: Abnormal signal noted in the spinal cord from T1 through T3 without evidence of enhancement or cord expansion  - MRI brain wwo: No acute pathology; mild chronic microvascular changes   - CRP: 0.16, AURORA: 1:640 , RPR: negative, TSH: 1.51   - neuro following  - day 3 of 5 IV solumedrol 1,000 mg (started on 03/02 as per neuro recommendation)  - f/u LP results:   Elevated CSF protein; polymorphonuclear leukocytes seen on gram stain, no organisms on gram stain; normal wbc, rbc, normal glucose, HSV negative, meningitis/enchephalitis panel negative, culture negative, lyme Ab neg; pending ACE  elevated IgG CSF, elevated IgG synthesis; normal IgG index,   f/u CSF results for oligoclonal band, NMO and anti MOG (pending)  - HIV neg, Hepatitis B neg; ACE level pending  - Neurosurgery: no surgical intervention at this time   - c/w Robaxin 500 mg at night time  - PT eval: home w/ home PT/rehab facility; need for RW     # HTN  - c/w Amlodipine 10mg daily  - DASH diet    # Neuropathy  - hbA1c: 5.1   - Continue Topamax 200mg twice daily and gabapentin 100mg three times daily     # DON  - CPAP if needed at night  - Outpt follow-up     # Asthma  - Continue Symbicort twice daily and Ventolin PRN    # GERD  - Protonix 40mg PO daily     # Constipation   - Continue Senna 2 tabs qhs. Miralax 17G daily PRN     DVT ppx: Lovenox 40 mg SubQ daily    GI ppx: Protonix 40mg daily   Diet: DASH/TLC  Activity: IAT   Dispo: From home   Code: FULL     Pending: Solumedrol Treatment

## 2021-03-04 NOTE — PROGRESS NOTE ADULT - SUBJECTIVE AND OBJECTIVE BOX
ANNMARIE MCCULLOUGH    Patient is a 50y old  Female who presents with a chief complaint of Weakness (04 Mar 2021 07:09)    INTERVAL HPI/OVERNIGHT EVENTS: no events overnight, no significant changes in symptoms     ROS: All ROS negative except LE weakness R>L, and intermittent muscle spasms in LE.    PHYSICAL EXAM:  T(C): 37, Max: 37 (03-04-21 @ 15:13)  HR: 97 (71 - 97)  BP: 111/65 (111/65 - 115/74)  RR: 19 (19 - 20)  SpO2: --    GENERAL: NAD, obese  PULMONARY/CHEST: No rales, rhonchi, or wheezing  CARDIOVASC: Regular rate and rhythm; No murmurs  GI/ABDOMEN: obese, Soft, Nontender, Nondistended; Bowel sounds present  EXTREMITIES: LE no edema, no calf tenderness b/l.  SKIN: No rashes or lesions  NERVOUS SYSTEM:  Alert & Oriented X3, motor RLE 2+/5, LLE 4+/5, sensation intact    LABS:                        12.0   9.05  )-----------( 207      ( 04 Mar 2021 07:50 )             38.1     03-04    140  |  106  |  27<H>  ----------------------------<  115<H>  4.1   |  25  |  0.9    Ca    8.9      04 Mar 2021 07:50  Mg     2.2     03-04    TPro  6.2  /  Alb  3.7  /  TBili  0.3  /  DBili  x   /  AST  13  /  ALT  16  /  AlkPhos  79  03-04        CAPILLARY BLOOD GLUCOSE          Culture - Acid Fast - CSF (collected 02 Mar 2021 10:49)  Source: .CSF CSF    Culture - CSF with Gram Stain (collected 02 Mar 2021 10:49)  Source: .CSF CSF  Gram Stain (02 Mar 2021 22:31):    polymorphonuclear leukocytes seen    No organisms seen    by cytocentrifuge  Preliminary Report (03 Mar 2021 17:33):    No growth      MEDICATIONS  (STANDING):  amLODIPine   Tablet 10 milliGRAM(s) Oral daily  budesonide  80 MICROgram(s)/formoterol 4.5 MICROgram(s) Inhaler 2 Puff(s) Inhalation two times a day  chlorhexidine 4% Liquid 1 Application(s) Topical <User Schedule>  enoxaparin Injectable 40 milliGRAM(s) SubCutaneous daily  ferrous    sulfate 325 milliGRAM(s) Oral daily  gabapentin 100 milliGRAM(s) Oral three times a day  methylPREDNISolone sodium succinate IVPB 1000 milliGRAM(s) IV Intermittent daily  multivitamin 1 Tablet(s) Oral daily  pantoprazole    Tablet 40 milliGRAM(s) Oral before breakfast  senna 2 Tablet(s) Oral at bedtime  topiramate 200 milliGRAM(s) Oral two times a day    MEDICATIONS  (PRN):  acetaminophen   Tablet .. 650 milliGRAM(s) Oral every 6 hours PRN Moderate Pain (4 - 6)  ALBUTerol    90 MICROgram(s) HFA Inhaler 1 Puff(s) Inhalation every 6 hours PRN Shortness of Breath and/or Wheezing  aluminum hydroxide/magnesium hydroxide/simethicone Suspension 30 milliLiter(s) Oral every 4 hours PRN Dyspepsia  methocarbamol 500 milliGRAM(s) Oral three times a day PRN spam  nystatin Powder 1 Application(s) Topical two times a day PRN itching  polyethylene glycol 3350 17 Gram(s) Oral daily PRN Constipation  Ubrelvy 100 milliGRAM(s) 100 milliGRAM(s) Oral two times a day PRN migrane

## 2021-03-05 ENCOUNTER — TRANSCRIPTION ENCOUNTER (OUTPATIENT)
Age: 51
End: 2021-03-05

## 2021-03-05 LAB
ALBUMIN SERPL ELPH-MCNC: 3.5 G/DL — SIGNIFICANT CHANGE UP (ref 3.5–5.2)
ALP SERPL-CCNC: 67 U/L — SIGNIFICANT CHANGE UP (ref 30–115)
ALT FLD-CCNC: 17 U/L — SIGNIFICANT CHANGE UP (ref 0–41)
ANION GAP SERPL CALC-SCNC: 8 MMOL/L — SIGNIFICANT CHANGE UP (ref 7–14)
AST SERPL-CCNC: 13 U/L — SIGNIFICANT CHANGE UP (ref 0–41)
BASOPHILS # BLD AUTO: 0 K/UL — SIGNIFICANT CHANGE UP (ref 0–0.2)
BASOPHILS NFR BLD AUTO: 0 % — SIGNIFICANT CHANGE UP (ref 0–1)
BILIRUB SERPL-MCNC: 0.3 MG/DL — SIGNIFICANT CHANGE UP (ref 0.2–1.2)
BUN SERPL-MCNC: 26 MG/DL — HIGH (ref 10–20)
CALCIUM SERPL-MCNC: 8.7 MG/DL — SIGNIFICANT CHANGE UP (ref 8.5–10.1)
CHLORIDE SERPL-SCNC: 109 MMOL/L — SIGNIFICANT CHANGE UP (ref 98–110)
CO2 SERPL-SCNC: 26 MMOL/L — SIGNIFICANT CHANGE UP (ref 17–32)
CREAT SERPL-MCNC: 0.9 MG/DL — SIGNIFICANT CHANGE UP (ref 0.7–1.5)
CULTURE RESULTS: NO GROWTH — SIGNIFICANT CHANGE UP
EOSINOPHIL # BLD AUTO: 0 K/UL — SIGNIFICANT CHANGE UP (ref 0–0.7)
EOSINOPHIL NFR BLD AUTO: 0 % — SIGNIFICANT CHANGE UP (ref 0–8)
GLUCOSE SERPL-MCNC: 79 MG/DL — SIGNIFICANT CHANGE UP (ref 70–99)
HCT VFR BLD CALC: 37.1 % — SIGNIFICANT CHANGE UP (ref 37–47)
HGB BLD-MCNC: 11.8 G/DL — LOW (ref 12–16)
IMM GRANULOCYTES NFR BLD AUTO: 0.3 % — SIGNIFICANT CHANGE UP (ref 0.1–0.3)
LYMPHOCYTES # BLD AUTO: 1.74 K/UL — SIGNIFICANT CHANGE UP (ref 1.2–3.4)
LYMPHOCYTES # BLD AUTO: 18.5 % — LOW (ref 20.5–51.1)
MAGNESIUM SERPL-MCNC: 2.3 MG/DL — SIGNIFICANT CHANGE UP (ref 1.8–2.4)
MCHC RBC-ENTMCNC: 29.1 PG — SIGNIFICANT CHANGE UP (ref 27–31)
MCHC RBC-ENTMCNC: 31.8 G/DL — LOW (ref 32–37)
MCV RBC AUTO: 91.4 FL — SIGNIFICANT CHANGE UP (ref 81–99)
MONOCYTES # BLD AUTO: 0.79 K/UL — HIGH (ref 0.1–0.6)
MONOCYTES NFR BLD AUTO: 8.4 % — SIGNIFICANT CHANGE UP (ref 1.7–9.3)
NEUTROPHILS # BLD AUTO: 6.84 K/UL — HIGH (ref 1.4–6.5)
NEUTROPHILS NFR BLD AUTO: 72.8 % — SIGNIFICANT CHANGE UP (ref 42.2–75.2)
NRBC # BLD: 0 /100 WBCS — SIGNIFICANT CHANGE UP (ref 0–0)
PLATELET # BLD AUTO: 206 K/UL — SIGNIFICANT CHANGE UP (ref 130–400)
POTASSIUM SERPL-MCNC: 3.7 MMOL/L — SIGNIFICANT CHANGE UP (ref 3.5–5)
POTASSIUM SERPL-SCNC: 3.7 MMOL/L — SIGNIFICANT CHANGE UP (ref 3.5–5)
PROT SERPL-MCNC: 6 G/DL — SIGNIFICANT CHANGE UP (ref 6–8)
RBC # BLD: 4.06 M/UL — LOW (ref 4.2–5.4)
RBC # FLD: 13.4 % — SIGNIFICANT CHANGE UP (ref 11.5–14.5)
SARS-COV-2 RNA SPEC QL NAA+PROBE: SIGNIFICANT CHANGE UP
SODIUM SERPL-SCNC: 143 MMOL/L — SIGNIFICANT CHANGE UP (ref 135–146)
SPECIMEN SOURCE: SIGNIFICANT CHANGE UP
WBC # BLD: 9.4 K/UL — SIGNIFICANT CHANGE UP (ref 4.8–10.8)
WBC # FLD AUTO: 9.4 K/UL — SIGNIFICANT CHANGE UP (ref 4.8–10.8)

## 2021-03-05 PROCEDURE — 99233 SBSQ HOSP IP/OBS HIGH 50: CPT

## 2021-03-05 PROCEDURE — 99232 SBSQ HOSP IP/OBS MODERATE 35: CPT

## 2021-03-05 RX ORDER — METHOCARBAMOL 500 MG/1
750 TABLET, FILM COATED ORAL EVERY 8 HOURS
Refills: 0 | Status: DISCONTINUED | OUTPATIENT
Start: 2021-03-05 | End: 2021-03-07

## 2021-03-05 RX ORDER — AMLODIPINE BESYLATE 2.5 MG/1
5 TABLET ORAL DAILY
Refills: 0 | Status: DISCONTINUED | OUTPATIENT
Start: 2021-03-06 | End: 2021-03-07

## 2021-03-05 RX ORDER — BACLOFEN 100 %
5 POWDER (GRAM) MISCELLANEOUS EVERY 8 HOURS
Refills: 0 | Status: DISCONTINUED | OUTPATIENT
Start: 2021-03-05 | End: 2021-03-07

## 2021-03-05 RX ADMIN — ENOXAPARIN SODIUM 40 MILLIGRAM(S): 100 INJECTION SUBCUTANEOUS at 13:36

## 2021-03-05 RX ADMIN — BUDESONIDE AND FORMOTEROL FUMARATE DIHYDRATE 2 PUFF(S): 160; 4.5 AEROSOL RESPIRATORY (INHALATION) at 21:49

## 2021-03-05 RX ADMIN — CHLORHEXIDINE GLUCONATE 1 APPLICATION(S): 213 SOLUTION TOPICAL at 06:04

## 2021-03-05 RX ADMIN — AMLODIPINE BESYLATE 10 MILLIGRAM(S): 2.5 TABLET ORAL at 06:04

## 2021-03-05 RX ADMIN — GABAPENTIN 100 MILLIGRAM(S): 400 CAPSULE ORAL at 06:03

## 2021-03-05 RX ADMIN — GABAPENTIN 100 MILLIGRAM(S): 400 CAPSULE ORAL at 13:36

## 2021-03-05 RX ADMIN — PANTOPRAZOLE SODIUM 40 MILLIGRAM(S): 20 TABLET, DELAYED RELEASE ORAL at 06:03

## 2021-03-05 RX ADMIN — ALBUTEROL 1 PUFF(S): 90 AEROSOL, METERED ORAL at 12:00

## 2021-03-05 RX ADMIN — Medication 200 MILLIGRAM(S): at 17:28

## 2021-03-05 RX ADMIN — Medication 5 MILLIGRAM(S): at 16:37

## 2021-03-05 RX ADMIN — Medication 1 TABLET(S): at 13:36

## 2021-03-05 RX ADMIN — SENNA PLUS 2 TABLET(S): 8.6 TABLET ORAL at 21:47

## 2021-03-05 RX ADMIN — Medication 200 MILLIGRAM(S): at 06:04

## 2021-03-05 RX ADMIN — GABAPENTIN 100 MILLIGRAM(S): 400 CAPSULE ORAL at 21:47

## 2021-03-05 RX ADMIN — Medication 116 MILLIGRAM(S): at 06:05

## 2021-03-05 RX ADMIN — Medication 325 MILLIGRAM(S): at 13:37

## 2021-03-05 NOTE — DISCHARGE NOTE PROVIDER - NSDCMRMEDTOKEN_GEN_ALL_CORE_FT
amLODIPine 10 mg oral tablet: 1 tab(s) orally once a day  gabapentin 100 mg oral capsule: 1 cap(s) orally 3 times a day  omeprazole 40 mg oral delayed release capsule: 1 cap(s) orally once a day  Qvar 80 mcg/inh inhalation aerosol: 2  inhaled 2 times a day  Topamax 200 mg oral tablet: 1 tab(s) orally 2 times a day  Ventolin 90 mcg/inh inhalation aerosol with adapter:    acetaminophen 325 mg oral tablet: 2 tab(s) orally every 6 hours, As needed, MDD:3 g  aluminum hydroxide-magnesium hydroxide 200 mg-200 mg/5 mL oral suspension: 30 milliliter(s) orally every 4 hours, As needed, Dyspepsia  amLODIPine 10 mg oral tablet: 1 tab(s) orally once a day  baclofen 5 mg oral tablet: 1 tab(s) orally every 8 hours, As needed, Muscle Spasm  gabapentin 100 mg oral capsule: 1 cap(s) orally 3 times a day  methocarbamol 750 mg oral tablet: 1 tab(s) orally every 8 hours, As needed, Muscle Spasm  Multiple Vitamins oral tablet: 1 tab(s) orally once a day  omeprazole 40 mg oral delayed release capsule: 1 cap(s) orally once a day  polyethylene glycol 3350 oral powder for reconstitution: 17 gram(s) orally once a day, As needed, Constipation  Qvar 80 mcg/inh inhalation aerosol: 2  inhaled 2 times a day  senna oral tablet: 2 tab(s) orally once a day (at bedtime)  Topamax 200 mg oral tablet: 1 tab(s) orally 2 times a day  Ubrelvy 100 mg oral tablet: 1 tab(s) orally 2 times a day, As Needed for migraine   Ventolin 90 mcg/inh inhalation aerosol with adapter:    acetaminophen 325 mg oral tablet: 2 tab(s) orally every 6 hours, As needed, MDD:3 g  aluminum hydroxide-magnesium hydroxide 200 mg-200 mg/5 mL oral suspension: 30 milliliter(s) orally every 4 hours, As needed, Dyspepsia  amLODIPine 5 mg oral tablet: 1 tab(s) orally once a day  baclofen 5 mg oral tablet: 1 tab(s) orally every 8 hours, As needed, Muscle Spasm  gabapentin 100 mg oral capsule: 1 cap(s) orally 3 times a day  methocarbamol 750 mg oral tablet: 1 tab(s) orally every 8 hours, As needed, Muscle Spasm  Multiple Vitamins oral tablet: 1 tab(s) orally once a day  omeprazole 40 mg oral delayed release capsule: 1 cap(s) orally once a day  polyethylene glycol 3350 oral powder for reconstitution: 17 gram(s) orally once a day, As needed, Constipation  Qvar 80 mcg/inh inhalation aerosol: 2  inhaled 2 times a day  senna oral tablet: 2 tab(s) orally once a day (at bedtime)  Topamax 200 mg oral tablet: 1 tab(s) orally 2 times a day  Ubrelvy 100 mg oral tablet: 1 tab(s) orally 2 times a day, As Needed for migraine   Ventolin 90 mcg/inh inhalation aerosol with adapter:

## 2021-03-05 NOTE — DIETITIAN INITIAL EVALUATION ADULT. - OTHER INFO
49 yo F with PMH GERD, HTN admitted for B/L LE weakness with associated sensory loss. Neuro recommended steroids.     Pt reports good appetite and po intake at home. Watches her portion sizes as trying to maintain wt loss s/p gastric sleeve 2 years ago. Denies use of oral nutrition supplements, takes Multivitamin, B12, iron, and vit D at home. Confirms NKFA. No Synagogue or cultural food preferences. Denies difficulties chewing or swallowing.     In house appetite is at baseline, consuming >50% of her meals. No c/o N+V, diarrhea. C/o mild constipation, last BM x2 days ago but this is chronic condition for pt. Receiving miralax in house.     UBW 230lbs, stable since October 2020. Stated UBW consistent with wt taken by RD upon visit - 102.9kg/226lbs. Pt does not think current dosing wt is accurate.     Ht:  67"   Wt: 226lbs   IBW:  135lbs +/-10%    BMI: 35  kg/m2      Skin: No pressure injuries per RN flow sheets  Edema: None noted per RN flow sheets

## 2021-03-05 NOTE — DIETITIAN INITIAL EVALUATION ADULT. - OTHER CALCULATIONS
wt used: 92.1kg CBW 61kg IBW kcal: 1565-1721kcal (MSJ x 1-1.1AF) -- bmi>30 protein: 61-73g (1-1.2g/kg IBW) -- bmi>30 fluids: 1ml/kcal or per LIP

## 2021-03-05 NOTE — DISCHARGE NOTE PROVIDER - HOSPITAL COURSE
50 year old female patient with past medical history of hypertension, obstructive sleep apnea and asthma presented for lower extremity weakness. The patient underwent an MRI of the thoracic and lumbar spine that showed abnormal signal in the spinal cord from T1 through T3 without evidence of cord compression. MRI of the brain showed no acute pathology and revealed mild chronic microvascular changes. The patient was followed by neurology with concern of transverse myelitis vs demyelinating disorder. An LP was performed with aspiration of 20 mL of clear CSF that was sent for analysis. Analysis showed elevated protein level, no organisms on Gram stain, negative culture, normal glucose, white cell count, the encephalitis and meningitis panel was negative. Lyme antibodies, HIV and Hepatitis B serology and ACE levels were checked and were negative. The patient was treated with Solumedrol 1g IV daily for 5 days and was assessed by neurosurgery with no surgical intervention warranted. The patient was treated with pain medication and muscle relaxants and underwent daily physical therapy sessions with improvement of her symptoms. She will be discharged to skilled nursing facility for short term rehab

## 2021-03-05 NOTE — PROGRESS NOTE ADULT - ASSESSMENT
Pt is a 50y Female with a past medical history as described above who presented for weakness    # LE Weakness  - MRI T-spine and L-spine: Abnormal signal noted in the spinal cord from T1 through T3 without evidence of enhancement or cord expansion  - MRI brain wwo: No acute pathology; mild chronic microvascular changes   - CRP: 0.16, AURORA: 1:640 , RPR: negative, TSH: 1.51   - neuro following  - day 4 of 5 IV solumedrol 1,000 mg (started on 03/02 as per neuro recommendation)  - f/u LP results:   Elevated CSF protein; polymorphonuclear leukocytes seen on gram stain, no organisms on gram stain; normal wbc, rbc, normal glucose, HSV negative, meningitis/enchephalitis panel negative, culture negative, lyme Ab neg; pending ACE  elevated IgG CSF, elevated IgG synthesis; normal IgG index,   f/u CSF results for oligoclonal band, NMO and anti MOG (pending)  - HIV neg, Hepatitis B neg; ACE level pending  - Neurosurgery: no surgical intervention at this time   - c/w Robaxin 500 mg at night time  - PT eval: home w/ home PT/rehab facility; need for RW     # HTN  - c/w Amlodipine 10mg daily  - DASH diet    # Neuropathy  - hbA1c: 5.1   - Continue Topamax 200mg twice daily and gabapentin 100mg three times daily     # DON  - CPAP if needed at night  - Outpt follow-up     # Asthma  - Continue Symbicort twice daily and Ventolin PRN    # GERD  - Protonix 40mg PO daily     # Constipation   - Continue Senna 2 tabs qhs. Miralax 17G daily PRN     DVT ppx: Lovenox 40 mg SubQ daily    GI ppx: Protonix 40mg daily   Diet: DASH/TLC  Activity: IAT   Dispo: From home   Code: FULL     Pending: Solumedrol Treatment    Pt is a 50y Female with a past medical history as described above who presented for weakness    # LE Weakness  - MRI T-spine and L-spine: Abnormal signal noted in the spinal cord from T1 through T3 without evidence of enhancement or cord expansion  - MRI brain wwo: No acute pathology; mild chronic microvascular changes   - CRP: 0.16, AURORA: 1:640 , RPR: negative, TSH: 1.51   - neuro following  - day 4 of 5 IV solumedrol 1,000 mg (started on 03/02 as per neuro recommendation)  - f/u LP results:   Elevated CSF protein; polymorphonuclear leukocytes seen on gram stain, no organisms on gram stain; normal wbc, rbc, normal glucose, HSV negative, meningitis/enchephalitis panel negative, culture negative, lyme Ab neg, normal ACE level   elevated IgG CSF, elevated IgG synthesis; normal IgG index  f/u CSF results for oligoclonal band, NMO and anti MOG (pending)  - HIV neg, Hepatitis B neg; ACE level wnl   - Neurosurgery: no surgical intervention at this time   - c/w Robaxin 500 mg at night time  - PT eval: home w/ home PT/rehab facility; need for RW     # HTN  - c/w Amlodipine 10mg daily  - DASH diet    # Neuropathy  - hbA1c: 5.1   - Continue Topamax 200mg twice daily and gabapentin 100mg three times daily     # DON  - CPAP if needed at night  - Outpt follow-up     # Asthma  - Continue Symbicort twice daily and Ventolin PRN    # GERD  - Protonix 40mg PO daily     # Constipation   - Continue Senna 2 tabs qhs. Miralax 17G daily PRN     DVT ppx: Lovenox 40 mg SubQ daily    GI ppx: Protonix 40mg daily   Diet: DASH/TLC  Activity: IAT   Dispo: From home   Code: FULL     Pending: Solumedrol Treatment    Pt is a 50y Female with a past medical history as described above who presented for weakness    # LE Weakness  - MRI T-spine and L-spine: Abnormal signal noted in the spinal cord from T1 through T3 without evidence of enhancement or cord expansion  - MRI brain wwo: No acute pathology; mild chronic microvascular changes   - CRP: 0.16, AURORA: 1:640 , RPR: negative, TSH: 1.51   - neuro following  - day 4 of 5 IV solumedrol 1,000 mg (started on 03/02 as per neuro recommendation)  - f/u LP results:   Elevated CSF protein; polymorphonuclear leukocytes seen on gram stain, no organisms on gram stain; normal wbc, rbc, normal glucose, HSV negative, meningitis/enchephalitis panel negative, culture negative, lyme Ab neg, normal ACE level   elevated IgG CSF, elevated IgG synthesis; normal IgG index  f/u CSF results for oligoclonal band, NMO and anti MOG (pending)  - HIV neg, Hepatitis B neg; ACE level wnl   - Neurosurgery: no surgical intervention at this time   - c/w Robaxin 500 mg at night time  - PT eval: home w/ home PT/rehab facility; need for RW     # HTN  - Amlodipine decreased to 5 mg qd as borderline BP yesterday  - DASH diet    # Neuropathy  - hbA1c: 5.1   - Continue Topamax 200mg twice daily and gabapentin 100mg three times daily     # DON  - CPAP if needed at night  - Outpt follow-up     # Asthma  - Continue Symbicort twice daily and Ventolin PRN    # GERD  - Protonix 40mg PO daily     # Constipation   - Continue Senna 2 tabs qhs. Miralax 17G daily PRN     DVT ppx: Lovenox 40 mg SubQ daily    GI ppx: Protonix 40mg daily   Diet: DASH/TLC  Activity: IAT   Dispo: From home   Code: FULL     Pending: Solumedrol Treatment

## 2021-03-05 NOTE — DIETITIAN INITIAL EVALUATION ADULT. - PERSON TAUGHT/METHOD
Encouraged pt to continue eating small frequent meals throughout the day, making sure she's consuming adequate protein./verbal instruction/patient instructed

## 2021-03-05 NOTE — DISCHARGE NOTE PROVIDER - INSTRUCTIONS
Low salt and cholesterol, Increase consumption of fruits vegetables and fibers and avoid excessive alcohol intake

## 2021-03-05 NOTE — DISCHARGE NOTE PROVIDER - CARE PROVIDER_API CALL
Jalen Vizcaino)  Neurology  [address] [place] [address], [address]  [place] [address]  Phone: [phone]  Fax: [phone]  Follow Up Time: 2 weeks   Jalen Vizcaino)  Neurology  [facility], [address]  [place] [address]  Phone: [phone]  Fax: [phone]  Follow Up Time: 2 weeks    NAING, SUNDEE  [id number]  [address] [facility] [place] [address]  Phone: ()-  Fax: ()-  Follow Up Time: Routine

## 2021-03-05 NOTE — PROGRESS NOTE ADULT - SUBJECTIVE AND OBJECTIVE BOX
Patient is a 50y old  Female who presents with a chief complaint of Weakness (04 Mar 2021 17:40)    INTERVAL HPI/OVERNIGHT EVENTS:      REVIEW OF SYSTEMS:  CONSTITUTIONAL: No fever, weight loss, or fatigue  RESPIRATORY: No cough, wheezing, chills or hemoptysis; No shortness of breath  CARDIOVASCULAR: No chest pain, palpitations, dizziness, or leg swelling  GASTROINTESTINAL: No abdominal or epigastric pain. No nausea, vomiting, or hematemesis; No diarrhea or constipation. No melena or hematochezia.  GENITOURINARY: No dysuria, frequency, hematuria, or incontinence  NEUROLOGICAL: +loss of strength, +numbness, +tremors; No headaches  FAMILY HISTORY:  No pertinent family history in first degree relatives      T(C): 35.6 (21 @ 00:00), Max: 37 (21 @ 15:13)  HR: 68 (21 @ 06:00) (68 - 97)  BP: 130/69 (21 @ 06:00) (111/65 - 135/63)  RR: 19 (21 @ 00:00) (19 - 20)  SpO2: --  Wt(kg): --Vital Signs Last 24 Hrs  T(C): 35.6 (05 Mar 2021 00:00), Max: 37 (04 Mar 2021 15:13)  T(F): 96 (05 Mar 2021 00:00), Max: 98.6 (04 Mar 2021 15:13)  HR: 68 (05 Mar 2021 06:00) (68 - 97)  BP: 130/69 (05 Mar 2021 06:00) (111/65 - 135/63)  BP(mean): --  RR: 19 (05 Mar 2021 00:00) (19 - 20)  SpO2: --    PHYSICAL EXAM:  GENERAL: NAD, well-groomed, well-developed  NERVOUS SYSTEM:  Alert & Oriented X3, Good concentration; Motor Strength and sensory 5/5 B/L upper extremities;  CHEST/LUNG: Clear to percussion bilaterally; No rales, rhonchi, wheezing, or rubs  HEART: Regular rate and rhythm; No murmurs, rubs, or gallops  ABDOMEN: Soft, Nontender, Nondistended; Bowel sounds present  EXTREMITIES:  2+ Peripheral Pulses, No clubbing, cyanosis, or edema    Consultant(s) Notes Reviewed:  [x ] YES  [ ] NO  Care Discussed with Consultants/Other Providers [ x] YES  [ ] NO    LABS:             12.0   9.05  )-----------( 207      ( 04 Mar 2021 07:50 )             38.1     -04    140  |  106  |  27<H>  ----------------------------<  115<H>  4.1   |  25  |  0.9    Ca    8.9      04 Mar 2021 07:50  Mg     2.2     -    TPro  6.2  /  Alb  3.7  /  TBili  0.3  /  DBili  x   /  AST  13  /  ALT  16  /  AlkPhos  79  -04    Culture - CSF with Gram Stain . (21 @ 10:49)   Gram Stain:   polymorphonuclear leukocytes seen   No organisms seen   by cytocentrifuge   Specimen Source: .CSF CSF   Culture Results:   No growth   CSF IgG Index (21 @ 10:49)   Quantitative Ig mg/dL   Albumin, Serum: 3884 mg/dL   IgG CSF: 10.0 mg/dL   CSF ALBU: 42.5 mg/dL   IgG/Albumin Ratio, Serum: 0.36 Ratio   IgG/Albumin Ratio, CSF: 0.24 Ratio   IgG Index: 0.6   IgG Synthesis: 11.0 mg/day   Cytomegalovirus by PCR, CSF (21 @ 10:49)   CMV PCR Detection: NotDetec: TESTING PERFORMED AT LOW VOLUME ON CSF SPECIMEN FOR CMV, MAY AFFECT   CSF PCR Panel (21 @ 10:49)   CSF PCR Result: NotDetec: The meningitis/encephalitis (ME) panel (CSFPCR) is a PCR based assay that     Herpes Simplex Virus 1/2 PCR, CSF (21 @ 10:49)   Source HSV 1/2: CSF   HSV 1/2 PCR: NotDetec:  VDRL Titer, CSF (21 @ 10:49)   VDRL Titer, CSF: Nonreact:         RADIOLOGY & ADDITIONAL TESTS:    < from: MR Head w/wo IV Cont (21 @ 16:32) >    IMPRESSION:    No acute intracranial pathology.    Incidentally noted 0.5 cm round lesion in the right lateral pituitary gland could reflect a proteinaceous Rathke's cleft cyst versus cystic microadenoma. This can be further followed up with MRI of the pituitary gland with contrast.    Mild chronic microvascular ischemic changes.    < end of copied text >    < from: MR Lumbar Spine w/wo IV Cont (21 @ 17:56) >  IMPRESSION:    CERVICAL SPINE:  No evidence of abnormal spinal cord signal.    Mild multilevel degenerative changes without significant spinal canal or neuroforaminal narrowing.    THORACIC SPINE:  Abnormal signal noted in the spinal cord from T1 through T3 without evidence of enhancement or cord expansion. Finding is nonspecific and may represent demyelination, acute transverse myelitis or viral myelitis.    At T10-T11 there is moderate spinal canal stenosis as well as mild mass effect upon the right dorsal aspect of the spinal cord due to osseous spurring involving the ligamentum flavum.    LUMBAR SPINE:  Multilevel degenerative changes and disc bulges disc bulges with superimposed central herniations at L4-5 and L5-S1 resulting in mild to moderate spinal canal stenosis as well as mild bilateral neuroforaminal narrowing.    No abnormal signal or enhancement noted of the cauda equina or nerve roots.      < end of copied text >    Imaging Personally Reviewed:  [ ] YES  [ ] NO  acetaminophen   Tablet .. 650 milliGRAM(s) Oral every 6 hours PRN  ALBUTerol    90 MICROgram(s) HFA Inhaler 1 Puff(s) Inhalation every 6 hours PRN  aluminum hydroxide/magnesium hydroxide/simethicone Suspension 30 milliLiter(s) Oral every 4 hours PRN  amLODIPine   Tablet 10 milliGRAM(s) Oral daily  budesonide  80 MICROgram(s)/formoterol 4.5 MICROgram(s) Inhaler 2 Puff(s) Inhalation two times a day  chlorhexidine 4% Liquid 1 Application(s) Topical <User Schedule>  enoxaparin Injectable 40 milliGRAM(s) SubCutaneous daily  ferrous    sulfate 325 milliGRAM(s) Oral daily  gabapentin 100 milliGRAM(s) Oral three times a day  methocarbamol 500 milliGRAM(s) Oral three times a day PRN  methylPREDNISolone sodium succinate IVPB 1000 milliGRAM(s) IV Intermittent daily  multivitamin 1 Tablet(s) Oral daily  nystatin Powder 1 Application(s) Topical two times a day PRN  pantoprazole    Tablet 40 milliGRAM(s) Oral before breakfast  polyethylene glycol 3350 17 Gram(s) Oral daily PRN  senna 2 Tablet(s) Oral at bedtime  topiramate 200 milliGRAM(s) Oral two times a day  Ubrelvy 100 milliGRAM(s) 100 milliGRAM(s) Oral two times a day PRN      HEALTH ISSUES - PROBLEM Dx:         Patient is a 50y old  Female who presents with a chief complaint of Weakness (04 Mar 2021 17:40)    INTERVAL HPI/OVERNIGHT EVENTS: No acute overnight events. Pt notes continued pressure sensation and muscle spasms in abdomen and LE. Pt also noted experiencing migraine with aura (visual: blue light in peripheral vision before onset of HA) that resolved with Ubrelvy. Pt also expressed concern that sxs may be related to anxiety and stress as pt has been under a lot of stress (lost job, stress over isolation from pandemic) and would like to see a psychiatrist. Pt denies any other sxs on ros or any other active complaints at this time.     REVIEW OF SYSTEMS:  CONSTITUTIONAL: No fever, weight loss, or fatigue  RESPIRATORY: No cough, wheezing, chills or hemoptysis; No shortness of breath  CARDIOVASCULAR: No chest pain, palpitations, dizziness, or leg swelling  GASTROINTESTINAL: No abdominal or epigastric pain. No nausea, vomiting, or hematemesis; No diarrhea or constipation. No melena or hematochezia.  GENITOURINARY: No dysuria, frequency, hematuria, or incontinence  NEUROLOGICAL: +loss of strength, +numbness, +tremors, +headaches  PSYCHIATRIC: +anxiety, +loss of sleep; no depression; no SI/HI    FAMILY HISTORY:  No pertinent family history in first degree relatives      T(C): 35.6 (21 @ 00:00), Max: 37 (21 @ 15:13)  HR: 68 (21 @ 06:00) (68 - 97)  BP: 130/69 (21 @ 06:00) (111/65 - 135/63)  RR: 19 (21 @ 00:00) (19 - 20)  SpO2: --  Wt(kg): --Vital Signs Last 24 Hrs  T(C): 35.6 (05 Mar 2021 00:00), Max: 37 (04 Mar 2021 15:13)  T(F): 96 (05 Mar 2021 00:00), Max: 98.6 (04 Mar 2021 15:13)  HR: 68 (05 Mar 2021 06:00) (68 - 97)  BP: 130/69 (05 Mar 2021 06:00) (111/65 - 135/63)  BP(mean): --  RR: 19 (05 Mar 2021 00:00) (19 - 20)  SpO2: --    PHYSICAL EXAM:  GENERAL: NAD, well-groomed, well-developed  NERVOUS SYSTEM:  Alert & Oriented X3, Good concentration; Motor Strength and sensory 5/5 B/L upper extremities; light sensation R>L LE; motor strength L>R LE; improved strength in LLE  CHEST/LUNG: Clear to percussion bilaterally; No rales, rhonchi, wheezing, or rubs  HEART: Regular rate and rhythm; No murmurs, rubs, or gallops  ABDOMEN: Soft, Nontender, Nondistended; Bowel sounds present  EXTREMITIES:  2+ Peripheral Pulses, No clubbing, cyanosis, or edema    Consultant(s) Notes Reviewed:  [x ] YES  [ ] NO  Care Discussed with Consultants/Other Providers [ x] YES  [ ] NO    LABS:             12.0   9.05  )-----------( 207      ( 04 Mar 2021 07:50 )             38.1     03-04    140  |  106  |  27<H>  ----------------------------<  115<H>  4.1   |  25  |  0.9    Ca    8.9      04 Mar 2021 07:50  Mg     2.2     03-04    TPro  6.2  /  Alb  3.7  /  TBili  0.3  /  DBili  x   /  AST  13  /  ALT  16  /  AlkPhos  79  03-04    Culture - CSF with Gram Stain . (21 @ 10:49)   Gram Stain:   polymorphonuclear leukocytes seen   No organisms seen   by cytocentrifuge   Specimen Source: .CSF CSF   Culture Results:   No growth   CSF IgG Index (21 @ 10:49)   Quantitative Ig mg/dL   Albumin, Serum: 3884 mg/dL   IgG CSF: 10.0 mg/dL   CSF ALBU: 42.5 mg/dL   IgG/Albumin Ratio, Serum: 0.36 Ratio   IgG/Albumin Ratio, CSF: 0.24 Ratio   IgG Index: 0.6   IgG Synthesis: 11.0 mg/day   Cytomegalovirus by PCR, CSF (21 @ 10:49)   CMV PCR Detection: NotDetec: TESTING PERFORMED AT LOW VOLUME ON CSF SPECIMEN FOR CMV, MAY AFFECT   CSF PCR Panel (21 @ 10:49)   CSF PCR Result: NotDetec: The meningitis/encephalitis (ME) panel (CSFPCR) is a PCR based assay that     Herpes Simplex Virus 1/2 PCR, CSF (21 @ 10:49)   Source HSV 1/2: CSF   HSV 1/2 PCR: NotDetec:  VDRL Titer, CSF (21 @ 10:49)   VDRL Titer, CSF: Nonreact:         RADIOLOGY & ADDITIONAL TESTS:    < from: MR Head w/wo IV Cont (21 @ 16:32) >    IMPRESSION:    No acute intracranial pathology.    Incidentally noted 0.5 cm round lesion in the right lateral pituitary gland could reflect a proteinaceous Rathke's cleft cyst versus cystic microadenoma. This can be further followed up with MRI of the pituitary gland with contrast.    Mild chronic microvascular ischemic changes.    < end of copied text >    < from: MR Lumbar Spine w/wo IV Cont (21 @ 17:56) >  IMPRESSION:    CERVICAL SPINE:  No evidence of abnormal spinal cord signal.    Mild multilevel degenerative changes without significant spinal canal or neuroforaminal narrowing.    THORACIC SPINE:  Abnormal signal noted in the spinal cord from T1 through T3 without evidence of enhancement or cord expansion. Finding is nonspecific and may represent demyelination, acute transverse myelitis or viral myelitis.    At T10-T11 there is moderate spinal canal stenosis as well as mild mass effect upon the right dorsal aspect of the spinal cord due to osseous spurring involving the ligamentum flavum.    LUMBAR SPINE:  Multilevel degenerative changes and disc bulges disc bulges with superimposed central herniations at L4-5 and L5-S1 resulting in mild to moderate spinal canal stenosis as well as mild bilateral neuroforaminal narrowing.    No abnormal signal or enhancement noted of the cauda equina or nerve roots.      < end of copied text >    Imaging Personally Reviewed:  [ ] YES  [ ] NO  acetaminophen   Tablet .. 650 milliGRAM(s) Oral every 6 hours PRN  ALBUTerol    90 MICROgram(s) HFA Inhaler 1 Puff(s) Inhalation every 6 hours PRN  aluminum hydroxide/magnesium hydroxide/simethicone Suspension 30 milliLiter(s) Oral every 4 hours PRN  amLODIPine   Tablet 10 milliGRAM(s) Oral daily  budesonide  80 MICROgram(s)/formoterol 4.5 MICROgram(s) Inhaler 2 Puff(s) Inhalation two times a day  chlorhexidine 4% Liquid 1 Application(s) Topical <User Schedule>  enoxaparin Injectable 40 milliGRAM(s) SubCutaneous daily  ferrous    sulfate 325 milliGRAM(s) Oral daily  gabapentin 100 milliGRAM(s) Oral three times a day  methocarbamol 500 milliGRAM(s) Oral three times a day PRN  methylPREDNISolone sodium succinate IVPB 1000 milliGRAM(s) IV Intermittent daily  multivitamin 1 Tablet(s) Oral daily  nystatin Powder 1 Application(s) Topical two times a day PRN  pantoprazole    Tablet 40 milliGRAM(s) Oral before breakfast  polyethylene glycol 3350 17 Gram(s) Oral daily PRN  senna 2 Tablet(s) Oral at bedtime  topiramate 200 milliGRAM(s) Oral two times a day  Ubrelvy 100 milliGRAM(s) 100 milliGRAM(s) Oral two times a day PRN      HEALTH ISSUES - PROBLEM Dx:

## 2021-03-05 NOTE — PROGRESS NOTE ADULT - ASSESSMENT
50F with a past medical history of HTN, obesity, lumbar radiculopathy who presented to the hospital for gradual worsening of weakness in both of her legs for about 2 weeks.     # B/L LE weakness with associated sensory loss   - MRI of spine without evidence of cord compression. However, there is increase SC signal T2 to T3 level without mass effect and ddx includes acute transverse myelitis or viral myelitis or demyelinating disease. R/o Copper deficiency.    - MRI: Brain WNL   - LP done 3/02: elevated protein, rest of studies pending   - neuro recommended Steroids 1 gr IV QD for 5 days, today day 4/5  - neuro eval appreciated, recommended MRI spine TRICKS, ordered  - f/i Copper level  - cont PT   - cont pain management with muscle relaxants, Baclofen added    # Pt had first shot for COVID vaccine, but missed her second shot, she will get the second shot OP once discharged.     DVT ppx     Pt likely will need STR

## 2021-03-05 NOTE — DISCHARGE NOTE PROVIDER - CARE PROVIDERS DIRECT ADDRESSES
,[email or web address]

## 2021-03-05 NOTE — PROGRESS NOTE ADULT - SUBJECTIVE AND OBJECTIVE BOX
Neurology Progress Note    Interval History:    Pt seen in f/u for longitudinally extensive thoracic myelopathy.  She notes some improvement in symptoms but still has significant RLE weakness and LLE sensory loss.  She c/o spasms in LE's    Vital Signs Last 24 Hrs  T(C): 35.6 (05 Mar 2021 00:00), Max: 35.6 (05 Mar 2021 00:00)  T(F): 96 (05 Mar 2021 00:00), Max: 96 (05 Mar 2021 00:00)  HR: 68 (05 Mar 2021 06:00) (68 - 78)  BP: 130/69 (05 Mar 2021 06:00) (130/69 - 135/63)  BP(mean): --  RR: 19 (05 Mar 2021 00:00) (19 - 19)  SpO2: --    Neurological Exam:   Pt awake alert, fluent speech.  UE's 5/5 strength.  LE's weakness RLE 2/5 HF, KE/KF foot DF  LLE 5-/5 prox/distal  Decreased sensation  LLE to LT.     MEDICATIONS  (STANDING):  budesonide  80 MICROgram(s)/formoterol 4.5 MICROgram(s) Inhaler 2 Puff(s) Inhalation two times a day  chlorhexidine 4% Liquid 1 Application(s) Topical <User Schedule>  enoxaparin Injectable 40 milliGRAM(s) SubCutaneous daily  ferrous    sulfate 325 milliGRAM(s) Oral daily  gabapentin 100 milliGRAM(s) Oral three times a day  methylPREDNISolone sodium succinate IVPB 1000 milliGRAM(s) IV Intermittent daily  multivitamin 1 Tablet(s) Oral daily  pantoprazole    Tablet 40 milliGRAM(s) Oral before breakfast  senna 2 Tablet(s) Oral at bedtime  topiramate 200 milliGRAM(s) Oral two times a day        Labs:  CBC Full  -  ( 05 Mar 2021 06:46 )  WBC Count : 9.40 K/uL  RBC Count : 4.06 M/uL  Hemoglobin : 11.8 g/dL  Hematocrit : 37.1 %  Platelet Count - Automated : 206 K/uL  Mean Cell Volume : 91.4 fL  Mean Cell Hemoglobin : 29.1 pg  Mean Cell Hemoglobin Concentration : 31.8 g/dL  Auto Neutrophil # : 6.84 K/uL  Auto Lymphocyte # : 1.74 K/uL  Auto Monocyte # : 0.79 K/uL  Auto Eosinophil # : 0.00 K/uL  Auto Basophil # : 0.00 K/uL  Auto Neutrophil % : 72.8 %  Auto Lymphocyte % : 18.5 %  Auto Monocyte % : 8.4 %  Auto Eosinophil % : 0.0 %  Auto Basophil % : 0.0 %    03-05    143  |  109  |  26<H>  ----------------------------<  79  3.7   |  26  |  0.9    Ca    8.7      05 Mar 2021 06:46  Mg     2.3     03-05    TPro  6.0  /  Alb  3.5  /  TBili  0.3  /  DBili  x   /  AST  13  /  ALT  17  /  AlkPhos  67  03-05    LIVER FUNCTIONS - ( 05 Mar 2021 06:46 )  Alb: 3.5 g/dL / Pro: 6.0 g/dL / ALK PHOS: 67 U/L / ALT: 17 U/L / AST: 13 U/L / GGT: x               Assessment:  This is a 50y Female w/ h/o 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  She has experienced some improvement with IV steroids but is still very weak in RLE.  She may benefit from bracing of right knee as well as additional PT.  I recommend to look for spinal vascular anomaly with special MRI sequence of T and L-spine (cannot be done same day for technical reasons).    Plan:  1.  MRI thoracic spine with and without contrast - PLEASE PUT AS INDICATION "TRICKS"  2.  Please premedicate patient with IV lorazepam 1 mg shortly before MRI.  3.  Please check copper level (pt with gastric bypass and recently taking zinc supplement which can deplete copper and low copper can be associated with myelopathy).  4.  IF T-spine TRICKS study unremarkable may proceed to L-spine MRI "TRICKS" as indication. to exclude vascular anomaly that if found could prompt neurointervention to perform embolization.  5.  Please begin baclofen 5 mg po q8h for LE spasms.  6.  Outpatient f/u with her neurologist in 2-3 weeks.    Discussed with on-call resident at [phone].

## 2021-03-05 NOTE — DISCHARGE NOTE PROVIDER - NSDCFUSCHEDAPPT_GEN_ALL_CORE_FT
ANNMARIE MCCULLOUGH ; 03/22/2021 ; [facility]  ANNMARIE MCCULLOUGH ; 04/13/2021 ; [facility] [address]

## 2021-03-05 NOTE — DISCHARGE NOTE PROVIDER - NSDCCPCAREPLAN_GEN_ALL_CORE_FT
PRINCIPAL DISCHARGE DIAGNOSIS  Diagnosis: Spinal cord disorder  Assessment and Plan of Treatment: You were admitted to the hospital because of weakness of the lower extremities. You underwent an MRI of the brain that was normal, an MRI of your spinal cord was done and showed some changes. You were seen by the neurology team and you were treated with steroids. You also underwent a lumbar puncture and analysis of the spinal cord fluid revealed no infection. Please follow up with your neurologist as outpatient within 2 weeks of discharge and call him earlier if you notice worsening of your weakness, numbness / tingling or if you notice new neurologic symptoms such as trouble walking, vertigo / dizziness, imbalance, vision changes (blurry vision, double vision, loss of vision), trouble swallowing, slurred speech      SECONDARY DISCHARGE DIAGNOSES  Diagnosis: Hypertension  Assessment and Plan of Treatment: Continue taking your blood pressure medication as indicated and follow with your doctor as outpatient. Limit salt in your diet and follow a heart healthy diet.    Diagnosis: Obstructive sleep apnea  Assessment and Plan of Treatment: Continue using your CPAP at night and follow up with your lung doctor as outpatient     PRINCIPAL DISCHARGE DIAGNOSIS  Diagnosis: Spinal cord disorder  Assessment and Plan of Treatment: You were admitted to the hospital because of weakness of the lower extremities. You underwent an MRI of the brain that was normal, an MRI of your spinal cord was done and showed some changes. You were seen by the neurology team and you were treated with steroids. You also underwent a lumbar puncture and analysis of the spinal cord fluid revealed no infection. Please follow up with your neurologist as outpatient within 2 weeks of discharge and call him earlier if you notice worsening of your weakness, numbness / tingling or if you notice new neurologic symptoms such as trouble walking, vertigo / dizziness, imbalance, vision changes (blurry vision, double vision, loss of vision), trouble swallowing, slurred speech  - Please follow up the results of the thoracic spine MRI and the copper level as outpatient      SECONDARY DISCHARGE DIAGNOSES  Diagnosis: Hypertension  Assessment and Plan of Treatment: Continue taking your blood pressure medication as indicated and follow with your doctor as outpatient. Limit salt in your diet and follow a heart healthy diet.    Diagnosis: Obstructive sleep apnea  Assessment and Plan of Treatment: Continue using your CPAP at night and follow up with your lung doctor as outpatient     PRINCIPAL DISCHARGE DIAGNOSIS  Diagnosis: Spinal cord disorder  Assessment and Plan of Treatment: You were admitted to the hospital because of weakness of the lower extremities. You underwent an MRI of the brain that was normal, an MRI of your spinal cord was done and showed some changes. You were seen by the neurology team and you were treated with steroids. You also underwent a lumbar puncture and analysis of the spinal cord fluid revealed no infection. Please follow up with your neurologist as outpatient within 2 weeks of discharge and call him earlier if you notice worsening of your weakness, numbness / tingling or if you notice new neurologic symptoms such as trouble walking, vertigo / dizziness, imbalance, vision changes (blurry vision, double vision, loss of vision), trouble swallowing, slurred speech  - Please follow up the results of SCF and the copper level as outpatient      SECONDARY DISCHARGE DIAGNOSES  Diagnosis: Hypertension  Assessment and Plan of Treatment: Continue taking your blood pressure medication as indicated and follow with your doctor as outpatient. Limit salt in your diet and follow a heart healthy diet.    Diagnosis: Obstructive sleep apnea  Assessment and Plan of Treatment: Continue using your CPAP at night and follow up with your lung doctor as outpatient

## 2021-03-05 NOTE — DISCHARGE NOTE PROVIDER - NSDCFUADDINST_GEN_ALL_CORE_FT
Call 911 or report to the nearest hospital in case of emergency  Call 911 or report to the nearest hospital in case of emergency   Please follow up the results of the thoracic spine MRI and the copper level as outpatient

## 2021-03-05 NOTE — DISCHARGE NOTE PROVIDER - PROVIDER TOKENS
PROVIDER:[TOKEN:[[id number]],FOLLOWUP:[2 weeks]] PROVIDER:[TOKEN:[[id number]],FOLLOWUP:[2 weeks]],PROVIDER:[TOKEN:[[id number]],FOLLOWUP:[Routine]]

## 2021-03-05 NOTE — PROGRESS NOTE ADULT - SUBJECTIVE AND OBJECTIVE BOX
ANNMARIE MCCULLOUGH    Patient is a 50y old  Female who presents with a chief complaint of Weakness (05 Mar 2021 16:01)    INTERVAL HPI/OVERNIGHT EVENTS: no events overnight. Pt complaints of not improving spasms in thighs and arms.     ROS: All ROS negative except as documented above and LE weakness, R>L.    PHYSICAL EXAM:  T(C): 35.6, Max: 35.6 (03-05-21 @ 00:00)  HR: 68 (68 - 78)  BP: 130/69 (130/69 - 135/63)  RR: 19 (19 - 19)  SpO2: --    GENERAL: NAD, obese  PULMONARY/CHEST: No rales, rhonchi, or wheezing  CARDIOVASC: Regular rate and rhythm; No murmurs  GI/ABDOMEN: obese, Soft, Nontender, Nondistended; Bowel sounds present  EXTREMITIES: LE no edema, no calf tenderness b/l.  NERVOUS SYSTEM:  Alert & Oriented X3, motor RLE 2+/5, LLE 4+/5, sensation intact    Consultant(s) Notes Reviewed by me.       LABS:                        11.8   9.40  )-----------( 206      ( 05 Mar 2021 06:46 )             37.1     03-05    143  |  109  |  26<H>  ----------------------------<  79  3.7   |  26  |  0.9    Ca    8.7      05 Mar 2021 06:46  Mg     2.3     03-05    TPro  6.0  /  Alb  3.5  /  TBili  0.3  /  DBili  x   /  AST  13  /  ALT  17  /  AlkPhos  67  03-05      RADIOLOGY & ADDITIONAL TESTS:  no new tests      MEDICATIONS  (STANDING):  budesonide  80 MICROgram(s)/formoterol 4.5 MICROgram(s) Inhaler 2 Puff(s) Inhalation two times a day  chlorhexidine 4% Liquid 1 Application(s) Topical <User Schedule>  enoxaparin Injectable 40 milliGRAM(s) SubCutaneous daily  ferrous    sulfate 325 milliGRAM(s) Oral daily  gabapentin 100 milliGRAM(s) Oral three times a day  methylPREDNISolone sodium succinate IVPB 1000 milliGRAM(s) IV Intermittent daily  multivitamin 1 Tablet(s) Oral daily  pantoprazole    Tablet 40 milliGRAM(s) Oral before breakfast  senna 2 Tablet(s) Oral at bedtime  topiramate 200 milliGRAM(s) Oral two times a day    MEDICATIONS  (PRN):  acetaminophen   Tablet .. 650 milliGRAM(s) Oral every 6 hours PRN Moderate Pain (4 - 6)  ALBUTerol    90 MICROgram(s) HFA Inhaler 1 Puff(s) Inhalation every 6 hours PRN Shortness of Breath and/or Wheezing  aluminum hydroxide/magnesium hydroxide/simethicone Suspension 30 milliLiter(s) Oral every 4 hours PRN Dyspepsia  baclofen 5 milliGRAM(s) Oral every 8 hours PRN Muscle Spasm  LORazepam   Injectable 2 milliGRAM(s) IV Push once PRN Anxiety  methocarbamol 750 milliGRAM(s) Oral every 8 hours PRN Muscle Spasm  nystatin Powder 1 Application(s) Topical two times a day PRN itching  polyethylene glycol 3350 17 Gram(s) Oral daily PRN Constipation  Ubrelvy 100 milliGRAM(s) 100 milliGRAM(s) Oral two times a day PRN migrane

## 2021-03-05 NOTE — DIETITIAN INITIAL EVALUATION ADULT. - NAME AND PHONE
Nutrition Interventions: meals and snacks RD to monitor diet order, energy intake, body composition, NFPF Nutrition Goal: Pt to meet greater than 75% of estimated needs within 7 days

## 2021-03-06 LAB
ALBUMIN SERPL ELPH-MCNC: 3 G/DL — LOW (ref 3.5–5.2)
ALP SERPL-CCNC: 67 U/L — SIGNIFICANT CHANGE UP (ref 30–115)
ALT FLD-CCNC: 16 U/L — SIGNIFICANT CHANGE UP (ref 0–41)
ANION GAP SERPL CALC-SCNC: 7 MMOL/L — SIGNIFICANT CHANGE UP (ref 7–14)
AST SERPL-CCNC: 11 U/L — SIGNIFICANT CHANGE UP (ref 0–41)
B BURGDOR DNA SPEC QL NAA+PROBE: NEGATIVE — SIGNIFICANT CHANGE UP
BASOPHILS # BLD AUTO: 0.01 K/UL — SIGNIFICANT CHANGE UP (ref 0–0.2)
BASOPHILS NFR BLD AUTO: 0.1 % — SIGNIFICANT CHANGE UP (ref 0–1)
BILIRUB SERPL-MCNC: 0.3 MG/DL — SIGNIFICANT CHANGE UP (ref 0.2–1.2)
BUN SERPL-MCNC: 19 MG/DL — SIGNIFICANT CHANGE UP (ref 10–20)
CALCIUM SERPL-MCNC: 8.8 MG/DL — SIGNIFICANT CHANGE UP (ref 8.5–10.1)
CHLORIDE SERPL-SCNC: 110 MMOL/L — SIGNIFICANT CHANGE UP (ref 98–110)
CO2 SERPL-SCNC: 25 MMOL/L — SIGNIFICANT CHANGE UP (ref 17–32)
CREAT SERPL-MCNC: 0.8 MG/DL — SIGNIFICANT CHANGE UP (ref 0.7–1.5)
EOSINOPHIL # BLD AUTO: 0 K/UL — SIGNIFICANT CHANGE UP (ref 0–0.7)
EOSINOPHIL NFR BLD AUTO: 0 % — SIGNIFICANT CHANGE UP (ref 0–8)
GLUCOSE SERPL-MCNC: 74 MG/DL — SIGNIFICANT CHANGE UP (ref 70–99)
HCT VFR BLD CALC: 36.2 % — LOW (ref 37–47)
HGB BLD-MCNC: 11.5 G/DL — LOW (ref 12–16)
IMM GRANULOCYTES NFR BLD AUTO: 0.3 % — SIGNIFICANT CHANGE UP (ref 0.1–0.3)
LYMPHOCYTES # BLD AUTO: 1.29 K/UL — SIGNIFICANT CHANGE UP (ref 1.2–3.4)
LYMPHOCYTES # BLD AUTO: 14.2 % — LOW (ref 20.5–51.1)
MAGNESIUM SERPL-MCNC: 2.4 MG/DL — SIGNIFICANT CHANGE UP (ref 1.8–2.4)
MCHC RBC-ENTMCNC: 28.8 PG — SIGNIFICANT CHANGE UP (ref 27–31)
MCHC RBC-ENTMCNC: 31.8 G/DL — LOW (ref 32–37)
MCV RBC AUTO: 90.5 FL — SIGNIFICANT CHANGE UP (ref 81–99)
MONOCYTES # BLD AUTO: 0.72 K/UL — HIGH (ref 0.1–0.6)
MONOCYTES NFR BLD AUTO: 7.9 % — SIGNIFICANT CHANGE UP (ref 1.7–9.3)
NEUTROPHILS # BLD AUTO: 7.05 K/UL — HIGH (ref 1.4–6.5)
NEUTROPHILS NFR BLD AUTO: 77.5 % — HIGH (ref 42.2–75.2)
NRBC # BLD: 0 /100 WBCS — SIGNIFICANT CHANGE UP (ref 0–0)
PLATELET # BLD AUTO: 197 K/UL — SIGNIFICANT CHANGE UP (ref 130–400)
POTASSIUM SERPL-MCNC: 3.8 MMOL/L — SIGNIFICANT CHANGE UP (ref 3.5–5)
POTASSIUM SERPL-SCNC: 3.8 MMOL/L — SIGNIFICANT CHANGE UP (ref 3.5–5)
PROT SERPL-MCNC: 5.7 G/DL — LOW (ref 6–8)
RBC # BLD: 4 M/UL — LOW (ref 4.2–5.4)
RBC # FLD: 13.6 % — SIGNIFICANT CHANGE UP (ref 11.5–14.5)
SODIUM SERPL-SCNC: 142 MMOL/L — SIGNIFICANT CHANGE UP (ref 135–146)
WBC # BLD: 9.1 K/UL — SIGNIFICANT CHANGE UP (ref 4.8–10.8)
WBC # FLD AUTO: 9.1 K/UL — SIGNIFICANT CHANGE UP (ref 4.8–10.8)

## 2021-03-06 PROCEDURE — 72157 MRI CHEST SPINE W/O & W/DYE: CPT | Mod: 26

## 2021-03-06 PROCEDURE — 99232 SBSQ HOSP IP/OBS MODERATE 35: CPT

## 2021-03-06 RX ORDER — AMLODIPINE BESYLATE 2.5 MG/1
1 TABLET ORAL
Qty: 0 | Refills: 0 | DISCHARGE
Start: 2021-03-06

## 2021-03-06 RX ORDER — AMLODIPINE BESYLATE 2.5 MG/1
1 TABLET ORAL
Qty: 0 | Refills: 0 | DISCHARGE

## 2021-03-06 RX ORDER — SENNA PLUS 8.6 MG/1
2 TABLET ORAL
Qty: 10 | Refills: 0
Start: 2021-03-06 | End: 2021-03-10

## 2021-03-06 RX ORDER — POLYETHYLENE GLYCOL 3350 17 G/17G
17 POWDER, FOR SOLUTION ORAL
Qty: 85 | Refills: 0
Start: 2021-03-06 | End: 2021-03-10

## 2021-03-06 RX ORDER — ACETAMINOPHEN 500 MG
2 TABLET ORAL
Qty: 80 | Refills: 0
Start: 2021-03-06 | End: 2021-03-15

## 2021-03-06 RX ORDER — METHOCARBAMOL 500 MG/1
1 TABLET, FILM COATED ORAL
Qty: 45 | Refills: 0
Start: 2021-03-06 | End: 2021-03-20

## 2021-03-06 RX ORDER — BACLOFEN 100 %
1 POWDER (GRAM) MISCELLANEOUS
Qty: 45 | Refills: 0
Start: 2021-03-06 | End: 2021-03-20

## 2021-03-06 RX ADMIN — CHLORHEXIDINE GLUCONATE 1 APPLICATION(S): 213 SOLUTION TOPICAL at 06:57

## 2021-03-06 RX ADMIN — PANTOPRAZOLE SODIUM 40 MILLIGRAM(S): 20 TABLET, DELAYED RELEASE ORAL at 06:56

## 2021-03-06 RX ADMIN — METHOCARBAMOL 750 MILLIGRAM(S): 500 TABLET, FILM COATED ORAL at 00:11

## 2021-03-06 RX ADMIN — Medication 650 MILLIGRAM(S): at 04:05

## 2021-03-06 RX ADMIN — ENOXAPARIN SODIUM 40 MILLIGRAM(S): 100 INJECTION SUBCUTANEOUS at 11:13

## 2021-03-06 RX ADMIN — METHOCARBAMOL 750 MILLIGRAM(S): 500 TABLET, FILM COATED ORAL at 17:23

## 2021-03-06 RX ADMIN — Medication 200 MILLIGRAM(S): at 17:22

## 2021-03-06 RX ADMIN — Medication 325 MILLIGRAM(S): at 11:12

## 2021-03-06 RX ADMIN — Medication 2 MILLIGRAM(S): at 12:00

## 2021-03-06 RX ADMIN — Medication 116 MILLIGRAM(S): at 07:35

## 2021-03-06 RX ADMIN — Medication 200 MILLIGRAM(S): at 06:56

## 2021-03-06 RX ADMIN — GABAPENTIN 100 MILLIGRAM(S): 400 CAPSULE ORAL at 06:56

## 2021-03-06 RX ADMIN — GABAPENTIN 100 MILLIGRAM(S): 400 CAPSULE ORAL at 11:13

## 2021-03-06 RX ADMIN — Medication 1 TABLET(S): at 11:12

## 2021-03-06 RX ADMIN — GABAPENTIN 100 MILLIGRAM(S): 400 CAPSULE ORAL at 21:23

## 2021-03-06 RX ADMIN — SENNA PLUS 2 TABLET(S): 8.6 TABLET ORAL at 21:23

## 2021-03-06 RX ADMIN — AMLODIPINE BESYLATE 5 MILLIGRAM(S): 2.5 TABLET ORAL at 06:56

## 2021-03-06 NOTE — PROGRESS NOTE ADULT - ASSESSMENT
Pt is a 50y Female with a past medical history as described above who presented for weakness    # LE Weakness  - MRI T-spine and L-spine: Abnormal signal noted in the spinal cord from T1 through T3 without evidence of enhancement or cord expansion  - MRI brain wwo: No acute pathology; mild chronic microvascular changes   - CRP: 0.16, AURORA: 1:640 , RPR: negative, TSH: 1.51   - day 5 of 5 IV solumedrol 1,000 mg (started on 03/02 as per neuro recommendation)  - neuro:   bracing of R knee and additional PT   MRI T spine TRICKS for possible vascular anomalies for possibility of embolization (if negative followed by MRI L spine TRICKS) - premedicate w/ IV Ativan 1mg pre-MRI studies   Cu level to r/u low Cu induced myelopathy   baclofen 5mg q8h PRN for muscle spasms   outpt f/u w/ pt's neurologist 2-3 weeks   - f/u LP results:   Elevated CSF protein; polymorphonuclear leukocytes seen on gram stain, no organisms on gram stain; normal wbc, rbc, normal glucose, HSV negative, meningitis/enchephalitis panel negative, culture negative, lyme Ab neg, normal ACE level   elevated IgG CSF, elevated IgG synthesis; normal IgG index  f/u CSF results for oligoclonal band, NMO and anti MOG (pending)  - HIV neg, Hepatitis B neg; ACE level wnl   - Neurosurgery: no surgical intervention at this time   - c/w Robaxin 500 mg at night time  - PT eval: home w/ home PT/rehab facility; need for RW     # HTN  - Amlodipine decreased to 5 mg qd as borderline BP yesterday  - DASH diet    # Neuropathy  - hbA1c: 5.1   - Continue Topamax 200mg twice daily and gabapentin 100mg three times daily     # DON  - CPAP if needed at night  - Outpt follow-up     # Asthma  - Continue Symbicort twice daily and Ventolin PRN    # GERD  - Protonix 40mg PO daily     # Constipation   - Continue Senna 2 tabs qhs. Miralax 17G daily PRN     DVT ppx: Lovenox 40 mg SubQ daily    GI ppx: Protonix 40mg daily   Diet: DASH/TLC  Activity: IAT   Dispo: From home   Code: FULL     Pending: MRI T spine TRICKS; Cu level; CSF studies    Pt is a 50y Female with a past medical history as described above who presented for weakness    # LE Weakness  - MRI T-spine and L-spine: Abnormal signal noted in the spinal cord from T1 through T3 without evidence of enhancement or cord expansion  - MRI brain wwo: No acute pathology; mild chronic microvascular changes   - CRP: 0.16, AURORA: 1:640 , RPR: negative, TSH: 1.51   - day 5 of 5 IV solumedrol 1,000 mg (started on 03/02 as per neuro recommendation)  - neuro:   bracing of R knee and additional PT   MRI T spine TRICKS for possible vascular anomalies for possibility of embolization (if negative followed by MRI L spine TRICKS) - premedicate w/ IV Ativan 1mg pre-MRI studies   Cu level to r/o low Cu induced myelopathy   baclofen 5mg q8h PRN for muscle spasms   outpt f/u w/ pt's neurologist 2-3 weeks   - f/u LP results:   Elevated CSF protein; polymorphonuclear leukocytes seen on gram stain, no organisms on gram stain; normal wbc, rbc, normal glucose, HSV negative, meningitis/enchephalitis panel negative, culture negative, lyme Ab neg, normal ACE level   elevated IgG CSF, elevated IgG synthesis; normal IgG index  f/u CSF results for oligoclonal band, NMO and anti MOG (pending)  - HIV neg, Hepatitis B neg; ACE level wnl   - Neurosurgery: no surgical intervention at this time   - c/w Robaxin 750 mg q8h PRN   - PT eval: home w/ home PT/rehab facility; need for RW     # HTN  - Continue Amlodipine 5 mg qd   - DASH diet    # Neuropathy  - hbA1c: 5.1   - Continue Topamax 200mg twice daily and gabapentin 100mg three times daily for diabetic neuropathy    # DNO  - CPAP if needed at night  - Outpt follow-up     # Asthma  - Continue Symbicort twice daily and Ventolin PRN    # GERD  - Protonix 40mg PO daily     # Constipation   - Continue Senna 2 tabs qhs. Miralax 17G daily PRN     DVT ppx: Lovenox 40 mg SubQ daily    GI ppx: Protonix 40mg daily   Diet: DASH/TLC  Activity: IAT   Dispo: From home   Code: FULL     Pending: MRI T spine TRICKS; Cu level; CSF studies

## 2021-03-06 NOTE — PROGRESS NOTE ADULT - SUBJECTIVE AND OBJECTIVE BOX
ANNMARIE MCCULLOUGH    Patient is a 50y old  Female who presents with a chief complaint of Weakness (06 Mar 2021 07:16)    INTERVAL HPI/OVERNIGHT EVENTS: no new complaints, weakness in LE is the same.     ROS: All ROS negative except asw documented above     PHYSICAL EXAM:  T(C): 36.3, Max: 36.4 (03-06-21 @ 07:24)  HR: 97 (70 - 97)  BP: 119/61 (119/61 - 134/77)  RR: 18 (18 - 18)  SpO2: --    GENERAL: NAD, obese  PULMONARY/CHEST: No rales, rhonchi, or wheezing  CARDIOVASC: Regular rate and rhythm; No murmurs  GI/ABDOMEN: obese, Soft, Nontender, Nondistended; Bowel sounds present  EXTREMITIES: LE no edema, no calf tenderness b/l.  NERVOUS SYSTEM:  Alert & Oriented X3, motor RLE 2+/5, LLE 4+/5, sensation intact    LABS:                        11.5   9.10  )-----------( 197      ( 06 Mar 2021 05:28 )             36.2     03-06    142  |  110  |  19  ----------------------------<  74  3.8   |  25  |  0.8    Ca    8.8      06 Mar 2021 05:28  Mg     2.4     03-06    TPro  5.7<L>  /  Alb  3.0<L>  /  TBili  0.3  /  DBili  x   /  AST  11  /  ALT  16  /  AlkPhos  67  03-06        CAPILLARY BLOOD GLUCOSE                RADIOLOGY & ADDITIONAL TESTS:        MEDICATIONS  (STANDING):  amLODIPine   Tablet 5 milliGRAM(s) Oral daily  budesonide  80 MICROgram(s)/formoterol 4.5 MICROgram(s) Inhaler 2 Puff(s) Inhalation two times a day  chlorhexidine 4% Liquid 1 Application(s) Topical <User Schedule>  enoxaparin Injectable 40 milliGRAM(s) SubCutaneous daily  ferrous    sulfate 325 milliGRAM(s) Oral daily  gabapentin 100 milliGRAM(s) Oral three times a day  methylPREDNISolone sodium succinate IVPB 1000 milliGRAM(s) IV Intermittent daily  multivitamin 1 Tablet(s) Oral daily  pantoprazole    Tablet 40 milliGRAM(s) Oral before breakfast  senna 2 Tablet(s) Oral at bedtime  topiramate 200 milliGRAM(s) Oral two times a day    MEDICATIONS  (PRN):  acetaminophen   Tablet .. 650 milliGRAM(s) Oral every 6 hours PRN Moderate Pain (4 - 6)  ALBUTerol    90 MICROgram(s) HFA Inhaler 1 Puff(s) Inhalation every 6 hours PRN Shortness of Breath and/or Wheezing  aluminum hydroxide/magnesium hydroxide/simethicone Suspension 30 milliLiter(s) Oral every 4 hours PRN Dyspepsia  baclofen 5 milliGRAM(s) Oral every 8 hours PRN Muscle Spasm  methocarbamol 750 milliGRAM(s) Oral every 8 hours PRN Muscle Spasm  nystatin Powder 1 Application(s) Topical two times a day PRN itching  polyethylene glycol 3350 17 Gram(s) Oral daily PRN Constipation  Ubrelvy 100 milliGRAM(s) 100 milliGRAM(s) Oral two times a day PRN migrane

## 2021-03-06 NOTE — PROGRESS NOTE ADULT - ASSESSMENT
50F with a past medical history of HTN, obesity, lumbar radiculopathy who presented to the hospital for gradual worsening of weakness in both of her legs for about 2 weeks.     # B/L LE weakness with associated sensory loss   - MRI of spine without evidence of cord compression. However, there is increase SC signal T2 to T3 level without mass effect and ddx includes acute transverse myelitis or viral myelitis or demyelinating disease. R/o Copper deficiency.    - MRI: Brain WNL   - LP done 3/02: elevated protein, rest of studies pending   - neuro recommended Steroids 1 gr IV QD for 5 days, today day 5/5  - neuro eval appreciated, recommended MRI spine TRICKS, ordered, if negative she will need Lumbar spine MRI TRICKS  - f/u Copper level  - cont PT   - cont pain management with muscle relaxants, Baclofen added    # Pt had first shot for COVID vaccine, but missed her second shot, she will get the second shot OP once discharged.     # Migraine - cont Ubrelvy    DVT ppx     Pt likely will need STR. pending MRI.

## 2021-03-06 NOTE — PROGRESS NOTE ADULT - SUBJECTIVE AND OBJECTIVE BOX
Patient is a 50y old  Female who presents with a chief complaint of Weakness (05 Mar 2021 16:53)    INTERVAL HPI/OVERNIGHT EVENTS:    REVIEW OF SYSTEMS:  CONSTITUTIONAL: No fever, weight loss, or fatigue  RESPIRATORY: No cough, wheezing, chills or hemoptysis; No shortness of breath  CARDIOVASCULAR: No chest pain, palpitations, dizziness, or leg swelling  GASTROINTESTINAL: No abdominal or epigastric pain. No nausea, vomiting, or hematemesis; No diarrhea or constipation. No melena or hematochezia.  GENITOURINARY: No dysuria, frequency, hematuria, or incontinence  NEUROLOGICAL: No headaches, memory loss, loss of strength, numbness, or tremors  SKIN: No itching, burning, rashes, or lesions   MUSCULOSKELETAL: No joint pain or swelling; No muscle, back, or extremity pain  PSYCHIATRIC: No depression, anxiety, mood swings, or difficulty sleeping  FAMILY HISTORY:  No pertinent family history in first degree relatives      T(C): 37 (03-05-21 @ 16:27), Max: 37 (03-05-21 @ 16:27)  HR: 92 (03-05-21 @ 16:27) (92 - 92)  BP: 120/80 (03-05-21 @ 16:27) (120/80 - 120/80)  RR: 20 (03-05-21 @ 16:27) (20 - 20)  SpO2: --  Wt(kg): --Vital Signs Last 24 Hrs  T(C): 37 (05 Mar 2021 16:27), Max: 37 (05 Mar 2021 16:27)  T(F): 98.6 (05 Mar 2021 16:27), Max: 98.6 (05 Mar 2021 16:27)  HR: 92 (05 Mar 2021 16:27) (92 - 92)  BP: 120/80 (05 Mar 2021 16:27) (120/80 - 120/80)  BP(mean): --  RR: 20 (05 Mar 2021 16:27) (20 - 20)  SpO2: --    PHYSICAL EXAM:  GENERAL: NAD, well-groomed, well-developed  NERVOUS SYSTEM:  Alert & Oriented X3, Good concentration; Motor Strength 5/5 B/L upper and lower extremities; DTRs 2+ intact and symmetric  CHEST/LUNG: Clear to percussion bilaterally; No rales, rhonchi, wheezing, or rubs  HEART: Regular rate and rhythm; No murmurs, rubs, or gallops  ABDOMEN: Soft, Nontender, Nondistended; Bowel sounds present  EXTREMITIES:  2+ Peripheral Pulses, No clubbing, cyanosis, or edema  SKIN: No rashes or lesions    Consultant(s) Notes Reviewed:  [x ] YES  [ ] NO  Care Discussed with Consultants/Other Providers [ x] YES  [ ] NO    LABS:                      11.8   9.40  )-----------( 206      ( 05 Mar 2021 06:46 )             37.1     03-05    143  |  109  |  26<H>  ----------------------------<  79  3.7   |  26  |  0.9    Ca    8.7      05 Mar 2021 06:46  Mg     2.3     03-05    TPro  6.0  /  Alb  3.5  /  TBili  0.3  /  DBili  x   /  AST  13  /  ALT  17  /  AlkPhos  67  03-05    RADIOLOGY & ADDITIONAL TESTS:    < from: MR Head w/wo IV Cont (03.01.21 @ 16:32) >  IMPRESSION:    No acute intracranial pathology.    Incidentally noted 0.5 cm round lesion in the right lateral pituitary gland could reflect a proteinaceous Rathke's cleft cyst versus cystic microadenoma. This can be further followed up with MRI of the pituitary gland with contrast.    Mild chronic microvascular ischemic changes.    < end of copied text >  < from: MR Lumbar Spine w/wo IV Cont (02.27.21 @ 17:56) >  IMPRESSION:    CERVICAL SPINE:  No evidence of abnormal spinal cord signal.    Mild multilevel degenerative changes without significant spinal canal or neuroforaminal narrowing.    THORACIC SPINE:  Abnormal signal noted in the spinal cord from T1 through T3 without evidence of enhancement or cord expansion. Finding is nonspecific and may represent demyelination, acute transverse myelitis or viral myelitis.    At T10-T11 there is moderate spinal canal stenosis as well as mild mass effect upon the right dorsal aspect of the spinal cord due to osseous spurring involving the ligamentum flavum.    LUMBAR SPINE:  Multilevel degenerative changes and disc bulges disc bulges with superimposed central herniations at L4-5 and L5-S1 resulting in mild to moderate spinal canal stenosis as well as mild bilateral neuroforaminal narrowing.    No abnormal signal or enhancement noted of the cauda equina or nerve roots.    < end of copied text >      Imaging Personally Reviewed:  [ ] YES  [ ] NO  acetaminophen   Tablet .. 650 milliGRAM(s) Oral every 6 hours PRN  ALBUTerol    90 MICROgram(s) HFA Inhaler 1 Puff(s) Inhalation every 6 hours PRN  aluminum hydroxide/magnesium hydroxide/simethicone Suspension 30 milliLiter(s) Oral every 4 hours PRN  amLODIPine   Tablet 5 milliGRAM(s) Oral daily  baclofen 5 milliGRAM(s) Oral every 8 hours PRN  budesonide  80 MICROgram(s)/formoterol 4.5 MICROgram(s) Inhaler 2 Puff(s) Inhalation two times a day  chlorhexidine 4% Liquid 1 Application(s) Topical <User Schedule>  enoxaparin Injectable 40 milliGRAM(s) SubCutaneous daily  ferrous    sulfate 325 milliGRAM(s) Oral daily  gabapentin 100 milliGRAM(s) Oral three times a day  LORazepam   Injectable 2 milliGRAM(s) IV Push once PRN  methocarbamol 750 milliGRAM(s) Oral every 8 hours PRN  methylPREDNISolone sodium succinate IVPB 1000 milliGRAM(s) IV Intermittent daily  multivitamin 1 Tablet(s) Oral daily  nystatin Powder 1 Application(s) Topical two times a day PRN  pantoprazole    Tablet 40 milliGRAM(s) Oral before breakfast  polyethylene glycol 3350 17 Gram(s) Oral daily PRN  senna 2 Tablet(s) Oral at bedtime  topiramate 200 milliGRAM(s) Oral two times a day  Ubrelvy 100 milliGRAM(s) 100 milliGRAM(s) Oral two times a day PRN       Patient is a 50y old  Female who presents with a chief complaint of Weakness (05 Mar 2021 16:53)    INTERVAL HPI/OVERNIGHT EVENTS: No acute overnight events. Pt lying in bed tearful but NAD on exam. Pt noted continued muscle spasms with partial relief w/ baclofen. Pt notes continued weakness in LE and abdomen area. Pt notes feeling overwhelmed by current sxs but denies SI. Pt notes that pt has been taking Zn supplements for concern over COVID-19. Pt denies any other sxs on ros or any other active complaints at this time.        REVIEW OF SYSTEMS:  CONSTITUTIONAL: No fever, weight loss, or fatigue  RESPIRATORY: No cough, wheezing, chills or hemoptysis; No shortness of breath  CARDIOVASCULAR: No chest pain, palpitations, dizziness, or leg swelling  GASTROINTESTINAL: No abdominal or epigastric pain. No nausea, vomiting, or hematemesis; No diarrhea or constipation. No melena or hematochezia.  GENITOURINARY: No dysuria, frequency, hematuria, or incontinence  NEUROLOGICAL: +headaches, +loss of strength, +numbness  SKIN: No itching, burning, rashes, or lesions   PSYCHIATRIC: +anxiety, difficulty sleeping; No SI, mood swings  FAMILY HISTORY:  No pertinent family history in first degree relatives      T(C): 37 (03-05-21 @ 16:27), Max: 37 (03-05-21 @ 16:27)  HR: 92 (03-05-21 @ 16:27) (92 - 92)  BP: 120/80 (03-05-21 @ 16:27) (120/80 - 120/80)  RR: 20 (03-05-21 @ 16:27) (20 - 20)  SpO2: --  Wt(kg): --Vital Signs Last 24 Hrs  T(C): 37 (05 Mar 2021 16:27), Max: 37 (05 Mar 2021 16:27)  T(F): 98.6 (05 Mar 2021 16:27), Max: 98.6 (05 Mar 2021 16:27)  HR: 92 (05 Mar 2021 16:27) (92 - 92)  BP: 120/80 (05 Mar 2021 16:27) (120/80 - 120/80)  BP(mean): --  RR: 20 (05 Mar 2021 16:27) (20 - 20)  SpO2: --    PHYSICAL EXAM:  GENERAL: NAD, well-groomed, well-developed  NERVOUS SYSTEM:  Alert & Oriented X3, Good concentration; Motor Strength and sensation intact 5/5 B/L upper extremities; +decreased motor strength R>L LE and decreased sensation in L>R LE;   CHEST/LUNG: Clear to percussion bilaterally; No rales, rhonchi, wheezing, or rubs  HEART: Regular rate and rhythm; No murmurs, rubs, or gallops  ABDOMEN: Soft, Nontender, Nondistended; Bowel sounds present  EXTREMITIES:  2+ Peripheral Pulses, No clubbing, cyanosis, or edema  SKIN: No rashes or lesions    Consultant(s) Notes Reviewed:  [x ] YES  [ ] NO  Care Discussed with Consultants/Other Providers [ x] YES  [ ] NO    LABS:                      11.8   9.40  )-----------( 206      ( 05 Mar 2021 06:46 )             37.1     03-05    143  |  109  |  26<H>  ----------------------------<  79  3.7   |  26  |  0.9    Ca    8.7      05 Mar 2021 06:46  Mg     2.3     03-05    TPro  6.0  /  Alb  3.5  /  TBili  0.3  /  DBili  x   /  AST  13  /  ALT  17  /  AlkPhos  67  03-05    RADIOLOGY & ADDITIONAL TESTS:    < from: MR Head w/wo IV Cont (03.01.21 @ 16:32) >  IMPRESSION:    No acute intracranial pathology.    Incidentally noted 0.5 cm round lesion in the right lateral pituitary gland could reflect a proteinaceous Rathke's cleft cyst versus cystic microadenoma. This can be further followed up with MRI of the pituitary gland with contrast.    Mild chronic microvascular ischemic changes.    < end of copied text >  < from: MR Lumbar Spine w/wo IV Cont (02.27.21 @ 17:56) >  IMPRESSION:    CERVICAL SPINE:  No evidence of abnormal spinal cord signal.    Mild multilevel degenerative changes without significant spinal canal or neuroforaminal narrowing.    THORACIC SPINE:  Abnormal signal noted in the spinal cord from T1 through T3 without evidence of enhancement or cord expansion. Finding is nonspecific and may represent demyelination, acute transverse myelitis or viral myelitis.    At T10-T11 there is moderate spinal canal stenosis as well as mild mass effect upon the right dorsal aspect of the spinal cord due to osseous spurring involving the ligamentum flavum.    LUMBAR SPINE:  Multilevel degenerative changes and disc bulges disc bulges with superimposed central herniations at L4-5 and L5-S1 resulting in mild to moderate spinal canal stenosis as well as mild bilateral neuroforaminal narrowing.    No abnormal signal or enhancement noted of the cauda equina or nerve roots.    < end of copied text >      Imaging Personally Reviewed:  [ ] YES  [ ] NO  acetaminophen   Tablet .. 650 milliGRAM(s) Oral every 6 hours PRN  ALBUTerol    90 MICROgram(s) HFA Inhaler 1 Puff(s) Inhalation every 6 hours PRN  aluminum hydroxide/magnesium hydroxide/simethicone Suspension 30 milliLiter(s) Oral every 4 hours PRN  amLODIPine   Tablet 5 milliGRAM(s) Oral daily  baclofen 5 milliGRAM(s) Oral every 8 hours PRN  budesonide  80 MICROgram(s)/formoterol 4.5 MICROgram(s) Inhaler 2 Puff(s) Inhalation two times a day  chlorhexidine 4% Liquid 1 Application(s) Topical <User Schedule>  enoxaparin Injectable 40 milliGRAM(s) SubCutaneous daily  ferrous    sulfate 325 milliGRAM(s) Oral daily  gabapentin 100 milliGRAM(s) Oral three times a day  LORazepam   Injectable 2 milliGRAM(s) IV Push once PRN  methocarbamol 750 milliGRAM(s) Oral every 8 hours PRN  methylPREDNISolone sodium succinate IVPB 1000 milliGRAM(s) IV Intermittent daily  multivitamin 1 Tablet(s) Oral daily  nystatin Powder 1 Application(s) Topical two times a day PRN  pantoprazole    Tablet 40 milliGRAM(s) Oral before breakfast  polyethylene glycol 3350 17 Gram(s) Oral daily PRN  senna 2 Tablet(s) Oral at bedtime  topiramate 200 milliGRAM(s) Oral two times a day  Ubrelvy 100 milliGRAM(s) 100 milliGRAM(s) Oral two times a day PRN

## 2021-03-07 ENCOUNTER — TRANSCRIPTION ENCOUNTER (OUTPATIENT)
Age: 51
End: 2021-03-07

## 2021-03-07 VITALS
HEART RATE: 64 BPM | DIASTOLIC BLOOD PRESSURE: 83 MMHG | SYSTOLIC BLOOD PRESSURE: 146 MMHG | TEMPERATURE: 98 F | RESPIRATION RATE: 18 BRPM

## 2021-03-07 LAB
ALBUMIN SERPL ELPH-MCNC: 3.4 G/DL — LOW (ref 3.5–5.2)
ALP SERPL-CCNC: 70 U/L — SIGNIFICANT CHANGE UP (ref 30–115)
ALT FLD-CCNC: 28 U/L — SIGNIFICANT CHANGE UP (ref 0–41)
ANION GAP SERPL CALC-SCNC: 8 MMOL/L — SIGNIFICANT CHANGE UP (ref 7–14)
AST SERPL-CCNC: 19 U/L — SIGNIFICANT CHANGE UP (ref 0–41)
BASOPHILS # BLD AUTO: 0.01 K/UL — SIGNIFICANT CHANGE UP (ref 0–0.2)
BASOPHILS NFR BLD AUTO: 0.1 % — SIGNIFICANT CHANGE UP (ref 0–1)
BILIRUB SERPL-MCNC: 0.3 MG/DL — SIGNIFICANT CHANGE UP (ref 0.2–1.2)
BLD GP AB SCN SERPL QL: SIGNIFICANT CHANGE UP
BUN SERPL-MCNC: 21 MG/DL — HIGH (ref 10–20)
CALCIUM SERPL-MCNC: 8.7 MG/DL — SIGNIFICANT CHANGE UP (ref 8.5–10.1)
CHLORIDE SERPL-SCNC: 108 MMOL/L — SIGNIFICANT CHANGE UP (ref 98–110)
CO2 SERPL-SCNC: 24 MMOL/L — SIGNIFICANT CHANGE UP (ref 17–32)
CREAT SERPL-MCNC: 0.8 MG/DL — SIGNIFICANT CHANGE UP (ref 0.7–1.5)
EOSINOPHIL # BLD AUTO: 0.01 K/UL — SIGNIFICANT CHANGE UP (ref 0–0.7)
EOSINOPHIL NFR BLD AUTO: 0.1 % — SIGNIFICANT CHANGE UP (ref 0–8)
GLUCOSE SERPL-MCNC: 82 MG/DL — SIGNIFICANT CHANGE UP (ref 70–99)
HCT VFR BLD CALC: 38.3 % — SIGNIFICANT CHANGE UP (ref 37–47)
HGB BLD-MCNC: 12.1 G/DL — SIGNIFICANT CHANGE UP (ref 12–16)
IMM GRANULOCYTES NFR BLD AUTO: 0.6 % — HIGH (ref 0.1–0.3)
LYMPHOCYTES # BLD AUTO: 1.94 K/UL — SIGNIFICANT CHANGE UP (ref 1.2–3.4)
LYMPHOCYTES # BLD AUTO: 19.1 % — LOW (ref 20.5–51.1)
MAGNESIUM SERPL-MCNC: 2.2 MG/DL — SIGNIFICANT CHANGE UP (ref 1.8–2.4)
MCHC RBC-ENTMCNC: 28.6 PG — SIGNIFICANT CHANGE UP (ref 27–31)
MCHC RBC-ENTMCNC: 31.6 G/DL — LOW (ref 32–37)
MCV RBC AUTO: 90.5 FL — SIGNIFICANT CHANGE UP (ref 81–99)
MONOCYTES # BLD AUTO: 0.68 K/UL — HIGH (ref 0.1–0.6)
MONOCYTES NFR BLD AUTO: 6.7 % — SIGNIFICANT CHANGE UP (ref 1.7–9.3)
NEUTROPHILS # BLD AUTO: 7.47 K/UL — HIGH (ref 1.4–6.5)
NEUTROPHILS NFR BLD AUTO: 73.4 % — SIGNIFICANT CHANGE UP (ref 42.2–75.2)
NRBC # BLD: 0 /100 WBCS — SIGNIFICANT CHANGE UP (ref 0–0)
PLATELET # BLD AUTO: 200 K/UL — SIGNIFICANT CHANGE UP (ref 130–400)
POTASSIUM SERPL-MCNC: 3.6 MMOL/L — SIGNIFICANT CHANGE UP (ref 3.5–5)
POTASSIUM SERPL-SCNC: 3.6 MMOL/L — SIGNIFICANT CHANGE UP (ref 3.5–5)
PROT SERPL-MCNC: 5.8 G/DL — LOW (ref 6–8)
RBC # BLD: 4.23 M/UL — SIGNIFICANT CHANGE UP (ref 4.2–5.4)
RBC # FLD: 13.6 % — SIGNIFICANT CHANGE UP (ref 11.5–14.5)
SARS-COV-2 RNA SPEC QL NAA+PROBE: SIGNIFICANT CHANGE UP
SODIUM SERPL-SCNC: 140 MMOL/L — SIGNIFICANT CHANGE UP (ref 135–146)
WBC # BLD: 10.17 K/UL — SIGNIFICANT CHANGE UP (ref 4.8–10.8)
WBC # FLD AUTO: 10.17 K/UL — SIGNIFICANT CHANGE UP (ref 4.8–10.8)

## 2021-03-07 PROCEDURE — 99239 HOSP IP/OBS DSCHRG MGMT >30: CPT

## 2021-03-07 RX ORDER — ALPRAZOLAM 0.25 MG
0.5 TABLET ORAL ONCE
Refills: 0 | Status: DISCONTINUED | OUTPATIENT
Start: 2021-03-07 | End: 2021-03-07

## 2021-03-07 RX ADMIN — Medication 116 MILLIGRAM(S): at 05:46

## 2021-03-07 RX ADMIN — Medication 5 MILLIGRAM(S): at 03:03

## 2021-03-07 RX ADMIN — Medication 200 MILLIGRAM(S): at 05:46

## 2021-03-07 RX ADMIN — Medication 1 TABLET(S): at 11:04

## 2021-03-07 RX ADMIN — ENOXAPARIN SODIUM 40 MILLIGRAM(S): 100 INJECTION SUBCUTANEOUS at 11:05

## 2021-03-07 RX ADMIN — PANTOPRAZOLE SODIUM 40 MILLIGRAM(S): 20 TABLET, DELAYED RELEASE ORAL at 05:47

## 2021-03-07 RX ADMIN — GABAPENTIN 100 MILLIGRAM(S): 400 CAPSULE ORAL at 11:04

## 2021-03-07 RX ADMIN — Medication 325 MILLIGRAM(S): at 11:04

## 2021-03-07 RX ADMIN — GABAPENTIN 100 MILLIGRAM(S): 400 CAPSULE ORAL at 05:47

## 2021-03-07 RX ADMIN — Medication 0.5 MILLIGRAM(S): at 11:04

## 2021-03-07 RX ADMIN — AMLODIPINE BESYLATE 5 MILLIGRAM(S): 2.5 TABLET ORAL at 05:47

## 2021-03-07 NOTE — PROGRESS NOTE ADULT - NSHPATTENDINGPLANDISCUSS_GEN_ALL_CORE
residents, nursing, , patient
residents, nursing, , patient
Resident Physician
residents, nursing, , patient

## 2021-03-07 NOTE — PROGRESS NOTE ADULT - ASSESSMENT
50F with a past medical history of HTN, obesity, lumbar radiculopathy who presented to the hospital for gradual worsening of weakness in both of her legs for about 2 weeks.     # B/L LE weakness with associated sensory loss   - MRI of spine without evidence of cord compression. However, there is increase SC signal T2 to T3 level without mass effect and ddx includes acute transverse myelitis or viral myelitis or demyelinating disease. R/o Copper deficiency.    - MRI: Brain WNL   - LP done 3/02: elevated protein, Cx negative, CMV, HSV, Lyme negative, IgG elevated. oligoclonal bands, MOG Ab, [place] pending.   - Steroids 1 gr IV QD completed for 5 days  - neuro eval appreciated, recommended MRI spine TRICKS: nondiagnostic due to technique however, there is no evidence of spinal dural aVF   - f/u Copper level, pending  - cont PT   - cont pain management with muscle relaxants, Baclofen  - OP follow up with neurology in 2 weeks     # Epidural lipomatosis T1-T7 - most likely due to obesity, weight loss advised    # Pt had first shot for COVID vaccine, but missed her second shot, she will get the second shot OP once discharged.     # Migraine - cont Ubrelvy    Will give one dose of Xanax, pt willing to f/u with psychotherapist as OP. Current emotional status might be related to high dose steroids.     DVT ppx     Pt is clinically stable for discharge to STR today.

## 2021-03-07 NOTE — PROGRESS NOTE ADULT - SUBJECTIVE AND OBJECTIVE BOX
SADIA ANNMARIE    Patient is a 50y old  Female who presents with a chief complaint of Weakness (06 Mar 2021 16:54)    INTERVAL HPI/OVERNIGHT EVENTS: No events overnight. Pt is very emotional today, tearful, states she feels depressed that she still cannot walk without assistance. Otherwise no new complaints, RLE weakness with no significant changes.     ROS: All ROS negative except as documented above.     PHYSICAL EXAM:  T(C): 36.6, Max: 36.6 (03-07-21 @ 07:49)  HR: 64 (64 - 97)  BP: 146/83 (119/61 - 146/83)  RR: 18 (18 - 18)  SpO2: --    GENERAL: NAD, tearful, obese  PULMONARY/CHEST: No rales, rhonchi, or wheezing  CARDIOVASC: Regular rate and rhythm; No murmurs  GI/ABDOMEN: obese, +BS, soft, nontender, nondistended  EXTREMITIEs: LE no edema, no deformity. No calf tenderness b/l.  NERVOUS SYSTEM:  Alert & Oriented X3, RLE motor 3/5, LLE 4+/5, sensation intact    LABS:                        12.1   10.17 )-----------( 200      ( 07 Mar 2021 06:26 )             38.3     03-07    140  |  108  |  21<H>  ----------------------------<  82  3.6   |  24  |  0.8    Ca    8.7      07 Mar 2021 06:26  Mg     2.2     03-07    TPro  5.8<L>  /  Alb  3.4<L>  /  TBili  0.3  /  DBili  x   /  AST  19  /  ALT  28  /  AlkPhos  70  03-07    RADIOLOGY & ADDITIONAL TESTS:  < from: MR Thoracic Spine w/wo IV Cont (03.06.21 @ 13:09) >  IMPRESSION:    Stable nonenhancing abnormal signal in the central cord extending from T1 down to T4-5 level without significant mass effect compared to the thoracic MRI dated 2/27/2021. The differential diagnosis remains nonspecific and could reflect idiopathic or infectious/postinfectious/post-vaccination transverse myelitis, anti-MOG spectrum myelitis, less likely ADEM or cord infarct.    The TRICKS sequence is nondiagnostic due to technique however, there is no evidence of spinal dural aVF such as cord expansion, adjacent flow voids, or pial enhancement. Further evaluation with DSA may be considered.    Stable dorsal epidural lipomatosis extending from T2 down to T7.    < end of copied text >        MEDICATIONS  (STANDING):  amLODIPine   Tablet 5 milliGRAM(s) Oral daily  budesonide  80 MICROgram(s)/formoterol 4.5 MICROgram(s) Inhaler 2 Puff(s) Inhalation two times a day  chlorhexidine 4% Liquid 1 Application(s) Topical <User Schedule>  enoxaparin Injectable 40 milliGRAM(s) SubCutaneous daily  ferrous    sulfate 325 milliGRAM(s) Oral daily  gabapentin 100 milliGRAM(s) Oral three times a day  multivitamin 1 Tablet(s) Oral daily  pantoprazole    Tablet 40 milliGRAM(s) Oral before breakfast  senna 2 Tablet(s) Oral at bedtime  topiramate 200 milliGRAM(s) Oral two times a day    MEDICATIONS  (PRN):  acetaminophen   Tablet .. 650 milliGRAM(s) Oral every 6 hours PRN Moderate Pain (4 - 6)  ALBUTerol    90 MICROgram(s) HFA Inhaler 1 Puff(s) Inhalation every 6 hours PRN Shortness of Breath and/or Wheezing  aluminum hydroxide/magnesium hydroxide/simethicone Suspension 30 milliLiter(s) Oral every 4 hours PRN Dyspepsia  baclofen 5 milliGRAM(s) Oral every 8 hours PRN Muscle Spasm  methocarbamol 750 milliGRAM(s) Oral every 8 hours PRN Muscle Spasm  nystatin Powder 1 Application(s) Topical two times a day PRN itching  polyethylene glycol 3350 17 Gram(s) Oral daily PRN Constipation  Ubrelvy 100 milliGRAM(s) 100 milliGRAM(s) Oral two times a day PRN migrane

## 2021-03-07 NOTE — DISCHARGE NOTE NURSING/CASE MANAGEMENT/SOCIAL WORK - PATIENT PORTAL LINK FT
You can access the FollowMyHealth Patient Portal offered by [facility] by registering at the following website: [email or web address]. By joining [facility]’s FollowMyHealth portal, you will also be able to view your health information using other applications (apps) compatible with our system.

## 2021-03-07 NOTE — PROGRESS NOTE ADULT - REASON FOR ADMISSION
Weakness

## 2021-03-07 NOTE — PROGRESS NOTE ADULT - PROVIDER SPECIALTY LIST ADULT
Hospitalist
Internal Medicine
Neurosurgery
Hospitalist
Neurology
Neurology
Hospitalist
Internal Medicine
Neurology
Hospitalist
Internal Medicine
Internal Medicine
Hospitalist
Hospitalist

## 2021-03-08 LAB — OLIGOCLONAL BANDS CSF ELPH-IMP: SIGNIFICANT CHANGE UP

## 2021-03-10 LAB
COPPER SERPL-MCNC: 80 UG/DL — SIGNIFICANT CHANGE UP (ref 80–158)
MOG AB CSF QL CBA IFA: NEGATIVE — SIGNIFICANT CHANGE UP

## 2021-03-18 ENCOUNTER — APPOINTMENT (OUTPATIENT)
Dept: NEUROLOGY | Facility: CLINIC | Age: 51
End: 2021-03-18
Payer: COMMERCIAL

## 2021-03-18 VITALS
HEIGHT: 67 IN | DIASTOLIC BLOOD PRESSURE: 92 MMHG | BODY MASS INDEX: 34.84 KG/M2 | HEART RATE: 81 BPM | WEIGHT: 222 LBS | OXYGEN SATURATION: 98 % | SYSTOLIC BLOOD PRESSURE: 136 MMHG | TEMPERATURE: 98 F

## 2021-03-18 DIAGNOSIS — E61.1 IRON DEFICIENCY: ICD-10-CM

## 2021-03-18 PROCEDURE — 99213 OFFICE O/P EST LOW 20 MIN: CPT

## 2021-03-18 PROCEDURE — 99072 ADDL SUPL MATRL&STAF TM PHE: CPT

## 2021-03-18 RX ORDER — SENNOSIDES 8.6 MG TABLETS 8.6 MG/1
8.6 TABLET ORAL
Refills: 0 | Status: ACTIVE | COMMUNITY

## 2021-03-18 RX ORDER — MULTIVITAMIN
TABLET ORAL
Refills: 0 | Status: ACTIVE | COMMUNITY

## 2021-03-18 RX ORDER — ALUMINUM HYDROXIDE, MAGNESIUM HYDROXIDE, SIMETHICONE 400; 400; 40 MG/10ML; MG/10ML; MG/10ML
200-200-20 SUSPENSION ORAL
Refills: 0 | Status: ACTIVE | COMMUNITY

## 2021-03-18 NOTE — PHYSICAL EXAM
[FreeTextEntry1] : UE's 5/5\par LLE 5/5\par RLE unable to flex at hip against gravity\par Right knee ext 4/5\par trace movement when trying to plantar flex right foot\par unable to dorsiflex right foot\par \par absent vibration in both feet.\par decreased PP left lower extremity\par normal PP right lower extremity\par

## 2021-03-18 NOTE — HISTORY OF PRESENT ILLNESS
[FreeTextEntry1] : Pt presents today in f/u for myelopathy, longitudinally extensive in thoracic spine.  She has pain up to ~T5.\par Spasms in her legs.  States methocarbamol not helping. Takes baclofen at night but not helping so she was given ambien at night.\par \par Had first dose of Moderna vaccine January 18th.  January 26th fell and twisted her back. Got back up and able to walk again after a week.  She felt sharp pain in her stomach a week before Feb 10th. First sx's of her legs week of February 10.   Received 5 days of IV Solumedrol.\par \par Has nerve pain but gabapentin isn't helping her.  Is taking gabapentin 200 mg three times a day.

## 2021-03-18 NOTE — ASSESSMENT
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  The NMO ab sent for CSF was not completed since there was not enough specimen to run the test.\par Other possible cause could be a spinal vascular anomaly (NOT seen on imaging).\par \par Plan:\par 1.  NMO ab serum test ordered.\par 2.  Increase baclofen to 10 mg am and noon and 20 mg qhs.\par 3.  Discontinue methocarbamol.\par 4.  Increase gabapentin to 300 mg three times a day then increase by 300 mg every 3 days until on 600 mg three times a day.\par 5.  Continue physical therapy.\par 6.  Given onset of symptoms within a couple of weeks of moderna vaccine I would probably not get second dose.  Discussed pros and cons with patient and she didn't want to get second dose.

## 2021-03-22 ENCOUNTER — APPOINTMENT (OUTPATIENT)
Dept: PLASTIC SURGERY | Facility: AMBULATORY SURGERY CENTER | Age: 51
End: 2021-03-22

## 2021-03-22 NOTE — CDI QUERY NOTE - NSCDIOTHERTXTBX_GEN_ALL_CORE_HH
CLINICAL INDICATORS  •	H&P: - Differential includes lumbar radiculopathy, disc herniation with cord impingement, vaccine side-effect/GBS (less likely), and transverse myelitis, or other demyelinating neuropathy     •	Neurology Consult: Differentials include infectious, inflammatory, demyelinating, autoimmune and vaccination ...    •	2/28 MRI: ... THORACIC SPINE: Abnormal signal noted in the spinal cord from T1 through T3 without evidence of enhancement or cord expansion. Finding is nonspecific and may represent demyelination, acute transverse myelitis or viral myelitis. At T10-T11 there is moderate spinal canal stenosis as well as mild mass effect upon the right dorsal aspect of the spinal cord due to osseous spurring involving the ligamentum flavum.       •	Discharge Note: The patient was followed by neurology with concern of transverse myelitis vs demyelinating disorder. An LP was performed with aspiration of 20 mL of clear CSF that was sent for analysis. Analysis showed elevated protein level, no organisms on Gram stain, negative culture, normal glucose, white cell count, the encephalitis and meningitis panel was negative. Lyme antibodies, HIV and Hepatitis B serology and ACE levels were checked and were negative. The patient was treated with Solumedrol 1g IV daily for 5 days and was assessed by neurosurgery with no surgical intervention warranted ... Diagnosis: Spinal cord disorder ... MRI of your spinal cord was done and showed some changes ... follow up with your neurologist as outpatient within 2 weeks of discharge ...      Based on your professional judgment and the clinical indicators, please clarify if the diagnosis of spinal cord disorder can be further specified as:  •	Transverse myelitis vs demyelinating disorder associated with spinal cord disorder could not be ruled out at the time of discharge  •	Transverse myelitis vs demyelinating disorder was ruled out at the time of discharge  •	Other (please specify):  •	Clinically unable to determine

## 2021-03-29 ENCOUNTER — APPOINTMENT (OUTPATIENT)
Dept: PLASTIC SURGERY | Facility: CLINIC | Age: 51
End: 2021-03-29

## 2021-04-13 ENCOUNTER — APPOINTMENT (OUTPATIENT)
Dept: SURGERY | Facility: CLINIC | Age: 51
End: 2021-04-13

## 2021-04-16 ENCOUNTER — APPOINTMENT (OUTPATIENT)
Dept: NEUROLOGY | Facility: CLINIC | Age: 51
End: 2021-04-16
Payer: COMMERCIAL

## 2021-04-16 VITALS
SYSTOLIC BLOOD PRESSURE: 133 MMHG | DIASTOLIC BLOOD PRESSURE: 90 MMHG | TEMPERATURE: 98 F | HEIGHT: 67 IN | WEIGHT: 217 LBS | BODY MASS INDEX: 34.06 KG/M2 | HEART RATE: 79 BPM | OXYGEN SATURATION: 94 %

## 2021-04-16 PROCEDURE — 99072 ADDL SUPL MATRL&STAF TM PHE: CPT

## 2021-04-16 PROCEDURE — 99214 OFFICE O/P EST MOD 30 MIN: CPT

## 2021-04-16 RX ORDER — TIZANIDINE HYDROCHLORIDE 4 MG/1
4 CAPSULE ORAL DAILY
Refills: 0 | Status: DISCONTINUED | COMMUNITY
End: 2021-04-16

## 2021-04-18 NOTE — PHYSICAL EXAM
[FreeTextEntry1] : UE's 5/5 proximally and distally.\par LLE 5-/5.\par RLE 2/5 hip flexion, 3 knee extension 2/5 knee flexion, 1/5 foot DF.

## 2021-04-18 NOTE — ASSESSMENT
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  Still has significant weakness residual from thoracic myelopathy.  She needs to be started on immunosuppression treatment soon.  I would like her to receive consultation with Dr. Ruy De Los Santos at [facility] to assist with determining best immunosuppression regimen and help answer the question of risk / benefit for Covid-19 vaccination given that her symptoms began within a few weeks of her first vaccine.  Our office reached out to Dr. De Los Santos and his office staff and they are working on getting patient appointment.

## 2021-04-18 NOTE — HISTORY OF PRESENT ILLNESS
[FreeTextEntry1] : Pt presents in f/u for myelopathy.\par She still is very weak in her right  leg.\par Pain in bilateral legs.  Spasms.\par On baclofen 10 mg in am and afternoon, 20 mg qpm\par Gabapentin 400 mg three times a day.  Can increase to 600 mg three times a day.\par \par She is returning home today from rehab and getting outpt pt and will be seein pain management.\par Will be seeing ophthalmologist soon.\par Vision studied in November due to hx of pituitary tumor told it was normal.\par Hasn't had f/u yet.  Will call for appointment.  \par \par NMO ab positive level = 9\par \par See's little bright dot if looks down in right eye.\par In left eye sees a blue light on and off a couple of times a week if looks fast.\par \par PERRLA\par \par OD 20/25 -1\par OS 20/30-2\par \par \par

## 2021-04-21 LAB
CULTURE RESULTS: SIGNIFICANT CHANGE UP
SPECIMEN SOURCE: SIGNIFICANT CHANGE UP

## 2021-05-07 ENCOUNTER — APPOINTMENT (OUTPATIENT)
Dept: NEUROLOGY | Facility: CLINIC | Age: 51
End: 2021-05-07
Payer: COMMERCIAL

## 2021-05-07 PROCEDURE — 99215 OFFICE O/P EST HI 40 MIN: CPT | Mod: 95

## 2021-05-07 PROCEDURE — 99417 PROLNG OP E/M EACH 15 MIN: CPT | Mod: 95

## 2021-05-07 NOTE — HISTORY OF PRESENT ILLNESS
[Home] : at home, [unfilled] , at the time of the visit. [Medical Office: ([facility])___] : at the medical office located in  [Verbal consent obtained from patient] : the patient, [unfilled] [FreeTextEntry1] : Reason for consult: NMO\par \par HPI: ANNMARIE MCCULLOUGH is a 50 year old woman \par \par Referred by Dr. Vizcaino.\par 2/2021 - RLE weakness, tightness, gait dysfunction, some RLE discomfort. went to ED 4x. Then LLE was numb but without weakness. Went to pain management, referred to neurologist, then went to [facility]. 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 Got IVMP, no improvement.\par Saw Dr. Vizcaino who sent for NMO ab in 3/2021, referred to me, but developed relapse in 4/2021.\par 4/2021 - acute paraplegia. [facility], got 3d IVMP and 5 sessions PLEX. Got rituximab x1. since then, some sensation coming back but no movement. \par Currently in [facility].\par \par \par ROS/Current Sx:\par severe incontinence of both bladder and bowel.\par neuropathic pain in both legs\par \par PMHX:\par NMO\par asthma\par depression\par anxiety\par LBP\par insomnia\par migraines\par gastric sleeve\par \par MEDS:\par qvar\par albuterol\par ambien\par lovenox ppx\par percocet prn pain\par midodrine\par omeprazole\par rituximab\par nasal spray\par baclofen 10-10-20\par pregabalin 100 bid\par topamax\par ubrelvy\par wellbutrin\par xanax\par b12\par calcium\par MV\par iron\par biotin\par colace\par miralax\par \par ALL: nkda\par \par SHx: no tob\par \par FHx: no neuro, no AI.\par \par Exam:\par AO3.  Normally conversant.  Follows commands, names, and repeats.  Good attention.\par PERRL, VFF, EOMI, no nystagmus, face symmetric, TUP at midline.\par paraplegic\par moves arms well\par \par MRI C, T, and brain 2/2021 reviewed - thoracic LETM on my review.\par \par AP: 51yo w/ NMO (aqp4+, thoracic myelitis in 2/2021, recurrent myelitis in 4/2021)\par \par All questions answered, education provided re: dx, rx, sx, prognosis. management discussed at length.\par \par discussed with Dr. Funes and Dr. Vizcaino.\par \par - 3d IVMP, then 60mg prednisone daily, down by 10mg qweek. Discussed with Dr. Funes and he agrees.\par - repeat 1g rituximab in 2wks (Dr. Vizcaino is arranging with rehab)\par - medical team at [place] - [phone] (vanessa)\par - cont rehab\par - RTC 6wks\par \par

## 2021-05-10 ENCOUNTER — APPOINTMENT (OUTPATIENT)
Dept: NEUROLOGY | Facility: CLINIC | Age: 51
End: 2021-05-10
Payer: COMMERCIAL

## 2021-05-10 VITALS
SYSTOLIC BLOOD PRESSURE: 145 MMHG | OXYGEN SATURATION: 97 % | HEART RATE: 83 BPM | TEMPERATURE: 96.2 F | HEIGHT: 67 IN | BODY MASS INDEX: 33.27 KG/M2 | DIASTOLIC BLOOD PRESSURE: 94 MMHG | WEIGHT: 212 LBS

## 2021-05-10 LAB
HBV E AB SER QL: NONREACTIVE
HBV SURFACE AB SER QL: NONREACTIVE
HBV SURFACE AG SER QL: NONREACTIVE
M TB IFN-G BLD-IMP: NEGATIVE
QUANTIFERON TB PLUS MITOGEN MINUS NIL: 7.92 IU/ML
QUANTIFERON TB PLUS NIL: 0.01 IU/ML
QUANTIFERON TB PLUS TB1 MINUS NIL: 0.01 IU/ML
QUANTIFERON TB PLUS TB2 MINUS NIL: 0.01 IU/ML

## 2021-05-10 PROCEDURE — 99214 OFFICE O/P EST MOD 30 MIN: CPT

## 2021-05-10 PROCEDURE — 99072 ADDL SUPL MATRL&STAF TM PHE: CPT

## 2021-05-10 NOTE — HISTORY OF PRESENT ILLNESS
[FreeTextEntry1] : Pt presents for f/u of neuromyelitis optica.  States 4/17 she fell down at bedside after going to the bathroom felt leg weakening fell onto left knee.  called police and they picked her up and was unable to get out of bed.  Went to hospital on Tuesday to ER after noon.  First CTH negative.  THen MRI of spine and that showed new inflammation.  Admitted and got 3 days of 1,000 mg Solumedrol then plasma exchange every other day for 5 exchanges.  Then had Rituxan a couple of days after that.   She needs second induction dose of 1 gram after the 2 week zaheer which will be 5/18.  So on 5/19 or as close to that as possible.\par \par Dr. De Los Santos saw her on Friday and wanted her on additional 3 days of IV Solumedrol which she just completed today.\par \par Started to feel more "surges of electricity" in Legs L > R over past few days.\par \par Now she is incontinent of bowel and bladder. Sometimes feels pressure wanting to go.

## 2021-05-10 NOTE — ASSESSMENT
Subjective       Jacinto Vanegas is a 1 y.o. male.     Chief Complaint   Patient presents with   • Cough   • Fever     102.3   • Earache     julian Casey Is brought in today by her mother for concerns of fever, cough, and earache.  Mother reports last night, patient developed a fever, max T1 102.3, responsive to Tylenol as often.  She has had a dry, nonproductive cough, clear rhinorrhea and nasal congestion.  Mother has noticed her pulling at her ears.  This morning. She has not  any drainage from her ears.  Denies any wheezing, shortness of breath, increased breathing, posttussive emesis.  She is not eating as much as  usual, but continues to drink fluids with good urine output.  Denies any bowel changes, nuchal rigidity, urinary symptoms, or rash.  Denies any ill contacts.      Cough   This is a new problem. The current episode started yesterday. The problem has been unchanged. The cough is non-productive. Associated symptoms include ear pain, a fever and rhinorrhea. Pertinent negatives include no nasal congestion, rash, shortness of breath or wheezing. Nothing aggravates the symptoms. He has tried nothing for the symptoms. His past medical history is significant for environmental allergies.   Fever    This is a new problem. The current episode started yesterday. The problem occurs constantly. The problem has been unchanged. The maximum temperature noted was 102 to 102.9 F. The temperature was taken using an oral thermometer. Associated symptoms include coughing and ear pain. Pertinent negatives include no congestion, diarrhea, rash, vomiting or wheezing. He has tried acetaminophen and NSAIDs for the symptoms. The treatment provided mild relief.   Risk factors: no sick contacts    Earache    There is pain in both ears. This is a new problem. The current episode started today. The problem occurs constantly. The problem has been unchanged. The maximum temperature recorded prior to his arrival was 102 -  [FreeTextEntry1] : Patient has neuromyelitis optica, confirmed with NMO antibody and imaging of spinal cord and brain.\par She presented with hemicord syndrome causing right LE weakness and recently had progression to develop paraplegia with no movement and only deep pressure sensation in LE's. \par She failed steroids and IVIG and required plasma exchange and now received first of two induction doses of Rituxan (May 4th).  Her second rituxan dose is due on May 19th for 1 gram.  This treatment is medically necessary as she is unable to walk and at risk for further worsening which can progress to quadriparesis and blindness.\par She will be on oral prednisone taper as bridge to full Rituxan induction.\par She will return in 4-6 weeks and should return to hospital for any worsening. 102.9 F. The fever has been present for 1 to 2 days. The pain is mild. Associated symptoms include coughing and rhinorrhea. Pertinent negatives include no diarrhea, ear discharge, rash or vomiting. He has tried acetaminophen and NSAIDs for the symptoms. The treatment provided moderate relief.        The following portions of the patient's history were reviewed and updated as appropriate: allergies, current medications, past family history, past medical history, past social history, past surgical history and problem list.    Current Outpatient Prescriptions   Medication Sig Dispense Refill   • albuterol (PROVENTIL HFA;VENTOLIN HFA) 108 (90 Base) MCG/ACT inhaler Inhale 2 puffs Every 4 (Four) Hours As Needed for Wheezing or Shortness of Air (persistent coughing). 8 g 3   • albuterol (PROVENTIL) (2.5 MG/3ML) 0.083% nebulizer solution Take 2.5 mg by nebulization Every 4 (Four) Hours As Needed for wheezing. 150 mL 12   • ibuprofen (CHILDRENS IBUPROFEN) 100 MG/5ML suspension Take 9.6 mL by mouth Every 6 (Six) Hours As Needed for Mild Pain  or Fever. 473 mL 1   • loratadine (CLARITIN) 5 MG/5ML syrup Take 5 mL by mouth Daily. 236 mL 12   • acetaminophen (TYLENOL) 160 MG/5ML liquid Give 7.5 mL by mouth every 4 hours as needed for fever. 236 mL 0     No current facility-administered medications for this visit.        Allergies   Allergen Reactions   • Augmentin [Amoxicillin-Pot Clavulanate] GI Intolerance   • Azithromycin GI Intolerance       Past Medical History:   Diagnosis Date   • Acute suppurative otitis media without spontaneous rupture of ear drum     right ear   • Acute upper respiratory infection    • Allergic rhinitis    • Cradle cap    • Diaper dermatitis    • Nasal congestion    • Person with feared health complaint in whom no diagnosis is made    • Rash and nonspecific skin eruption    • Seborrheic dermatitis    • Stenosis of nasolacrimal duct     congenital   • Viral syndrome        Review of Systems  "  Constitutional: Positive for appetite change and fever.   HENT: Positive for ear pain and rhinorrhea. Negative for congestion, ear discharge and trouble swallowing.    Eyes: Negative.    Respiratory: Positive for cough. Negative for apnea, choking, shortness of breath, wheezing and stridor.    Cardiovascular: Negative.    Gastrointestinal: Negative.  Negative for diarrhea and vomiting.   Endocrine: Negative.    Genitourinary: Negative.  Negative for decreased urine volume.   Musculoskeletal: Negative.  Negative for neck stiffness.   Skin: Negative.  Negative for rash.   Allergic/Immunologic: Positive for environmental allergies.   Neurological: Negative.    Hematological: Negative.    Psychiatric/Behavioral: Negative.          Objective     Temp 98.1 °F (36.7 °C)  Ht 94 cm (37\")  Wt (!) 19.1 kg (42 lb)  BMI 21.57 kg/m2    Physical Exam   Constitutional: He appears well-developed and well-nourished. He is active.   HENT:   Head: Atraumatic.   Right Ear: Tympanic membrane normal.   Left Ear: Tympanic membrane normal.   Nose: Congestion present.   Mouth/Throat: Mucous membranes are moist. Pharynx erythema present. Tonsils are 4+ on the right. Tonsils are 4+ on the left.   Eyes: Conjunctivae and lids are normal. Red reflex is present bilaterally.   Neck: Normal range of motion. Neck supple. No rigidity.   Cardiovascular: Normal rate and regular rhythm.    Pulmonary/Chest: Effort normal and breath sounds normal. No accessory muscle usage, nasal flaring, stridor or grunting. No respiratory distress. Air movement is not decreased. No transmitted upper airway sounds. He has no decreased breath sounds. He has no wheezes. He has no rhonchi. He has no rales. He exhibits no retraction.   Abdominal: Soft. Bowel sounds are normal. He exhibits no mass. There is no rigidity.   Musculoskeletal: Normal range of motion.   Lymphadenopathy:     He has no cervical adenopathy.   Neurological: He is alert.   Skin: Skin is warm and " dry. Capillary refill takes less than 3 seconds. No rash noted. No pallor.   Nursing note and vitals reviewed.        Assessment/Plan     Jacinto was seen today for cough, fever and earache.    Diagnoses and all orders for this visit:    Streptococcal pharyngitis  -     Discontinue: amoxicillin (AMOXIL) 400 MG/5ML suspension; Take 6 mL by mouth 2 (Two) Times a Day for 10 days.    Fever in pediatric patient  -     POC Rapid Strep A  -     POC Influenza A / B    Other orders  -     acetaminophen (TYLENOL) 160 MG/5ML liquid; Give 7.5 mL by mouth every 4 hours as needed for fever.    Influenza negative. RST positive. Will treat with amoxicillin 50 mg/kg X 10 days.   Encourage fluids.  May gargle with salt water if desired. Throw away toothbrush after 24hrs of treatment.    May not return to school or  until treated at least 24hrs and fever has resolved.   Return to clinic if symptoms worsen or do not improve. Discussed s/s warranting ER presentation.         Return for Next scheduled follow up.

## 2021-05-20 ENCOUNTER — OUTPATIENT (OUTPATIENT)
Dept: OUTPATIENT SERVICES | Facility: HOSPITAL | Age: 51
LOS: 1 days | Discharge: HOME | End: 2021-05-20

## 2021-05-20 ENCOUNTER — APPOINTMENT (OUTPATIENT)
Dept: HEMATOLOGY ONCOLOGY | Facility: CLINIC | Age: 51
End: 2021-05-20
Payer: COMMERCIAL

## 2021-05-20 ENCOUNTER — NON-APPOINTMENT (OUTPATIENT)
Age: 51
End: 2021-05-20

## 2021-05-20 ENCOUNTER — APPOINTMENT (OUTPATIENT)
Dept: INFUSION THERAPY | Facility: CLINIC | Age: 51
End: 2021-05-20
Payer: COMMERCIAL

## 2021-05-20 DIAGNOSIS — Z98.890 OTHER SPECIFIED POSTPROCEDURAL STATES: Chronic | ICD-10-CM

## 2021-05-20 DIAGNOSIS — Z98.891 HISTORY OF UTERINE SCAR FROM PREVIOUS SURGERY: Chronic | ICD-10-CM

## 2021-05-20 DIAGNOSIS — M54.5 LOW BACK PAIN: ICD-10-CM

## 2021-05-20 PROCEDURE — 99213 OFFICE O/P EST LOW 20 MIN: CPT

## 2021-05-20 PROCEDURE — 99203 OFFICE O/P NEW LOW 30 MIN: CPT

## 2021-05-20 RX ORDER — RITUXIMAB 10 MG/ML
1000 INJECTION, SOLUTION INTRAVENOUS ONCE
Refills: 0 | Status: COMPLETED | OUTPATIENT
Start: 2021-05-20 | End: 2021-05-20

## 2021-05-20 RX ORDER — DEXAMETHASONE 0.5 MG/5ML
12 ELIXIR ORAL ONCE
Refills: 0 | Status: COMPLETED | OUTPATIENT
Start: 2021-05-20 | End: 2021-05-20

## 2021-05-20 RX ORDER — DIPHENHYDRAMINE HCL 50 MG
50 CAPSULE ORAL ONCE
Refills: 0 | Status: COMPLETED | OUTPATIENT
Start: 2021-05-20 | End: 2021-05-20

## 2021-05-20 RX ORDER — FAMOTIDINE 10 MG/ML
20 INJECTION INTRAVENOUS ONCE
Refills: 0 | Status: COMPLETED | OUTPATIENT
Start: 2021-05-20 | End: 2021-05-20

## 2021-05-20 RX ORDER — ACETAMINOPHEN 500 MG
650 TABLET ORAL ONCE
Refills: 0 | Status: COMPLETED | OUTPATIENT
Start: 2021-05-20 | End: 2021-05-20

## 2021-05-20 RX ADMIN — Medication 650 MILLIGRAM(S): at 11:29

## 2021-05-20 RX ADMIN — Medication 650 MILLIGRAM(S): at 11:30

## 2021-05-20 RX ADMIN — FAMOTIDINE 104 MILLIGRAM(S): 10 INJECTION INTRAVENOUS at 11:29

## 2021-05-20 RX ADMIN — Medication 122 MILLIGRAM(S): at 11:29

## 2021-05-20 RX ADMIN — RITUXIMAB 166.67 MILLIGRAM(S): 10 INJECTION, SOLUTION INTRAVENOUS at 12:06

## 2021-05-20 RX ADMIN — Medication 102 MILLIGRAM(S): at 11:29

## 2021-05-22 PROBLEM — M54.5 LOW BACK PAIN: Status: ACTIVE | Noted: 2018-06-13

## 2021-05-24 ENCOUNTER — NON-APPOINTMENT (OUTPATIENT)
Age: 51
End: 2021-05-24

## 2021-06-02 ENCOUNTER — RX RENEWAL (OUTPATIENT)
Age: 51
End: 2021-06-02

## 2021-06-08 DIAGNOSIS — G82.21 PARAPLEGIA, COMPLETE: ICD-10-CM

## 2021-06-08 DIAGNOSIS — M54.5 LOW BACK PAIN: ICD-10-CM

## 2021-06-08 DIAGNOSIS — G36.0 NEUROMYELITIS OPTICA [DEVIC]: ICD-10-CM

## 2021-06-10 ENCOUNTER — APPOINTMENT (OUTPATIENT)
Dept: NEUROLOGY | Facility: CLINIC | Age: 51
End: 2021-06-10
Payer: COMMERCIAL

## 2021-06-10 VITALS
BODY MASS INDEX: 33.27 KG/M2 | SYSTOLIC BLOOD PRESSURE: 108 MMHG | WEIGHT: 212 LBS | HEART RATE: 97 BPM | TEMPERATURE: 97.4 F | HEIGHT: 67 IN | DIASTOLIC BLOOD PRESSURE: 74 MMHG | OXYGEN SATURATION: 98 %

## 2021-06-10 PROCEDURE — 99214 OFFICE O/P EST MOD 30 MIN: CPT

## 2021-06-10 RX ORDER — AMLODIPINE BESYLATE 5 MG/1
5 TABLET ORAL DAILY
Refills: 0 | Status: DISCONTINUED | COMMUNITY
Start: 2018-01-30 | End: 2021-06-10

## 2021-06-10 RX ORDER — GABAPENTIN 600 MG/1
600 TABLET, COATED ORAL
Qty: 90 | Refills: 5 | Status: DISCONTINUED | COMMUNITY
Start: 2021-03-18 | End: 2021-06-10

## 2021-06-17 ENCOUNTER — APPOINTMENT (OUTPATIENT)
Dept: NEUROLOGY | Facility: CLINIC | Age: 51
End: 2021-06-17
Payer: COMMERCIAL

## 2021-06-17 PROCEDURE — 99215 OFFICE O/P EST HI 40 MIN: CPT | Mod: 95

## 2021-06-18 NOTE — PHYSICAL EXAM
[FreeTextEntry1] : altered sensation medial left forearm and hand.\par near card testing:\par 20/25 -2 os\par 20/20 -1 od\par \par VF intact to confrontation.\par  \par 5/5 upper extremities.\par 0/5 Lower extremities.\par \par normal LT, PP, Vib in UE's.\par absent LT, PP Vib in LE's.\par \par mid thoracic sensory level over spine.\par

## 2021-06-18 NOTE — ASSESSMENT
[FreeTextEntry1] : Neuromyelitis optica with paraparesis.  LE's are flaccid and no movement is seen today.  No detectable sensation on exam though patient is experiencing various sensory symptoms in different parts of her body.  This is of uncertain signficance at this time.  The way she described some of the pulling sensation sounded like spasticity but none was detected on her exam today.  I suggested she could try an additional baclofen tablet when experiencing the rib discomfort to see if that helps.  She should continue the lyrica and gabapentin and follow up with pain management as well.  She will continue steroid taper as suggested by Dr. De Los Santos.\par She will return in 4-6 weeks.

## 2021-06-19 NOTE — HISTORY OF PRESENT ILLNESS
[Home] : at home, [unfilled] , at the time of the visit. [Medical Office: ([facility])___] : at the medical office located in  [Verbal consent obtained from patient] : the patient, [unfilled] [FreeTextEntry1] : Initial hx 5/2021\par Referred by Dr. Vizcaino.\par 2/2021 - RLE weakness, tightness, gait dysfunction, some RLE discomfort. went to ED 4x. Then LLE was numb but without weakness. Went to pain management, referred to neurologist, then went to [facility]. 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 Got IVMP, no improvement.\par Saw Dr. Vizcaino who sent for NMO ab in 3/2021, referred to me, but developed relapse in 4/2021.\par 4/2021 - acute paraplegia. [facility], got 3d IVMP and 5 sessions PLEX. Got rituximab 1g x2 in 5/2021. since then, some sensation coming back but no movement. \par Currently in [place] rehab.\par \par Subj interval:\par \par Stil in rehab.\par \par Still not having movement in her legs.\par \par Still with neuropathic pain and bothersome paresthesias in both legs, torso. Pressure-like in both legs.\par \par PMHX:\par NMO\par asthma\par depression\par anxiety\par LBP\par insomnia\par migraines\par gastric sleeve\par \par MEDS:\par qvar\par albuterol\par ambien\par lovenox ppx\par percocet prn pain\par midodrine\par omeprazole\par rituximab\par nasal spray\par baclofen 10-10-20\par pregabalin 100 tid\par topamax\par ubrelvy\par wellbutrin\par valium prn\par b12\par calcium\par MV\par iron\par biotin\par colace\par miralax\par \par \par O:\par \par SHx: no tob\par \par FHx: no neuro, no AI.\par \par Exam:\par AO3. Normally conversant. Follows commands, names, and repeats. Good attention.\par PERRL, VFF, EOMI, no nystagmus, face symmetric, TUP at midline.\par paraplegic\par moves arms well\par \par \par MRI C, T, and brain 2/2021 reviewed - thoracic LETM on my review.\par \par \par AP: 51yo w/ NMO (aqp4+, thoracic myelitis in 2/2021, recurrent myelitis in 4/2021)\par \par All questions answered, education provided re: dx, rx, sx, prognosis. management discussed at length.\par \par - plan for continued rituximab in 11/2021\par - recommended to increase lyrica to goal 200tid over time for neuropathic pain, spasms.\par - discussed with medical team at [place] - [phone] (Karina - staff nurse) for pain, sleep.***\par - cont prednisone taper, can decrease slowly to off if clinically stable, as she is on rituximab.\par - cont rehab\par - RTC 6wks\par

## 2021-06-25 PROBLEM — Z00.00 ENCOUNTER FOR PREVENTIVE HEALTH EXAMINATION: Noted: 2021-06-25

## 2021-06-29 ENCOUNTER — NON-APPOINTMENT (OUTPATIENT)
Age: 51
End: 2021-06-29

## 2021-08-03 ENCOUNTER — APPOINTMENT (OUTPATIENT)
Dept: NEUROLOGY | Facility: CLINIC | Age: 51
End: 2021-08-03
Payer: COMMERCIAL

## 2021-08-03 VITALS
DIASTOLIC BLOOD PRESSURE: 79 MMHG | HEIGHT: 67 IN | WEIGHT: 212 LBS | HEART RATE: 79 BPM | BODY MASS INDEX: 33.27 KG/M2 | TEMPERATURE: 97.8 F | OXYGEN SATURATION: 90 % | SYSTOLIC BLOOD PRESSURE: 144 MMHG

## 2021-08-03 DIAGNOSIS — M54.2 CERVICALGIA: ICD-10-CM

## 2021-08-03 PROCEDURE — 99214 OFFICE O/P EST MOD 30 MIN: CPT

## 2021-08-03 NOTE — PHYSICAL EXAM
[FreeTextEntry1] : 20/25 -2 OD  (without readers)\par 20/25 -1 OS (without readers).\par \par \par 5/5 proximal/distal upper extremity strength\par 0/5 LE's strength proximally and distally.\par \par Decreased sensation from ~ mid thoracic region down.\par Cannot feel LT/PP in LE's.\par \par 2+ upper extremity reflexes\par absent LE reflexes.\par Tone is not increased in LE's.

## 2021-08-03 NOTE — ASSESSMENT
[FreeTextEntry1] : MOG ab NMO syndrome causing thoracic myelopathy and paraparesis.\par She received steroids, IVIg, PLEX and now rituxan x 2 w/o improvement in LE's.\par UE's remain strong.  She is wants to change from baclofen to tizanidine to see if it helps the spasms better.\par Cervicalgia.\par \par Plan:\par 1.  Discontinue baclofen.\par 2.  Begin Tizanidine 4 mg po tid.  May increase to 6 mg po tid if needed.\par 3.  Physical therapy for cervicalgia.\par 4.  Return in 2-3 months.

## 2021-08-03 NOTE — HISTORY OF PRESENT ILLNESS
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  She has spasms in her arm.  When reaches for something with her left hand she can get a spasm LUE causing left arm to rise above her head, after she gets tingling in left hand shooting up her left arm.  States this happens in morning when she wakes up and aids make her turn to hold the bed rail when changing her diaper.   left arm stays up for a few minutes or seconds then slams down then crosses over her body to right or goes back up, digits 3-5 can get numb.\par States lyrica 200 mg three times a day helps with the shooting pain in her legs (don't bother her as much any more).  Arm symptoms mainly in the morning a few times, occasionally once in afternoon, not every day, maybe 3-4 days per week.  Has been happening for about 3 weeks.\par \par States her left leg went up and down on its on for weeks then she had an episode 2 months later.\par \par "4/2021 - acute paraplegia. [facility], got 3d IVMP and 5 sessions PLEX. Got rituximab 1g x2 in 5/2021. since then, some sensation coming back but no movement. "\par \par \par She took baclofen 10 mg / 10 mg/ 20 mg with option for extra 10 mg.\par \par States spasms in her back are in middle of night and can last 15-20 min.\par

## 2021-08-04 ENCOUNTER — APPOINTMENT (OUTPATIENT)
Dept: NEUROLOGY | Facility: CLINIC | Age: 51
End: 2021-08-04
Payer: COMMERCIAL

## 2021-08-04 PROCEDURE — 99215 OFFICE O/P EST HI 40 MIN: CPT | Mod: 95

## 2021-08-04 NOTE — HISTORY OF PRESENT ILLNESS
[Home] : at home, [unfilled] , at the time of the visit. [Medical Office: ([facility])___] : at the medical office located in  [Verbal consent obtained from patient] : the patient, [unfilled] [FreeTextEntry1] : Initial hx 5/2021\par Referred by Dr. Vizcaino.\par 2/2021 - RLE weakness, tightness, gait dysfunction, some RLE discomfort. went to ED 4x. Then LLE was numb but without weakness. Went to pain management, referred to neurologist, then went to [facility]. 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 Got IVMP, no improvement.\par Saw Dr. Vizcaino who sent for NMO ab in 3/2021, referred to me, but developed relapse in 4/2021.\par 4/2021 - acute paraplegia. [facility], got 3d IVMP and 5 sessions PLEX. Got rituximab 1g x2 in 5/2021. since then, some sensation coming back but no movement. \par Currently in [place] rehab.\par \par Subj interval:\par \par Stil in rehab.\par \par Still not having movement in her legs.\par \par Still with neuropathic pain and bothersome paresthesias in both legs, torso. Pressure-like in both legs. lyrica 200tid helps with the pain and improved her sleep.\par \par Painful spasms in her back, back is "moving" almost like a cramp. Happened 3x over past 2 days, lasting seconds to minutes. \par \par PMHX:\par NMO\par asthma\par depression\par anxiety\par LBP\par insomnia\par migraines\par gastric sleeve\par \par MEDS:\par qvar\par albuterol\par ambien\par lovenox ppx\par percocet prn pain\par midodrine\par omeprazole\par rituximab\par nasal spray\par baclofen \par pregabalin \par topamax\par ubrelvy\par wellbutrin\par valium prn\par b12\par calcium\par MV\par iron\par biotin\par colace\par miralax\par gabapentin 100tid\par \par O:\par \par SHx: no tob\par \par FHx: no neuro, no AI.\par \par Exam:\par AO3. Normally conversant. Follows commands, names, and repeats. Good attention.\par PERRL, VFF, EOMI, no nystagmus, face symmetric, TUP at midline.\par paraplegic\par moves arms well\par \par \par MRI C, T, and brain 2/2021 reviewed - thoracic LETM on my review.\par \par \par AP: 49yo w/ NMO (aqp4+, thoracic myelitis in 2/2021, recurrent myelitis in 4/2021)\par \par All questions answered, education provided re: dx, rx, sx, prognosis. management discussed at length.\par \par - plan for continued rituximab in 11/2021\par - cont lyrica to goal 200tid for neuropathic pain, spasms.\par - stop gabapentin 100tid\par - increase baclofen to 20tid x3d, then 30tid\par - to consider tramadol for pain if does not improve with baclofen\par - discussed with medical team at [place] - [phone] (Karina - staff nurse) for pain, sleep.\par - cont prednisone taper, can decrease slowly to off if clinically stable, as she is on rituximab.\par - cont rehab\par - discussed with Dr. Vizcaino.\par - RTC 6wks

## 2021-08-06 ENCOUNTER — OUTPATIENT (OUTPATIENT)
Dept: OUTPATIENT SERVICES | Facility: HOSPITAL | Age: 51
LOS: 1 days | Discharge: HOME | End: 2021-08-06
Payer: COMMERCIAL

## 2021-08-06 DIAGNOSIS — Z98.891 HISTORY OF UTERINE SCAR FROM PREVIOUS SURGERY: Chronic | ICD-10-CM

## 2021-08-06 DIAGNOSIS — Z98.890 OTHER SPECIFIED POSTPROCEDURAL STATES: Chronic | ICD-10-CM

## 2021-08-06 DIAGNOSIS — Z12.31 ENCOUNTER FOR SCREENING MAMMOGRAM FOR MALIGNANT NEOPLASM OF BREAST: ICD-10-CM

## 2021-08-06 PROCEDURE — 77063 BREAST TOMOSYNTHESIS BI: CPT | Mod: 26

## 2021-08-06 PROCEDURE — 77067 SCR MAMMO BI INCL CAD: CPT | Mod: 26

## 2021-08-09 ENCOUNTER — NON-APPOINTMENT (OUTPATIENT)
Age: 51
End: 2021-08-09

## 2021-08-17 ENCOUNTER — EMERGENCY (EMERGENCY)
Facility: HOSPITAL | Age: 51
LOS: 0 days | Discharge: HOME | End: 2021-08-17
Attending: EMERGENCY MEDICINE | Admitting: EMERGENCY MEDICINE
Payer: COMMERCIAL

## 2021-08-17 VITALS
OXYGEN SATURATION: 99 % | DIASTOLIC BLOOD PRESSURE: 78 MMHG | HEIGHT: 67 IN | HEART RATE: 87 BPM | RESPIRATION RATE: 18 BRPM | SYSTOLIC BLOOD PRESSURE: 139 MMHG | TEMPERATURE: 98 F

## 2021-08-17 DIAGNOSIS — Z87.09 PERSONAL HISTORY OF OTHER DISEASES OF THE RESPIRATORY SYSTEM: ICD-10-CM

## 2021-08-17 DIAGNOSIS — Z86.69 PERSONAL HISTORY OF OTHER DISEASES OF THE NERVOUS SYSTEM AND SENSE ORGANS: ICD-10-CM

## 2021-08-17 DIAGNOSIS — J45.909 UNSPECIFIED ASTHMA, UNCOMPLICATED: ICD-10-CM

## 2021-08-17 DIAGNOSIS — R35.0 FREQUENCY OF MICTURITION: ICD-10-CM

## 2021-08-17 DIAGNOSIS — Z79.899 OTHER LONG TERM (CURRENT) DRUG THERAPY: ICD-10-CM

## 2021-08-17 DIAGNOSIS — Z98.890 OTHER SPECIFIED POSTPROCEDURAL STATES: Chronic | ICD-10-CM

## 2021-08-17 DIAGNOSIS — Z98.84 BARIATRIC SURGERY STATUS: ICD-10-CM

## 2021-08-17 DIAGNOSIS — R10.11 RIGHT UPPER QUADRANT PAIN: ICD-10-CM

## 2021-08-17 DIAGNOSIS — Z98.891 HISTORY OF UTERINE SCAR FROM PREVIOUS SURGERY: Chronic | ICD-10-CM

## 2021-08-17 DIAGNOSIS — N30.00 ACUTE CYSTITIS WITHOUT HEMATURIA: ICD-10-CM

## 2021-08-17 LAB
ALBUMIN SERPL ELPH-MCNC: 3.4 G/DL — LOW (ref 3.5–5.2)
ALP SERPL-CCNC: 85 U/L — SIGNIFICANT CHANGE UP (ref 30–115)
ALT FLD-CCNC: 8 U/L — SIGNIFICANT CHANGE UP (ref 0–41)
ANION GAP SERPL CALC-SCNC: 11 MMOL/L — SIGNIFICANT CHANGE UP (ref 7–14)
APPEARANCE UR: ABNORMAL
AST SERPL-CCNC: 14 U/L — SIGNIFICANT CHANGE UP (ref 0–41)
BACTERIA # UR AUTO: ABNORMAL
BASOPHILS # BLD AUTO: 0.03 K/UL — SIGNIFICANT CHANGE UP (ref 0–0.2)
BASOPHILS NFR BLD AUTO: 0.4 % — SIGNIFICANT CHANGE UP (ref 0–1)
BILIRUB DIRECT SERPL-MCNC: <0.2 MG/DL — SIGNIFICANT CHANGE UP (ref 0–0.2)
BILIRUB INDIRECT FLD-MCNC: >0.1 MG/DL — LOW (ref 0.2–1.2)
BILIRUB SERPL-MCNC: 0.3 MG/DL — SIGNIFICANT CHANGE UP (ref 0.2–1.2)
BILIRUB UR-MCNC: NEGATIVE — SIGNIFICANT CHANGE UP
BUN SERPL-MCNC: 21 MG/DL — HIGH (ref 10–20)
CALCIUM SERPL-MCNC: 8.9 MG/DL — SIGNIFICANT CHANGE UP (ref 8.5–10.1)
CHLORIDE SERPL-SCNC: 109 MMOL/L — SIGNIFICANT CHANGE UP (ref 98–110)
CO2 SERPL-SCNC: 24 MMOL/L — SIGNIFICANT CHANGE UP (ref 17–32)
COLOR SPEC: ABNORMAL
CREAT SERPL-MCNC: 0.7 MG/DL — SIGNIFICANT CHANGE UP (ref 0.7–1.5)
DIFF PNL FLD: ABNORMAL
EOSINOPHIL # BLD AUTO: 0.24 K/UL — SIGNIFICANT CHANGE UP (ref 0–0.7)
EOSINOPHIL NFR BLD AUTO: 3.3 % — SIGNIFICANT CHANGE UP (ref 0–8)
EPI CELLS # UR: 19 /HPF — HIGH (ref 0–5)
GLUCOSE SERPL-MCNC: 74 MG/DL — SIGNIFICANT CHANGE UP (ref 70–99)
GLUCOSE UR QL: NEGATIVE — SIGNIFICANT CHANGE UP
HCG SERPL QL: NEGATIVE — SIGNIFICANT CHANGE UP
HCT VFR BLD CALC: 38.2 % — SIGNIFICANT CHANGE UP (ref 37–47)
HGB BLD-MCNC: 11.9 G/DL — LOW (ref 12–16)
HYALINE CASTS # UR AUTO: 2 /LPF — SIGNIFICANT CHANGE UP (ref 0–7)
IMM GRANULOCYTES NFR BLD AUTO: 0.6 % — HIGH (ref 0.1–0.3)
KETONES UR-MCNC: NEGATIVE — SIGNIFICANT CHANGE UP
LEUKOCYTE ESTERASE UR-ACNC: ABNORMAL
LIDOCAIN IGE QN: 38 U/L — SIGNIFICANT CHANGE UP (ref 7–60)
LYMPHOCYTES # BLD AUTO: 1.15 K/UL — LOW (ref 1.2–3.4)
LYMPHOCYTES # BLD AUTO: 16 % — LOW (ref 20.5–51.1)
MCHC RBC-ENTMCNC: 29 PG — SIGNIFICANT CHANGE UP (ref 27–31)
MCHC RBC-ENTMCNC: 31.2 G/DL — LOW (ref 32–37)
MCV RBC AUTO: 93.2 FL — SIGNIFICANT CHANGE UP (ref 81–99)
MONOCYTES # BLD AUTO: 0.7 K/UL — HIGH (ref 0.1–0.6)
MONOCYTES NFR BLD AUTO: 9.8 % — HIGH (ref 1.7–9.3)
NEUTROPHILS # BLD AUTO: 5.01 K/UL — SIGNIFICANT CHANGE UP (ref 1.4–6.5)
NEUTROPHILS NFR BLD AUTO: 69.9 % — SIGNIFICANT CHANGE UP (ref 42.2–75.2)
NITRITE UR-MCNC: NEGATIVE — SIGNIFICANT CHANGE UP
NRBC # BLD: 0 /100 WBCS — SIGNIFICANT CHANGE UP (ref 0–0)
PH UR: 7 — SIGNIFICANT CHANGE UP (ref 5–8)
PLATELET # BLD AUTO: 186 K/UL — SIGNIFICANT CHANGE UP (ref 130–400)
POTASSIUM SERPL-MCNC: 3.9 MMOL/L — SIGNIFICANT CHANGE UP (ref 3.5–5)
POTASSIUM SERPL-SCNC: 3.9 MMOL/L — SIGNIFICANT CHANGE UP (ref 3.5–5)
PROT SERPL-MCNC: 5.7 G/DL — LOW (ref 6–8)
PROT UR-MCNC: ABNORMAL
RBC # BLD: 4.1 M/UL — LOW (ref 4.2–5.4)
RBC # FLD: 13.5 % — SIGNIFICANT CHANGE UP (ref 11.5–14.5)
RBC CASTS # UR COMP ASSIST: 8 /HPF — HIGH (ref 0–4)
SODIUM SERPL-SCNC: 144 MMOL/L — SIGNIFICANT CHANGE UP (ref 135–146)
SP GR SPEC: 1.01 — SIGNIFICANT CHANGE UP (ref 1.01–1.03)
UROBILINOGEN FLD QL: SIGNIFICANT CHANGE UP
WBC # BLD: 7.17 K/UL — SIGNIFICANT CHANGE UP (ref 4.8–10.8)
WBC # FLD AUTO: 7.17 K/UL — SIGNIFICANT CHANGE UP (ref 4.8–10.8)
WBC UR QL: >720 /HPF — HIGH (ref 0–5)

## 2021-08-17 PROCEDURE — 74177 CT ABD & PELVIS W/CONTRAST: CPT | Mod: 26,MA

## 2021-08-17 PROCEDURE — 99285 EMERGENCY DEPT VISIT HI MDM: CPT

## 2021-08-17 RX ORDER — SACCHAROMYCES BOULARDII 250 MG
1 POWDER IN PACKET (EA) ORAL
Qty: 28 | Refills: 0
Start: 2021-08-17 | End: 2021-08-30

## 2021-08-17 RX ORDER — CEFTRIAXONE 500 MG/1
1000 INJECTION, POWDER, FOR SOLUTION INTRAMUSCULAR; INTRAVENOUS ONCE
Refills: 0 | Status: COMPLETED | OUTPATIENT
Start: 2021-08-17 | End: 2021-08-17

## 2021-08-17 RX ORDER — SODIUM CHLORIDE 9 MG/ML
1000 INJECTION INTRAMUSCULAR; INTRAVENOUS; SUBCUTANEOUS ONCE
Refills: 0 | Status: COMPLETED | OUTPATIENT
Start: 2021-08-17 | End: 2021-08-17

## 2021-08-17 RX ORDER — CEFPODOXIME PROXETIL 100 MG
1 TABLET ORAL
Qty: 28 | Refills: 0
Start: 2021-08-17 | End: 2021-08-30

## 2021-08-17 RX ADMIN — CEFTRIAXONE 100 MILLIGRAM(S): 500 INJECTION, POWDER, FOR SOLUTION INTRAMUSCULAR; INTRAVENOUS at 12:31

## 2021-08-17 RX ADMIN — SODIUM CHLORIDE 1000 MILLILITER(S): 9 INJECTION INTRAMUSCULAR; INTRAVENOUS; SUBCUTANEOUS at 09:54

## 2021-08-17 NOTE — ED ADULT NURSE NOTE - OBJECTIVE STATEMENT
Pt aox4, in no acute distress, c/o abdominal pain x few days. Pt from [facility], stage 3 to sacrum, dressing changed, pt states she is paraplegic after auto immune disease episode. Urine sent, IV/labs obtained and sent. Will continue to monitor the pt.

## 2021-08-17 NOTE — ED PROVIDER NOTE - PROGRESS NOTE DETAILS
ATTENDING NOTE: I personally evaluated the patient. I reviewed the Resident’s note (as assigned above), and agree with the findings and plan except as documented in my note. 52 y/o F PMH HTN, DON and neuromyelitis optica, currently non-ambulatory here with ABD discomfort over the past couple of weeks and decreased PO intake since yesterday. Exam as documented. Pt reports upper ABD sensation due to chronic disease. Will check labs, CT and reevaluate. Pt is very well appearing. OG : Pt updated w/ CT results. will be discharged back to [place]. I spoke to nursing manager of the floor regarding patient's condition and the importance of completing abx. Nursing manager ok with plan.

## 2021-08-17 NOTE — ED PROVIDER NOTE - CLINICAL SUMMARY MEDICAL DECISION MAKING FREE TEXT BOX
Pt with abdominal pain and decreased appetite. Multiple comorbidities. Required labs and CT. Found to have cystitis with early  signs of ascending infection. Tolerating PO. WIll dc to NH who can watch patient. Stable for dc

## 2021-08-17 NOTE — ED PROVIDER NOTE - PHYSICAL EXAMINATION
CONSTITUTIONAL: Well-developed; well-nourished; in no acute distress.   SKIN: warm, dry  HEAD: Normocephalic; atraumatic.  EYES: PERRL, EOMI, normal sclera and conjunctiva   ENT: No nasal discharge; airway clear.  NECK: Supple; non tender.  CARD:  Regular rate and rhythm.   RESP: NO inc WOB   ABD: soft ntnd, +suprapubic tenderness  LYMPH: No acute cervical adenopathy.  NEURO: Alert, oriented, grossly unremarkable  PSYCH: Cooperative, appropriate.

## 2021-08-17 NOTE — ED PROVIDER NOTE - PATIENT PORTAL LINK FT
You can access the FollowMyHealth Patient Portal offered by [facility] by registering at the following website: [email or web address]. By joining [facility]’s FollowMyHealth portal, you will also be able to view your health information using other applications (apps) compatible with our system.

## 2021-08-17 NOTE — ED PROVIDER NOTE - IV ALTEPASE ADMIN HIDDEN
Name from pharmacy: FUROSEMIDE 80 MG TABLET          Will file in chart as: furosemide (LASIX) 80 MG tablet    Sig: TAKE 1/2 TABLET BY MOUTH DAILY    Disp:  45 tablet    Refills:  3    Start: 7/20/2019    Class: Eprescribe    Requested on: 5/30/2019    Last ordered: 1 month ago by Steven J Heyden, MD Last refill: 5/31/2019    Rx #: [id number]       To be filled at: [facility] - [facility] [address] [address]     APPTS AND LABS ARE UP-TO-DATE  LAST OFFICE VISIT: 05/03/2019  FOLLOW UP APPT: NONE  LAST LAB: 04/25/2019    Medication declined patient has refills on file.    show

## 2021-08-17 NOTE — ED PROVIDER NOTE - OBJECTIVE STATEMENT
50yo F patient with PMH of gastric sleeve, asthma, DON , neuromyelitis optica currently in rehab for LE weakness presenting w/ abd pain and urinary frequency x 3 weeks.  pt report she also has this " shock like sensation " in RUQ which she reports she was told was 2/2 her spinal condition.  denies any fevers or chills . Pt received blood work less than a week ago which were all in normal limit,.

## 2021-08-19 LAB
-  AMIKACIN: SIGNIFICANT CHANGE UP
-  AMOXICILLIN/CLAVULANIC ACID: SIGNIFICANT CHANGE UP
-  AMPICILLIN/SULBACTAM: SIGNIFICANT CHANGE UP
-  AMPICILLIN: SIGNIFICANT CHANGE UP
-  AZTREONAM: SIGNIFICANT CHANGE UP
-  CEFAZOLIN: SIGNIFICANT CHANGE UP
-  CEFEPIME: SIGNIFICANT CHANGE UP
-  CEFOXITIN: SIGNIFICANT CHANGE UP
-  CEFTRIAXONE: SIGNIFICANT CHANGE UP
-  CIPROFLOXACIN: SIGNIFICANT CHANGE UP
-  ERTAPENEM: SIGNIFICANT CHANGE UP
-  GENTAMICIN: SIGNIFICANT CHANGE UP
-  IMIPENEM: SIGNIFICANT CHANGE UP
-  LEVOFLOXACIN: SIGNIFICANT CHANGE UP
-  MEROPENEM: SIGNIFICANT CHANGE UP
-  NITROFURANTOIN: SIGNIFICANT CHANGE UP
-  PIPERACILLIN/TAZOBACTAM: SIGNIFICANT CHANGE UP
-  TIGECYCLINE: SIGNIFICANT CHANGE UP
-  TOBRAMYCIN: SIGNIFICANT CHANGE UP
-  TRIMETHOPRIM/SULFAMETHOXAZOLE: SIGNIFICANT CHANGE UP
METHOD TYPE: SIGNIFICANT CHANGE UP

## 2021-08-20 ENCOUNTER — APPOINTMENT (OUTPATIENT)
Dept: NEUROLOGY | Facility: CLINIC | Age: 51
End: 2021-08-20

## 2021-08-21 LAB
-  AMIKACIN: SIGNIFICANT CHANGE UP
-  AMOXICILLIN/CLAVULANIC ACID: SIGNIFICANT CHANGE UP
-  AMPICILLIN/SULBACTAM: SIGNIFICANT CHANGE UP
-  AMPICILLIN: SIGNIFICANT CHANGE UP
-  AZTREONAM: SIGNIFICANT CHANGE UP
-  CEFAZOLIN: SIGNIFICANT CHANGE UP
-  CEFEPIME: SIGNIFICANT CHANGE UP
-  CEFOXITIN: SIGNIFICANT CHANGE UP
-  CEFTRIAXONE: SIGNIFICANT CHANGE UP
-  CIPROFLOXACIN: SIGNIFICANT CHANGE UP
-  ERTAPENEM: SIGNIFICANT CHANGE UP
-  GENTAMICIN: SIGNIFICANT CHANGE UP
-  IMIPENEM: SIGNIFICANT CHANGE UP
-  LEVOFLOXACIN: SIGNIFICANT CHANGE UP
-  MEROPENEM: SIGNIFICANT CHANGE UP
-  NITROFURANTOIN: SIGNIFICANT CHANGE UP
-  PIPERACILLIN/TAZOBACTAM: SIGNIFICANT CHANGE UP
-  TIGECYCLINE: SIGNIFICANT CHANGE UP
-  TOBRAMYCIN: SIGNIFICANT CHANGE UP
-  TRIMETHOPRIM/SULFAMETHOXAZOLE: SIGNIFICANT CHANGE UP
CULTURE RESULTS: SIGNIFICANT CHANGE UP
METHOD TYPE: SIGNIFICANT CHANGE UP
ORGANISM # SPEC MICROSCOPIC CNT: SIGNIFICANT CHANGE UP
SPECIMEN SOURCE: SIGNIFICANT CHANGE UP

## 2021-09-17 ENCOUNTER — APPOINTMENT (OUTPATIENT)
Dept: NEUROLOGY | Facility: CLINIC | Age: 51
End: 2021-09-17
Payer: COMMERCIAL

## 2021-09-17 PROCEDURE — 99214 OFFICE O/P EST MOD 30 MIN: CPT | Mod: 95

## 2021-09-17 NOTE — HISTORY OF PRESENT ILLNESS
[Home] : at home, [unfilled] , at the time of the visit. [Medical Office: ([facility])___] : at the medical office located in  [Verbal consent obtained from patient] : the patient, [unfilled] [FreeTextEntry1] : Initial hx 5/2021\par Referred by Dr. Vizcaino.\par 2/2021 - RLE weakness, tightness, gait dysfunction, some RLE discomfort. went to ED 4x. Then LLE was numb but without weakness. Went to pain management, referred to neurologist, then went to [facility]. 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 Got IVMP, no improvement.\par Saw Dr. Vizcaino who sent for NMO ab in 3/2021, referred to me, but developed relapse in 4/2021.\par 4/2021 - acute paraplegia. [facility], got 3d IVMP and 5 sessions PLEX. Got rituximab 1g x2 in 5/2021. since then, some sensation coming back but no movement. \par Currently in [place] rehab.\par \par Subj interval:\par \par Was more drowsy on baclofen 30tid in combination of valium for anxiety. Then her facility decreased baclofen to 20tid and changed valium 5mg tid to prn. Then developed worsened spasms, abdominal and LE spasms, lasting seconds.\par \par Stil in rehab.\par \par Still not having movement in her legs.\par \par Still with neuropathic pain and bothersome paresthesias in both legs, torso. Pressure-like in both legs. lyrica 200tid helps with the pain and improved her sleep.\par \par Painful spasms in her back, back is "moving" almost like a cramp. Happened 3x over past 2 days, lasting seconds to minutes.\par \par PMHX:\par NMO\par asthma\par depression\par anxiety\par LBP\par insomnia\par migraines\par gastric sleeve\par \par MEDS:\par qvar\par albuterol prn\par ambien prn\par lovenox ppx\par percocet prn pain\par protonix\par rituximab\par baclofen \par pregabalin \par ubrelvy\par wellbutrin\par valium prn\par b12\par calcium\par MV\par iron\par biotin\par colace\par miralax\par gabapentin\par \par O:\par \par SHx: no tob\par \par FHx: no neuro, no AI.\par \par Exam:\par AO3. Normally conversant. Follows commands, names, and repeats. Good attention.\par PERRL, VFF, EOMI, no nystagmus, face symmetric, TUP at midline.\par paraplegic\par moves arms well\par \par \par MRI C, T, and brain 2/2021 reviewed - thoracic LETM on my review.\par \par \par AP: 50yo w/ NMO (aqp4+, thoracic myelitis in 2/2021, recurrent myelitis in 4/2021)\par \par All questions answered, education provided re: dx, rx, sx, prognosis. management discussed at length.\par \par - plan for continued rituximab in 11/2021\par - cont lyrica to goal 200bid for neuropathic pain, spasms.\par - can cont gabapentin 300tid for now\par - can cont baclofen 20tid for now\par - start trileptal 150bid, increase to 300bid after 1wk given likelihood of tonic spasms.\par - medical team at [place] - [phone] (Karina - staff nurse)\par - cont rehab\par - repeat MRI C+T spine now\par - RTC 4wks

## 2021-10-01 ENCOUNTER — APPOINTMENT (OUTPATIENT)
Dept: NEUROLOGY | Facility: CLINIC | Age: 51
End: 2021-10-01
Payer: COMMERCIAL

## 2021-10-01 VITALS
SYSTOLIC BLOOD PRESSURE: 121 MMHG | HEART RATE: 87 BPM | OXYGEN SATURATION: 98 % | WEIGHT: 212 LBS | DIASTOLIC BLOOD PRESSURE: 87 MMHG | TEMPERATURE: 97 F | HEIGHT: 67 IN | BODY MASS INDEX: 33.27 KG/M2

## 2021-10-01 PROCEDURE — 99215 OFFICE O/P EST HI 40 MIN: CPT

## 2021-10-01 RX ORDER — TIZANIDINE 4 MG/1
4 TABLET ORAL
Qty: 135 | Refills: 5 | Status: DISCONTINUED | COMMUNITY
Start: 2021-08-03 | End: 2021-10-01

## 2021-10-01 NOTE — HISTORY OF PRESENT ILLNESS
[FreeTextEntry1] : Pt presents for f/u of NMO.  Saw Dr. De Los Santos a few weeks ago and started on trileptal due to increase to 300 mg twice a day to help with some of pain.  She feels like increased spasticity since baclofen decreased.\par Developed allergic reaction to cepodoxime and also had several medications discontinued or lowered.  Topamax was stopped by another physician and she promptly developed several severe migraines.  She asks about Ubrelvy since she had good relief from that in the past.\par \par Arms remains strong but she notes discomfort in skin of chest.  She does not catheterize and states she is able to tell when she has to urinate.  Some increased sensations noted in LE's.

## 2021-10-01 NOTE — PHYSICAL EXAM
[FreeTextEntry1] : 5/5 prox/distal upper extremities with normal tone and bulk.\par 0/5 prox/distal lower extremities.  minor increase in tone.\par sensory decreased from above umbilicus to feet.

## 2021-10-01 NOTE — ASSESSMENT
[FreeTextEntry1] : 1.  NMO temporally associated ~3 weeks following first Moderna injection.  She did not get additional vaccine dose.  She remains paraplegic though some increase in sensations in LE's.  Overall still significant senory loss.\par 2.  Regarding increase discomfort in skin of chest it is difficult to gauge the significance.  She is afraid it could be harbinger of another flare up.  She remains strong in upper extremities.  She still has rituximab on board and a pending MRI C and T spine w/wo contrast.  We discussed option of steroids if she starts to feel weak in UE.  Would hold off on steroids at moment since another explanation could be sensitivity relating to recent medication changes.\par 3.  Return in 2 months.  Should have had next Rituximab dose by then and results from C and T spine MRI.\par 4.  Restart topirimate 50 mg qhs x 1 week then 100 mg qhs.\par 5.  Ubrelvy 100 mg po prn for migraine.\par 6.  Increase trileptal to 300 mg bid.

## 2021-10-20 ENCOUNTER — APPOINTMENT (OUTPATIENT)
Dept: NEUROLOGY | Facility: CLINIC | Age: 51
End: 2021-10-20
Payer: COMMERCIAL

## 2021-10-20 PROCEDURE — 99215 OFFICE O/P EST HI 40 MIN: CPT | Mod: 95

## 2021-10-20 NOTE — HISTORY OF PRESENT ILLNESS
[Home] : at home, [unfilled] , at the time of the visit. [Medical Office: ([facility])___] : at the medical office located in  [Verbal consent obtained from patient] : the patient, [unfilled] [FreeTextEntry1] : Initial hx 5/2021\par Referred by Dr. Vizcaino.\par 2/2021 - RLE weakness, tightness, gait dysfunction, some RLE discomfort. went to ED 4x. Then LLE was numb but without weakness. Went to pain management, referred to neurologist, then went to [facility]. 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 Got IVMP, no improvement.\par Saw Dr. Vizcaino who sent for NMO ab in 3/2021, referred to me, but developed relapse in 4/2021.\par 4/2021 - acute paraplegia. [facility], got 3d IVMP and 5 sessions PLEX. Got rituximab 1g x2 in 5/2021. since then, some sensation coming back but no movement. \par Currently in [place] rehab.\par \par Subj interval:\par \par Has developed a bad rash over past 1 months. This had been attributed to lyrica so has been decreasing dose, currently on 100mg bid and transitioned to 300tid. This definitively started PRIOR to starting trileptal. \par \par On trileptal 300bid, has not helped with spasms. Painful spasms persist.\par \par Was more drowsy on baclofen 30tid in combination of valium for anxiety. Then her facility decreased baclofen to 20tid and changed valium 5mg tid to prn. Then developed worsened spasms, abdominal and LE spasms, lasting seconds.\par \par Stil in rehab.\par \par Still not having movement in her legs.\par \par \par PMHX:\par NMO\par asthma\par depression\par anxiety\par LBP\par insomnia\par migraines\par gastric sleeve\par \par MEDS:\par qvar\par albuterol prn\par ambien prn\par lovenox ppx\par percocet prn pain\par protonix\par rituximab\par baclofen \par pregabalin \par ubrelvy\par wellbutrin\par valium prn\par b12\par calcium\par MV\par iron\par biotin\par colace\par miralax\par gabapentin\par \par O:\par \par SHx: no tob\par \par FHx: no neuro, no AI.\par \par Exam:\par AO3. Normally conversant. Follows commands, names, and repeats. Good attention.\par PERRL, VFF, EOMI, no nystagmus, face symmetric, TUP at midline.\par paraplegic\par moves arms well\par \par \par MRI C, T, and brain 2/2021 reviewed - thoracic LETM on my review.\par \par \par AP: 52yo w/ NMO (aqp4+, thoracic myelitis in 2/2021, recurrent myelitis in 4/2021)\par \par All questions answered, education provided re: dx, rx, sx, prognosis. management discussed at length. discussed with staff at rehab.\par \par - continue rituximab in 11/2021\par - inc gabapentin to 600tid for now, and stop lyrica.\par - inc trileptal to 450mg bid for tonic spasms (RASH PREDATES TRILEPTAL)\par - check CMP, CBC, immunoglobulins, and lymphocyte subset panel, cd19% and cd19 count prior to rituximab treatment\par - advised to see derm ASAP\par - can cont baclofen 20tid for now\par - medical team at [place] - [phone] (Karina - staff nurse)\par - cont rehab\par - repeat MRI C+T spine now\par - RTC 6wks

## 2021-10-21 ENCOUNTER — APPOINTMENT (OUTPATIENT)
Dept: NEUROLOGY | Facility: CLINIC | Age: 51
End: 2021-10-21

## 2021-11-19 ENCOUNTER — APPOINTMENT (OUTPATIENT)
Dept: INFUSION THERAPY | Facility: CLINIC | Age: 51
End: 2021-11-19

## 2021-11-19 ENCOUNTER — OUTPATIENT (OUTPATIENT)
Dept: OUTPATIENT SERVICES | Facility: HOSPITAL | Age: 51
LOS: 1 days | Discharge: HOME | End: 2021-11-19

## 2021-11-19 DIAGNOSIS — Z98.891 HISTORY OF UTERINE SCAR FROM PREVIOUS SURGERY: Chronic | ICD-10-CM

## 2021-11-19 DIAGNOSIS — Z98.890 OTHER SPECIFIED POSTPROCEDURAL STATES: Chronic | ICD-10-CM

## 2021-11-19 RX ORDER — ACETAMINOPHEN 500 MG
650 TABLET ORAL ONCE
Refills: 0 | Status: COMPLETED | OUTPATIENT
Start: 2021-11-19 | End: 2021-11-19

## 2021-11-19 RX ORDER — RITUXIMAB 10 MG/ML
1000 INJECTION, SOLUTION INTRAVENOUS ONCE
Refills: 0 | Status: COMPLETED | OUTPATIENT
Start: 2021-11-19 | End: 2021-11-19

## 2021-11-19 RX ORDER — DIPHENHYDRAMINE HCL 50 MG
50 CAPSULE ORAL ONCE
Refills: 0 | Status: COMPLETED | OUTPATIENT
Start: 2021-11-19 | End: 2021-11-19

## 2021-11-19 RX ORDER — DEXAMETHASONE 0.5 MG/5ML
12 ELIXIR ORAL ONCE
Refills: 0 | Status: COMPLETED | OUTPATIENT
Start: 2021-11-19 | End: 2021-11-19

## 2021-11-19 RX ORDER — FAMOTIDINE 10 MG/ML
20 INJECTION INTRAVENOUS ONCE
Refills: 0 | Status: COMPLETED | OUTPATIENT
Start: 2021-11-19 | End: 2021-11-19

## 2021-11-19 RX ADMIN — RITUXIMAB 250 MILLIGRAM(S): 10 INJECTION, SOLUTION INTRAVENOUS at 13:37

## 2021-11-19 RX ADMIN — Medication 650 MILLIGRAM(S): at 11:35

## 2021-11-19 RX ADMIN — Medication 122 MILLIGRAM(S): at 11:35

## 2021-11-19 RX ADMIN — Medication 650 MILLIGRAM(S): at 13:33

## 2021-11-19 RX ADMIN — FAMOTIDINE 104 MILLIGRAM(S): 10 INJECTION INTRAVENOUS at 11:35

## 2021-11-19 RX ADMIN — Medication 102 MILLIGRAM(S): at 11:35

## 2021-11-23 ENCOUNTER — RESULT REVIEW (OUTPATIENT)
Age: 51
End: 2021-11-23

## 2021-11-23 ENCOUNTER — OUTPATIENT (OUTPATIENT)
Dept: OUTPATIENT SERVICES | Facility: HOSPITAL | Age: 51
LOS: 1 days | Discharge: HOME | End: 2021-11-23
Payer: COMMERCIAL

## 2021-11-23 ENCOUNTER — TRANSCRIPTION ENCOUNTER (OUTPATIENT)
Age: 51
End: 2021-11-23

## 2021-11-23 VITALS — HEART RATE: 105 BPM | DIASTOLIC BLOOD PRESSURE: 74 MMHG | SYSTOLIC BLOOD PRESSURE: 115 MMHG | RESPIRATION RATE: 13 BRPM

## 2021-11-23 VITALS
HEART RATE: 95 BPM | HEIGHT: 67 IN | WEIGHT: 223.11 LBS | SYSTOLIC BLOOD PRESSURE: 133 MMHG | DIASTOLIC BLOOD PRESSURE: 86 MMHG | TEMPERATURE: 98 F | RESPIRATION RATE: 20 BRPM

## 2021-11-23 DIAGNOSIS — Z98.890 OTHER SPECIFIED POSTPROCEDURAL STATES: Chronic | ICD-10-CM

## 2021-11-23 DIAGNOSIS — Z98.891 HISTORY OF UTERINE SCAR FROM PREVIOUS SURGERY: Chronic | ICD-10-CM

## 2021-11-23 PROCEDURE — 88312 SPECIAL STAINS GROUP 1: CPT | Mod: 26

## 2021-11-23 PROCEDURE — 88305 TISSUE EXAM BY PATHOLOGIST: CPT | Mod: 26

## 2021-11-23 RX ORDER — GABAPENTIN 400 MG/1
1 CAPSULE ORAL
Qty: 0 | Refills: 0 | DISCHARGE

## 2021-11-23 NOTE — H&P PST ADULT - ASSESSMENT
A: abdominal pain   P: EGD/ screening colonoscopy  A: abdominal pain , dysphagia , GERD, and Weight loss  P: EGD/ screening colonoscopy

## 2021-11-23 NOTE — H&P PST ADULT - HISTORY OF PRESENT ILLNESS
51 year female patient with dyspepsia is here for EGD and screening colonoscopy  51 year female patient with dyspepsia , GERD , weight loss , and dysphagia  is here for EGD and screening colonoscopy

## 2021-11-23 NOTE — PRE-ANESTHESIA EVALUATION ADULT - NSATTENDATTESTRD_GEN_ALL_CORE
The patient has been re-examined and I agree with the above assessment or I updated with my findings. fair plus

## 2021-11-26 LAB — SURGICAL PATHOLOGY STUDY: SIGNIFICANT CHANGE UP

## 2021-12-01 DIAGNOSIS — Z99.89 DEPENDENCE ON OTHER ENABLING MACHINES AND DEVICES: ICD-10-CM

## 2021-12-01 DIAGNOSIS — G47.33 OBSTRUCTIVE SLEEP APNEA (ADULT) (PEDIATRIC): ICD-10-CM

## 2021-12-01 DIAGNOSIS — M19.90 UNSPECIFIED OSTEOARTHRITIS, UNSPECIFIED SITE: ICD-10-CM

## 2021-12-01 DIAGNOSIS — K44.9 DIAPHRAGMATIC HERNIA WITHOUT OBSTRUCTION OR GANGRENE: ICD-10-CM

## 2021-12-01 DIAGNOSIS — J45.909 UNSPECIFIED ASTHMA, UNCOMPLICATED: ICD-10-CM

## 2021-12-01 DIAGNOSIS — K29.50 UNSPECIFIED CHRONIC GASTRITIS WITHOUT BLEEDING: ICD-10-CM

## 2021-12-01 DIAGNOSIS — R63.4 ABNORMAL WEIGHT LOSS: ICD-10-CM

## 2021-12-01 DIAGNOSIS — I10 ESSENTIAL (PRIMARY) HYPERTENSION: ICD-10-CM

## 2021-12-01 DIAGNOSIS — Z98.84 BARIATRIC SURGERY STATUS: ICD-10-CM

## 2021-12-01 DIAGNOSIS — E66.9 OBESITY, UNSPECIFIED: ICD-10-CM

## 2021-12-01 DIAGNOSIS — K21.9 GASTRO-ESOPHAGEAL REFLUX DISEASE WITHOUT ESOPHAGITIS: ICD-10-CM

## 2021-12-09 DIAGNOSIS — G36.0 NEUROMYELITIS OPTICA [DEVIC]: ICD-10-CM

## 2021-12-13 ENCOUNTER — APPOINTMENT (OUTPATIENT)
Dept: NEUROLOGY | Facility: CLINIC | Age: 51
End: 2021-12-13
Payer: COMMERCIAL

## 2021-12-13 VITALS
HEART RATE: 81 BPM | BODY MASS INDEX: 35.16 KG/M2 | WEIGHT: 224 LBS | OXYGEN SATURATION: 98 % | DIASTOLIC BLOOD PRESSURE: 88 MMHG | HEIGHT: 67 IN | SYSTOLIC BLOOD PRESSURE: 132 MMHG | TEMPERATURE: 94.3 F

## 2021-12-13 DIAGNOSIS — K21.9 GASTRO-ESOPHAGEAL REFLUX DISEASE W/OUT ESOPHAGITIS: ICD-10-CM

## 2021-12-13 PROCEDURE — 99214 OFFICE O/P EST MOD 30 MIN: CPT

## 2021-12-13 RX ORDER — GABAPENTIN 300 MG/1
300 CAPSULE ORAL 3 TIMES DAILY
Qty: 90 | Refills: 5 | Status: DISCONTINUED | COMMUNITY
End: 2021-12-13

## 2021-12-13 RX ORDER — PANTOPRAZOLE 40 MG/1
40 TABLET, DELAYED RELEASE ORAL DAILY
Qty: 30 | Refills: 3 | Status: ACTIVE | COMMUNITY
Start: 2021-12-13

## 2021-12-13 RX ORDER — PREGABALIN 200 MG/1
200 CAPSULE ORAL TWICE DAILY
Refills: 0 | Status: DISCONTINUED | COMMUNITY
End: 2021-12-13

## 2021-12-13 NOTE — HISTORY OF PRESENT ILLNESS
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  Has paraplegia from this.  Had increase spasms after med changes in September.  Spasms have progressed.  \par \jocelyn Had Rituxan at [facility] last month.\par \par Cervical and Thoracic spine MRI's requested and she states pending for next month.\par \jocelyn Developed skin rash and saw dermatologist and told it more than likely was delayed reaction to an antibiotic she took end of August/September.  Rash is only on arms and back and neck.\par \par Was prescribed 10 days of prednisone and topical solution that helped in some areas.  Some areas are still itching in shoulders.  States has been putting lotion and meds on her skin.  She has a chronic problem with scratching, habit. \par \par She takes oxcarbazepine 450 mg twice a day.  (States the itching started before taking this medication).\par Has been on the Wellbutrin 300 mg XL sine March 2021.\par \par Stopped Lyrica thinking it might be cause of rash however not confirmed because still having rash off it.\par Started gabapentin back and titrated up to 600 mg three times a day.\par \par States able to tell when she has to urinate.\par Can feel pinching in her thighs and abdomen.  States she does have some sensation in vagina.\par

## 2021-12-13 NOTE — PHYSICAL EXAM
[FreeTextEntry1] : no movt in LE's \par 5/5 upper extremities.\par Normal UE reflexes.\par Absent LE reflexes.\par \par LE's some mild vibration sensation in knees and ankles.\par PP sensation present in LE's.\par

## 2021-12-15 ENCOUNTER — APPOINTMENT (OUTPATIENT)
Dept: NEUROLOGY | Facility: CLINIC | Age: 51
End: 2021-12-15
Payer: COMMERCIAL

## 2021-12-15 VITALS
HEART RATE: 99 BPM | BODY MASS INDEX: 35.16 KG/M2 | SYSTOLIC BLOOD PRESSURE: 110 MMHG | HEIGHT: 67 IN | WEIGHT: 224 LBS | DIASTOLIC BLOOD PRESSURE: 76 MMHG | TEMPERATURE: 97.2 F | RESPIRATION RATE: 16 BRPM | OXYGEN SATURATION: 100 %

## 2021-12-15 PROCEDURE — 99215 OFFICE O/P EST HI 40 MIN: CPT

## 2021-12-15 NOTE — HISTORY OF PRESENT ILLNESS
[FreeTextEntry1] : Initial hx 5/2021\par Referred by Dr. Vizcaino.\par 2/2021 - RLE weakness, tightness, gait dysfunction, some RLE discomfort. went to ED 4x. Then LLE was numb but without weakness. Went to pain management, referred to neurologist, then went to [facility]. 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 Got IVMP, no improvement.\par Saw Dr. Vizcaino who sent for NMO ab in 3/2021, referred to me, but developed relapse in 4/2021.\par 4/2021 - acute paraplegia. [facility], got 3d IVMP and 5 sessions PLEX. Got rituximab 1g x2 in 5/2021. since then, some sensation coming back but no movement. \par Currently in [place] rehab.\par Had developed a bad rash since 9/2021, but improved; derm felt it was a delayed reaction to antibiotics. \par \par \par Subj interval:\par \par Still in rehab, was able to come home for thanksgiving, has a 15yo son.\par \par Had developed a bad rash since 9/2021, but improved; derm felt it was a delayed reaction to antibiotics. This had been attributed to lyrica so stopped dose, currently on 100mg bid and transitioned to 300tid. This definitively started PRIOR to starting trileptal. \par \par On trileptal 450bid, has not helped with spasms. Painful spasms persist, multiple times daily. \par \par Was more drowsy on baclofen 30tid in combination of valium for anxiety. Then her facility decreased baclofen to 20tid and changed valium 5mg tid to prn. Then developed worsened spasms, abdominal and LE spasms, lasting seconds.\par \par Still not having movement in her legs.\par \par \par PMHX:\par NMO\par asthma\par depression\par anxiety\par LBP\par insomnia\par migraines\par gastric sleeve\par \par MEDS:\par qvar\par albuterol prn\par ambien prn\par lovenox ppx\par percocet prn pain\par protonix\par rituximab\par baclofen \par ubrelvy\par wellbutrin\par valium prn\par b12\par calcium\par MV\par iron\par biotin\par colace\par miralax\par gabapentin\par \par \par SHx: no tob\par \par FHx: no neuro, no AI.\par \par \par O:\par \par Exam:\par AO3. Normally conversant. Follows commands, names, and repeats. Good attention.\par PERRL, VFF, EOMI, no nystagmus, face symmetric, TUP at midline.\par 5/5 in both UEs throughout, paraplegic. tone 1 in LLE and +/- in RLE\par Near complete sensory loss in both legs and low torso - some minimal patchy VBS but otherwise absent.\par DTRs 2+ in UEs, 3 in L knee, 2 in R knee, minimal clonus in both AJs.\par \par \par MRI C, T, and brain 2/2021 reviewed - thoracic LETM on my review.\par \par \par AP: 50yo w/ NMO (aqp4+, thoracic myelitis in 2/2021, recurrent myelitis in 4/2021)\par \par All questions answered, education provided re: dx, rx, sx, prognosis. management discussed at length. discussed with staff at rehab.\par \par - continue rituximab in 5/2022\par - inc gabapentin to 900tid x2wks, then to 1200mg tid\par - cont trileptal 450mg bid for now. can consider inc to 600bid in the future.\par - can cont baclofen 20tid for now\par - will consider medical marijuana if gabapentin doesn't help\par - check CMP, CBC, immunoglobulins, and lymphocyte subset panel, cd19% and cd19 count prior to next rituximab treatment.\par - medical team at [place] - [phone] (Karina - staff nurse)\par - cont rehab\par - repeat MRI C+T spine 1/5/22\par - RTC 2m

## 2022-01-07 ENCOUNTER — RESULT REVIEW (OUTPATIENT)
Age: 52
End: 2022-01-07

## 2022-01-07 ENCOUNTER — OUTPATIENT (OUTPATIENT)
Dept: OUTPATIENT SERVICES | Facility: HOSPITAL | Age: 52
LOS: 1 days | Discharge: HOME | End: 2022-01-07
Payer: COMMERCIAL

## 2022-01-07 DIAGNOSIS — G36.0 NEUROMYELITIS OPTICA [DEVIC]: ICD-10-CM

## 2022-01-07 DIAGNOSIS — Z98.890 OTHER SPECIFIED POSTPROCEDURAL STATES: Chronic | ICD-10-CM

## 2022-01-07 DIAGNOSIS — Z98.891 HISTORY OF UTERINE SCAR FROM PREVIOUS SURGERY: Chronic | ICD-10-CM

## 2022-01-07 PROCEDURE — 72157 MRI CHEST SPINE W/O & W/DYE: CPT | Mod: 26

## 2022-01-07 PROCEDURE — 72156 MRI NECK SPINE W/O & W/DYE: CPT | Mod: 26

## 2022-02-09 LAB
BASOPHILS # BLD AUTO: 0.02 K/UL
BASOPHILS NFR BLD AUTO: 0.6 %
EOSINOPHIL # BLD AUTO: 0.34 K/UL
EOSINOPHIL NFR BLD AUTO: 9.5 %
HCT VFR BLD CALC: 40.1 %
HGB BLD-MCNC: 12.2 G/DL
IMM GRANULOCYTES NFR BLD AUTO: 0.3 %
LYMPHOCYTES # BLD AUTO: 0.75 K/UL
LYMPHOCYTES NFR BLD AUTO: 20.9 %
MAN DIFF?: NORMAL
MCHC RBC-ENTMCNC: 28.4 PG
MCHC RBC-ENTMCNC: 30.4 G/DL
MCV RBC AUTO: 93.5 FL
MONOCYTES # BLD AUTO: 0.34 K/UL
MONOCYTES NFR BLD AUTO: 9.5 %
NEUTROPHILS # BLD AUTO: 2.12 K/UL
NEUTROPHILS NFR BLD AUTO: 59.2 %
PLATELET # BLD AUTO: 216 K/UL
RBC # BLD: 4.29 M/UL
RBC # FLD: 13.8 %
WBC # FLD AUTO: 3.58 K/UL

## 2022-02-10 LAB
ALBUMIN SERPL ELPH-MCNC: 3.9 G/DL
ALP BLD-CCNC: 110 U/L
ALT SERPL-CCNC: 10 U/L
ANION GAP SERPL CALC-SCNC: 14 MMOL/L
AST SERPL-CCNC: 14 U/L
BILIRUB SERPL-MCNC: 0.3 MG/DL
BUN SERPL-MCNC: 11 MG/DL
CALCIUM SERPL-MCNC: 9.5 MG/DL
CHLORIDE SERPL-SCNC: 97 MMOL/L
CO2 SERPL-SCNC: 29 MMOL/L
CREAT SERPL-MCNC: 0.6 MG/DL
DEPRECATED KAPPA LC FREE/LAMBDA SER: 1.38 RATIO
GLUCOSE SERPL-MCNC: 61 MG/DL
IGA SER QL IEP: 207 MG/DL
IGG SER QL IEP: 1147 MG/DL
IGM SER QL IEP: 76 MG/DL
KAPPA LC CSF-MCNC: 1.54 MG/DL
KAPPA LC SERPL-MCNC: 2.12 MG/DL
POTASSIUM SERPL-SCNC: 4 MMOL/L
PROT SERPL-MCNC: 6.2 G/DL
SODIUM SERPL-SCNC: 139 MMOL/L

## 2022-02-17 ENCOUNTER — APPOINTMENT (OUTPATIENT)
Dept: NEUROLOGY | Facility: CLINIC | Age: 52
End: 2022-02-17
Payer: COMMERCIAL

## 2022-02-17 LAB
CD16+CD56+ CELLS # BLD: 112 /UL
CD16+CD56+ CELLS NFR BLD: 14 %
CD19 CELLS NFR BLD: 0 /UL
CD3 CELLS # BLD: 693 /UL
CD3 CELLS NFR BLD: 85 %
CD3+CD4+ CELLS # BLD: 552 /UL
CD3+CD4+ CELLS NFR BLD: 67 %
CD3+CD4+ CELLS/CD3+CD8+ CLL SPEC: 4.28 RATIO
CD3+CD8+ CELLS # SPEC: 129 /UL
CD3+CD8+ CELLS NFR BLD: 16 %
CELLS.CD3-CD19+/CELLS IN BLOOD: 0 %

## 2022-02-17 PROCEDURE — 99215 OFFICE O/P EST HI 40 MIN: CPT | Mod: 95

## 2022-02-18 NOTE — HISTORY OF PRESENT ILLNESS
[Home] : at home, [unfilled] , at the time of the visit. [Medical Office: ([facility])___] : at the medical office located in  [Verbal consent obtained from patient] : the patient, [unfilled] [FreeTextEntry1] : Initial hx 5/2021\par Referred by Dr. Vizcaino.\par 2/2021 - RLE weakness, tightness, gait dysfunction, some RLE discomfort. went to ED 4x. Then LLE was numb but without weakness. Went to pain management, referred to neurologist, then went to [facility]. 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 Got IVMP, no improvement.\par Saw Dr. Vizcaino who sent for NMO ab in 3/2021, referred to me, but developed relapse in 4/2021.\par 4/2021 - acute paraplegia. [facility], got 3d IVMP and 5 sessions PLEX. Got rituximab 1g x2 in 5/2021. since then, some sensation coming back but no movement. \par Currently in [place] rehab.\par Left rehab in 12/2021.\par Has developed a bad rash in late 2021. This had been attributed to lyrica so stopped it. This definitively started PRIOR to starting trileptal. However, may have been attributed to antibiotic (delayed reaction, per derm).\par \par \par Subj interval:\par \par L sided chest pain, sensory "spasms" in the "stomach". Several times per day. \par \par On trileptal 450bid, has not helped with spasms. Painful spasms persist.\par \par On 1200tid gabapentin which has not been sufficient.\par \par PMHX:\par NMO\par asthma\par depression\par anxiety\par LBP\par insomnia\par migraines\par gastric sleeve\par \par MEDS:\par qvar\par albuterol prn\par ambien prn\par lovenox ppx\par protonix\par rituximab\par ubrelvy\par wellbutrin\par valium prn\par b12\par calcium\par MV\par iron\par biotin\par colace\par miralax\par gabapentin 1200tid\par trileptal 450bid\par baclofen  20tid\par oxycodone 5mg tid prn\par \par \par O:\par \par SHx: no tob\par \par FHx: no neuro, no AI.\par \par \par Exam:\par AO3. Normally conversant. Follows commands, names, and repeats. Good attention.\par PERRL, VFF, EOMI, no nystagmus, face symmetric, TUP at midline.\par paraplegic\par moves arms well\par \par \par MRI C, T, and brain 2/2021 reviewed - thoracic LETM on my review.\par \par MRI C+T 1/2022 - no new lorie+ lesions, however, c/w 3/2021 there was caudal extension on the left lateral aspect down to T8 (likely related to relapse she had in 4/2021). \par \par \par AP: 52yo w/ NMO (aqp4+, thoracic myelitis in 2/2021, recurrent myelitis in 4/2021 leading to paraplegia).\par \par All questions answered, education provided re: dx, rx, sx, prognosis. management discussed at length. \par \par - continue rituximab q6m\par - cont gabapentin 1200tid for now\par - inc trileptal to 600mg bid x1wk, then 750mg bid for tonic spasms (RASH PREDATES TRILEPTAL)\par - can cont baclofen 20tid for now\par - cont PT\par - new blood work in 2m\par - RTC 6wks\par

## 2022-02-22 ENCOUNTER — OUTPATIENT (OUTPATIENT)
Dept: OUTPATIENT SERVICES | Facility: HOSPITAL | Age: 52
LOS: 1 days | Discharge: HOME | End: 2022-02-22

## 2022-02-22 ENCOUNTER — APPOINTMENT (OUTPATIENT)
Dept: PSYCHIATRY | Facility: CLINIC | Age: 52
End: 2022-02-22

## 2022-02-22 DIAGNOSIS — Z98.890 OTHER SPECIFIED POSTPROCEDURAL STATES: Chronic | ICD-10-CM

## 2022-02-22 DIAGNOSIS — F33.1 MAJOR DEPRESSIVE DISORDER, RECURRENT, MODERATE: ICD-10-CM

## 2022-02-22 DIAGNOSIS — Z98.891 HISTORY OF UTERINE SCAR FROM PREVIOUS SURGERY: Chronic | ICD-10-CM

## 2022-03-09 ENCOUNTER — APPOINTMENT (OUTPATIENT)
Dept: PSYCHIATRY | Facility: CLINIC | Age: 52
End: 2022-03-09
Payer: COMMERCIAL

## 2022-03-09 ENCOUNTER — NON-APPOINTMENT (OUTPATIENT)
Age: 52
End: 2022-03-09

## 2022-03-09 ENCOUNTER — OUTPATIENT (OUTPATIENT)
Dept: OUTPATIENT SERVICES | Facility: HOSPITAL | Age: 52
LOS: 1 days | Discharge: HOME | End: 2022-03-09

## 2022-03-09 DIAGNOSIS — Z98.890 OTHER SPECIFIED POSTPROCEDURAL STATES: Chronic | ICD-10-CM

## 2022-03-09 DIAGNOSIS — Z98.891 HISTORY OF UTERINE SCAR FROM PREVIOUS SURGERY: Chronic | ICD-10-CM

## 2022-03-09 DIAGNOSIS — Z86.59 PERSONAL HISTORY OF OTHER MENTAL AND BEHAVIORAL DISORDERS: ICD-10-CM

## 2022-03-09 DIAGNOSIS — F33.1 MAJOR DEPRESSIVE DISORDER, RECURRENT, MODERATE: ICD-10-CM

## 2022-03-09 DIAGNOSIS — E23.6 OTHER DISORDERS OF PITUITARY GLAND: ICD-10-CM

## 2022-03-09 PROCEDURE — 90792 PSYCH DIAG EVAL W/MED SRVCS: CPT

## 2022-03-24 ENCOUNTER — OUTPATIENT (OUTPATIENT)
Dept: OUTPATIENT SERVICES | Facility: HOSPITAL | Age: 52
LOS: 1 days | Discharge: HOME | End: 2022-03-24

## 2022-03-24 ENCOUNTER — APPOINTMENT (OUTPATIENT)
Dept: PSYCHIATRY | Facility: CLINIC | Age: 52
End: 2022-03-24

## 2022-03-24 DIAGNOSIS — Z98.890 OTHER SPECIFIED POSTPROCEDURAL STATES: Chronic | ICD-10-CM

## 2022-03-24 DIAGNOSIS — Z98.891 HISTORY OF UTERINE SCAR FROM PREVIOUS SURGERY: Chronic | ICD-10-CM

## 2022-03-24 DIAGNOSIS — F33.1 MAJOR DEPRESSIVE DISORDER, RECURRENT, MODERATE: ICD-10-CM

## 2022-03-25 ENCOUNTER — APPOINTMENT (OUTPATIENT)
Age: 52
End: 2022-03-25
Payer: COMMERCIAL

## 2022-03-25 ENCOUNTER — NON-APPOINTMENT (OUTPATIENT)
Age: 52
End: 2022-03-25

## 2022-03-25 ENCOUNTER — LABORATORY RESULT (OUTPATIENT)
Age: 52
End: 2022-03-25

## 2022-03-25 VITALS
HEART RATE: 98 BPM | SYSTOLIC BLOOD PRESSURE: 120 MMHG | DIASTOLIC BLOOD PRESSURE: 80 MMHG | RESPIRATION RATE: 12 BRPM | OXYGEN SATURATION: 97 % | HEIGHT: 67 IN | WEIGHT: 224 LBS | BODY MASS INDEX: 35.16 KG/M2

## 2022-03-25 DIAGNOSIS — R06.02 SHORTNESS OF BREATH: ICD-10-CM

## 2022-03-25 PROCEDURE — 99203 OFFICE O/P NEW LOW 30 MIN: CPT | Mod: 25

## 2022-03-25 PROCEDURE — 71046 X-RAY EXAM CHEST 2 VIEWS: CPT

## 2022-03-25 RX ORDER — CHLORHEXIDINE GLUCONATE 4 %
325 (65 FE) LIQUID (ML) TOPICAL DAILY
Qty: 30 | Refills: 0 | Status: COMPLETED | COMMUNITY
Start: 2021-03-18 | End: 2022-03-25

## 2022-03-25 RX ORDER — DIAZEPAM 5 MG/1
5 TABLET ORAL EVERY 8 HOURS
Qty: 90 | Refills: 0 | Status: COMPLETED | COMMUNITY
Start: 2021-12-13 | End: 2022-03-25

## 2022-03-25 RX ORDER — ZOLPIDEM TARTRATE 10 MG/1
10 TABLET ORAL
Refills: 0 | Status: COMPLETED | COMMUNITY
End: 2022-03-25

## 2022-03-25 RX ORDER — OMEPRAZOLE 40 MG/1
40 CAPSULE, DELAYED RELEASE ORAL
Refills: 0 | Status: COMPLETED | COMMUNITY
End: 2022-03-25

## 2022-03-25 RX ORDER — ALBUTEROL 90 MCG
AEROSOL (GRAM) INHALATION
Refills: 0 | Status: COMPLETED | COMMUNITY
End: 2022-03-25

## 2022-03-25 RX ORDER — OXCARBAZEPINE 300 MG/1
300 TABLET, FILM COATED ORAL TWICE DAILY
Qty: 120 | Refills: 3 | Status: COMPLETED | COMMUNITY
Start: 1900-01-01 | End: 2022-03-25

## 2022-03-25 RX ORDER — CALCIUM CARBONATE/VITAMIN D3 500 MG-2.5
TABLET,CHEWABLE ORAL TWICE DAILY
Refills: 0 | Status: COMPLETED | COMMUNITY
End: 2022-03-25

## 2022-03-25 RX ORDER — CETIRIZINE HCL 10 MG
TABLET ORAL
Refills: 0 | Status: COMPLETED | COMMUNITY
End: 2022-03-25

## 2022-03-25 RX ORDER — ANTIARTHRITIC COMBINATION NO.2 900 MG
5000 TABLET ORAL DAILY
Refills: 0 | Status: COMPLETED | COMMUNITY
Start: 2018-12-07 | End: 2022-03-25

## 2022-03-25 RX ORDER — AZELASTINE HYDROCHLORIDE 137 UG/1
SPRAY, METERED NASAL
Refills: 0 | Status: COMPLETED | COMMUNITY
End: 2022-03-25

## 2022-03-25 NOTE — PROCEDURE
[FreeTextEntry1] : CXR PA l \par The costophrenic and cardiophrenic angles are sharp\par The telma parenchyma shows no infiltrates, consolidations, or nodules \par The Mediastinum is within normal limits\par No pleural effusions\par

## 2022-03-25 NOTE — HISTORY OF PRESENT ILLNESS
[Intermittent] : intermittent [Doing Well] : doing well [Well Controlled] : Well controlled [Checks Regularly] : The patient checks ~his/her~ peak flow regularly [Good Control] : peak flow has been good [None] : None [Adherent] : the patient is adherent with ~his/her~ medication regimen [Goals--Doing Well] : the patient is doing well with ~his/her~ asthma goals [PFTs] : pulmonary function tests

## 2022-03-25 NOTE — PHYSICAL EXAM
[No Acute Distress] : no acute distress [Normal Oropharynx] : normal oropharynx [Normal Appearance] : normal appearance [No Neck Mass] : no neck mass [Normal Rate/Rhythm] : normal rate/rhythm [Normal S1, S2] : normal s1, s2 [No Murmurs] : no murmurs [No Resp Distress] : no resp distress [Clear to Auscultation Bilaterally] : clear to auscultation bilaterally [No Abnormalities] : no abnormalities [Benign] : benign [Normal Gait] : normal gait [No Clubbing] : no clubbing [No Cyanosis] : no cyanosis [No Edema] : no edema [FROM] : FROM [Normal Color/ Pigmentation] : normal color/ pigmentation [Oriented x3] : oriented x3 [[id number]] : Paraplegia  [Normal Affect] : normal affect

## 2022-03-25 NOTE — ASSESSMENT
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

## 2022-03-31 ENCOUNTER — APPOINTMENT (OUTPATIENT)
Dept: NEUROLOGY | Facility: CLINIC | Age: 52
End: 2022-03-31
Payer: COMMERCIAL

## 2022-03-31 ENCOUNTER — LABORATORY RESULT (OUTPATIENT)
Age: 52
End: 2022-03-31

## 2022-03-31 VITALS
HEIGHT: 67 IN | WEIGHT: 224 LBS | SYSTOLIC BLOOD PRESSURE: 112 MMHG | BODY MASS INDEX: 35.16 KG/M2 | TEMPERATURE: 97.9 F | OXYGEN SATURATION: 100 % | HEART RATE: 95 BPM | DIASTOLIC BLOOD PRESSURE: 78 MMHG

## 2022-03-31 DIAGNOSIS — M79.2 NEURALGIA AND NEURITIS, UNSPECIFIED: ICD-10-CM

## 2022-03-31 PROCEDURE — 99215 OFFICE O/P EST HI 40 MIN: CPT

## 2022-03-31 NOTE — HISTORY OF PRESENT ILLNESS
[FreeTextEntry1] : Initial hx 5/2021\par Referred by Dr. Vizcaino.\par 2/2021 - RLE weakness, tightness, gait dysfunction, some RLE discomfort. went to ED 4x. Then LLE was numb but without weakness. Went to pain management, referred to neurologist, then went to [facility]. 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 Got IVMP, no improvement.\par Saw Dr. Vizcaino who sent for NMO ab in 3/2021, referred to me, but developed relapse in 4/2021.\par 4/2021 - acute paraplegia. [facility], got 3d IVMP and 5 sessions PLEX. Got rituximab 1g x2 in 5/2021. since then, some sensation coming back but no movement. \par Currently in [place] rehab.\par Left rehab in 12/2021.\par Has developed a bad rash in late 2021. This had been attributed to lyrica so stopped it. This definitively started PRIOR to starting trileptal. However, may have been attributed to antibiotic (delayed reaction, per derm).\par 3/2022 - Had "chest spasms", read the HR instead of PO2 so thought her O2 was 65, called 911, went to ED, cleared from cardiac perspective.\par 3/2022 - developed new burning and tightness in head, neck, shoulders. awaiting MRI C spine. \par \par \par Subj interval:\par \par \par 3/2022 - developed worsened burning and tightness in head, neck, shoulders. awaiting MRI C spine.\par \par 3/2022 - Had worse "chest spasms", read the HR instead of PO2 so thought her O2 was 65, called 911, went to ED, cleared. \par \par On trileptal 750bid, a little less painful spasms, but still can be severe. Still with tightness on chest and legs that is severe QOD. \par \par On 1200tid gabapentin which has not been sufficient.\par \par \par PMHX:\par NMO\par asthma\par depression\par anxiety\par LBP\par insomnia\par migraines\par gastric sleeve\par \par MEDS:\par qvar\par albuterol prn\par ambien prn\par lovenox ppx\par protonix\par rituximab\par ubrelvy\par wellbutrin 300 daily\par valium prn\par b12\par calcium\par MV\par iron\par biotin\par colace\par miralax\par gabapentin 1200tid\par trileptal 450bid\par baclofen 20tid\par oxycodone 5mg tid prn\par \par \par SHx: no tob\par \par FHx: no neuro, no AI.\par \par \par O:\par \par AO3. Normally conversant. Follows commands, names, and repeats. Good attention.\par PERRL, VFF, EOMI, no nystagmus, face symmetric, TUP at midline.\par 5/5 in both UEs throughout, paraplegic. tone 1 in LLE and +/- in RLE\par Near complete sensory loss in both legs and low torso - some minimal patchy VBS but otherwise absent. normal in UEs.\par DTRs 2+ in UEs, 3 in L knee, 2 in R knee, minimal clonus in both AJs.\par \par \par MRI C, T, and brain 2/2021 reviewed - thoracic LETM on my review.\par \par MRI C+T 1/2022 - no new lorie+ lesions, however, c/w 3/2021 there was caudal extension on the left lateral aspect down to T8 (likely related to relapse she had in 4/2021). \par \par \par AP: 50yo w/ NMO (aqp4+, thoracic myelitis in 2/2021, recurrent myelitis in 4/2021 leading to paraplegia).\par \par All questions answered, education provided re: dx, rx, sx, prognosis. management discussed at length. \par \par - continue rituximab q6m\par - cont gabapentin 1200tid for now\par - cont trileptal 750mg bid for tonic spasms\par - can cont baclofen 20tid for now\par - cont PT\par - trial of nucynta 50tid (low dose given pt on wellbutrin)\par - new blood work\par - referral to the [facility]\par - RTC 6wks

## 2022-04-01 ENCOUNTER — APPOINTMENT (OUTPATIENT)
Dept: PEDIATRIC ALLERGY IMMUNOLOGY | Facility: CLINIC | Age: 52
End: 2022-04-01
Payer: COMMERCIAL

## 2022-04-01 VITALS — WEIGHT: 224 LBS | BODY MASS INDEX: 35.16 KG/M2 | HEIGHT: 67 IN

## 2022-04-01 LAB
25(OH)D3 SERPL-MCNC: 50.8 NG/ML
ALBUMIN SERPL ELPH-MCNC: 3.9 G/DL
ALP BLD-CCNC: 111 U/L
ALT SERPL-CCNC: 13 U/L
ANION GAP SERPL CALC-SCNC: 11 MMOL/L
AST SERPL-CCNC: 14 U/L
BASOPHILS # BLD AUTO: 0.06 K/UL
BASOPHILS NFR BLD AUTO: 2 %
BILIRUB SERPL-MCNC: 0.3 MG/DL
BUN SERPL-MCNC: 15 MG/DL
CALCIUM SERPL-MCNC: 9.1 MG/DL
CD16+CD56+ CELLS # BLD: 100 /UL
CD16+CD56+ CELLS NFR BLD: 15 %
CD19 CELLS NFR BLD: 1 /UL
CD3 CELLS # BLD: 546 /UL
CD3 CELLS NFR BLD: 83 %
CD3+CD4+ CELLS # BLD: 431 /UL
CD3+CD4+ CELLS NFR BLD: 65 %
CD3+CD4+ CELLS/CD3+CD8+ CLL SPEC: 4.24 RATIO
CD3+CD8+ CELLS # SPEC: 102 /UL
CD3+CD8+ CELLS NFR BLD: 15 %
CELLS.CD3-CD19+/CELLS IN BLOOD: <1 %
CHLORIDE SERPL-SCNC: 97 MMOL/L
CO2 SERPL-SCNC: 28 MMOL/L
CREAT SERPL-MCNC: 0.6 MG/DL
DEPRECATED KAPPA LC FREE/LAMBDA SER: 1.22 RATIO
EGFR: 107 ML/MIN/1.73M2
EOSINOPHIL # BLD AUTO: 0.21 K/UL
EOSINOPHIL NFR BLD AUTO: 7 %
GLUCOSE SERPL-MCNC: 80 MG/DL
HCT VFR BLD CALC: 38.9 %
HGB BLD-MCNC: 12.1 G/DL
IGA SER QL IEP: 194 MG/DL
IGG SER QL IEP: 1122 MG/DL
IGM SER QL IEP: 72 MG/DL
KAPPA LC CSF-MCNC: 1.44 MG/DL
KAPPA LC SERPL-MCNC: 1.76 MG/DL
LYMPHOCYTES # BLD AUTO: 0.74 K/UL
LYMPHOCYTES NFR BLD AUTO: 25 %
MAN DIFF?: NORMAL
MCHC RBC-ENTMCNC: 28.7 PG
MCHC RBC-ENTMCNC: 31.1 GM/DL
MCV RBC AUTO: 92.4 FL
MONOCYTES # BLD AUTO: 0.36 K/UL
MONOCYTES NFR BLD AUTO: 12 %
NEUTROPHILS # BLD AUTO: 1.57 K/UL
NEUTROPHILS NFR BLD AUTO: 53 %
PLATELET # BLD AUTO: 247 K/UL
POTASSIUM SERPL-SCNC: 4.1 MMOL/L
PROT SERPL-MCNC: 6.2 G/DL
RBC # BLD: 4.21 M/UL
RBC # FLD: 13 %
SODIUM SERPL-SCNC: 136 MMOL/L
WBC # FLD AUTO: 2.96 K/UL

## 2022-04-01 PROCEDURE — 95004 PERQ TESTS W/ALRGNC XTRCS: CPT

## 2022-04-01 PROCEDURE — 99213 OFFICE O/P EST LOW 20 MIN: CPT | Mod: 25

## 2022-04-01 NOTE — SOCIAL HISTORY
[Apartment] : [unfilled] lives in an apartment  [Central Forced Air] : heating provided by central forced air [Window Units] : air conditioning provided by window units [Living Area] : in living area [None] : none [Dust Mite Covers] : does not have dust mite covers [Feather Pillows] : does not have feather pillows [Feather Comforter] : does not have a feather comforter [Bedroom] : not in the bedroom [Basement] : not in the basement

## 2022-04-01 NOTE — PHYSICAL EXAM
[Alert] : alert [Well Nourished] : well nourished [No Acute Distress] : no acute distress [Well Developed] : well developed [Normal Pupil & Iris Size/Symmetry] : normal pupil and iris size and symmetry [No Discharge] : no discharge [No Photophobia] : no photophobia [Sclera Not Icteric] : sclera not icteric [Normal TMs] : both tympanic membranes were normal [Normal Nasal Mucosa] : the nasal mucosa was normal [Normal Lips/Tongue] : the lips and tongue were normal [Normal Outer Ear/Nose] : the ears and nose were normal in appearance [Normal Tonsils] : normal tonsils [No Thrush] : no thrush [Supple] : the neck was supple [Normal Rate and Effort] : normal respiratory rhythm and effort [No Retractions] : no retractions [No Crackles] : no crackles [Bilateral Audible Breath Sounds] : bilateral audible breath sounds [Normal Rate] : heart rate was normal  [Normal S1, S2] : normal S1 and S2 [No murmur] : no murmur [Regular Rhythm] : with a regular rhythm [Skin Intact] : skin intact  [No Rash] : no rash [No Skin Lesions] : no skin lesions [Normal Mood] : mood was normal [Normal Affect] : affect was normal [Alert, Awake, Oriented as Age-Appropriate] : alert, awake, oriented as age appropriate [Pale mucosa] : no pale mucosa [de-identified] : Pt in wheelchair, no motion of LE

## 2022-04-01 NOTE — HISTORY OF PRESENT ILLNESS
[None] : The patient is currently asymptomatic [de-identified] : ANNMARIE MCCULLOUGH is a 51 year female  with a history of nasal itchiness, runny nose, watery and itchy eyes. Patient was last seen on August 2020, she states she has been doing ok. She suffers from seasonal allergies which she states she does not take any allergy medications. Her asthma has been under control and is following up with a pulmonologist.  She is on Qvar twice a day with no issues.

## 2022-04-01 NOTE — REASON FOR VISIT
[Routine Follow-Up] : a routine follow-up visit for [Hay Fever] : hay fever [Family Member] : family member

## 2022-04-01 NOTE — ASSESSMENT
[FreeTextEntry1] : 1. AR/AC - Fexofenadine 180mg, Fluticasone nasal, azelastine nasal and azelastine opthalmic prm

## 2022-04-01 NOTE — REVIEW OF SYSTEMS
[Eye Discharge] : eye discharge [Eye Itching] : itchy eyes [Rhinorrhea] : rhinorrhea [Nl] : Genitourinary

## 2022-04-07 ENCOUNTER — OUTPATIENT (OUTPATIENT)
Dept: OUTPATIENT SERVICES | Facility: HOSPITAL | Age: 52
LOS: 1 days | Discharge: HOME | End: 2022-04-07

## 2022-04-07 ENCOUNTER — APPOINTMENT (OUTPATIENT)
Dept: PSYCHIATRY | Facility: CLINIC | Age: 52
End: 2022-04-07
Payer: COMMERCIAL

## 2022-04-07 DIAGNOSIS — Z98.890 OTHER SPECIFIED POSTPROCEDURAL STATES: Chronic | ICD-10-CM

## 2022-04-07 DIAGNOSIS — F41.9 ANXIETY DISORDER, UNSPECIFIED: ICD-10-CM

## 2022-04-07 DIAGNOSIS — Z98.891 HISTORY OF UTERINE SCAR FROM PREVIOUS SURGERY: Chronic | ICD-10-CM

## 2022-04-07 DIAGNOSIS — F33.1 MAJOR DEPRESSIVE DISORDER, RECURRENT, MODERATE: ICD-10-CM

## 2022-04-07 PROCEDURE — 99215 OFFICE O/P EST HI 40 MIN: CPT | Mod: 95

## 2022-04-07 RX ORDER — ENOXAPARIN SODIUM 60 MG/.6ML
60 INJECTION SUBCUTANEOUS
Refills: 0 | Status: DISCONTINUED | COMMUNITY
End: 2022-04-07

## 2022-04-07 RX ORDER — HYDROCODONE BITARTRATE AND ACETAMINOPHEN 10; 300 MG/1; MG/1
10-300 TABLET ORAL TWICE DAILY
Refills: 0 | Status: DISCONTINUED | COMMUNITY
Start: 2018-11-07 | End: 2022-04-07

## 2022-04-08 LAB
JCV INDEX: 0.17
STRATIFY JCV ANTIBODY: NEGATIVE

## 2022-04-13 ENCOUNTER — APPOINTMENT (OUTPATIENT)
Dept: PSYCHIATRY | Facility: CLINIC | Age: 52
End: 2022-04-13

## 2022-04-22 ENCOUNTER — RX RENEWAL (OUTPATIENT)
Age: 52
End: 2022-04-22

## 2022-04-25 ENCOUNTER — OUTPATIENT (OUTPATIENT)
Dept: OUTPATIENT SERVICES | Facility: HOSPITAL | Age: 52
LOS: 1 days | Discharge: HOME | End: 2022-04-25
Payer: COMMERCIAL

## 2022-04-25 ENCOUNTER — APPOINTMENT (OUTPATIENT)
Dept: PSYCHIATRY | Facility: CLINIC | Age: 52
End: 2022-04-25

## 2022-04-25 ENCOUNTER — RESULT REVIEW (OUTPATIENT)
Age: 52
End: 2022-04-25

## 2022-04-25 DIAGNOSIS — G36.0 NEUROMYELITIS OPTICA [DEVIC]: ICD-10-CM

## 2022-04-25 DIAGNOSIS — Z98.891 HISTORY OF UTERINE SCAR FROM PREVIOUS SURGERY: Chronic | ICD-10-CM

## 2022-04-25 DIAGNOSIS — Z98.890 OTHER SPECIFIED POSTPROCEDURAL STATES: Chronic | ICD-10-CM

## 2022-04-25 PROCEDURE — 72156 MRI NECK SPINE W/O & W/DYE: CPT | Mod: 26

## 2022-04-25 PROCEDURE — 70553 MRI BRAIN STEM W/O & W/DYE: CPT | Mod: 26

## 2022-04-27 ENCOUNTER — APPOINTMENT (OUTPATIENT)
Dept: PSYCHIATRY | Facility: CLINIC | Age: 52
End: 2022-04-27
Payer: COMMERCIAL

## 2022-04-27 ENCOUNTER — OUTPATIENT (OUTPATIENT)
Dept: OUTPATIENT SERVICES | Facility: HOSPITAL | Age: 52
LOS: 1 days | Discharge: HOME | End: 2022-04-27

## 2022-04-27 ENCOUNTER — NON-APPOINTMENT (OUTPATIENT)
Age: 52
End: 2022-04-27

## 2022-04-27 DIAGNOSIS — Z98.890 OTHER SPECIFIED POSTPROCEDURAL STATES: Chronic | ICD-10-CM

## 2022-04-27 DIAGNOSIS — F41.1 GENERALIZED ANXIETY DISORDER: ICD-10-CM

## 2022-04-27 DIAGNOSIS — F33.1 MAJOR DEPRESSIVE DISORDER, RECURRENT, MODERATE: ICD-10-CM

## 2022-04-27 DIAGNOSIS — Z98.891 HISTORY OF UTERINE SCAR FROM PREVIOUS SURGERY: Chronic | ICD-10-CM

## 2022-04-27 PROCEDURE — 99215 OFFICE O/P EST HI 40 MIN: CPT | Mod: 95

## 2022-05-04 ENCOUNTER — APPOINTMENT (OUTPATIENT)
Dept: PSYCHIATRY | Facility: CLINIC | Age: 52
End: 2022-05-04

## 2022-05-04 ENCOUNTER — OUTPATIENT (OUTPATIENT)
Dept: OUTPATIENT SERVICES | Facility: HOSPITAL | Age: 52
LOS: 1 days | Discharge: HOME | End: 2022-05-04

## 2022-05-04 DIAGNOSIS — F41.1 GENERALIZED ANXIETY DISORDER: ICD-10-CM

## 2022-05-04 DIAGNOSIS — Z98.890 OTHER SPECIFIED POSTPROCEDURAL STATES: Chronic | ICD-10-CM

## 2022-05-04 DIAGNOSIS — F33.1 MAJOR DEPRESSIVE DISORDER, RECURRENT, MODERATE: ICD-10-CM

## 2022-05-04 DIAGNOSIS — Z98.891 HISTORY OF UTERINE SCAR FROM PREVIOUS SURGERY: Chronic | ICD-10-CM

## 2022-05-06 ENCOUNTER — NON-APPOINTMENT (OUTPATIENT)
Age: 52
End: 2022-05-06

## 2022-05-09 ENCOUNTER — LABORATORY RESULT (OUTPATIENT)
Age: 52
End: 2022-05-09

## 2022-05-11 ENCOUNTER — APPOINTMENT (OUTPATIENT)
Dept: PSYCHIATRY | Facility: CLINIC | Age: 52
End: 2022-05-11

## 2022-05-11 ENCOUNTER — OUTPATIENT (OUTPATIENT)
Dept: OUTPATIENT SERVICES | Facility: HOSPITAL | Age: 52
LOS: 1 days | Discharge: HOME | End: 2022-05-11

## 2022-05-11 DIAGNOSIS — F41.1 GENERALIZED ANXIETY DISORDER: ICD-10-CM

## 2022-05-11 DIAGNOSIS — Z98.891 HISTORY OF UTERINE SCAR FROM PREVIOUS SURGERY: Chronic | ICD-10-CM

## 2022-05-11 DIAGNOSIS — Z98.890 OTHER SPECIFIED POSTPROCEDURAL STATES: Chronic | ICD-10-CM

## 2022-05-11 DIAGNOSIS — F33.1 MAJOR DEPRESSIVE DISORDER, RECURRENT, MODERATE: ICD-10-CM

## 2022-05-12 ENCOUNTER — OUTPATIENT (OUTPATIENT)
Dept: OUTPATIENT SERVICES | Facility: HOSPITAL | Age: 52
LOS: 1 days | Discharge: HOME | End: 2022-05-12

## 2022-05-12 DIAGNOSIS — R06.02 SHORTNESS OF BREATH: ICD-10-CM

## 2022-05-12 DIAGNOSIS — Z98.890 OTHER SPECIFIED POSTPROCEDURAL STATES: Chronic | ICD-10-CM

## 2022-05-12 DIAGNOSIS — Z98.891 HISTORY OF UTERINE SCAR FROM PREVIOUS SURGERY: Chronic | ICD-10-CM

## 2022-05-12 LAB — GAS PNL BLDA: SIGNIFICANT CHANGE UP

## 2022-05-18 ENCOUNTER — OUTPATIENT (OUTPATIENT)
Dept: OUTPATIENT SERVICES | Facility: HOSPITAL | Age: 52
LOS: 1 days | Discharge: HOME | End: 2022-05-18

## 2022-05-18 ENCOUNTER — APPOINTMENT (OUTPATIENT)
Dept: PSYCHIATRY | Facility: CLINIC | Age: 52
End: 2022-05-18

## 2022-05-18 DIAGNOSIS — Z98.891 HISTORY OF UTERINE SCAR FROM PREVIOUS SURGERY: Chronic | ICD-10-CM

## 2022-05-18 DIAGNOSIS — F33.1 MAJOR DEPRESSIVE DISORDER, RECURRENT, MODERATE: ICD-10-CM

## 2022-05-18 DIAGNOSIS — Z98.890 OTHER SPECIFIED POSTPROCEDURAL STATES: Chronic | ICD-10-CM

## 2022-05-18 DIAGNOSIS — F41.1 GENERALIZED ANXIETY DISORDER: ICD-10-CM

## 2022-05-19 ENCOUNTER — APPOINTMENT (OUTPATIENT)
Dept: PSYCHIATRY | Facility: CLINIC | Age: 52
End: 2022-05-19
Payer: COMMERCIAL

## 2022-05-19 ENCOUNTER — OUTPATIENT (OUTPATIENT)
Dept: OUTPATIENT SERVICES | Facility: HOSPITAL | Age: 52
LOS: 1 days | Discharge: HOME | End: 2022-05-19

## 2022-05-19 DIAGNOSIS — Z98.890 OTHER SPECIFIED POSTPROCEDURAL STATES: Chronic | ICD-10-CM

## 2022-05-19 DIAGNOSIS — F41.1 GENERALIZED ANXIETY DISORDER: ICD-10-CM

## 2022-05-19 DIAGNOSIS — F33.1 MAJOR DEPRESSIVE DISORDER, RECURRENT, MODERATE: ICD-10-CM

## 2022-05-19 DIAGNOSIS — Z98.891 HISTORY OF UTERINE SCAR FROM PREVIOUS SURGERY: Chronic | ICD-10-CM

## 2022-05-19 PROCEDURE — 99215 OFFICE O/P EST HI 40 MIN: CPT | Mod: 95

## 2022-05-19 RX ORDER — BUPROPION HYDROCHLORIDE 150 MG/1
150 TABLET, EXTENDED RELEASE ORAL EVERY MORNING
Qty: 7 | Refills: 0 | Status: DISCONTINUED | COMMUNITY
Start: 1900-01-01 | End: 2022-05-19

## 2022-05-25 ENCOUNTER — APPOINTMENT (OUTPATIENT)
Dept: PSYCHIATRY | Facility: CLINIC | Age: 52
End: 2022-05-25

## 2022-05-25 ENCOUNTER — OUTPATIENT (OUTPATIENT)
Dept: OUTPATIENT SERVICES | Facility: HOSPITAL | Age: 52
LOS: 1 days | Discharge: HOME | End: 2022-05-25

## 2022-05-25 DIAGNOSIS — Z98.890 OTHER SPECIFIED POSTPROCEDURAL STATES: Chronic | ICD-10-CM

## 2022-05-25 DIAGNOSIS — Z98.891 HISTORY OF UTERINE SCAR FROM PREVIOUS SURGERY: Chronic | ICD-10-CM

## 2022-05-25 DIAGNOSIS — F41.1 GENERALIZED ANXIETY DISORDER: ICD-10-CM

## 2022-05-25 DIAGNOSIS — F33.1 MAJOR DEPRESSIVE DISORDER, RECURRENT, MODERATE: ICD-10-CM

## 2022-05-27 ENCOUNTER — OUTPATIENT (OUTPATIENT)
Dept: OUTPATIENT SERVICES | Facility: HOSPITAL | Age: 52
LOS: 1 days | Discharge: HOME | End: 2022-05-27

## 2022-05-27 ENCOUNTER — APPOINTMENT (OUTPATIENT)
Dept: INFUSION THERAPY | Facility: CLINIC | Age: 52
End: 2022-05-27

## 2022-05-27 VITALS
HEART RATE: 80 BPM | DIASTOLIC BLOOD PRESSURE: 83 MMHG | RESPIRATION RATE: 16 BRPM | SYSTOLIC BLOOD PRESSURE: 132 MMHG | TEMPERATURE: 97.3 F

## 2022-05-27 DIAGNOSIS — Z98.891 HISTORY OF UTERINE SCAR FROM PREVIOUS SURGERY: Chronic | ICD-10-CM

## 2022-05-27 DIAGNOSIS — Z98.890 OTHER SPECIFIED POSTPROCEDURAL STATES: Chronic | ICD-10-CM

## 2022-05-27 RX ORDER — ACETAMINOPHEN 500 MG
650 TABLET ORAL ONCE
Refills: 0 | Status: COMPLETED | OUTPATIENT
Start: 2022-05-27 | End: 2022-05-27

## 2022-05-27 RX ORDER — DIPHENHYDRAMINE HCL 50 MG
50 CAPSULE ORAL ONCE
Refills: 0 | Status: COMPLETED | OUTPATIENT
Start: 2022-05-27 | End: 2022-05-27

## 2022-05-27 RX ORDER — RITUXIMAB 10 MG/ML
1000 INJECTION, SOLUTION INTRAVENOUS ONCE
Refills: 0 | Status: COMPLETED | OUTPATIENT
Start: 2022-05-27 | End: 2022-05-27

## 2022-05-27 RX ADMIN — Medication 58 MILLIGRAM(S): at 11:41

## 2022-05-27 RX ADMIN — Medication 50 MILLIGRAM(S): at 11:41

## 2022-05-27 RX ADMIN — RITUXIMAB 200 MILLIGRAM(S): 10 INJECTION, SOLUTION INTRAVENOUS at 12:05

## 2022-06-01 ENCOUNTER — APPOINTMENT (OUTPATIENT)
Dept: PSYCHIATRY | Facility: CLINIC | Age: 52
End: 2022-06-01

## 2022-06-01 ENCOUNTER — OUTPATIENT (OUTPATIENT)
Dept: OUTPATIENT SERVICES | Facility: HOSPITAL | Age: 52
LOS: 1 days | Discharge: HOME | End: 2022-06-01

## 2022-06-01 ENCOUNTER — APPOINTMENT (OUTPATIENT)
Dept: NEUROLOGY | Facility: CLINIC | Age: 52
End: 2022-06-01
Payer: COMMERCIAL

## 2022-06-01 ENCOUNTER — TRANSCRIPTION ENCOUNTER (OUTPATIENT)
Age: 52
End: 2022-06-01

## 2022-06-01 DIAGNOSIS — F41.1 GENERALIZED ANXIETY DISORDER: ICD-10-CM

## 2022-06-01 DIAGNOSIS — Z98.890 OTHER SPECIFIED POSTPROCEDURAL STATES: Chronic | ICD-10-CM

## 2022-06-01 DIAGNOSIS — Z98.891 HISTORY OF UTERINE SCAR FROM PREVIOUS SURGERY: Chronic | ICD-10-CM

## 2022-06-01 DIAGNOSIS — F33.1 MAJOR DEPRESSIVE DISORDER, RECURRENT, MODERATE: ICD-10-CM

## 2022-06-01 PROCEDURE — 99417 PROLNG OP E/M EACH 15 MIN: CPT | Mod: 95

## 2022-06-01 PROCEDURE — 99215 OFFICE O/P EST HI 40 MIN: CPT | Mod: 95

## 2022-06-02 NOTE — HISTORY OF PRESENT ILLNESS
[Home] : at home, [unfilled] , at the time of the visit. [Medical Office: ([facility])___] : at the medical office located in  [Verbal consent obtained from patient] : the patient, [unfilled] [FreeTextEntry1] : Initial hx 5/2021\par Referred by Dr. Vizcaino.\par 2/2021 - RLE weakness, tightness, gait dysfunction, some RLE discomfort. went to ED 4x. Then LLE was numb but without weakness. Went to pain management, referred to neurologist, then went to [facility]. 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 Got IVMP, no improvement.\par Saw Dr. Vizcaino who sent for NMO ab in 3/2021, referred to me, but developed relapse in 4/2021.\par 4/2021 - acute paraplegia. [facility], got 3d IVMP and 5 sessions PLEX. Got rituximab 1g x2 in 5/2021. since then, some sensation coming back but no movement. \par Currently in [place] rehab.\par Left rehab in 12/2021.\par Has developed a bad rash in late 2021. This had been attributed to lyrica so stopped it. This definitively started PRIOR to starting trileptal. However, may have been attributed to antibiotic (delayed reaction, per derm).\par 3/2022 - Had "chest spasms", read the HR instead of PO2 so thought her O2 was 65, called 911, went to ED, cleared from cardiac perspective.\par 3/2022 - developed new burning and tightness in head, neck, shoulders. MRI C spine without new activity.\par 5/2022 - switched from wellbutrin to cymbalta. \par 5/2022 - tested positive for covid on two home tests.\par \par \par Subj interval:\par \par Chronic pain and spasms. \par \par On trileptal 750bid, a little less painful spasms, but still can be severe. Still with tightness on chest and legs that is severe QOD. \par \par On 1200tid gabapentin which has not been sufficient.\par \par 5/2022 - switched from wellbutrin to cymbalta 60mg. \par \par HA yesterday, tested +for covid. Also with a cough and stuffy nose.\par \par PMHX:\par NMO\par asthma\par depression\par anxiety\par LBP\par insomnia\par migraines\par gastric sleeve\par \par MEDS:\par qvar\par asa81\par albuterol prn\par ambien prn\par protonix\par rituximab\par ubrelvy\par cymbalta\par valium prn\par b12\par calcium\par MV\par iron\par biotin\par colace\par miralax\par gabapentin 1200tid\par trileptal 750bid\par baclofen 20tid\par oxycodone 5mg tid prn\par nucynta 50tid\par \par SHx: no tob\par \par FHx: no neuro, no AI.\par \par \par O:\par \par AO3. Normally conversant. Follows commands, names, and repeats. Good attention.\par moves both arms well\par \par \par MRI C, T, and brain 2/2021 reviewed - thoracic LETM on my review.\par \par MRI C+T 1/2022 - no new lorie+ lesions, however, c/w 3/2021 there was caudal extension on the left lateral aspect down to T8 (likely related to relapse she had in 4/2021). \par \par MRI C spine and MRI brain/sella 4/2022 - unchanged. 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 Somewhat improved on nucynta 50tid; will increase for better effect.\par \par Covid+\par \par All questions answered, education provided re: dx, rx, sx, prognosis. management discussed at length. \par \par \par - continue rituximab q6m\par - increase nucynta to 75tid from 50tid\par - 1wk after making nucynta switch, dec gabapentin to 600tid from 1200tid\par - cont trileptal 750mg bid for tonic spasms for now, consider dec in future.\par - can cont baclofen 20tid for now\par - cont cymbalta 60\par - cont PT\par \par - covid - filled out form for antibody treatment\par \par - new blood work this month\par \par - RTC 6wks

## 2022-06-03 ENCOUNTER — OUTPATIENT (OUTPATIENT)
Dept: INPATIENT UNIT | Facility: HOSPITAL | Age: 52
LOS: 1 days | Discharge: HOME | End: 2022-06-03

## 2022-06-03 ENCOUNTER — APPOINTMENT (OUTPATIENT)
Age: 52
End: 2022-06-03

## 2022-06-03 VITALS
HEART RATE: 80 BPM | DIASTOLIC BLOOD PRESSURE: 71 MMHG | SYSTOLIC BLOOD PRESSURE: 101 MMHG | TEMPERATURE: 98 F | OXYGEN SATURATION: 99 % | RESPIRATION RATE: 17 BRPM

## 2022-06-03 VITALS
HEART RATE: 96 BPM | OXYGEN SATURATION: 98 % | TEMPERATURE: 99 F | RESPIRATION RATE: 18 BRPM | DIASTOLIC BLOOD PRESSURE: 76 MMHG | SYSTOLIC BLOOD PRESSURE: 107 MMHG

## 2022-06-03 DIAGNOSIS — Z98.891 HISTORY OF UTERINE SCAR FROM PREVIOUS SURGERY: Chronic | ICD-10-CM

## 2022-06-03 DIAGNOSIS — Z98.890 OTHER SPECIFIED POSTPROCEDURAL STATES: Chronic | ICD-10-CM

## 2022-06-03 DIAGNOSIS — U07.1 COVID-19: ICD-10-CM

## 2022-06-03 RX ORDER — SODIUM CHLORIDE 9 MG/ML
250 INJECTION INTRAMUSCULAR; INTRAVENOUS; SUBCUTANEOUS
Refills: 0 | Status: DISCONTINUED | OUTPATIENT
Start: 2022-06-03 | End: 2022-06-03

## 2022-06-03 RX ORDER — BEBTELOVIMAB 87.5 MG/ML
175 INJECTION, SOLUTION INTRAVENOUS ONCE
Refills: 0 | Status: COMPLETED | OUTPATIENT
Start: 2022-06-03 | End: 2022-06-03

## 2022-06-03 RX ADMIN — BEBTELOVIMAB 175 MILLIGRAM(S): 87.5 INJECTION, SOLUTION INTRAVENOUS at 12:00

## 2022-06-03 RX ADMIN — SODIUM CHLORIDE 125 MILLILITER(S): 9 INJECTION INTRAMUSCULAR; INTRAVENOUS; SUBCUTANEOUS at 14:51

## 2022-06-03 NOTE — CHART NOTE - NSCHARTNOTEFT_GEN_A_CORE
Medicine Progress Note    Patient is a 51y old  Female who presents with a chief complaint of covid 19 infection and to receive monoclonal antibody infusion Bebtelovimab.  pt tested positive on may 31, and is having cough, sore throat, headaches and fatigue.  pt has been fully vaccinated against covid.     PAST MEDICAL & SURGICAL HISTORY:  Asthma  STABLE, EPISODIC -AVERAGE USE OF ALBUTEROL ABOUT ONCE A MONTH      Obstructive sleep apnea on CPAP      Obesity      GERD (gastroesophageal reflux disease)      HTN (hypertension)      Arthritis  CHR PAIN B/L KNEES      Fatty liver      H/O  section      History of tonsillectomy and adenoidectomy  AND REMOVAL UVULA      H/O arthroscopic knee surgery  LT AND RT        Home Medications:  gabapentin 600 mg oral tablet: 1 tab(s) orally 3 times a day (2021 11:58)  omeprazole 40 mg oral delayed release capsule: 1 cap(s) orally once a day (2021 11:58)  Qvar 80 mcg/inh inhalation aerosol: 2  inhaled 2 times a day (2021 11:58)  Topamax 200 mg oral tablet: 1 tab(s) orally 2 times a day (2021 11:58)  Ubrelvy 100 mg oral tablet: 1 tab(s) orally 2 times a day, As Needed for migraine  (2021 11:58)  Ventolin 90 mcg/inh inhalation aerosol with adapter:  (2021 11:58)      MEDICATIONS  (STANDING):  bebtelovimab (EUA) Injectable 175 milliGRAM(s) IV Push once    PHYSICAL EXAM:  Vital Signs Last 24 Hrs    VSS    CONSTITUTIONAL: NAD, well-developed, well-groomed  RESPIRATORY: Normal respiratory effort; lungs are clear to auscultation bilaterally  CARDIOVASCULAR: Regular rate and rhythm, normal S1 and S2, no murmur/rub/gallop; No lower extremity edema; Peripheral pulses are 2+ bilaterally  PSYCH: A+O to person, place, and time; affect appropriate  NEUROLOGY: CN 2-12 are intact and symmetric; no gross sensory deficits   SKIN: No rashes; no palpable lesions    Plan:  I have reviewed the Bebtelovimab Emergency Use Authorization (EUA) and I have provided the patient or patient's caregiver with the following information:  1. FDA has authorized emergency use of Bebtelovimab which is not an FDA approved biological agent.  2. The patient or patient’s caregiver has the option to accept or refuse administration of Bebtelovimab  3. The significant known and potential risks and benefits of Bebtelovimab and the extent to which such risks and benefits are unknown.  4. Information on available alternative treatments and risks and benefits of those alternatives.  Informed consent was obtained. Answered all of patient's questions and concerns to their satisfaction. Patient verbalized understanding.  Monitor VS per protocol.  Insert peripheral IV.  Infuse NSS 25/mL IV continuously.  Administer Bebtelovimab IVP over 30 seconds.  Observe patient for 1 hour post infusion.    Disposition:  Patient tolerated infusion well, denies complaints of chest pain, shortness of breath, dizziness or palpitations. Discharge instructions given and fact sheet included.  Instructed patient to contact the call center or their PMD if they develop side effects at home.  Instructed the patient to call 911 and go to the emergency room if they develop symptoms of a severe allergic reaction. Patient verbalized understanding.  RN educated patient on the proper use of the incentive spirometer and the patient displayed return demonstration.  VSS and RN removed IV successfully.  Patient was discharged home in [facility].

## 2022-06-08 ENCOUNTER — APPOINTMENT (OUTPATIENT)
Dept: PSYCHIATRY | Facility: CLINIC | Age: 52
End: 2022-06-08

## 2022-06-10 ENCOUNTER — APPOINTMENT (OUTPATIENT)
Dept: INFUSION THERAPY | Facility: CLINIC | Age: 52
End: 2022-06-10

## 2022-06-10 ENCOUNTER — OUTPATIENT (OUTPATIENT)
Dept: OUTPATIENT SERVICES | Facility: HOSPITAL | Age: 52
LOS: 1 days | Discharge: HOME | End: 2022-06-10

## 2022-06-10 DIAGNOSIS — Z98.890 OTHER SPECIFIED POSTPROCEDURAL STATES: Chronic | ICD-10-CM

## 2022-06-10 DIAGNOSIS — Z98.891 HISTORY OF UTERINE SCAR FROM PREVIOUS SURGERY: Chronic | ICD-10-CM

## 2022-06-10 RX ORDER — RITUXIMAB 10 MG/ML
1000 INJECTION, SOLUTION INTRAVENOUS ONCE
Refills: 0 | Status: COMPLETED | OUTPATIENT
Start: 2022-06-10 | End: 2022-06-10

## 2022-06-10 RX ORDER — ACETAMINOPHEN 500 MG
650 TABLET ORAL ONCE
Refills: 0 | Status: COMPLETED | OUTPATIENT
Start: 2022-06-10 | End: 2022-06-10

## 2022-06-10 RX ORDER — DIPHENHYDRAMINE HCL 50 MG
50 CAPSULE ORAL ONCE
Refills: 0 | Status: COMPLETED | OUTPATIENT
Start: 2022-06-10 | End: 2022-06-10

## 2022-06-10 RX ADMIN — RITUXIMAB 166.67 MILLIGRAM(S): 10 INJECTION, SOLUTION INTRAVENOUS at 13:00

## 2022-06-10 RX ADMIN — Medication 58 MILLIGRAM(S): at 11:51

## 2022-06-10 RX ADMIN — Medication 650 MILLIGRAM(S): at 11:54

## 2022-06-10 RX ADMIN — Medication 50 MILLIGRAM(S): at 11:54

## 2022-06-13 DIAGNOSIS — G36.0 NEUROMYELITIS OPTICA [DEVIC]: ICD-10-CM

## 2022-06-13 DIAGNOSIS — G82.21 PARAPLEGIA, COMPLETE: ICD-10-CM

## 2022-06-15 ENCOUNTER — NON-APPOINTMENT (OUTPATIENT)
Age: 52
End: 2022-06-15

## 2022-06-16 ENCOUNTER — APPOINTMENT (OUTPATIENT)
Dept: PSYCHIATRY | Facility: CLINIC | Age: 52
End: 2022-06-16

## 2022-06-16 ENCOUNTER — OUTPATIENT (OUTPATIENT)
Dept: OUTPATIENT SERVICES | Facility: HOSPITAL | Age: 52
LOS: 1 days | Discharge: HOME | End: 2022-06-16

## 2022-06-16 DIAGNOSIS — Z98.890 OTHER SPECIFIED POSTPROCEDURAL STATES: Chronic | ICD-10-CM

## 2022-06-16 DIAGNOSIS — F33.1 MAJOR DEPRESSIVE DISORDER, RECURRENT, MODERATE: ICD-10-CM

## 2022-06-16 DIAGNOSIS — F41.1 GENERALIZED ANXIETY DISORDER: ICD-10-CM

## 2022-06-16 DIAGNOSIS — Z98.891 HISTORY OF UTERINE SCAR FROM PREVIOUS SURGERY: Chronic | ICD-10-CM

## 2022-06-16 PROCEDURE — 99215 OFFICE O/P EST HI 40 MIN: CPT | Mod: 95

## 2022-06-22 ENCOUNTER — APPOINTMENT (OUTPATIENT)
Dept: PSYCHIATRY | Facility: CLINIC | Age: 52
End: 2022-06-22

## 2022-06-22 ENCOUNTER — OUTPATIENT (OUTPATIENT)
Dept: OUTPATIENT SERVICES | Facility: HOSPITAL | Age: 52
LOS: 1 days | Discharge: HOME | End: 2022-06-22

## 2022-06-22 DIAGNOSIS — Z98.890 OTHER SPECIFIED POSTPROCEDURAL STATES: Chronic | ICD-10-CM

## 2022-06-22 DIAGNOSIS — F41.1 GENERALIZED ANXIETY DISORDER: ICD-10-CM

## 2022-06-22 DIAGNOSIS — Z98.891 HISTORY OF UTERINE SCAR FROM PREVIOUS SURGERY: Chronic | ICD-10-CM

## 2022-06-22 DIAGNOSIS — F33.1 MAJOR DEPRESSIVE DISORDER, RECURRENT, MODERATE: ICD-10-CM

## 2022-06-27 LAB
ALBUMIN SERPL ELPH-MCNC: 4.1 G/DL
ALP BLD-CCNC: 108 U/L
ALT SERPL-CCNC: 34 U/L
ANION GAP SERPL CALC-SCNC: 12 MMOL/L
AST SERPL-CCNC: 25 U/L
BASOPHILS # BLD AUTO: 0.02 K/UL
BASOPHILS NFR BLD AUTO: 0.6 %
BILIRUB SERPL-MCNC: 0.3 MG/DL
BUN SERPL-MCNC: 10 MG/DL
CALCIUM SERPL-MCNC: 9.5 MG/DL
CHLORIDE SERPL-SCNC: 95 MMOL/L
CO2 SERPL-SCNC: 32 MMOL/L
CREAT SERPL-MCNC: 0.5 MG/DL
EGFR: 112 ML/MIN/1.73M2
EOSINOPHIL # BLD AUTO: 0.25 K/UL
EOSINOPHIL NFR BLD AUTO: 7.1 %
GLUCOSE SERPL-MCNC: 75 MG/DL
HCT VFR BLD CALC: 39.6 %
HGB BLD-MCNC: 12.5 G/DL
IMM GRANULOCYTES NFR BLD AUTO: 0.3 %
LYMPHOCYTES # BLD AUTO: 0.61 K/UL
LYMPHOCYTES NFR BLD AUTO: 17.3 %
MAN DIFF?: NORMAL
MCHC RBC-ENTMCNC: 28.5 PG
MCHC RBC-ENTMCNC: 31.6 GM/DL
MCV RBC AUTO: 90.4 FL
MONOCYTES # BLD AUTO: 0.37 K/UL
MONOCYTES NFR BLD AUTO: 10.5 %
NEUTROPHILS # BLD AUTO: 2.27 K/UL
NEUTROPHILS NFR BLD AUTO: 64.2 %
PLATELET # BLD AUTO: 260 K/UL
POTASSIUM SERPL-SCNC: 3.7 MMOL/L
PROT SERPL-MCNC: 6.6 G/DL
RBC # BLD: 4.38 M/UL
RBC # FLD: 12.8 %
SODIUM SERPL-SCNC: 138 MMOL/L
WBC # FLD AUTO: 3.53 K/UL

## 2022-06-27 NOTE — PATIENT PROFILE ADULT - STATED REASON FOR ADMISSION
S-(situation): INR    B-(background):  RESIDENT WITH CURRENT ANTIBIOTIC USE BEGINNING 6/26;    A-(assessment): INR- 1.9  Current Dose: Coumadin 1mg M/F and 2mg AOD  Dx. A Fib  Current Antibiotic-Cipro 500mg BID x 5 days-started 6/26 for UTI  Previous INR: 6/22: 1.9- 1mg M/F and 2mg AOD  6/16: 2.2 1mg M/F and 2mg AOD  Allergies: Penicillins & Sulfa Drugs    R-(recommendations): await new orders.    
Lower Extremities Weakness

## 2022-06-28 ENCOUNTER — TRANSCRIPTION ENCOUNTER (OUTPATIENT)
Age: 52
End: 2022-06-28

## 2022-06-28 ENCOUNTER — OUTPATIENT (OUTPATIENT)
Dept: OUTPATIENT SERVICES | Facility: HOSPITAL | Age: 52
LOS: 1 days | Discharge: HOME | End: 2022-06-28

## 2022-06-28 ENCOUNTER — RESULT REVIEW (OUTPATIENT)
Age: 52
End: 2022-06-28

## 2022-06-28 VITALS
TEMPERATURE: 97 F | WEIGHT: 227.08 LBS | HEART RATE: 96 BPM | SYSTOLIC BLOOD PRESSURE: 143 MMHG | HEIGHT: 67 IN | DIASTOLIC BLOOD PRESSURE: 106 MMHG | RESPIRATION RATE: 18 BRPM

## 2022-06-28 VITALS
HEART RATE: 74 BPM | OXYGEN SATURATION: 100 % | SYSTOLIC BLOOD PRESSURE: 145 MMHG | DIASTOLIC BLOOD PRESSURE: 96 MMHG | RESPIRATION RATE: 18 BRPM

## 2022-06-28 DIAGNOSIS — Z98.890 OTHER SPECIFIED POSTPROCEDURAL STATES: Chronic | ICD-10-CM

## 2022-06-28 DIAGNOSIS — Z98.891 HISTORY OF UTERINE SCAR FROM PREVIOUS SURGERY: Chronic | ICD-10-CM

## 2022-06-28 PROCEDURE — 88305 TISSUE EXAM BY PATHOLOGIST: CPT | Mod: 26

## 2022-06-28 RX ORDER — GABAPENTIN 400 MG/1
1 CAPSULE ORAL
Qty: 0 | Refills: 0 | DISCHARGE

## 2022-06-28 RX ORDER — ALBUTEROL 90 UG/1
0 AEROSOL, METERED ORAL
Qty: 0 | Refills: 0 | DISCHARGE

## 2022-06-28 RX ORDER — UBROGEPANT 100 MG/1
1 TABLET ORAL
Qty: 0 | Refills: 0 | DISCHARGE

## 2022-06-28 RX ORDER — BECLOMETHASONE DIPROPIONATE 40 UG/1
2 AEROSOL, METERED RESPIRATORY (INHALATION)
Qty: 0 | Refills: 0 | DISCHARGE

## 2022-06-28 RX ORDER — TOPIRAMATE 25 MG
1 TABLET ORAL
Qty: 0 | Refills: 0 | DISCHARGE

## 2022-06-28 RX ORDER — OMEPRAZOLE 10 MG/1
1 CAPSULE, DELAYED RELEASE ORAL
Qty: 0 | Refills: 0 | DISCHARGE

## 2022-06-28 RX ORDER — OXYCODONE HYDROCHLORIDE 5 MG/1
1 TABLET ORAL
Qty: 0 | Refills: 0 | DISCHARGE

## 2022-06-28 NOTE — ASU PATIENT PROFILE, ADULT - NSICDXPASTMEDICALHX_GEN_ALL_CORE_FT
PAST MEDICAL HISTORY:  Arthritis CHR PAIN B/L KNEES    Asthma STABLE, EPISODIC -AVERAGE USE OF ALBUTEROL ABOUT ONCE A MONTH    Fatty liver     GERD (gastroesophageal reflux disease)     HTN (hypertension)     Neuromyelitis optica     Obesity     Obstructive sleep apnea on CPAP

## 2022-06-28 NOTE — ASU DISCHARGE PLAN (ADULT/PEDIATRIC) - NS MD DC FALL RISK RISK
For information on Fall & Injury Prevention, visit: [email or web address] OR  [email or web address] OR  https://www.cdc.gov/steadi/patient.html

## 2022-06-28 NOTE — H&P PST ADULT - HISTORY OF PRESENT ILLNESS
51 year female patient with HTN, GERD , fatty liver is here for colonoscopy last one 2021 poor prep  51 year female patient with HTN, GERD , gastric sleeve 2018, fatty liver is here for colonoscopy last one 2021 poor prep   patient continue to have constipation

## 2022-06-28 NOTE — ASU DISCHARGE PLAN (ADULT/PEDIATRIC) - CARE PROVIDER_API CALL
Sj Zaragoza  GASTROENTEROLOGY  [address] [place] [address]  Phone: [phone]  Fax: [phone]  Follow Up Time:

## 2022-06-28 NOTE — ASU PATIENT PROFILE, ADULT - FALL HARM RISK - RISK INTERVENTIONS
01-Jul-2018 Assistance OOB with selected safe patient handling equipment/Communicate Fall Risk and Risk Factors to all staff, patient, and family/Discuss with provider need for PT consult/Monitor gait and stability/Provide patient with walking aids - walker, cane, crutches/Reinforce activity limits and safety measures with patient and family/Visual Cue: Yellow wristband/Bed in lowest position, wheels locked, appropriate side rails in place/Call bell, personal items and telephone in reach/Instruct patient to call for assistance before getting out of bed or chair/Non-slip footwear when patient is out of bed/[place] to call system/Physically safe environment - no spills, clutter or unnecessary equipment/Purposeful Proactive Rounding/Room/bathroom lighting operational, light cord in reach

## 2022-06-28 NOTE — ASU DISCHARGE PLAN (ADULT/PEDIATRIC) - CALL YOUR DOCTOR IF YOU HAVE ANY OF THE FOLLOWING:
Bleeding that does not stop/Swelling that gets worse/Pain not relieved by Medications/Nausea and vomiting that does not stop/Inability to tolerate liquids or foods

## 2022-06-29 ENCOUNTER — OUTPATIENT (OUTPATIENT)
Dept: OUTPATIENT SERVICES | Facility: HOSPITAL | Age: 52
LOS: 1 days | Discharge: HOME | End: 2022-06-29

## 2022-06-29 ENCOUNTER — APPOINTMENT (OUTPATIENT)
Dept: PSYCHIATRY | Facility: CLINIC | Age: 52
End: 2022-06-29

## 2022-06-29 DIAGNOSIS — Z98.891 HISTORY OF UTERINE SCAR FROM PREVIOUS SURGERY: Chronic | ICD-10-CM

## 2022-06-29 DIAGNOSIS — Z98.890 OTHER SPECIFIED POSTPROCEDURAL STATES: Chronic | ICD-10-CM

## 2022-06-29 DIAGNOSIS — F33.1 MAJOR DEPRESSIVE DISORDER, RECURRENT, MODERATE: ICD-10-CM

## 2022-06-29 DIAGNOSIS — F41.1 GENERALIZED ANXIETY DISORDER: ICD-10-CM

## 2022-06-30 PROBLEM — G36.0 NEUROMYELITIS OPTICA [DEVIC]: Chronic | Status: ACTIVE | Noted: 2022-06-28

## 2022-07-01 LAB — SURGICAL PATHOLOGY STUDY: SIGNIFICANT CHANGE UP

## 2022-07-05 DIAGNOSIS — G47.33 OBSTRUCTIVE SLEEP APNEA (ADULT) (PEDIATRIC): ICD-10-CM

## 2022-07-05 DIAGNOSIS — K64.8 OTHER HEMORRHOIDS: ICD-10-CM

## 2022-07-05 DIAGNOSIS — K57.30 DIVERTICULOSIS OF LARGE INTESTINE WITHOUT PERFORATION OR ABSCESS WITHOUT BLEEDING: ICD-10-CM

## 2022-07-05 DIAGNOSIS — Z12.11 ENCOUNTER FOR SCREENING FOR MALIGNANT NEOPLASM OF COLON: ICD-10-CM

## 2022-07-05 DIAGNOSIS — I10 ESSENTIAL (PRIMARY) HYPERTENSION: ICD-10-CM

## 2022-07-05 DIAGNOSIS — K76.0 FATTY (CHANGE OF) LIVER, NOT ELSEWHERE CLASSIFIED: ICD-10-CM

## 2022-07-05 DIAGNOSIS — M19.90 UNSPECIFIED OSTEOARTHRITIS, UNSPECIFIED SITE: ICD-10-CM

## 2022-07-05 DIAGNOSIS — Z88.1 ALLERGY STATUS TO OTHER ANTIBIOTIC AGENTS STATUS: ICD-10-CM

## 2022-07-05 DIAGNOSIS — K21.9 GASTRO-ESOPHAGEAL REFLUX DISEASE WITHOUT ESOPHAGITIS: ICD-10-CM

## 2022-07-05 DIAGNOSIS — Z99.89 DEPENDENCE ON OTHER ENABLING MACHINES AND DEVICES: ICD-10-CM

## 2022-07-05 DIAGNOSIS — J45.909 UNSPECIFIED ASTHMA, UNCOMPLICATED: ICD-10-CM

## 2022-07-05 DIAGNOSIS — D12.3 BENIGN NEOPLASM OF TRANSVERSE COLON: ICD-10-CM

## 2022-07-05 DIAGNOSIS — K63.89 OTHER SPECIFIED DISEASES OF INTESTINE: ICD-10-CM

## 2022-07-06 ENCOUNTER — APPOINTMENT (OUTPATIENT)
Dept: PSYCHIATRY | Facility: CLINIC | Age: 52
End: 2022-07-06

## 2022-07-06 ENCOUNTER — OUTPATIENT (OUTPATIENT)
Dept: OUTPATIENT SERVICES | Facility: HOSPITAL | Age: 52
LOS: 1 days | Discharge: HOME | End: 2022-07-06

## 2022-07-06 DIAGNOSIS — Z98.891 HISTORY OF UTERINE SCAR FROM PREVIOUS SURGERY: Chronic | ICD-10-CM

## 2022-07-06 DIAGNOSIS — Z98.890 OTHER SPECIFIED POSTPROCEDURAL STATES: Chronic | ICD-10-CM

## 2022-07-06 DIAGNOSIS — F41.1 GENERALIZED ANXIETY DISORDER: ICD-10-CM

## 2022-07-06 DIAGNOSIS — F33.1 MAJOR DEPRESSIVE DISORDER, RECURRENT, MODERATE: ICD-10-CM

## 2022-07-07 ENCOUNTER — OUTPATIENT (OUTPATIENT)
Dept: OUTPATIENT SERVICES | Facility: HOSPITAL | Age: 52
LOS: 1 days | Discharge: HOME | End: 2022-07-07

## 2022-07-07 DIAGNOSIS — Z98.890 OTHER SPECIFIED POSTPROCEDURAL STATES: Chronic | ICD-10-CM

## 2022-07-07 DIAGNOSIS — G36.0 NEUROMYELITIS OPTICA [DEVIC]: ICD-10-CM

## 2022-07-07 DIAGNOSIS — Z98.891 HISTORY OF UTERINE SCAR FROM PREVIOUS SURGERY: Chronic | ICD-10-CM

## 2022-07-13 ENCOUNTER — APPOINTMENT (OUTPATIENT)
Dept: PSYCHIATRY | Facility: CLINIC | Age: 52
End: 2022-07-13

## 2022-07-15 ENCOUNTER — RX RENEWAL (OUTPATIENT)
Age: 52
End: 2022-07-15

## 2022-07-15 RX ORDER — AZELASTINE HYDROCHLORIDE 137 UG/1
137 SPRAY, METERED NASAL
Qty: 1 | Refills: 2 | Status: ACTIVE | COMMUNITY
Start: 2022-07-15 | End: 1900-01-01

## 2022-07-19 ENCOUNTER — APPOINTMENT (OUTPATIENT)
Dept: NEUROLOGY | Facility: CLINIC | Age: 52
End: 2022-07-19

## 2022-07-19 VITALS
BODY MASS INDEX: 35.63 KG/M2 | DIASTOLIC BLOOD PRESSURE: 86 MMHG | SYSTOLIC BLOOD PRESSURE: 132 MMHG | WEIGHT: 227 LBS | OXYGEN SATURATION: 98 % | HEIGHT: 67 IN | HEART RATE: 79 BPM

## 2022-07-19 DIAGNOSIS — G43.909 MIGRAINE, UNSPECIFIED, NOT INTRACTABLE, W/OUT STATUS MIGRAINOSUS: ICD-10-CM

## 2022-07-19 PROCEDURE — 99214 OFFICE O/P EST MOD 30 MIN: CPT

## 2022-07-19 NOTE — HISTORY OF PRESENT ILLNESS
[FreeTextEntry1] : Pt is 51 yof with NMO spectrum disorder. \par States home PT not as effective as in the PT office.\par \par States starting getting home PT  2 months.\par They have a bike but only can move her arms, can't get passive ROM with cycle on her legs so wants\par to change PT location to place with that equipment.\par \par States she received Rituxan in May and June.\par \par Has feelings of tightness - squeezing in chest from the myelopathy.\par AC can trigger the hug like feelings.\par \par Feels that her lower extremities have loosened up a bit for her.\par \par States she had a stroke in past but when I reivew her brain imaging all I can see that may have been construed\par as that is WM lesion in midline jorge.\par \par Ran out of duloxetine 60 mg 1 1/2 weeks ago, getting refilled from psychiatrist who is back this week.\par States planning to add on clonazepam.\par \par Gabapentin decreased from 1200 mg q8h to 600 mg q8h (1800 mg/day).\par This was decreased June 1.\par

## 2022-07-19 NOTE — PHYSICAL EXAM
[FreeTextEntry1] : 5/5 prox/distal upper extremities\par no voluntary movement in LE's.\par No vibration in LE's.\par can feel PP in legs R > L.\par \par

## 2022-07-19 NOTE — ASSESSMENT
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  She is currently being treated with rituximab.  No new flare ups are described and recent imaging of brain and cervical spine are stable.\par \par Plan:\par 1.  Physical therapy referral given for new gym so she can get PROM she wants for bilateral lower extremities.\par 2.  Continue oxcarbazepine and gabapentin.\par 3.  Duloxetine 60 mg through psychiatry.\par 4.  Ubrelvy 100 mg po prn for migraine.  Refill provided.\par 5.  f/u with Dr. De Los Santos as scheduled.

## 2022-07-20 ENCOUNTER — APPOINTMENT (OUTPATIENT)
Dept: PSYCHIATRY | Facility: CLINIC | Age: 52
End: 2022-07-20

## 2022-07-20 ENCOUNTER — OUTPATIENT (OUTPATIENT)
Dept: OUTPATIENT SERVICES | Facility: HOSPITAL | Age: 52
LOS: 1 days | Discharge: HOME | End: 2022-07-20

## 2022-07-20 ENCOUNTER — NON-APPOINTMENT (OUTPATIENT)
Age: 52
End: 2022-07-20

## 2022-07-20 DIAGNOSIS — F41.1 GENERALIZED ANXIETY DISORDER: ICD-10-CM

## 2022-07-20 DIAGNOSIS — Z98.890 OTHER SPECIFIED POSTPROCEDURAL STATES: Chronic | ICD-10-CM

## 2022-07-20 DIAGNOSIS — F33.1 MAJOR DEPRESSIVE DISORDER, RECURRENT, MODERATE: ICD-10-CM

## 2022-07-20 DIAGNOSIS — Z98.891 HISTORY OF UTERINE SCAR FROM PREVIOUS SURGERY: Chronic | ICD-10-CM

## 2022-07-20 PROCEDURE — 99215 OFFICE O/P EST HI 40 MIN: CPT | Mod: 95

## 2022-07-20 RX ORDER — DIAZEPAM 5 MG/1
5 TABLET ORAL
Qty: 15 | Refills: 0 | Status: DISCONTINUED | COMMUNITY
Start: 2022-07-14 | End: 2022-07-20

## 2022-07-27 ENCOUNTER — APPOINTMENT (OUTPATIENT)
Dept: PSYCHIATRY | Facility: CLINIC | Age: 52
End: 2022-07-27

## 2022-07-27 ENCOUNTER — OUTPATIENT (OUTPATIENT)
Dept: OUTPATIENT SERVICES | Facility: HOSPITAL | Age: 52
LOS: 1 days | Discharge: HOME | End: 2022-07-27

## 2022-07-27 DIAGNOSIS — F33.1 MAJOR DEPRESSIVE DISORDER, RECURRENT, MODERATE: ICD-10-CM

## 2022-07-27 DIAGNOSIS — Z98.891 HISTORY OF UTERINE SCAR FROM PREVIOUS SURGERY: Chronic | ICD-10-CM

## 2022-07-27 DIAGNOSIS — F41.1 GENERALIZED ANXIETY DISORDER: ICD-10-CM

## 2022-07-27 DIAGNOSIS — Z98.890 OTHER SPECIFIED POSTPROCEDURAL STATES: Chronic | ICD-10-CM

## 2022-08-03 ENCOUNTER — OUTPATIENT (OUTPATIENT)
Dept: OUTPATIENT SERVICES | Facility: HOSPITAL | Age: 52
LOS: 1 days | Discharge: HOME | End: 2022-08-03

## 2022-08-03 ENCOUNTER — APPOINTMENT (OUTPATIENT)
Dept: PSYCHIATRY | Facility: CLINIC | Age: 52
End: 2022-08-03

## 2022-08-03 DIAGNOSIS — Z98.890 OTHER SPECIFIED POSTPROCEDURAL STATES: Chronic | ICD-10-CM

## 2022-08-03 DIAGNOSIS — F41.1 GENERALIZED ANXIETY DISORDER: ICD-10-CM

## 2022-08-03 DIAGNOSIS — Z98.891 HISTORY OF UTERINE SCAR FROM PREVIOUS SURGERY: Chronic | ICD-10-CM

## 2022-08-03 DIAGNOSIS — F33.1 MAJOR DEPRESSIVE DISORDER, RECURRENT, MODERATE: ICD-10-CM

## 2022-08-03 PROCEDURE — 99215 OFFICE O/P EST HI 40 MIN: CPT | Mod: 95

## 2022-08-03 RX ORDER — CLONAZEPAM 0.5 MG/1
0.5 TABLET ORAL DAILY
Qty: 10 | Refills: 0 | Status: DISCONTINUED | COMMUNITY
Start: 2022-07-20 | End: 2022-08-03

## 2022-08-05 ENCOUNTER — APPOINTMENT (OUTPATIENT)
Dept: PSYCHIATRY | Facility: CLINIC | Age: 52
End: 2022-08-05

## 2022-08-05 ENCOUNTER — OUTPATIENT (OUTPATIENT)
Dept: OUTPATIENT SERVICES | Facility: HOSPITAL | Age: 52
LOS: 1 days | Discharge: HOME | End: 2022-08-05

## 2022-08-05 DIAGNOSIS — Z98.890 OTHER SPECIFIED POSTPROCEDURAL STATES: Chronic | ICD-10-CM

## 2022-08-05 DIAGNOSIS — Z98.891 HISTORY OF UTERINE SCAR FROM PREVIOUS SURGERY: Chronic | ICD-10-CM

## 2022-08-05 DIAGNOSIS — F41.1 GENERALIZED ANXIETY DISORDER: ICD-10-CM

## 2022-08-05 DIAGNOSIS — F33.1 MAJOR DEPRESSIVE DISORDER, RECURRENT, MODERATE: ICD-10-CM

## 2022-08-08 ENCOUNTER — OUTPATIENT (OUTPATIENT)
Dept: OUTPATIENT SERVICES | Facility: HOSPITAL | Age: 52
LOS: 1 days | Discharge: HOME | End: 2022-08-08

## 2022-08-08 DIAGNOSIS — Z12.31 ENCOUNTER FOR SCREENING MAMMOGRAM FOR MALIGNANT NEOPLASM OF BREAST: ICD-10-CM

## 2022-08-08 DIAGNOSIS — Z98.891 HISTORY OF UTERINE SCAR FROM PREVIOUS SURGERY: Chronic | ICD-10-CM

## 2022-08-08 DIAGNOSIS — Z98.890 OTHER SPECIFIED POSTPROCEDURAL STATES: Chronic | ICD-10-CM

## 2022-08-08 PROCEDURE — 77067 SCR MAMMO BI INCL CAD: CPT | Mod: 26

## 2022-08-08 PROCEDURE — 77063 BREAST TOMOSYNTHESIS BI: CPT | Mod: 26

## 2022-08-09 ENCOUNTER — RX RENEWAL (OUTPATIENT)
Age: 52
End: 2022-08-09

## 2022-08-10 ENCOUNTER — OUTPATIENT (OUTPATIENT)
Dept: OUTPATIENT SERVICES | Facility: HOSPITAL | Age: 52
LOS: 1 days | Discharge: HOME | End: 2022-08-10

## 2022-08-10 ENCOUNTER — APPOINTMENT (OUTPATIENT)
Dept: PSYCHIATRY | Facility: CLINIC | Age: 52
End: 2022-08-10

## 2022-08-10 ENCOUNTER — APPOINTMENT (OUTPATIENT)
Dept: PEDIATRIC ALLERGY IMMUNOLOGY | Facility: CLINIC | Age: 52
End: 2022-08-10

## 2022-08-10 VITALS — BODY MASS INDEX: 35.63 KG/M2 | WEIGHT: 227 LBS | HEIGHT: 67 IN

## 2022-08-10 DIAGNOSIS — F41.1 GENERALIZED ANXIETY DISORDER: ICD-10-CM

## 2022-08-10 DIAGNOSIS — F33.1 MAJOR DEPRESSIVE DISORDER, RECURRENT, MODERATE: ICD-10-CM

## 2022-08-10 DIAGNOSIS — H10.10 ACUTE ATOPIC CONJUNCTIVITIS, UNSPECIFIED EYE: ICD-10-CM

## 2022-08-10 DIAGNOSIS — Z98.890 OTHER SPECIFIED POSTPROCEDURAL STATES: Chronic | ICD-10-CM

## 2022-08-10 DIAGNOSIS — Z98.891 HISTORY OF UTERINE SCAR FROM PREVIOUS SURGERY: Chronic | ICD-10-CM

## 2022-08-10 PROCEDURE — 99213 OFFICE O/P EST LOW 20 MIN: CPT

## 2022-08-10 NOTE — HISTORY OF PRESENT ILLNESS
[None] : The patient is currently asymptomatic [de-identified] : ANNMARIE MCCULLOUGH is a 51 year female with Allergic Rhinitis. Patient is here for a follow up. Patient states she has been doing well. Patient states she started feeling ear ache on left ear this morning.  Her allergy symptoms and asthma have been under good control. She is on azelastine nasal spray, fluticasone nasal spray, Allegra 180 mg and Qvar inhaler with no problems.

## 2022-08-10 NOTE — PHYSICAL EXAM
[Alert] : alert [Well Nourished] : well nourished [No Acute Distress] : no acute distress [Well Developed] : well developed [Normal Pupil & Iris Size/Symmetry] : normal pupil and iris size and symmetry [No Discharge] : no discharge [No Photophobia] : no photophobia [Sclera Not Icteric] : sclera not icteric [Normal TMs] : both tympanic membranes were normal [Normal Nasal Mucosa] : the nasal mucosa was normal [Normal Lips/Tongue] : the lips and tongue were normal [Normal Outer Ear/Nose] : the ears and nose were normal in appearance [Normal Tonsils] : normal tonsils [No Thrush] : no thrush [Supple] : the neck was supple [Normal Rate and Effort] : normal respiratory rhythm and effort [No Crackles] : no crackles [No Retractions] : no retractions [Bilateral Audible Breath Sounds] : bilateral audible breath sounds [Normal Rate] : heart rate was normal  [Normal S1, S2] : normal S1 and S2 [No murmur] : no murmur [Regular Rhythm] : with a regular rhythm [Skin Intact] : skin intact  [No Rash] : no rash [No Skin Lesions] : no skin lesions [Normal Mood] : mood was normal [Normal Affect] : affect was normal [Alert, Awake, Oriented as Age-Appropriate] : alert, awake, oriented as age appropriate [Pale mucosa] : no pale mucosa [de-identified] : Pt in wheelchair, no motion of LE

## 2022-08-17 ENCOUNTER — APPOINTMENT (OUTPATIENT)
Dept: NEUROLOGY | Facility: CLINIC | Age: 52
End: 2022-08-17

## 2022-08-17 VITALS
TEMPERATURE: 96.6 F | OXYGEN SATURATION: 100 % | HEIGHT: 67 IN | BODY MASS INDEX: 35.63 KG/M2 | DIASTOLIC BLOOD PRESSURE: 111 MMHG | HEART RATE: 88 BPM | SYSTOLIC BLOOD PRESSURE: 165 MMHG | WEIGHT: 227 LBS

## 2022-08-17 DIAGNOSIS — Z79.899 OTHER LONG TERM (CURRENT) DRUG THERAPY: ICD-10-CM

## 2022-08-17 PROCEDURE — 99215 OFFICE O/P EST HI 40 MIN: CPT

## 2022-08-18 ENCOUNTER — APPOINTMENT (OUTPATIENT)
Dept: INTERNAL MEDICINE | Facility: CLINIC | Age: 52
End: 2022-08-18

## 2022-08-19 ENCOUNTER — OUTPATIENT (OUTPATIENT)
Dept: OUTPATIENT SERVICES | Facility: HOSPITAL | Age: 52
LOS: 1 days | Discharge: HOME | End: 2022-08-19

## 2022-08-19 ENCOUNTER — APPOINTMENT (OUTPATIENT)
Dept: PSYCHIATRY | Facility: CLINIC | Age: 52
End: 2022-08-19

## 2022-08-19 DIAGNOSIS — Z98.890 OTHER SPECIFIED POSTPROCEDURAL STATES: Chronic | ICD-10-CM

## 2022-08-19 DIAGNOSIS — F33.1 MAJOR DEPRESSIVE DISORDER, RECURRENT, MODERATE: ICD-10-CM

## 2022-08-19 DIAGNOSIS — F41.1 GENERALIZED ANXIETY DISORDER: ICD-10-CM

## 2022-08-19 DIAGNOSIS — Z98.891 HISTORY OF UTERINE SCAR FROM PREVIOUS SURGERY: Chronic | ICD-10-CM

## 2022-08-19 NOTE — HISTORY OF PRESENT ILLNESS
[FreeTextEntry1] : Initial hx 5/2021\par Referred by Dr. Vizcaino.\par 1/2021 - got 1st covid vaccine.\par 2/2021 - RLE weakness, tightness, gait dysfunction, some RLE discomfort. went to ED 4x. Then LLE was numb but without weakness. Went to pain management, referred to neurologist, then went to [facility]. 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 Got IVMP, no improvement.\par Saw Dr. Vizcaino who sent for NMO ab in 3/2021, referred to me, but developed relapse in 4/2021.\par 4/2021 - acute paraplegia. [facility], got 3d IVMP and 5 sessions PLEX. Got rituximab 1g x2 in 5/2021. since then, some sensation coming back but no movement. \par Currently in [place] rehab.\par Left rehab in 12/2021.\par Has developed a bad rash in late 2021. This had been attributed to lyrica so stopped it. This definitively started PRIOR to starting trileptal. However, may have been attributed to antibiotic (delayed reaction, per derm).\par 3/2022 - Had "chest spasms", read the HR instead of PO2 so thought her O2 was 65, called 911, went to ED, cleared from cardiac perspective.\par 3/2022 - developed new burning and tightness in head, neck, shoulders. MRI C spine without new activity.\par 5/2022 - switched from wellbutrin to cymbalta. \par late 5/2022 - tested positive for covid on two home tests. got monoclonal ab. just headaches, lasted several days. some congestion, some cough, no fever.\par \par \par Subj interval:\par \par Chronic pain - tightness and burning on chest and legs that is severe, happens daily.  on 600tid gabapentin (1200tid gabapentin was not sufficient) and cymbalta 60. Nucynta 75tid hasn't helped much. Oxycodone 15 qid prn doesn't help. Tried lyrica in the past which didn't help. Saw pain management, q1mon, planning for medical marijuana.\par \par Chronic spasms - On trileptal 750bid and baclofen 20tid, still with uncomfortable spasms but not frankly painful. \par \par Intermittent generalized pruritic rash. Persistent. Had seen derm in the past but not recently.\par \par PMHX:\par NMO\par asthma\par depression\par anxiety\par LBP\par insomnia\par migraines\par gastric sleeve\par \par MEDS:\par qvar\par asa81\par albuterol prn\par ambien prn\par protonix\par rituximab\par ubrelvy\par cymbalta 60mg\par b12\par calcium\par MV\par iron\par biotin\par colace\par miralax\par gabapentin 600tid\par trileptal 750bid\par baclofen 20tid\par oxycodone 15mg qid prn\par nucynta 75tid\par hydroxyzine prn itching/anxiety\par \par SHx: no tob\par \par FHx: no neuro, no AI.\par \par \par O:\par \par AO3. Normally conversant. Follows commands, names, and repeats. Good attention.\par PERRL, VFF, EOMI, no nystagmus, face symmetric, TUP at midline.\par 5/5 in both UEs throughout, paraplegic. tone 1 in LLE and +/- in RLE\par Near complete sensory loss in both legs and low torso - some minimal patchy VBS but otherwise absent. normal in UEs.\par FTN and Karal in UEs intact.\par DTRs 2+ in UEs, 3 in L knee, 2 in R knee, minimal clonus in both AJs.\par \par \par MRI C, T, and brain 2/2021 reviewed - thoracic LETM on my review.\par \par MRI C+T 1/2022 - no new lorie+ lesions, however, c/w 3/2021 there was caudal extension on the left lateral aspect down to T8 (likely related to relapse she had in 4/2021). \par \par MRI C spine and MRI brain/sella 4/2022 - unchanged. 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 Stable from NMO standpoint, on rituximab.\par \par Chronic pain and spasms. \par \par All questions answered, education provided re: dx, rx, sx, prognosis. management discussed at length. filled out med marijuana form\par \par - continue rituximab q6m\par - cont PT\par - check blood work q6m\par \par Pain:\par - cont f/u with pain management\par - cont nucynta 75tid, cont oxycodone as per pain management, cont cymbalta and gabapentin.\par - trial of medical marijuana\par \par Spasms:\par - cont trileptal 750mg bid for tonic spasms for now, consider dec in future.\par - can cont baclofen 20tid for now\par \par - RTC 2-3m

## 2022-08-31 ENCOUNTER — APPOINTMENT (OUTPATIENT)
Dept: PSYCHIATRY | Facility: CLINIC | Age: 52
End: 2022-08-31

## 2022-08-31 ENCOUNTER — OUTPATIENT (OUTPATIENT)
Dept: OUTPATIENT SERVICES | Facility: HOSPITAL | Age: 52
LOS: 1 days | Discharge: HOME | End: 2022-08-31

## 2022-08-31 DIAGNOSIS — Z98.891 HISTORY OF UTERINE SCAR FROM PREVIOUS SURGERY: Chronic | ICD-10-CM

## 2022-08-31 DIAGNOSIS — F33.1 MAJOR DEPRESSIVE DISORDER, RECURRENT, MODERATE: ICD-10-CM

## 2022-08-31 DIAGNOSIS — F41.1 GENERALIZED ANXIETY DISORDER: ICD-10-CM

## 2022-08-31 DIAGNOSIS — Z98.890 OTHER SPECIFIED POSTPROCEDURAL STATES: Chronic | ICD-10-CM

## 2022-09-08 ENCOUNTER — EMERGENCY (EMERGENCY)
Facility: HOSPITAL | Age: 52
LOS: 0 days | Discharge: HOME | End: 2022-09-08
Attending: EMERGENCY MEDICINE | Admitting: EMERGENCY MEDICINE

## 2022-09-08 VITALS
OXYGEN SATURATION: 100 % | HEIGHT: 67 IN | RESPIRATION RATE: 17 BRPM | SYSTOLIC BLOOD PRESSURE: 137 MMHG | HEART RATE: 84 BPM | DIASTOLIC BLOOD PRESSURE: 85 MMHG | TEMPERATURE: 97 F | WEIGHT: 227.08 LBS

## 2022-09-08 VITALS
TEMPERATURE: 98 F | RESPIRATION RATE: 18 BRPM | DIASTOLIC BLOOD PRESSURE: 69 MMHG | SYSTOLIC BLOOD PRESSURE: 153 MMHG | OXYGEN SATURATION: 100 % | HEART RATE: 80 BPM

## 2022-09-08 DIAGNOSIS — Z88.1 ALLERGY STATUS TO OTHER ANTIBIOTIC AGENTS STATUS: ICD-10-CM

## 2022-09-08 DIAGNOSIS — Z98.890 OTHER SPECIFIED POSTPROCEDURAL STATES: Chronic | ICD-10-CM

## 2022-09-08 DIAGNOSIS — K59.09 OTHER CONSTIPATION: ICD-10-CM

## 2022-09-08 DIAGNOSIS — R10.30 LOWER ABDOMINAL PAIN, UNSPECIFIED: ICD-10-CM

## 2022-09-08 DIAGNOSIS — K21.9 GASTRO-ESOPHAGEAL REFLUX DISEASE WITHOUT ESOPHAGITIS: ICD-10-CM

## 2022-09-08 DIAGNOSIS — Z98.891 HISTORY OF UTERINE SCAR FROM PREVIOUS SURGERY: Chronic | ICD-10-CM

## 2022-09-08 DIAGNOSIS — R74.01 ELEVATION OF LEVELS OF LIVER TRANSAMINASE LEVELS: ICD-10-CM

## 2022-09-08 DIAGNOSIS — J45.909 UNSPECIFIED ASTHMA, UNCOMPLICATED: ICD-10-CM

## 2022-09-08 DIAGNOSIS — D64.9 ANEMIA, UNSPECIFIED: ICD-10-CM

## 2022-09-08 DIAGNOSIS — Z98.84 BARIATRIC SURGERY STATUS: ICD-10-CM

## 2022-09-08 DIAGNOSIS — Z99.89 DEPENDENCE ON OTHER ENABLING MACHINES AND DEVICES: ICD-10-CM

## 2022-09-08 DIAGNOSIS — I10 ESSENTIAL (PRIMARY) HYPERTENSION: ICD-10-CM

## 2022-09-08 DIAGNOSIS — G47.33 OBSTRUCTIVE SLEEP APNEA (ADULT) (PEDIATRIC): ICD-10-CM

## 2022-09-08 DIAGNOSIS — E87.1 HYPO-OSMOLALITY AND HYPONATREMIA: ICD-10-CM

## 2022-09-08 DIAGNOSIS — E66.9 OBESITY, UNSPECIFIED: ICD-10-CM

## 2022-09-08 DIAGNOSIS — M13.862 OTHER SPECIFIED ARTHRITIS, LEFT KNEE: ICD-10-CM

## 2022-09-08 DIAGNOSIS — E87.8 OTHER DISORDERS OF ELECTROLYTE AND FLUID BALANCE, NOT ELSEWHERE CLASSIFIED: ICD-10-CM

## 2022-09-08 DIAGNOSIS — M13.861 OTHER SPECIFIED ARTHRITIS, RIGHT KNEE: ICD-10-CM

## 2022-09-08 DIAGNOSIS — Z74.01 BED CONFINEMENT STATUS: ICD-10-CM

## 2022-09-08 DIAGNOSIS — Z90.09 ACQUIRED ABSENCE OF OTHER PART OF HEAD AND NECK: ICD-10-CM

## 2022-09-08 DIAGNOSIS — G36.0 NEUROMYELITIS OPTICA [DEVIC]: ICD-10-CM

## 2022-09-08 LAB
ALBUMIN SERPL ELPH-MCNC: 3.7 G/DL — SIGNIFICANT CHANGE UP (ref 3.5–5.2)
ALP SERPL-CCNC: 117 U/L — HIGH (ref 30–115)
ALT FLD-CCNC: 37 U/L — SIGNIFICANT CHANGE UP (ref 0–41)
ANION GAP SERPL CALC-SCNC: 12 MMOL/L — SIGNIFICANT CHANGE UP (ref 7–14)
APPEARANCE UR: CLEAR — SIGNIFICANT CHANGE UP
AST SERPL-CCNC: 44 U/L — HIGH (ref 0–41)
BASOPHILS # BLD AUTO: 0.02 K/UL — SIGNIFICANT CHANGE UP (ref 0–0.2)
BASOPHILS NFR BLD AUTO: 0.5 % — SIGNIFICANT CHANGE UP (ref 0–1)
BILIRUB SERPL-MCNC: 0.3 MG/DL — SIGNIFICANT CHANGE UP (ref 0.2–1.2)
BILIRUB UR-MCNC: NEGATIVE — SIGNIFICANT CHANGE UP
BLD GP AB SCN SERPL QL: SIGNIFICANT CHANGE UP
BUN SERPL-MCNC: 9 MG/DL — LOW (ref 10–20)
CALCIUM SERPL-MCNC: 8.7 MG/DL — SIGNIFICANT CHANGE UP (ref 8.5–10.1)
CHLORIDE SERPL-SCNC: 94 MMOL/L — LOW (ref 98–110)
CO2 SERPL-SCNC: 26 MMOL/L — SIGNIFICANT CHANGE UP (ref 17–32)
COLOR SPEC: SIGNIFICANT CHANGE UP
CREAT SERPL-MCNC: 0.6 MG/DL — LOW (ref 0.7–1.5)
DIFF PNL FLD: NEGATIVE — SIGNIFICANT CHANGE UP
EGFR: 107 ML/MIN/1.73M2 — SIGNIFICANT CHANGE UP
EOSINOPHIL # BLD AUTO: 0.44 K/UL — SIGNIFICANT CHANGE UP (ref 0–0.7)
EOSINOPHIL NFR BLD AUTO: 12 % — HIGH (ref 0–8)
GLUCOSE SERPL-MCNC: 83 MG/DL — SIGNIFICANT CHANGE UP (ref 70–99)
GLUCOSE UR QL: NEGATIVE — SIGNIFICANT CHANGE UP
HCG SERPL QL: NEGATIVE — SIGNIFICANT CHANGE UP
HCT VFR BLD CALC: 36.1 % — LOW (ref 37–47)
HGB BLD-MCNC: 11.9 G/DL — LOW (ref 12–16)
IMM GRANULOCYTES NFR BLD AUTO: 0.3 % — SIGNIFICANT CHANGE UP (ref 0.1–0.3)
KETONES UR-MCNC: NEGATIVE — SIGNIFICANT CHANGE UP
LACTATE SERPL-SCNC: 1 MMOL/L — SIGNIFICANT CHANGE UP (ref 0.7–2)
LEUKOCYTE ESTERASE UR-ACNC: NEGATIVE — SIGNIFICANT CHANGE UP
LIDOCAIN IGE QN: 40 U/L — SIGNIFICANT CHANGE UP (ref 7–60)
LYMPHOCYTES # BLD AUTO: 0.56 K/UL — LOW (ref 1.2–3.4)
LYMPHOCYTES # BLD AUTO: 15.3 % — LOW (ref 20.5–51.1)
MCHC RBC-ENTMCNC: 28.8 PG — SIGNIFICANT CHANGE UP (ref 27–31)
MCHC RBC-ENTMCNC: 33 G/DL — SIGNIFICANT CHANGE UP (ref 32–37)
MCV RBC AUTO: 87.4 FL — SIGNIFICANT CHANGE UP (ref 81–99)
MONOCYTES # BLD AUTO: 0.43 K/UL — SIGNIFICANT CHANGE UP (ref 0.1–0.6)
MONOCYTES NFR BLD AUTO: 11.7 % — HIGH (ref 1.7–9.3)
NEUTROPHILS # BLD AUTO: 2.2 K/UL — SIGNIFICANT CHANGE UP (ref 1.4–6.5)
NEUTROPHILS NFR BLD AUTO: 60.2 % — SIGNIFICANT CHANGE UP (ref 42.2–75.2)
NITRITE UR-MCNC: NEGATIVE — SIGNIFICANT CHANGE UP
NRBC # BLD: 0 /100 WBCS — SIGNIFICANT CHANGE UP (ref 0–0)
PH UR: 7.5 — SIGNIFICANT CHANGE UP (ref 5–8)
PLATELET # BLD AUTO: 227 K/UL — SIGNIFICANT CHANGE UP (ref 130–400)
POTASSIUM SERPL-MCNC: 4.9 MMOL/L — SIGNIFICANT CHANGE UP (ref 3.5–5)
POTASSIUM SERPL-SCNC: 4.9 MMOL/L — SIGNIFICANT CHANGE UP (ref 3.5–5)
PROT SERPL-MCNC: 6.3 G/DL — SIGNIFICANT CHANGE UP (ref 6–8)
PROT UR-MCNC: NEGATIVE — SIGNIFICANT CHANGE UP
RBC # BLD: 4.13 M/UL — LOW (ref 4.2–5.4)
RBC # FLD: 12.6 % — SIGNIFICANT CHANGE UP (ref 11.5–14.5)
SODIUM SERPL-SCNC: 132 MMOL/L — LOW (ref 135–146)
SP GR SPEC: 1.02 — SIGNIFICANT CHANGE UP (ref 1.01–1.03)
UROBILINOGEN FLD QL: SIGNIFICANT CHANGE UP
WBC # BLD: 3.66 K/UL — LOW (ref 4.8–10.8)
WBC # FLD AUTO: 3.66 K/UL — LOW (ref 4.8–10.8)

## 2022-09-08 PROCEDURE — 99285 EMERGENCY DEPT VISIT HI MDM: CPT

## 2022-09-08 PROCEDURE — 74177 CT ABD & PELVIS W/CONTRAST: CPT | Mod: 26,MA

## 2022-09-08 RX ORDER — DIATRIZOATE MEGLUMINE 180 MG/ML
30 INJECTION, SOLUTION INTRAVESICAL ONCE
Refills: 0 | Status: COMPLETED | OUTPATIENT
Start: 2022-09-08 | End: 2022-09-08

## 2022-09-08 RX ORDER — LACTULOSE 10 G/15ML
10 SOLUTION ORAL ONCE
Refills: 0 | Status: DISCONTINUED | OUTPATIENT
Start: 2022-09-08 | End: 2022-09-08

## 2022-09-08 RX ORDER — POLYETHYLENE GLYCOL 3350 17 G/17G
17 POWDER, FOR SOLUTION ORAL ONCE
Refills: 0 | Status: COMPLETED | OUTPATIENT
Start: 2022-09-08 | End: 2022-09-08

## 2022-09-08 RX ORDER — MULTIVIT WITH MIN/MFOLATE/K2 340-15/3 G
1 POWDER (GRAM) ORAL ONCE
Refills: 0 | Status: DISCONTINUED | OUTPATIENT
Start: 2022-09-08 | End: 2022-09-08

## 2022-09-08 RX ADMIN — Medication 1 ENEMA: at 17:32

## 2022-09-08 RX ADMIN — POLYETHYLENE GLYCOL 3350 17 GRAM(S): 17 POWDER, FOR SOLUTION ORAL at 17:32

## 2022-09-08 RX ADMIN — DIATRIZOATE MEGLUMINE 30 MILLILITER(S): 180 INJECTION, SOLUTION INTRAVESICAL at 13:56

## 2022-09-08 NOTE — ED PROVIDER NOTE - PROVIDER TOKENS
PROVIDER:[TOKEN:[[id number]],FOLLOWUP:[1-3 Days]],PROVIDER:[TOKEN:[[id number]],FOLLOWUP:[1-3 Days]]

## 2022-09-08 NOTE — ED PROVIDER NOTE - OBJECTIVE STATEMENT
52 year old female, past medical history neuromyelitis optica, paraplegic, bed bound, constipation, htn, who presents with abdominal pain. patient with lower abd pain and constipation that began x3 days, passing flatus, last BM x3 days ago. denies fever, chills, chest pain, shortness of breath, back pain, nausea/vomiting. hx gastric bypass.

## 2022-09-08 NOTE — ED PROVIDER NOTE - PROGRESS NOTE DETAILS
patient comfortable, improvement of symptoms, patient to fu with GI and PMD. return precautions given. patient comfortable, improvement of symptoms, patient to fu with GI, Dr Zaragoza, and PMD. return precautions given. patient with lactulose prescription @ home.

## 2022-09-08 NOTE — ED ADULT TRIAGE NOTE - CHIEF COMPLAINT QUOTE
Pt here AAOX3 c/o abdominal pain and constipation. hx of obstruction .  Pt is bed bound at home and has pruritic rash around neck .

## 2022-09-08 NOTE — ED PROVIDER NOTE - CARE PLAN
Principal Discharge DX:	Constipation   1 Principal Discharge DX:	Constipation  Secondary Diagnosis:	Hyponatremia  Secondary Diagnosis:	Transaminitis

## 2022-09-08 NOTE — ED PROVIDER NOTE - PATIENT PORTAL LINK FT
You can access the FollowMyHealth Patient Portal offered by [facility] by registering at the following website: [email or web address]. By joining [employer]’s FollowMyHealth portal, you will also be able to view your health information using other applications (apps) compatible with our system.

## 2022-09-08 NOTE — ED PROVIDER NOTE - PHYSICAL EXAMINATION
CONSTITUTIONAL: paraplegic female, no acute distress, non-toxic   SKIN: skin exam is warm and dry  HEAD: Normocephalic; atraumatic  ENT: MMM, no nasal congestion  NECK: Supple; non tender.  ROM intact.  CARD: S1, S2 normal, no murmur  RESP: Good air movement bilaterally  ABD: soft; non-distended; non-tender. No Rebound, No guarding  EXT: No skin changes  NEURO: awake, alert, following commands, oriented, grossly unremarkable. No Focal deficits. GCS 15.   PSYCH: Cooperative, appropriate.

## 2022-09-08 NOTE — ED PROVIDER NOTE - CARE PROVIDER_API CALL
CANDY SWARTZ  Internal Medicine  [address] [place] [address]  Phone: [phone]  Fax: [phone]  Follow Up Time: 1-3 Days    Sj Zaragoza  GASTROENTEROLOGY  [address] [place] [address]  Phone: [phone]  Fax: [phone]  Follow Up Time: 1-3 Days

## 2022-09-08 NOTE — ED PROVIDER NOTE - NS ED ATTENDING STATEMENT MOD
Final Anesthesia Post-op Assessment    Patient: Elvira ROSALEE Christie  Procedure(s) Performed: COLONOSCOPY [[id number]]  Anesthesia type: Monitor Anesthesia Care    Mental status recovered: patient participates in evaluation.  VS noted and are stable.  Respiratory function satisfactory; airway patent.  Cardiovascular function and hydration status satisfactory.  Adequate pain control.  Nausea and vomiting control satisfactory.  No apparent anesthetic complications.     
This was a shared visit with the REY. I reviewed and verified the documentation and independently performed the documented:

## 2022-09-08 NOTE — ED PROVIDER NOTE - ATTENDING APP SHARED VISIT CONTRIBUTION OF CARE
52yF neuromyelitis paraplegic/bedbound on chronic opiates c/b chronic constipation p/w abd pain.  Pt c/o abd pain and constipation - last BM 3d ago which is slightly longer than her usual interval.  Is taking bowel regimen including lactulose at home and still passing flatus.  Still tolerating PO.  No fever, CP, SOB.  +nausea w/o vomiting.  +hx gastric bypass.

## 2022-09-08 NOTE — ED PROVIDER NOTE - NSFOLLOWUPINSTRUCTIONS_ED_ALL_ED_FT
Please follow up with your primary care doctor and gastroenterologist in 1-7 days   Please be aware of any new or worsening signs or symptoms that should prompt your return to the ER.      Constipation    Constipation is when a person has fewer than three bowel movements a week, has difficulty having a bowel movement, or has stools that are dry, hard, or larger than normal. Other symptoms can include abdominal pain or bloating. As people grow older, constipation is more common. A low-fiber diet, not taking in enough fluids, and taking certain medicines may make constipation worse. Treatment varies but may include dietary modifications (more fiber-rich foods), lifestyle modifications, and possible medications.     SEEK IMMEDIATE MEDICAL CARE IF YOU HAVE THE FOLLOWING SYMPTOMS: bright red blood in your stool, constipation for longer than 4 days, abdominal or rectal pain, unexplained weight loss, or inability to pass gas.    Abdominal Pain, Adult     Many things can cause belly (abdominal) pain. Most times, belly pain is not dangerous. Many cases of belly pain can be watched and treated at home. Sometimes belly pain is serious, though. Your doctor will try to find the cause of your belly pain.  Follow these instructions at home:  Take over-the-counter and prescription medicines only as told by your doctor. Do not take medicines that help you poop (laxatives) unless told to by your doctor.Drink enough fluid to keep your pee (urine) clear or pale yellow.Watch your belly pain for any changes.Keep all follow-up visits as told by your doctor. This is important.Contact a doctor if:  Your belly pain changes or gets worse.You are not hungry, or you lose weight without trying.You are having trouble pooping (constipated) or have watery poop (diarrhea) for more than 2–3 days.You have pain when you pee or poop.Your belly pain wakes you up at night.Your pain gets worse with meals, after eating, or with certain foods.You are throwing up and cannot keep anything down.You have a fever.Get help right away if:  Your pain does not go away as soon as your doctor says it should.You cannot stop throwing up.Your pain is only in areas of your belly, such as the right side or the left lower part of the belly.You have bloody or black poop, or poop that looks like tar.You have very bad pain, cramping, or bloating in your belly.You have signs of not having enough fluid or water in your body (dehydration), such as:  Dark pee, very little pee, or no pee.Cracked lips.Dry mouth.Sunken eyes.Sleepiness.Weakness.This information is not intended to replace advice given to you by your health care provider. Make sure you discuss any questions you have with your health care provider.    Document Released: 06/05/2009 Document Revised: 07/07/2017 Document Reviewed: 05/31/2017  [employer] Interactive Patient Education © 2019 [employer]

## 2022-09-08 NOTE — ED PROVIDER NOTE - CLINICAL SUMMARY MEDICAL DECISION MAKING FREE TEXT BOX
52yF p/w abd pain and constipation.  Pt nontoxic appearing if chronically ill and abd soft/benign despite discomfort.  Labs w/ borderline leukopenia and mild anemia, mild hyponatremia/hypochloremia and mild transaminitis of unclear etiology.  CT w/o obstruction or acute pathology though w/ large stool burden throughout her colon.  Pt treated with meds in the ED and recommend supportive care, escalating o/p bowel regimen, o/p GI f/u, return precautions.

## 2022-09-08 NOTE — ED PROVIDER NOTE - NS ED ROS FT
Review of Systems:  	•	CONSTITUTIONAL: no fever  	•	SKIN: no rash  	•	ENT: no sore throat   	•	RESPIRATORY: no shortness of breath  	•	CARDIAC: no chest pain  	•	GI: +abd pain, no nausea, no vomiting, +constipation  	•	GENITO-URINARY: no discharge, no hematuria   	•	MUSCULOSKELETAL: no joint paint, no swelling, no redness  	•	NEUROLOGIC: no weakness, no headache  	•	PSYCH: no anxiety, non suicidal, non homicidal, no hallucination, no depression

## 2022-09-10 LAB
CULTURE RESULTS: NO GROWTH — SIGNIFICANT CHANGE UP
SPECIMEN SOURCE: SIGNIFICANT CHANGE UP

## 2022-09-12 ENCOUNTER — RX RENEWAL (OUTPATIENT)
Age: 52
End: 2022-09-12

## 2022-09-13 ENCOUNTER — APPOINTMENT (OUTPATIENT)
Dept: PSYCHIATRY | Facility: CLINIC | Age: 52
End: 2022-09-13

## 2022-09-13 ENCOUNTER — OUTPATIENT (OUTPATIENT)
Dept: OUTPATIENT SERVICES | Facility: HOSPITAL | Age: 52
LOS: 1 days | Discharge: HOME | End: 2022-09-13

## 2022-09-13 DIAGNOSIS — Z98.890 OTHER SPECIFIED POSTPROCEDURAL STATES: Chronic | ICD-10-CM

## 2022-09-13 DIAGNOSIS — F33.1 MAJOR DEPRESSIVE DISORDER, RECURRENT, MODERATE: ICD-10-CM

## 2022-09-13 DIAGNOSIS — Z98.891 HISTORY OF UTERINE SCAR FROM PREVIOUS SURGERY: Chronic | ICD-10-CM

## 2022-09-13 DIAGNOSIS — F41.1 GENERALIZED ANXIETY DISORDER: ICD-10-CM

## 2022-09-13 PROCEDURE — 99214 OFFICE O/P EST MOD 30 MIN: CPT | Mod: 95

## 2022-09-14 ENCOUNTER — OUTPATIENT (OUTPATIENT)
Dept: OUTPATIENT SERVICES | Facility: HOSPITAL | Age: 52
LOS: 1 days | Discharge: HOME | End: 2022-09-14

## 2022-09-14 ENCOUNTER — APPOINTMENT (OUTPATIENT)
Dept: PSYCHIATRY | Facility: CLINIC | Age: 52
End: 2022-09-14

## 2022-09-14 DIAGNOSIS — Z98.890 OTHER SPECIFIED POSTPROCEDURAL STATES: Chronic | ICD-10-CM

## 2022-09-14 DIAGNOSIS — F33.1 MAJOR DEPRESSIVE DISORDER, RECURRENT, MODERATE: ICD-10-CM

## 2022-09-14 DIAGNOSIS — F41.1 GENERALIZED ANXIETY DISORDER: ICD-10-CM

## 2022-09-14 DIAGNOSIS — Z98.891 HISTORY OF UTERINE SCAR FROM PREVIOUS SURGERY: Chronic | ICD-10-CM

## 2022-09-28 ENCOUNTER — APPOINTMENT (OUTPATIENT)
Dept: PSYCHIATRY | Facility: CLINIC | Age: 52
End: 2022-09-28

## 2022-09-28 ENCOUNTER — OUTPATIENT (OUTPATIENT)
Dept: OUTPATIENT SERVICES | Facility: HOSPITAL | Age: 52
LOS: 1 days | Discharge: HOME | End: 2022-09-28

## 2022-09-28 DIAGNOSIS — F41.1 GENERALIZED ANXIETY DISORDER: ICD-10-CM

## 2022-09-28 DIAGNOSIS — Z98.891 HISTORY OF UTERINE SCAR FROM PREVIOUS SURGERY: Chronic | ICD-10-CM

## 2022-09-28 DIAGNOSIS — F33.1 MAJOR DEPRESSIVE DISORDER, RECURRENT, MODERATE: ICD-10-CM

## 2022-09-28 DIAGNOSIS — Z98.890 OTHER SPECIFIED POSTPROCEDURAL STATES: Chronic | ICD-10-CM

## 2022-10-03 ENCOUNTER — RX RENEWAL (OUTPATIENT)
Age: 52
End: 2022-10-03

## 2022-10-06 ENCOUNTER — OUTPATIENT (OUTPATIENT)
Dept: OUTPATIENT SERVICES | Facility: HOSPITAL | Age: 52
LOS: 1 days | Discharge: HOME | End: 2022-10-06

## 2022-10-06 ENCOUNTER — NON-APPOINTMENT (OUTPATIENT)
Age: 52
End: 2022-10-06

## 2022-10-06 ENCOUNTER — APPOINTMENT (OUTPATIENT)
Dept: INTERNAL MEDICINE | Facility: CLINIC | Age: 52
End: 2022-10-06

## 2022-10-06 VITALS
OXYGEN SATURATION: 100 % | BODY MASS INDEX: 35.63 KG/M2 | HEIGHT: 67 IN | WEIGHT: 227 LBS | DIASTOLIC BLOOD PRESSURE: 82 MMHG | HEART RATE: 90 BPM | TEMPERATURE: 95.9 F | SYSTOLIC BLOOD PRESSURE: 138 MMHG

## 2022-10-06 DIAGNOSIS — Z98.891 HISTORY OF UTERINE SCAR FROM PREVIOUS SURGERY: Chronic | ICD-10-CM

## 2022-10-06 DIAGNOSIS — Z98.890 OTHER SPECIFIED POSTPROCEDURAL STATES: Chronic | ICD-10-CM

## 2022-10-06 DIAGNOSIS — B35.6 TINEA CRURIS: ICD-10-CM

## 2022-10-06 PROCEDURE — 99212 OFFICE O/P EST SF 10 MIN: CPT | Mod: GC

## 2022-10-06 RX ORDER — CLOTRIMAZOLE 10 MG/G
1 CREAM TOPICAL 3 TIMES DAILY
Qty: 1 | Refills: 4 | Status: ACTIVE | COMMUNITY
Start: 2022-10-06 | End: 1900-01-01

## 2022-10-06 NOTE — PHYSICAL EXAM
[Chaperone Present] : A chaperone was present in the examining room during all aspects of the physical examination [Appropriately responsive] : appropriately responsive [Alert] : alert [No Acute Distress] : no acute distress [No Lymphadenopathy] : no lymphadenopathy [Regular Rate Rhythm] : regular rate rhythm [No Murmurs] : no murmurs [Clear to Auscultation B/L] : clear to auscultation bilaterally [Soft] : soft [Non-tender] : non-tender [Non-distended] : non-distended [No HSM] : No HSM [No Lesions] : no lesions [No Mass] : no mass [Oriented x3] : oriented x3 [Examination Of The Breasts] : a normal appearance [Labia Majora] : normal [Normal] : normal [FreeTextEntry1] : some areas of scaly skin around vulva [FreeTextEntry5] : Unable to do bimanual

## 2022-10-06 NOTE — HISTORY OF PRESENT ILLNESS
[FreeTextEntry1] : \par \par 52 year old female being seen for a NMO follow-up visit , LMP = May 2021. In a for annual exam. Patient is bedbound, reports being paraplegic. She is not able to move.  Wears diapers and has urine and bowl incontinence. Report no vaginal bleeding or GI/ complaints. Does has some irritation under breast\par \par \par Past GYN Hx\par -Last pap smear , \par - no Hx of abnormal pap, has had had  normal paps\par - last sexual contact \par - last mammogram Aug 2022 normal\par - STD infections\par - c/s section\par \par \par Family Hx\par - no gyn cancers

## 2022-10-06 NOTE — PLAN
[FreeTextEntry1] : 52 year old female being seen for a NMO follow-up visit , LMP = May 2021. In a for annual exam. Patient is bedbound, reports being paraplegic. Has urine and bowl incontinence. Report no gyn symptoms\par \par # GYN - limited exam, as patient is paraplegic, unable to do bimanual exam.\par - external genitalia significant for some mild fungal skin infection\par - discussed with patient and Aid to apply topical anti-fungal that will be prescribed\par -- keep area dry, need to change diapers more frequently and also apply AD ointment\par - reports last pap 2019 could try to attempt exam if seen in an office with a lift to transfer to exam table\par \par \par # Breast\par - PE negative\par - Mammogram in aug 2022 normal\par \par \par \par

## 2022-10-09 DIAGNOSIS — M47.14 OTHER SPONDYLOSIS WITH MYELOPATHY, THORACIC REGION: ICD-10-CM

## 2022-10-12 ENCOUNTER — APPOINTMENT (OUTPATIENT)
Dept: PSYCHIATRY | Facility: CLINIC | Age: 52
End: 2022-10-12

## 2022-10-12 ENCOUNTER — OUTPATIENT (OUTPATIENT)
Dept: OUTPATIENT SERVICES | Facility: HOSPITAL | Age: 52
LOS: 1 days | Discharge: HOME | End: 2022-10-12

## 2022-10-12 DIAGNOSIS — F33.1 MAJOR DEPRESSIVE DISORDER, RECURRENT, MODERATE: ICD-10-CM

## 2022-10-12 DIAGNOSIS — Z98.890 OTHER SPECIFIED POSTPROCEDURAL STATES: Chronic | ICD-10-CM

## 2022-10-12 DIAGNOSIS — F41.1 GENERALIZED ANXIETY DISORDER: ICD-10-CM

## 2022-10-12 DIAGNOSIS — B35.6 TINEA CRURIS: ICD-10-CM

## 2022-10-12 DIAGNOSIS — R32 UNSPECIFIED URINARY INCONTINENCE: ICD-10-CM

## 2022-10-12 DIAGNOSIS — Z98.891 HISTORY OF UTERINE SCAR FROM PREVIOUS SURGERY: Chronic | ICD-10-CM

## 2022-10-12 NOTE — BEHAVIORAL HEALTH
[Cognitive and/or Behavior Therapy] : Cognitive and/or Behavior Therapy  [[facility]] : [facility]  [Supportive Therapy] : Supportive Therapy [Return in ____ week(s)] : Return in [unfilled] week(s) [FreeTextEntry2] : Goal#1: To reduce anxiety \par Goal#1 Objective #1:Pt will explore and process feelings, identifying two triggers of anxiety.\par Goal# 1 Objective #2 :Pt will learn and implement two coping skills in order to reduce anxiety\par Goal #1 Objective #3: Pt will maintain monthly med management appointments.  :\par Goal#1 Reduce Depression\par Goal #1 Objective #1 Pt will explore and process feelings, identifying two triggers of depression.\par Goal#1 Objective #2 Pt will learn and implement two coping skills in order to reduce depression. \par Goal #1 Objective # 3 Pt will maintain monthly med management appointments.  \par \par \par \par \par . \par \par  [de-identified] : The focus of session was again on pt's feelings about her career.  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 Pt would like to work again but feels she cannot do so until she get "out of her funk" Therapist validated pt feelings and how working could provide pt a lot of cordelia and fulfillment. Therapist asked pt to identify ways she can start to "get out of her funk" , what that would look like for her at this time in her life.Pt continues to experience a great deal of pain and so far CBD gummies have been infective.  Pt reports crying bouts were minimal in the past week. Pt is compliant with medication and therapy, [FreeTextEntry1] : Pt will adhere to all safety precautions related to COVID and will contact tx team for worsening of symptoms and/or problems with medication. Next appt: 10/26\par \par

## 2022-10-12 NOTE — PHYSICAL EXAM
[Cooperative] : cooperative [Euthymic] : euthymic [Full] : full [Clear] : clear [Linear/Goal Directed] : linear/goal directed [None Reported] : none reported [Average] : average [WNL] : within normal limits

## 2022-10-12 NOTE — REASON FOR VISIT
[Home] : at home, [unfilled] , at the time of the visit. [Medical Office: ([facility])___] : at the medical office located in  [Patient] : the patient [Self] : self

## 2022-10-18 LAB
ALBUMIN SERPL ELPH-MCNC: 3.8 G/DL
ALP BLD-CCNC: 115 U/L
ALT SERPL-CCNC: 22 U/L
ANION GAP SERPL CALC-SCNC: 11 MMOL/L
AST SERPL-CCNC: 16 U/L
BASOPHILS # BLD AUTO: 0.02 K/UL
BASOPHILS NFR BLD AUTO: 0.6 %
BILIRUB SERPL-MCNC: 0.3 MG/DL
BUN SERPL-MCNC: 12 MG/DL
CALCIUM SERPL-MCNC: 8.9 MG/DL
CD16+CD56+ CELLS # BLD: 104 /UL
CD16+CD56+ CELLS NFR BLD: 16 %
CD19 CELLS NFR BLD: 1 /UL
CD3 CELLS # BLD: 565 /UL
CD3 CELLS NFR BLD: 83 %
CD3+CD4+ CELLS # BLD: 457 /UL
CD3+CD4+ CELLS NFR BLD: 66 %
CD3+CD4+ CELLS/CD3+CD8+ CLL SPEC: 4.08 RATIO
CD3+CD8+ CELLS # SPEC: 112 /UL
CD3+CD8+ CELLS NFR BLD: 16 %
CELLS.CD3-CD19+/CELLS IN BLOOD: <1 %
CHLORIDE SERPL-SCNC: 92 MMOL/L
CO2 SERPL-SCNC: 30 MMOL/L
CREAT SERPL-MCNC: 0.46 MG/DL
DEPRECATED KAPPA LC FREE/LAMBDA SER: 1.37 RATIO
EGFR: 114 ML/MIN/1.73M2
EOSINOPHIL # BLD AUTO: 0.15 K/UL
EOSINOPHIL NFR BLD AUTO: 4.3 %
GLUCOSE SERPL-MCNC: 83 MG/DL
HCT VFR BLD CALC: 38.3 %
HGB BLD-MCNC: 11.5 G/DL
IGA SER QL IEP: 164 MG/DL
IGG SER QL IEP: 1057 MG/DL
IGM SER QL IEP: 60 MG/DL
IMM GRANULOCYTES NFR BLD AUTO: 0 %
KAPPA LC CSF-MCNC: 1.18 MG/DL
KAPPA LC SERPL-MCNC: 1.62 MG/DL
LYMPHOCYTES # BLD AUTO: 0.72 K/UL
LYMPHOCYTES NFR BLD AUTO: 20.7 %
MAN DIFF?: NORMAL
MCHC RBC-ENTMCNC: 28.8 PG
MCHC RBC-ENTMCNC: 30 GM/DL
MCV RBC AUTO: 96 FL
MONOCYTES # BLD AUTO: 0.45 K/UL
MONOCYTES NFR BLD AUTO: 12.9 %
NEUTROPHILS # BLD AUTO: 2.14 K/UL
NEUTROPHILS NFR BLD AUTO: 61.5 %
PLATELET # BLD AUTO: 291 K/UL
POTASSIUM SERPL-SCNC: 3.9 MMOL/L
PROT SERPL-MCNC: 6.2 G/DL
RBC # BLD: 3.99 M/UL
RBC # FLD: 13.2 %
SODIUM SERPL-SCNC: 133 MMOL/L
WBC # FLD AUTO: 3.48 K/UL

## 2022-10-21 ENCOUNTER — APPOINTMENT (OUTPATIENT)
Dept: NEUROLOGY | Facility: CLINIC | Age: 52
End: 2022-10-21

## 2022-10-21 PROCEDURE — 99215 OFFICE O/P EST HI 40 MIN: CPT | Mod: 95

## 2022-10-24 NOTE — HISTORY OF PRESENT ILLNESS
[Home] : at home, [unfilled] , at the time of the visit. [Medical Office: ([facility])___] : at the medical office located in  [Verbal consent obtained from patient] : the patient, [unfilled] [FreeTextEntry1] : Initial hx 5/2021\par Referred by Dr. Vizcaino.\par 1/2021 - got 1st covid vaccine.\par 2/2021 - RLE weakness, tightness, gait dysfunction, some RLE discomfort. went to ED 4x. Then LLE was numb but without weakness. Went to pain management, referred to neurologist, then went to [facility]. 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 Got IVMP, no improvement.\par Saw Dr. Vizcaino who sent for NMO ab in 3/2021, referred to me, but developed relapse in 4/2021.\par 4/2021 - acute paraplegia. [facility], got 3d IVMP and 5 sessions PLEX. Got rituximab 1g x2 in 5/2021. since then, some sensation coming back but no movement. \par Currently in [place] rehab.\par Left rehab in 12/2021.\par Has developed a bad rash in late 2021. This had been attributed to lyrica so stopped it. This definitively started PRIOR to starting trileptal. However, may have been attributed to antibiotic (delayed reaction, per derm).\par 3/2022 - Had "chest spasms", read the HR instead of PO2 so thought her O2 was 65, called 911, went to ED, cleared from cardiac perspective.\par 3/2022 - developed new burning and tightness in head, neck, shoulders. MRI C spine without new activity.\par 5/2022 - switched from wellbutrin to cymbalta. \par late 5/2022 - tested positive for covid on two home tests. got monoclonal ab. just headaches, lasted several days. some congestion, some cough, no fever.\par \par \par Subj interval:\par \par Chronic pain - tightness and burning on chest and legs that is severe, happens daily. on 600tid gabapentin (1200tid gabapentin was not sufficient) and cymbalta 60. Nucynta 75tid hasn't helped much. Oxycodone 15 qid prn doesn't help. Tried lyrica in the past which didn't help. Sees pain management, medical marijuana has not helped with the pain but relaxes her, increasing the dose.\par \par Chronic spasms - On trileptal 750bid and baclofen 20tid, still with uncomfortable spasms but not frankly painful. \par \par Living with her mother. Waiting to get an elevator.\par \par Got motorized wheelchair.\par \par PMHX:\par NMO\par asthma\par depression\par anxiety\par LBP\par insomnia\par migraines\par gastric sleeve\par \par MEDS:\par qvar\par asa81\par albuterol prn\par ambien prn\par protonix\par rituximab\par ubrelvy\par cymbalta 60mg\par b12\par calcium\par MV\par iron\par biotin\par colace\par miralax\par gabapentin 600tid\par trileptal 750bid\par baclofen 20tid\par oxycodone 15mg qid prn\par nucynta 75tid\par hydroxyzine prn itching/anxiety\par medical marijuana\par \par SHx: no tob\par \par FHx: no neuro, no AI.\par \par \par O:\par \par AO3. Normally conversant. Follows commands, names, and repeats. Good attention.\par PERRL, face symm\par moves both arms well\par \par \par MRI C, T, and brain 2/2021 reviewed - thoracic LETM on my review.\par \par MRI C+T 1/2022 - no new lorie+ lesions, however, c/w 3/2021 there was caudal extension on the left lateral aspect down to T8 (likely related to relapse she had in 4/2021). \par \par MRI C spine and MRI brain/sella 4/2022 - unchanged. 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 Stable from NMO standpoint, on rituximab.\par \par Chronic pain and spasms. \par \par All questions answered, education provided re: dx, rx, sx, prognosis. management discussed at length. \par \par - continue rituximab q6m\par - cont PT\par - check blood work q6m\par - discussed saeed, she will review and let me know.\par \par Pain:\par - cont f/u with pain management\par - cont nucynta 75tid, cont oxycodone as per pain management, cont cymbalta and gabapentin.\par - cont medical marijuana\par - f/u with pain management, ? spine stimulator\par \par Spasms:\par - cont trileptal 750mg bid for tonic spasms for now, consider dec in future.\par - can cont baclofen 20tid for now\par \par - RTC 3m

## 2022-10-26 ENCOUNTER — APPOINTMENT (OUTPATIENT)
Dept: PSYCHIATRY | Facility: CLINIC | Age: 52
End: 2022-10-26

## 2022-10-28 ENCOUNTER — OUTPATIENT (OUTPATIENT)
Dept: OUTPATIENT SERVICES | Facility: HOSPITAL | Age: 52
LOS: 1 days | Discharge: HOME | End: 2022-10-28

## 2022-10-28 ENCOUNTER — APPOINTMENT (OUTPATIENT)
Dept: PSYCHIATRY | Facility: CLINIC | Age: 52
End: 2022-10-28

## 2022-10-28 DIAGNOSIS — F41.1 GENERALIZED ANXIETY DISORDER: ICD-10-CM

## 2022-10-28 DIAGNOSIS — Z98.890 OTHER SPECIFIED POSTPROCEDURAL STATES: Chronic | ICD-10-CM

## 2022-10-28 DIAGNOSIS — Z98.891 HISTORY OF UTERINE SCAR FROM PREVIOUS SURGERY: Chronic | ICD-10-CM

## 2022-10-28 DIAGNOSIS — F33.1 MAJOR DEPRESSIVE DISORDER, RECURRENT, MODERATE: ICD-10-CM

## 2022-10-28 NOTE — BEHAVIORAL HEALTH
[Cognitive and/or Behavior Therapy] : Cognitive and/or Behavior Therapy  [[facility]] : [facility]  [Supportive Therapy] : Supportive Therapy [Return in ____ week(s)] : Return in [unfilled] week(s) [FreeTextEntry2] : Goal#1: To reduce anxiety \par Goal#1 Objective #1:Pt will explore and process feelings, identifying two triggers of anxiety.\par Goal# 1 Objective #2 :Pt will learn and implement two coping skills in order to reduce anxiety\par Goal #1 Objective #3: Pt will maintain monthly med management appointments.  :\par Goal#1 Reduce Depression\par Goal #1 Objective #1 Pt will explore and process feelings, identifying two triggers of depression.\par Goal#1 Objective #2 Pt will learn and implement two coping skills in order to reduce depression. \par Goal #1 Objective # 3 Pt will maintain monthly med management appointments.  \par \par \par \par \par . \par \par  [de-identified] : The focus of session was again on pt's depressive symptoms. .  Therapist provided active listening as pt shared how she feels down thinking of  all of the things she can no longer do in her life as a result of her paraplegia. Pt reports her friend informed her that she can still most thing she wants to do but it will require more assistance and figuring out, Therapist validated pt feelings and agreed that certain things may be more challenging at this time but that does not mean she cannot do it. Therapist asked pt to identify things that she can do and to try to ficus on these as opposed to focusing on the things she cannot  .Pt reports some mild pain relief with CBD gummies.  Pt was responsive to therapist support and interventions.  Pt is compliant with medication and therapy, [FreeTextEntry1] : Pt will adhere to all safety precautions related to COVID and will contact tx team for worsening of symptoms and/or problems with medication. Next appt: 11/9\par \par

## 2022-11-08 ENCOUNTER — APPOINTMENT (OUTPATIENT)
Dept: PSYCHIATRY | Facility: CLINIC | Age: 52
End: 2022-11-08

## 2022-11-08 ENCOUNTER — OUTPATIENT (OUTPATIENT)
Dept: OUTPATIENT SERVICES | Facility: HOSPITAL | Age: 52
LOS: 1 days | Discharge: HOME | End: 2022-11-08

## 2022-11-08 DIAGNOSIS — F41.1 GENERALIZED ANXIETY DISORDER: ICD-10-CM

## 2022-11-08 DIAGNOSIS — Z98.891 HISTORY OF UTERINE SCAR FROM PREVIOUS SURGERY: Chronic | ICD-10-CM

## 2022-11-08 DIAGNOSIS — F33.1 MAJOR DEPRESSIVE DISORDER, RECURRENT, MODERATE: ICD-10-CM

## 2022-11-08 DIAGNOSIS — Z98.890 OTHER SPECIFIED POSTPROCEDURAL STATES: Chronic | ICD-10-CM

## 2022-11-08 PROCEDURE — 99214 OFFICE O/P EST MOD 30 MIN: CPT | Mod: 95

## 2022-11-08 NOTE — DISCUSSION/SUMMARY
[FreeTextEntry1] : Patient is a 53yo AA  female, depression, anxiety, PMHx of Neuromyelitis optima, paralyzed since 4/2021, neurological symptoms began 2/2021, post Moderna vaccine which she received 1/2021.   She sleeps well, intermittent dysphoric mood due to medical diagnosis,  'shame' associated with the loss of her life. She has learned to 'accept' this new diagnosis.  She is at low risk, no attempts, recent medical diagnosis, positive family support, linked to treatment.\par \par Duloxetine 60mg po daily \par Hydroxyzine 50mg po prn\par

## 2022-11-08 NOTE — HISTORY OF PRESENT ILLNESS
[FreeTextEntry1] : She received motorized vehicle and was able to take her son for Halloween. She sleeps well, intermittent dysphoric mood due to medical diagnosis, not being able to engage in previous activities and the 'shame' associated with the loss of her life. She has learned to 'accept' this new diagnosis.  She feels embarrassed to be in wheelchair, coping skills are discussed.\par \par She endorses increased appetite, episodes of tearfulness presents in waves, she feels nervous when she experiences pain due to the uncertainty. She denies panic symptoms. She struggles with 'lot of pain' in shoulders, chest, back, arms and has an infusion this week.  She had ophthalmologist appointment recently, she follows up pulmonologist and neurologist.\par \par She watches television, engages in social media, online [facility] sessions and support groups.  She takes Hydroxyzine rarely.  Presently, she denies thoughts of harm to self or others, provides her family as protective factors, safety plan is discussed, she would call 911.

## 2022-11-08 NOTE — PHYSICAL EXAM
[Talkative] : talkative [Normal] : good [Average] : average [Cooperative] : cooperative [Depressed] : depressed [Constricted] : constricted [Clear] : clear [Linear/Goal Directed] : linear/goal directed [WNL] : within normal limits [FreeTextEntry1] : Lying in bed [FreeTextEntry8] : 'fine' [FreeTextEntry9] : Full

## 2022-11-08 NOTE — SOCIAL HISTORY
[Unemployed] : unemployed [] :  [None] : none [Yes] : yes [No Known Use] : no known use [FreeTextEntry4] : Masters in Social work [TextBox_7] : Socially, last drink 12/31/21 [FreeTextEntry1] : Edibles, once to twice weekly [FreeTextEntry2] : Medical marijuana for pain

## 2022-11-09 ENCOUNTER — OUTPATIENT (OUTPATIENT)
Dept: OUTPATIENT SERVICES | Facility: HOSPITAL | Age: 52
LOS: 1 days | Discharge: HOME | End: 2022-11-09

## 2022-11-09 ENCOUNTER — FORM ENCOUNTER (OUTPATIENT)
Age: 52
End: 2022-11-09

## 2022-11-09 ENCOUNTER — APPOINTMENT (OUTPATIENT)
Dept: PSYCHIATRY | Facility: CLINIC | Age: 52
End: 2022-11-09

## 2022-11-09 DIAGNOSIS — Z98.891 HISTORY OF UTERINE SCAR FROM PREVIOUS SURGERY: Chronic | ICD-10-CM

## 2022-11-09 DIAGNOSIS — Z98.890 OTHER SPECIFIED POSTPROCEDURAL STATES: Chronic | ICD-10-CM

## 2022-11-09 DIAGNOSIS — F33.1 MAJOR DEPRESSIVE DISORDER, RECURRENT, MODERATE: ICD-10-CM

## 2022-11-09 DIAGNOSIS — F41.1 GENERALIZED ANXIETY DISORDER: ICD-10-CM

## 2022-11-11 ENCOUNTER — APPOINTMENT (OUTPATIENT)
Dept: INFUSION THERAPY | Facility: CLINIC | Age: 52
End: 2022-11-11

## 2022-11-11 ENCOUNTER — OUTPATIENT (OUTPATIENT)
Dept: OUTPATIENT SERVICES | Facility: HOSPITAL | Age: 52
LOS: 1 days | Discharge: HOME | End: 2022-11-11

## 2022-11-11 DIAGNOSIS — Z98.890 OTHER SPECIFIED POSTPROCEDURAL STATES: Chronic | ICD-10-CM

## 2022-11-11 DIAGNOSIS — Z98.891 HISTORY OF UTERINE SCAR FROM PREVIOUS SURGERY: Chronic | ICD-10-CM

## 2022-11-11 LAB
BASOPHILS # BLD AUTO: 0.02 K/UL
BASOPHILS NFR BLD AUTO: 0.5 %
EOSINOPHIL # BLD AUTO: 0.22 K/UL
EOSINOPHIL NFR BLD AUTO: 5.4 %
HCT VFR BLD CALC: 37.2 %
HGB BLD-MCNC: 12.1 G/DL
IMM GRANULOCYTES NFR BLD AUTO: 0.7 %
LYMPHOCYTES # BLD AUTO: 0.83 K/UL
LYMPHOCYTES NFR BLD AUTO: 20.5 %
MAN DIFF?: NORMAL
MCHC RBC-ENTMCNC: 29.8 PG
MCHC RBC-ENTMCNC: 32.5 G/DL
MCV RBC AUTO: 91.6 FL
MONOCYTES # BLD AUTO: 0.53 K/UL
MONOCYTES NFR BLD AUTO: 13.1 %
NEUTROPHILS # BLD AUTO: 2.41 K/UL
NEUTROPHILS NFR BLD AUTO: 59.8 %
PLATELET # BLD AUTO: 241 K/UL
RBC # BLD: 4.06 M/UL
RBC # FLD: 12.5 %
WBC # FLD AUTO: 4.04 K/UL

## 2022-11-11 RX ORDER — DIPHENHYDRAMINE HCL 50 MG
50 CAPSULE ORAL ONCE
Refills: 0 | Status: COMPLETED | OUTPATIENT
Start: 2022-11-11 | End: 2022-11-11

## 2022-11-11 RX ORDER — ACETAMINOPHEN 500 MG
650 TABLET ORAL ONCE
Refills: 0 | Status: COMPLETED | OUTPATIENT
Start: 2022-11-11 | End: 2022-11-11

## 2022-11-11 RX ORDER — RITUXIMAB 10 MG/ML
1000 INJECTION, SOLUTION INTRAVENOUS ONCE
Refills: 0 | Status: COMPLETED | OUTPATIENT
Start: 2022-11-11 | End: 2022-11-11

## 2022-11-11 RX ADMIN — Medication 650 MILLIGRAM(S): at 11:37

## 2022-11-11 RX ADMIN — Medication 50 MILLIGRAM(S): at 11:33

## 2022-11-11 RX ADMIN — RITUXIMAB 1000 MILLIGRAM(S): 10 INJECTION, SOLUTION INTRAVENOUS at 11:59

## 2022-11-11 RX ADMIN — Medication 650 MILLIGRAM(S): at 11:33

## 2022-11-11 RX ADMIN — Medication 58 MILLIGRAM(S): at 11:33

## 2022-11-14 DIAGNOSIS — G82.21 PARAPLEGIA, COMPLETE: ICD-10-CM

## 2022-11-14 DIAGNOSIS — G36.0 NEUROMYELITIS OPTICA [DEVIC]: ICD-10-CM

## 2022-11-16 ENCOUNTER — APPOINTMENT (OUTPATIENT)
Dept: PSYCHIATRY | Facility: CLINIC | Age: 52
End: 2022-11-16

## 2022-11-16 ENCOUNTER — OUTPATIENT (OUTPATIENT)
Dept: OUTPATIENT SERVICES | Facility: HOSPITAL | Age: 52
LOS: 1 days | Discharge: HOME | End: 2022-11-16

## 2022-11-16 DIAGNOSIS — Z98.890 OTHER SPECIFIED POSTPROCEDURAL STATES: Chronic | ICD-10-CM

## 2022-11-16 DIAGNOSIS — F41.1 GENERALIZED ANXIETY DISORDER: ICD-10-CM

## 2022-11-16 DIAGNOSIS — Z98.891 HISTORY OF UTERINE SCAR FROM PREVIOUS SURGERY: Chronic | ICD-10-CM

## 2022-11-16 DIAGNOSIS — F33.1 MAJOR DEPRESSIVE DISORDER, RECURRENT, MODERATE: ICD-10-CM

## 2022-11-16 NOTE — BEHAVIORAL HEALTH
[Cognitive and/or Behavior Therapy] : Cognitive and/or Behavior Therapy  [[facility]] : [facility]  [Supportive Therapy] : Supportive Therapy [Return in ____ week(s)] : Return in [unfilled] week(s) [FreeTextEntry2] : Goal#1: To reduce anxiety \par Goal#1 Objective #1:Pt will explore and process feelings, identifying two triggers of anxiety.\par Goal# 1 Objective #2 :Pt will learn and implement two coping skills in order to reduce anxiety\par Goal #1 Objective #3: Pt will maintain monthly med management appointments.  :\par Goal#1 Reduce Depression\par Goal #1 Objective #1 Pt will explore and process feelings, identifying two triggers of depression.\par Goal#1 Objective #2 Pt will learn and implement two coping skills in order to reduce depression. \par Goal #1 Objective # 3 Pt will maintain monthly med management appointments.  \par \par \par \par \par . \par \par  [de-identified] : The focus of session was on pt's desire to live in her own home again as opposed to her mom;s.  Therapist provided active listening as pt shared how her current home would need ti be made handicap accessible which could cost a large amount of money as there are many stairs involved. Pt does not would to move as she likes the home she now has and would like to find someone to help she that she can live there once more.  Therapist validated pt feelings of wanting in her return to her own home where she is more comfortable. Therapist and pt problem solved what can be done as moving would probably be too costly for her at this time.Pt identified a plan tocontact contractors and obtain estimates for an in home elevator or chair life. Pt and therapist also reviewed effective coping skills to reduce rumination on pain, . Pt was responsive to therapist support and interventions.  Pt is compliant with medication and therapy, [FreeTextEntry1] : Pt will adhere to all safety precautions related to COVID and will contact tx team for worsening of symptoms and/or problems with medication. Next appt: 11/29\par \par

## 2022-11-25 ENCOUNTER — OUTPATIENT (OUTPATIENT)
Dept: OUTPATIENT SERVICES | Facility: HOSPITAL | Age: 52
LOS: 1 days | Discharge: HOME | End: 2022-11-25

## 2022-11-25 ENCOUNTER — APPOINTMENT (OUTPATIENT)
Dept: INFUSION THERAPY | Facility: CLINIC | Age: 52
End: 2022-11-25

## 2022-11-25 DIAGNOSIS — Z98.891 HISTORY OF UTERINE SCAR FROM PREVIOUS SURGERY: Chronic | ICD-10-CM

## 2022-11-25 DIAGNOSIS — Z98.890 OTHER SPECIFIED POSTPROCEDURAL STATES: Chronic | ICD-10-CM

## 2022-11-25 RX ORDER — RITUXIMAB 10 MG/ML
1000 INJECTION, SOLUTION INTRAVENOUS ONCE
Refills: 0 | Status: COMPLETED | OUTPATIENT
Start: 2022-11-25 | End: 2022-11-25

## 2022-11-25 RX ORDER — ACETAMINOPHEN 500 MG
650 TABLET ORAL ONCE
Refills: 0 | Status: COMPLETED | OUTPATIENT
Start: 2022-11-25 | End: 2022-11-25

## 2022-11-25 RX ORDER — DIPHENHYDRAMINE HCL 50 MG
50 CAPSULE ORAL ONCE
Refills: 0 | Status: COMPLETED | OUTPATIENT
Start: 2022-11-25 | End: 2022-11-25

## 2022-11-25 RX ADMIN — Medication 50 MILLIGRAM(S): at 11:08

## 2022-11-25 RX ADMIN — RITUXIMAB 1000 MILLIGRAM(S): 10 INJECTION, SOLUTION INTRAVENOUS at 11:51

## 2022-11-25 RX ADMIN — Medication 650 MILLIGRAM(S): at 11:08

## 2022-11-25 RX ADMIN — Medication 58 MILLIGRAM(S): at 11:09

## 2022-11-28 DIAGNOSIS — M54.50 LOW BACK PAIN, UNSPECIFIED: ICD-10-CM

## 2022-11-28 DIAGNOSIS — G36.0 NEUROMYELITIS OPTICA [DEVIC]: ICD-10-CM

## 2022-11-28 DIAGNOSIS — G82.21 PARAPLEGIA, COMPLETE: ICD-10-CM

## 2022-11-29 ENCOUNTER — APPOINTMENT (OUTPATIENT)
Dept: PSYCHIATRY | Facility: CLINIC | Age: 52
End: 2022-11-29

## 2022-11-29 ENCOUNTER — APPOINTMENT (OUTPATIENT)
Age: 52
End: 2022-11-29

## 2022-11-29 ENCOUNTER — APPOINTMENT (OUTPATIENT)
Age: 52
End: 2022-11-29
Payer: COMMERCIAL

## 2022-11-29 ENCOUNTER — OUTPATIENT (OUTPATIENT)
Dept: OUTPATIENT SERVICES | Facility: HOSPITAL | Age: 52
LOS: 1 days | Discharge: HOME | End: 2022-11-29

## 2022-11-29 VITALS
OXYGEN SATURATION: 99 % | SYSTOLIC BLOOD PRESSURE: 124 MMHG | HEART RATE: 103 BPM | BODY MASS INDEX: 37.2 KG/M2 | RESPIRATION RATE: 14 BRPM | DIASTOLIC BLOOD PRESSURE: 84 MMHG | HEIGHT: 67 IN | WEIGHT: 237 LBS

## 2022-11-29 DIAGNOSIS — F33.1 MAJOR DEPRESSIVE DISORDER, RECURRENT, MODERATE: ICD-10-CM

## 2022-11-29 DIAGNOSIS — Z98.890 OTHER SPECIFIED POSTPROCEDURAL STATES: Chronic | ICD-10-CM

## 2022-11-29 DIAGNOSIS — F41.1 GENERALIZED ANXIETY DISORDER: ICD-10-CM

## 2022-11-29 DIAGNOSIS — Z98.891 HISTORY OF UTERINE SCAR FROM PREVIOUS SURGERY: Chronic | ICD-10-CM

## 2022-11-29 PROCEDURE — 94010 BREATHING CAPACITY TEST: CPT

## 2022-11-29 PROCEDURE — 94727 GAS DIL/WSHOT DETER LNG VOL: CPT

## 2022-11-29 PROCEDURE — 94729 DIFFUSING CAPACITY: CPT

## 2022-11-29 PROCEDURE — 99214 OFFICE O/P EST MOD 30 MIN: CPT | Mod: 25

## 2022-11-29 NOTE — HISTORY OF PRESENT ILLNESS
[Intermittent] : intermittent [Doing Well] : doing well [Well Controlled] : Well controlled [Checks Regularly] : The patient checks ~his/her~ peak flow regularly [Good Control] : peak flow has been good [None] : None [Adherent] : the patient is adherent with ~his/her~ medication regimen [Goals--Doing Well] : the patient is doing well with ~his/her~ asthma goals [PFTs] : pulmonary function tests [Follow-Up - Routine Clinic] : a routine clinic follow-up of [Excessive Daytime Sleepiness] : excessive daytime sleepiness [Snoring] : snoring [Unrefreshing Sleep] : unrefreshing sleep [Currently Experiencing] : The patient is currently experiencing symptoms.

## 2022-11-29 NOTE — BEHAVIORAL HEALTH
[Cognitive and/or Behavior Therapy] : Cognitive and/or Behavior Therapy  [[facility]] : [facility]  [Supportive Therapy] : Supportive Therapy [Return in ____ week(s)] : Return in [unfilled] week(s) [FreeTextEntry2] : Goal#1: To reduce anxiety \par Goal#1 Objective #1:Pt will explore and process feelings, identifying two triggers of anxiety.\par Goal# 1 Objective #2 :Pt will learn and implement two coping skills in order to reduce anxiety\par Goal #1 Objective #3: Pt will maintain monthly med management appointments.  :\par Goal#1 Reduce Depression\par Goal #1 Objective #1 Pt will explore and process feelings, identifying two triggers of depression.\par Goal#1 Objective #2 Pt will learn and implement two coping skills in order to reduce depression. \par Goal #1 Objective # 3 Pt will maintain monthly med management appointments.  \par \par \par \par \par . \par \par  [de-identified] : The focus of session was on pt's current stressors. Therapist provided active likening and empathic response as pat shared her struggles with the lack of independence her disease has given her. Pt discussed today how she is unable to take her special needs son to and from his after school classes and must rely on others. Pt also shared how transportation to [facility] [place] is unreliable and she is often late or waiting for rides to/from. Therapist validated pt frustration and how hard it must be from being able to travel identify to relying on others. Therapist explored ways pt could gain some independence either in current home or in making for own home equipped for her condition. Pt's mood is good and she seems to enjoy having the support and outlet to talk for weekly sessions. Therapist provides compassion and humor and pt is responsive to this. [FreeTextEntry1] : Pt will adhere to all safety precautions related to COVID and will contact tx team for worsening of symptoms and/or problems with medication. Next appt: 12/7\par \par

## 2022-11-29 NOTE — REASON FOR VISIT
[Asthma] : asthma [Shortness of Breath] : shortness of breath [Follow-Up] : a follow-up visit [Sleep Apnea] : sleep apnea

## 2022-11-29 NOTE — PHYSICAL EXAM
[No Acute Distress] : no acute distress [Normal Oropharynx] : normal oropharynx [Normal Appearance] : normal appearance [No Neck Mass] : no neck mass [Normal Rate/Rhythm] : normal rate/rhythm [Normal S1, S2] : normal s1, s2 [No Murmurs] : no murmurs [No Resp Distress] : no resp distress [Clear to Auscultation Bilaterally] : clear to auscultation bilaterally [No Abnormalities] : no abnormalities [Benign] : benign [Normal Gait] : normal gait [No Clubbing] : no clubbing [No Cyanosis] : no cyanosis [No Edema] : no edema [FROM] : FROM [Normal Color/ Pigmentation] : normal color/ pigmentation [Oriented x3] : oriented x3 [Normal Affect] : normal affect [TextBox_132] : Paraplegia

## 2022-12-07 ENCOUNTER — APPOINTMENT (OUTPATIENT)
Dept: PSYCHIATRY | Facility: CLINIC | Age: 52
End: 2022-12-07

## 2022-12-07 ENCOUNTER — OUTPATIENT (OUTPATIENT)
Dept: OUTPATIENT SERVICES | Facility: HOSPITAL | Age: 52
LOS: 1 days | Discharge: HOME | End: 2022-12-07

## 2022-12-07 DIAGNOSIS — Z98.890 OTHER SPECIFIED POSTPROCEDURAL STATES: Chronic | ICD-10-CM

## 2022-12-07 DIAGNOSIS — Z98.891 HISTORY OF UTERINE SCAR FROM PREVIOUS SURGERY: Chronic | ICD-10-CM

## 2022-12-07 DIAGNOSIS — F33.1 MAJOR DEPRESSIVE DISORDER, RECURRENT, MODERATE: ICD-10-CM

## 2022-12-07 DIAGNOSIS — F41.1 GENERALIZED ANXIETY DISORDER: ICD-10-CM

## 2022-12-07 NOTE — BEHAVIORAL HEALTH
[Cognitive and/or Behavior Therapy] : Cognitive and/or Behavior Therapy  [[facility]] : [facility]  [Supportive Therapy] : Supportive Therapy [Return in ____ week(s)] : Return in [unfilled] week(s) [FreeTextEntry2] : Goal#1: To reduce anxiety \par Goal#1 Objective #1:Pt will explore and process feelings, identifying two triggers of anxiety.\par Goal# 1 Objective #2 :Pt will learn and implement two coping skills in order to reduce anxiety\par Goal #1 Objective #3: Pt will maintain monthly med management appointments.  :\par Goal#1 Reduce Depression\par Goal #1 Objective #1 Pt will explore and process feelings, identifying two triggers of depression.\par Goal#1 Objective #2 Pt will learn and implement two coping skills in order to reduce depression. \par Goal #1 Objective # 3 Pt will maintain monthly med management appointments.  \par \par \par \par \par . \par \par  [de-identified] : The focus of session was on pt's socialization. Therapist provided active likening and empathic response as pt shared how she misses her independence and how she can n o longer participate in all of the fun activities her sorority sister do. Pt reports she finds it hard to see the pictures on social med]ia of their get together that she can n o longer attend. Therapist validated pt feelings and how upsetting it must be to feel she cannot join in the fun but also asked if there were some activities she could still attend. Pt identified that there are some but it would be a struggle , something he often avoids,  Therapist explored ways pts could address these barriers and enjoy these social events again.  Pt's mood is good again  today and she reports that having someone to listen helps her mood. Therapist provides compassion and appreciation for the time with pt each week. [FreeTextEntry1] : Pt will adhere to all safety precautions related to COVID and will contact tx team for worsening of symptoms and/or problems with medication. Next appt: 12/15\par \par

## 2022-12-08 ENCOUNTER — APPOINTMENT (OUTPATIENT)
Dept: SLEEP CENTER | Facility: HOSPITAL | Age: 52
End: 2022-12-08

## 2022-12-15 ENCOUNTER — APPOINTMENT (OUTPATIENT)
Dept: PSYCHIATRY | Facility: CLINIC | Age: 52
End: 2022-12-15

## 2022-12-15 ENCOUNTER — OUTPATIENT (OUTPATIENT)
Dept: OUTPATIENT SERVICES | Facility: HOSPITAL | Age: 52
LOS: 1 days | Discharge: HOME | End: 2022-12-15

## 2022-12-15 DIAGNOSIS — Z98.890 OTHER SPECIFIED POSTPROCEDURAL STATES: Chronic | ICD-10-CM

## 2022-12-15 DIAGNOSIS — F41.1 GENERALIZED ANXIETY DISORDER: ICD-10-CM

## 2022-12-15 DIAGNOSIS — Z98.891 HISTORY OF UTERINE SCAR FROM PREVIOUS SURGERY: Chronic | ICD-10-CM

## 2022-12-15 DIAGNOSIS — F33.1 MAJOR DEPRESSIVE DISORDER, RECURRENT, MODERATE: ICD-10-CM

## 2022-12-15 NOTE — BEHAVIORAL HEALTH
[Cognitive and/or Behavior Therapy] : Cognitive and/or Behavior Therapy  [[facility]] : [facility]  [Supportive Therapy] : Supportive Therapy [Return in ____ week(s)] : Return in [unfilled] week(s) [FreeTextEntry2] : Goal#1: To reduce anxiety \par Goal#1 Objective #1:Pt will explore and process feelings, identifying two triggers of anxiety.\par Goal# 1 Objective #2 :Pt will learn and implement two coping skills in order to reduce anxiety\par Goal #1 Objective #3: Pt will maintain monthly med management appointments.  :\par Goal#1 Reduce Depression\par Goal #1 Objective #1 Pt will explore and process feelings, identifying two triggers of depression.\par Goal#1 Objective #2 Pt will learn and implement two coping skills in order to reduce depression. \par Goal #1 Objective # 3 Pt will maintain monthly med management appointments.  \par \par \par \par \par . \par \par  [de-identified] : The focus of session was on pt's experience of pain.  Therapist provided active likening and empathic response as pt shared how she is struggling with pain all over her body and how her medications do little to ease this. Pt reports how difficult it is to live in a constant state of pain. Therapist validated pt experience and how this can  negatively affect ones mood as well.Therapist explored ways pt could distract from, the pain by engaging ion meditation or mindfulness exercises. .Pt was also encouraged to contact her doctor if the pain becomes unbearable. Pt is compliant with medication and therapy and responsive to therapist support and interventions. [FreeTextEntry1] : Pt will adhere to all safety precautions related to COVID and will contact tx team for worsening of symptoms and/or problems with medication. Next appt: 12/21\par \par

## 2022-12-21 ENCOUNTER — APPOINTMENT (OUTPATIENT)
Dept: PSYCHIATRY | Facility: CLINIC | Age: 52
End: 2022-12-21

## 2022-12-21 ENCOUNTER — OUTPATIENT (OUTPATIENT)
Dept: OUTPATIENT SERVICES | Facility: HOSPITAL | Age: 52
LOS: 1 days | Discharge: HOME | End: 2022-12-21

## 2022-12-21 DIAGNOSIS — F41.1 GENERALIZED ANXIETY DISORDER: ICD-10-CM

## 2022-12-21 DIAGNOSIS — Z98.890 OTHER SPECIFIED POSTPROCEDURAL STATES: Chronic | ICD-10-CM

## 2022-12-21 DIAGNOSIS — Z98.891 HISTORY OF UTERINE SCAR FROM PREVIOUS SURGERY: Chronic | ICD-10-CM

## 2022-12-21 DIAGNOSIS — F33.1 MAJOR DEPRESSIVE DISORDER, RECURRENT, MODERATE: ICD-10-CM

## 2022-12-22 ENCOUNTER — APPOINTMENT (OUTPATIENT)
Dept: PSYCHIATRY | Facility: CLINIC | Age: 52
End: 2022-12-22

## 2022-12-22 ENCOUNTER — OUTPATIENT (OUTPATIENT)
Dept: OUTPATIENT SERVICES | Facility: HOSPITAL | Age: 52
LOS: 1 days | Discharge: HOME | End: 2022-12-22

## 2022-12-22 DIAGNOSIS — Z98.891 HISTORY OF UTERINE SCAR FROM PREVIOUS SURGERY: Chronic | ICD-10-CM

## 2022-12-22 DIAGNOSIS — F33.1 MAJOR DEPRESSIVE DISORDER, RECURRENT, MODERATE: ICD-10-CM

## 2022-12-22 DIAGNOSIS — Z98.890 OTHER SPECIFIED POSTPROCEDURAL STATES: Chronic | ICD-10-CM

## 2022-12-22 DIAGNOSIS — F41.1 GENERALIZED ANXIETY DISORDER: ICD-10-CM

## 2022-12-22 NOTE — BEHAVIORAL HEALTH
[Cognitive and/or Behavior Therapy] : Cognitive and/or Behavior Therapy  [[facility]] : [facility]  [Supportive Therapy] : Supportive Therapy [Return in ____ week(s)] : Return in [unfilled] week(s) [FreeTextEntry2] : Goal#1: To reduce anxiety \par Goal#1 Objective #1:Pt will explore and process feelings, identifying two triggers of anxiety.\par Goal# 1 Objective #2 :Pt will learn and implement two coping skills in order to reduce anxiety\par Goal #1 Objective #3: Pt will maintain monthly med management appointments.  :\par Goal#1 Reduce Depression\par Goal #1 Objective #1 Pt will explore and process feelings, identifying two triggers of depression.\par Goal#1 Objective #2 Pt will learn and implement two coping skills in order to reduce depression. \par Goal #1 Objective # 3 Pt will maintain monthly med management appointments.  \par \par \par \par \par . \par \par  [de-identified] : The focus of session was on pt's feelings about the holidays   Therapist provided active likening and empathic response as pt shared how she finds the holiday season difficult due to her paraplegia., and this is the second [place] she is spending in this condition. Pt reports it puts her in a low mood and her constant pain only worsens this. . Therapist validated pt experience  and feelings as well as how hard it must be for her at this time, unable to participate in the holiday as she had in the past.  Therapist again asked pt to focus on what she can do as opposed to what she cannot , also validating this can be hard to do sometimes Pt then used remainder of session to discuss some recent stressful events and how she coped.. Pt is compliant with medication and therapy and responsive to therapist support and interventions. [FreeTextEntry1] : Pt will adhere to all safety precautions related to COVID and will contact tx team for worsening of symptoms and/or problems with medication. Next appt: 12/28\par \par

## 2022-12-23 DIAGNOSIS — Z02.9 ENCOUNTER FOR ADMINISTRATIVE EXAMINATIONS, UNSPECIFIED: ICD-10-CM

## 2022-12-28 ENCOUNTER — APPOINTMENT (OUTPATIENT)
Dept: PSYCHIATRY | Facility: CLINIC | Age: 52
End: 2022-12-28

## 2022-12-28 ENCOUNTER — OUTPATIENT (OUTPATIENT)
Dept: OUTPATIENT SERVICES | Facility: HOSPITAL | Age: 52
LOS: 1 days | Discharge: HOME | End: 2022-12-28

## 2022-12-28 DIAGNOSIS — Z98.890 OTHER SPECIFIED POSTPROCEDURAL STATES: Chronic | ICD-10-CM

## 2022-12-28 DIAGNOSIS — F33.1 MAJOR DEPRESSIVE DISORDER, RECURRENT, MODERATE: ICD-10-CM

## 2022-12-28 DIAGNOSIS — G82.20 PARAPLEGIA, UNSPECIFIED: ICD-10-CM

## 2022-12-28 DIAGNOSIS — F41.1 GENERALIZED ANXIETY DISORDER: ICD-10-CM

## 2022-12-28 DIAGNOSIS — Z98.891 HISTORY OF UTERINE SCAR FROM PREVIOUS SURGERY: Chronic | ICD-10-CM

## 2022-12-28 NOTE — BEHAVIORAL HEALTH
If bp is  120s over 80s.  Permissible up to 140/90.no need to restart the ramipril  If goes higher than that ,call back on Monday    [FreeTextEntry2] : Goal#1: To reduce anxiety \par Goal#1 Objective #1:Pt will explore and process feelings, identifying two triggers of anxiety.\par Goal# 1 Objective #2 :Pt will learn and implement two coping skills in order to reduce anxiety\par Goal #1 Objective #3: Pt will maintain monthly med management appointments.  :\par Goal#1 Reduce Depression\par Goal #1 Objective #1 Pt will explore and process feelings, identifying two triggers of depression.\par Goal#1 Objective #2 Pt will learn and implement two coping skills in order to reduce depression. \par Goal #1 Objective # 3 Pt will maintain monthly med management appointments.  \par \par \par \par \par . \par \par  [Cognitive and/or Behavior Therapy] : Cognitive and/or Behavior Therapy  [[facility]] : [facility]  [Supportive Therapy] : Supportive Therapy [de-identified] : The focus of session was on pt's feelings about her illness.  Therapist provided active listening and empathic response as pt shared how she worries that her illness will worsen to the pint she will need a ventilator after reading bout her condition on the Internet. Pt reports she asked her doctor but he would not say whether this would or not happen, only that he does not know . Therapist validated pt feelings of fear and pointed out that dr most likely does not know, that everyone;s body is different . Therapist urged pt not to focus on the "worst case scenario" and to avid thing like this, it will only worsen her sx of depression and anxiety. Pt and therapist reviewed coping strategies to avoid worst case thoughts such as positive self talk . Pt is compliant with medication and therapy and responsive to therapist support and interventions. [Return in ____ week(s)] : Return in [unfilled] week(s) [FreeTextEntry1] : Pt will adhere to all safety precautions related to COVID and will contact tx team for worsening of symptoms and/or problems with medication. Next appt: 1/9\par \par

## 2023-01-07 ENCOUNTER — APPOINTMENT (OUTPATIENT)
Dept: SLEEP CENTER | Facility: HOSPITAL | Age: 53
End: 2023-01-07

## 2023-01-07 ENCOUNTER — OUTPATIENT (OUTPATIENT)
Dept: OUTPATIENT SERVICES | Facility: HOSPITAL | Age: 53
LOS: 1 days | Discharge: HOME | End: 2023-01-07
Payer: COMMERCIAL

## 2023-01-07 DIAGNOSIS — Z98.891 HISTORY OF UTERINE SCAR FROM PREVIOUS SURGERY: Chronic | ICD-10-CM

## 2023-01-07 DIAGNOSIS — Z98.890 OTHER SPECIFIED POSTPROCEDURAL STATES: Chronic | ICD-10-CM

## 2023-01-07 PROCEDURE — 95800 SLP STDY UNATTENDED: CPT | Mod: 26

## 2023-01-09 ENCOUNTER — NON-APPOINTMENT (OUTPATIENT)
Age: 53
End: 2023-01-09

## 2023-01-09 ENCOUNTER — APPOINTMENT (OUTPATIENT)
Dept: PSYCHIATRY | Facility: CLINIC | Age: 53
End: 2023-01-09

## 2023-01-09 DIAGNOSIS — G47.33 OBSTRUCTIVE SLEEP APNEA (ADULT) (PEDIATRIC): ICD-10-CM

## 2023-01-10 ENCOUNTER — NON-APPOINTMENT (OUTPATIENT)
Age: 53
End: 2023-01-10

## 2023-01-11 ENCOUNTER — NON-APPOINTMENT (OUTPATIENT)
Age: 53
End: 2023-01-11

## 2023-01-17 ENCOUNTER — EMERGENCY (EMERGENCY)
Facility: HOSPITAL | Age: 53
LOS: 0 days | Discharge: HOME | End: 2023-01-18
Attending: STUDENT IN AN ORGANIZED HEALTH CARE EDUCATION/TRAINING PROGRAM | Admitting: STUDENT IN AN ORGANIZED HEALTH CARE EDUCATION/TRAINING PROGRAM
Payer: COMMERCIAL

## 2023-01-17 VITALS
TEMPERATURE: 98 F | DIASTOLIC BLOOD PRESSURE: 67 MMHG | OXYGEN SATURATION: 96 % | RESPIRATION RATE: 18 BRPM | SYSTOLIC BLOOD PRESSURE: 133 MMHG | HEART RATE: 80 BPM

## 2023-01-17 DIAGNOSIS — J45.909 UNSPECIFIED ASTHMA, UNCOMPLICATED: ICD-10-CM

## 2023-01-17 DIAGNOSIS — M19.90 UNSPECIFIED OSTEOARTHRITIS, UNSPECIFIED SITE: ICD-10-CM

## 2023-01-17 DIAGNOSIS — Z90.09 ACQUIRED ABSENCE OF OTHER PART OF HEAD AND NECK: ICD-10-CM

## 2023-01-17 DIAGNOSIS — R42 DIZZINESS AND GIDDINESS: ICD-10-CM

## 2023-01-17 DIAGNOSIS — Z98.890 OTHER SPECIFIED POSTPROCEDURAL STATES: Chronic | ICD-10-CM

## 2023-01-17 DIAGNOSIS — Z87.19 PERSONAL HISTORY OF OTHER DISEASES OF THE DIGESTIVE SYSTEM: ICD-10-CM

## 2023-01-17 DIAGNOSIS — Z86.69 PERSONAL HISTORY OF OTHER DISEASES OF THE NERVOUS SYSTEM AND SENSE ORGANS: ICD-10-CM

## 2023-01-17 DIAGNOSIS — R11.0 NAUSEA: ICD-10-CM

## 2023-01-17 DIAGNOSIS — R29.898 OTHER SYMPTOMS AND SIGNS INVOLVING THE MUSCULOSKELETAL SYSTEM: ICD-10-CM

## 2023-01-17 DIAGNOSIS — K21.9 GASTRO-ESOPHAGEAL REFLUX DISEASE WITHOUT ESOPHAGITIS: ICD-10-CM

## 2023-01-17 DIAGNOSIS — Z99.89 DEPENDENCE ON OTHER ENABLING MACHINES AND DEVICES: ICD-10-CM

## 2023-01-17 DIAGNOSIS — G47.33 OBSTRUCTIVE SLEEP APNEA (ADULT) (PEDIATRIC): ICD-10-CM

## 2023-01-17 DIAGNOSIS — I10 ESSENTIAL (PRIMARY) HYPERTENSION: ICD-10-CM

## 2023-01-17 DIAGNOSIS — Z98.891 HISTORY OF UTERINE SCAR FROM PREVIOUS SURGERY: Chronic | ICD-10-CM

## 2023-01-17 LAB
ALBUMIN SERPL ELPH-MCNC: 3.4 G/DL — LOW (ref 3.5–5.2)
ALP SERPL-CCNC: 109 U/L — SIGNIFICANT CHANGE UP (ref 30–115)
ALT FLD-CCNC: 77 U/L — HIGH (ref 0–41)
ANION GAP SERPL CALC-SCNC: 12 MMOL/L — SIGNIFICANT CHANGE UP (ref 7–14)
AST SERPL-CCNC: 49 U/L — HIGH (ref 0–41)
BASOPHILS # BLD AUTO: 0.01 K/UL — SIGNIFICANT CHANGE UP (ref 0–0.2)
BASOPHILS NFR BLD AUTO: 0.2 % — SIGNIFICANT CHANGE UP (ref 0–1)
BILIRUB SERPL-MCNC: 0.4 MG/DL — SIGNIFICANT CHANGE UP (ref 0.2–1.2)
BUN SERPL-MCNC: 11 MG/DL — SIGNIFICANT CHANGE UP (ref 10–20)
CALCIUM SERPL-MCNC: 8.9 MG/DL — SIGNIFICANT CHANGE UP (ref 8.4–10.5)
CHLORIDE SERPL-SCNC: 96 MMOL/L — LOW (ref 98–110)
CO2 SERPL-SCNC: 29 MMOL/L — SIGNIFICANT CHANGE UP (ref 17–32)
CREAT SERPL-MCNC: 0.6 MG/DL — LOW (ref 0.7–1.5)
EGFR: 107 ML/MIN/1.73M2 — SIGNIFICANT CHANGE UP
EOSINOPHIL # BLD AUTO: 0.07 K/UL — SIGNIFICANT CHANGE UP (ref 0–0.7)
EOSINOPHIL NFR BLD AUTO: 1.2 % — SIGNIFICANT CHANGE UP (ref 0–8)
GLUCOSE SERPL-MCNC: 95 MG/DL — SIGNIFICANT CHANGE UP (ref 70–99)
HCT VFR BLD CALC: 35.1 % — LOW (ref 37–47)
HGB BLD-MCNC: 11.1 G/DL — LOW (ref 12–16)
IMM GRANULOCYTES NFR BLD AUTO: 0.7 % — HIGH (ref 0.1–0.3)
LACTATE SERPL-SCNC: 0.8 MMOL/L — SIGNIFICANT CHANGE UP (ref 0.7–2)
LIDOCAIN IGE QN: 39 U/L — SIGNIFICANT CHANGE UP (ref 7–60)
LYMPHOCYTES # BLD AUTO: 0.53 K/UL — LOW (ref 1.2–3.4)
LYMPHOCYTES # BLD AUTO: 9 % — LOW (ref 20.5–51.1)
MCHC RBC-ENTMCNC: 28.1 PG — SIGNIFICANT CHANGE UP (ref 27–31)
MCHC RBC-ENTMCNC: 31.6 G/DL — LOW (ref 32–37)
MCV RBC AUTO: 88.9 FL — SIGNIFICANT CHANGE UP (ref 81–99)
MONOCYTES # BLD AUTO: 0.82 K/UL — HIGH (ref 0.1–0.6)
MONOCYTES NFR BLD AUTO: 14 % — HIGH (ref 1.7–9.3)
NEUTROPHILS # BLD AUTO: 4.4 K/UL — SIGNIFICANT CHANGE UP (ref 1.4–6.5)
NEUTROPHILS NFR BLD AUTO: 74.9 % — SIGNIFICANT CHANGE UP (ref 42.2–75.2)
NRBC # BLD: 0 /100 WBCS — SIGNIFICANT CHANGE UP (ref 0–0)
PLATELET # BLD AUTO: 220 K/UL — SIGNIFICANT CHANGE UP (ref 130–400)
POTASSIUM SERPL-MCNC: 3.8 MMOL/L — SIGNIFICANT CHANGE UP (ref 3.5–5)
POTASSIUM SERPL-SCNC: 3.8 MMOL/L — SIGNIFICANT CHANGE UP (ref 3.5–5)
PROT SERPL-MCNC: 6.1 G/DL — SIGNIFICANT CHANGE UP (ref 6–8)
RBC # BLD: 3.95 M/UL — LOW (ref 4.2–5.4)
RBC # FLD: 12.7 % — SIGNIFICANT CHANGE UP (ref 11.5–14.5)
SODIUM SERPL-SCNC: 137 MMOL/L — SIGNIFICANT CHANGE UP (ref 135–146)
TROPONIN T SERPL-MCNC: <0.01 NG/ML — SIGNIFICANT CHANGE UP
WBC # BLD: 5.87 K/UL — SIGNIFICANT CHANGE UP (ref 4.8–10.8)
WBC # FLD AUTO: 5.87 K/UL — SIGNIFICANT CHANGE UP (ref 4.8–10.8)

## 2023-01-17 PROCEDURE — 70450 CT HEAD/BRAIN W/O DYE: CPT | Mod: 26,MA

## 2023-01-17 PROCEDURE — 74177 CT ABD & PELVIS W/CONTRAST: CPT | Mod: 26,MA

## 2023-01-17 PROCEDURE — 71045 X-RAY EXAM CHEST 1 VIEW: CPT | Mod: 26

## 2023-01-17 PROCEDURE — 99285 EMERGENCY DEPT VISIT HI MDM: CPT

## 2023-01-17 PROCEDURE — 93010 ELECTROCARDIOGRAM REPORT: CPT

## 2023-01-17 RX ORDER — ACETAMINOPHEN 500 MG
650 TABLET ORAL ONCE
Refills: 0 | Status: COMPLETED | OUTPATIENT
Start: 2023-01-17 | End: 2023-01-17

## 2023-01-17 RX ORDER — DIATRIZOATE MEGLUMINE 180 MG/ML
30 INJECTION, SOLUTION INTRAVESICAL ONCE
Refills: 0 | Status: COMPLETED | OUTPATIENT
Start: 2023-01-17 | End: 2023-01-17

## 2023-01-17 RX ORDER — ONDANSETRON 8 MG/1
4 TABLET, FILM COATED ORAL ONCE
Refills: 0 | Status: COMPLETED | OUTPATIENT
Start: 2023-01-17 | End: 2023-01-17

## 2023-01-17 RX ORDER — METOCLOPRAMIDE HCL 10 MG
10 TABLET ORAL ONCE
Refills: 0 | Status: COMPLETED | OUTPATIENT
Start: 2023-01-17 | End: 2023-01-17

## 2023-01-17 RX ORDER — MECLIZINE HCL 12.5 MG
1 TABLET ORAL
Qty: 30 | Refills: 0
Start: 2023-01-17 | End: 2023-01-26

## 2023-01-17 RX ADMIN — Medication 104 MILLIGRAM(S): at 18:29

## 2023-01-17 RX ADMIN — ONDANSETRON 4 MILLIGRAM(S): 8 TABLET, FILM COATED ORAL at 16:22

## 2023-01-17 RX ADMIN — DIATRIZOATE MEGLUMINE 30 MILLILITER(S): 180 INJECTION, SOLUTION INTRAVESICAL at 18:29

## 2023-01-17 RX ADMIN — Medication 650 MILLIGRAM(S): at 18:29

## 2023-01-17 NOTE — ED PROVIDER NOTE - NSFOLLOWUPINSTRUCTIONS_ED_ALL_ED_FT
Dizziness    Dizziness is a common problem. It is a feeling of unsteadiness or light-headedness. You may feel like you are about to faint. This condition can be caused by a number of things, including medicines, dehydration, or illness. Drink enough fluid to keep your urine clear or pale yellow. Do not drink alcohol and limit your caffeine and salt intake. Avoid quick movement.  Rise slowly from chairs and steady yourself until you feel okay. In the morning, first sit up on the side of the bed.    SEEK IMMEDIATE MEDICAL CARE IF YOU HAVE THE FOLLOWING SYMPTOMS: vomiting, changes in your vision or speech, weakness in your arms or legs, trouble speaking or swallowing, chest pain, abdominal pain, shortness of breath, sweating, bleeding, headache, neck pain, or fever.       Abdominal Pain    Many things can cause abdominal pain. Usually, abdominal pain is not caused by a disease and will improve without treatment. Your health care provider will do a physical exam and possibly order blood tests and imaging to help determine the seriousness of your pain. However, in many cases, no cause may be found and you may need further testing as an outpatient. Monitor your abdominal pain for any changes.     SEEK IMMEDIATE MEDICAL CARE IF YOU HAVE THE FOLLOWING SYMPTOMS: worsening abdominal pain, vomiting, diarrhea, inability to have bowel movements or pass gas, black or bloody stool, fever accompanying chest pain or back pain, or dizziness/lightheadedness.    Our Emergency Department Referral Coordinators will be reaching out to you in the next 24-48 hours from 9:00am to 5:00pm (Monday - Friday) with a follow up appointment. Please expect a phone call from the hospital in that time frame. If you do not receive a call or if you have any questions or concerns, you can reach them at [phone] or [phone].

## 2023-01-17 NOTE — ED PROVIDER NOTE - PATIENT PORTAL LINK FT
You can access the FollowMyHealth Patient Portal offered by [facility] by registering at the following website: [email or web address]. By joining [employer]s FollowMyHealth portal, you will also be able to view your health information using other applications (apps) compatible with our system.

## 2023-01-17 NOTE — ED PROVIDER NOTE - CLINICAL SUMMARY MEDICAL DECISION MAKING FREE TEXT BOX
52-year-old female presenting today with vertiginous symptoms and nausea/dry heaving.  Patient is hemodynamically stable and well-appearing on evaluation.  Patient is neurologically intact to her baseline mental status.  Patient of note has chronic lower extremity weakness and is unable to ambulate.  Patient endorsed that she is at her baseline neurological status.  Patient was treated and had significant improvement in her symptomatology.  CAT scans of the head and abdomen pelvis were both unremarkable.  Patient was discharged to close follow-up with PMD/neurology.  Return precautions explained to patient.  Patient endorses vertiginous episode today was similar to previous episodes in the past.  Endorses that they are associated only with movement and had improved in the ED.  Patient endorsed no longer feeling nauseous and having episodes of dry heaving.  Patient endorsed no other symptomatology such as chest pain, syncope or shortness of breath.

## 2023-01-17 NOTE — ED PROVIDER NOTE - PHYSICAL EXAMINATION
CONST: Well appearing in NAD  EYES: PERRL, EOMI, Sclera and conjunctiva clear.   ENT: No nasal discharge. TM's clear B/L without drainage. Oropharynx normal appearing, no erythema or exudates. No abscess or swelling. Uvula midline. No temporal artery or mastoid tenderness.  NECK: Non-tender, no meningeal signs. normal ROM. supple   CARD: Normal S1 S2; Normal rate and rhythm  RESP: Equal BS B/L, No wheezes, rhonchi or rales. No distress  GI: Soft, non-tender, non-distended. no cva tenderness. normal BS  MS: Normal ROM in all extremities. No midline spinal tenderness. pulses 2 +. no calf tenderness or swelling  SKIN: Warm, dry, no acute rashes. Good turgor  NEURO: A&Ox3, No focal deficits. Finger to nose intact. Negative pronator drift

## 2023-01-17 NOTE — ED PROVIDER NOTE - PROGRESS NOTE DETAILS
Pt feeling much better - ambulating well in ed. eager to go home - discussed all results with pt. strict return precautions given, will follow up with pmd and neuro

## 2023-01-17 NOTE — ED PROVIDER NOTE - OBJECTIVE STATEMENT
52 year old female with pmhx of htn, vertigo, asthma, presents with vertigo and nausea for the last few days. Pt admits when she turns her head she has sensation of room spinning. pt has felt like this in the past. no vomiting, diarrhea, fever, chills, ha, chest pain, or sob.

## 2023-01-18 ENCOUNTER — APPOINTMENT (OUTPATIENT)
Dept: NEUROLOGY | Facility: CLINIC | Age: 53
End: 2023-01-18
Payer: COMMERCIAL

## 2023-01-18 VITALS
OXYGEN SATURATION: 97 % | TEMPERATURE: 98.5 F | HEIGHT: 67 IN | SYSTOLIC BLOOD PRESSURE: 142 MMHG | DIASTOLIC BLOOD PRESSURE: 93 MMHG | HEART RATE: 99 BPM

## 2023-01-18 DIAGNOSIS — R42 DIZZINESS AND GIDDINESS: ICD-10-CM

## 2023-01-18 PROCEDURE — 99215 OFFICE O/P EST HI 40 MIN: CPT

## 2023-01-18 RX ORDER — MECLIZINE HYDROCHLORIDE 12.5 MG/1
12.5 TABLET ORAL
Qty: 90 | Refills: 3 | Status: ACTIVE | COMMUNITY
Start: 2023-01-18 | End: 1900-01-01

## 2023-01-18 NOTE — HISTORY OF PRESENT ILLNESS
[FreeTextEntry1] : Initial hx 5/2021\par Referred by Dr. Vizcaino.\par 1/2021 - got 1st covid vaccine.\par 2/2021 - RLE weakness, tightness, gait dysfunction, some RLE discomfort. went to ED 4x. Then LLE was numb but without weakness. Went to pain management, referred to neurologist, then went to [facility]. 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 Got IVMP, no improvement.\par Saw Dr. Vizcaino who sent for NMO ab in 3/2021, referred to me, but developed relapse in 4/2021.\par 4/2021 - acute paraplegia. [facility], got 3d IVMP and 5 sessions PLEX. Got rituximab 1g x2 in 5/2021. since then, some sensation coming back but no movement. \par Currently in [place] rehab.\par Left rehab in 12/2021.\par Has developed a bad rash in late 2021. This had been attributed to lyrica so stopped it. This definitively started PRIOR to starting trileptal. However, may have been attributed to antibiotic (delayed reaction, per derm).\par 3/2022 - Had "chest spasms", read the HR instead of PO2 so thought her O2 was 65, called 911, went to ED, cleared from cardiac perspective.\par 3/2022 - developed new burning and tightness in head, neck, shoulders. MRI C spine without new activity.\par 5/2022 - switched from wellbutrin to cymbalta. \par late 5/2022 - tested positive for covid on two home tests. got monoclonal ab. just headaches, lasted several days. some congestion, some cough, no fever.\par \par \par Subj interval:\par \par For 1-2wks, has had intermittent sleepiness, constant dizziness/vertigo, nausea, and poor appetite. Called EMS for dizziness yesterday, went to ED, had CTH. Some nausea and dry heaves. Feels like room spins. Has a hx of episodic vertigo in the past. \par \par Chronic pain - tightness and burning on chest and legs that is severe, happens daily. on 600tid gabapentin (1200tid gabapentin was not sufficient) and cymbalta 60. Nucynta 75tid hasn't helped much. Oxycodone 15 qid prn doesn't help. Tried lyrica in the past which didn't help. Sees pain management, medical marijuana has not helped with the pain but relaxes her, increasing the dose.\par \par Chronic spasms - On trileptal 750bid and baclofen 20tid, still with uncomfortable spasms but not frankly painful. \par \par Living with her mother. Waiting to get an elevator.\par \par Got motorized wheelchair.\par \par PMHX:\par NMO\par asthma\par depression\par anxiety\par LBP\par insomnia\par migraines\par gastric sleeve\par \par MEDS:\par qvar\par asa81\par albuterol prn\par protonix\par rituximab\par ubrelvy prn\par cymbalta 60mg\par b12\par calcium\par MV\par iron\par biotin\par colace\par miralax\par gabapentin 600tid\par trileptal 750bid\par baclofen 20tid\par oxycodone 15mg qid prn\par nucynta 75tid\par hydroxyzine prn itching/anxiety\par medical marijuana\par \par SHx: no tob\par \par FHx: no neuro, no AI.\par \par \par O:\par AO3. Normally conversant. Follows commands, names, and repeats. Good attention.\par PERRL, VFF, EOMI, no nystagmus, face symmetric, TUP at midline.\par 5/5 in both UEs throughout, paraplegic. tone 1 in LLE and +/- in RLE\par Near complete sensory loss in both legs and low torso - some minimal patchy VBS but otherwise absent. normal in UEs.\par FTN and Karla in UEs intact.\par DTRs 2+ in UEs, 3 in L knee, 2 in R knee, minimal clonus in both AJs.\par \par \par \par MRI C, T, and brain 2/2021 reviewed - thoracic LETM on my review.\par \par MRI C+T 1/2022 - no new lorie+ lesions, however, c/w 3/2021 there was caudal extension on the left lateral aspect down to T8 (likely related to relapse she had in 4/2021). \par \par MRI C spine and MRI brain/sella 4/2022 - unchanged. 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 Stable from NMO standpoint, on rituximab.\par \par Unclear etiology of new dizziness - unclear if peripheral or central, no new changes on exam. \par \par All questions answered, education provided re: dx, rx, sx, prognosis. management discussed at length. \par \par - continue rituximab q6m\par - cont PT\par - check blood work q6m\par - check new brain MRI to r/o involvement of posterior medulla\par \par - f/u with PMD for mild transaminitis, unclear etiology, will recheck in 4wks\par - trial of meclizine for dizziness/vertigo\par \par Pain:\par - cont f/u with pain management\par - cont nucynta 75tid, cont oxycodone as per pain management, cont cymbalta and gabapentin.\par - cont medical marijuana\par - f/u with pain management, ? spine stimulator\par \par Spasms:\par - dec trileptal to 600mg bid (from 750bid) for tonic spasms for now, consider further dec in future.\par - dec baclofen to 10-10-20\par \par - RTC 4-6wks

## 2023-01-20 ENCOUNTER — NON-APPOINTMENT (OUTPATIENT)
Age: 53
End: 2023-01-20

## 2023-01-20 ENCOUNTER — APPOINTMENT (OUTPATIENT)
Dept: PSYCHIATRY | Facility: CLINIC | Age: 53
End: 2023-01-20

## 2023-01-25 ENCOUNTER — APPOINTMENT (OUTPATIENT)
Dept: PSYCHIATRY | Facility: CLINIC | Age: 53
End: 2023-01-25

## 2023-01-25 ENCOUNTER — OUTPATIENT (OUTPATIENT)
Dept: OUTPATIENT SERVICES | Facility: HOSPITAL | Age: 53
LOS: 1 days | Discharge: HOME | End: 2023-01-25

## 2023-01-25 DIAGNOSIS — F33.1 MAJOR DEPRESSIVE DISORDER, RECURRENT, MODERATE: ICD-10-CM

## 2023-01-25 DIAGNOSIS — Z98.890 OTHER SPECIFIED POSTPROCEDURAL STATES: Chronic | ICD-10-CM

## 2023-01-25 DIAGNOSIS — F41.1 GENERALIZED ANXIETY DISORDER: ICD-10-CM

## 2023-01-25 DIAGNOSIS — Z98.891 HISTORY OF UTERINE SCAR FROM PREVIOUS SURGERY: Chronic | ICD-10-CM

## 2023-01-25 NOTE — BEHAVIORAL HEALTH
[Cognitive and/or Behavior Therapy] : Cognitive and/or Behavior Therapy  [[facility] [place] [facility]] : [facility]  [Supportive Therapy] : Supportive Therapy [Return in ____ week(s)] : Return in [unfilled] week(s) [FreeTextEntry2] : Goal#1: To reduce anxiety \par Goal#1 Objective #1:Pt will explore and process feelings, identifying two triggers of anxiety.\par Goal# 1 Objective #2 :Pt will learn and implement two coping skills in order to reduce anxiety\par Goal #1 Objective #3: Pt will maintain monthly med management appointments.  :\par Goal#1 Reduce Depression\par Goal #1 Objective #1 Pt will explore and process feelings, identifying two triggers of depression.\par Goal#1 Objective #2 Pt will learn and implement two coping skills in order to reduce depression. \par Goal #1 Objective # 3 Pt will maintain monthly med management appointments.  \par \par \par \par \par . \par \par  [de-identified] : The focus of session was on pt's feelings about her illness and wanting to move back to her own home. .  Therapist provided active listening and empathic response as pt shared how she would need to do major rec=novations on her current home to be accessble for her wheelchair and moving to a new home might be too costly. . Therapist validated pt feelings of wanting to resume her life her her own home and explroed what options she has at this poinnt. Therapist thelmaares pt to focus on the what can we do as opposed to what we can't boost her mood and outlook. . Pt and therapist priyank reviewed coping strategies to avoid worst case thoughts such as positive self talk . Pt is compliant with medication and therapy and responsive to therapist support and interventions. [FreeTextEntry1] : Pt will adhere to all safety precautions related to COVID and will contact tx team for worsening of symptoms and/or problems with medication. Next appt: 1/30\par \par

## 2023-01-25 NOTE — REASON FOR VISIT
[Access issues (e.g., transportation, impaired mobility, etc.)] : due to patient's access issues [Continuing, patient not seen in-person within last 12 months (provide details below)] : Telehealth services are continuing, patient not seen in-person within last 12 months.  [Telehealth (audio & video) - Individual/Group] : This visit was provided via telehealth using real-time 2-way audio visual technology. [Other Location: e.g. Home (Enter Location, City,State)___] : The provider was located at [unfilled]. [Home] : The patient, [unfilled], was located at home, [unfilled], at the time of the visit. [Verbal consent obtained from patient/other participant(s)] : Verbal consent for telehealth/telephonic services obtained from patient/other participant(s) [Patient] : Patient [FreeTextEntry4] : 10:00 [FreeTextEntry5] : 10:34

## 2023-01-27 ENCOUNTER — OUTPATIENT (OUTPATIENT)
Dept: OUTPATIENT SERVICES | Facility: HOSPITAL | Age: 53
LOS: 1 days | Discharge: HOME | End: 2023-01-27
Payer: MEDICAID

## 2023-01-27 ENCOUNTER — APPOINTMENT (OUTPATIENT)
Dept: PSYCHIATRY | Facility: CLINIC | Age: 53
End: 2023-01-27
Payer: COMMERCIAL

## 2023-01-27 DIAGNOSIS — F33.1 MAJOR DEPRESSIVE DISORDER, RECURRENT, MODERATE: ICD-10-CM

## 2023-01-27 DIAGNOSIS — Z98.891 HISTORY OF UTERINE SCAR FROM PREVIOUS SURGERY: Chronic | ICD-10-CM

## 2023-01-27 DIAGNOSIS — Z98.890 OTHER SPECIFIED POSTPROCEDURAL STATES: Chronic | ICD-10-CM

## 2023-01-27 DIAGNOSIS — F41.1 GENERALIZED ANXIETY DISORDER: ICD-10-CM

## 2023-01-27 PROCEDURE — 99215 OFFICE O/P EST HI 40 MIN: CPT | Mod: 95

## 2023-01-27 NOTE — PHYSICAL EXAM
[Average] : average [Cooperative] : cooperative [Clear] : clear [Linear/Goal Directed] : linear/goal directed [WNL] : within normal limits [Euthymic] : euthymic [Full] : full

## 2023-01-27 NOTE — SOCIAL HISTORY
[Unemployed] : unemployed [] :  [None] : none [Yes] : yes [No Known Use] : no known use [# Of Children ___] : has [unfilled] children [FreeTextEntry4] : Masters in Social work [TextBox_7] : Socially, last drink 12/31/21 [FreeTextEntry1] : Edibles, once to twice weekly [FreeTextEntry2] : Medical marijuana for pain

## 2023-01-27 NOTE — DISCUSSION/SUMMARY
[FreeTextEntry1] : Dinora is a 51yo AA  female, depression, anxiety, PMHx of Neuromyelitis optima, paralyzed since 4/2021, neurological symptoms began 2/2021, post Moderna vaccine 1/2021.   Few weeks ago, she awoke with dizziness, which prompted a visit to [facility] ED.  CT brain, abdomen, CXR were negative.  \par \par She sleeps well, dysphoric mood. She had positive response to Duloxetine, experiences metallic taste in mouth after taking Duloxetine and would like to switch to Bupropion again as it was effective for weight.  She is at low risk, no acute thoughts of harm to self, recent medical condition, positive family support, linked to treatment.\par \par Decrease Duloxetine to 40mg x 5d then 30mg x 5d and discontinue\par Initiate Bupropion XL 150mg po am\par Hydroxyzine 50mg po prn\par

## 2023-01-27 NOTE — HISTORY OF PRESENT ILLNESS
[FreeTextEntry1] : Few weeks ago, she awoke with dizziness, which prompted a visit to [facility] ED.  CT brain, abdomen, CXR were negative.  As per neurologist, LFTs were 'low' thus labs will be redone. \par \par She sleeps well with intermittent CBD gummies, CBD oil or THC 10mg prn.  She had a difficult time during the holidays but she enjoyed going  shopping in motorized vehicle twice. She endorses good appetite, experiences intermittent episodes of tearfulness. She denies panic symptoms. She struggles with pain in shoulders, chest, back, arms which are exacerbated with external stimuli.  She follows up pulmonologist and neurologist. She watches television, engages in social media, online [employer] sessions and support groups.  \par \par She took Hydroxyzine four times since last visit. She expresses positive response to Duloxetine, experiences metallic taste in mouth after taking Duloxetine and would like to switch to Buproprion again as it was effective for weight, risks, benefits and side effects are discussed.  Presently, she denies thoughts of harm to self or others, provides her family as protective factors, safety plan is discussed, she would call 911.\par

## 2023-01-30 ENCOUNTER — APPOINTMENT (OUTPATIENT)
Dept: PSYCHIATRY | Facility: CLINIC | Age: 53
End: 2023-01-30

## 2023-01-30 ENCOUNTER — OUTPATIENT (OUTPATIENT)
Dept: OUTPATIENT SERVICES | Facility: HOSPITAL | Age: 53
LOS: 1 days | Discharge: HOME | End: 2023-01-30

## 2023-01-30 DIAGNOSIS — Z98.890 OTHER SPECIFIED POSTPROCEDURAL STATES: Chronic | ICD-10-CM

## 2023-01-30 DIAGNOSIS — F41.1 GENERALIZED ANXIETY DISORDER: ICD-10-CM

## 2023-01-30 DIAGNOSIS — Z98.891 HISTORY OF UTERINE SCAR FROM PREVIOUS SURGERY: Chronic | ICD-10-CM

## 2023-01-30 DIAGNOSIS — F33.1 MAJOR DEPRESSIVE DISORDER, RECURRENT, MODERATE: ICD-10-CM

## 2023-01-30 NOTE — BEHAVIORAL HEALTH
[Cognitive and/or Behavior Therapy] : Cognitive and/or Behavior Therapy  [[facility]] : [facility]  [Supportive Therapy] : Supportive Therapy [Return in ____ week(s)] : Return in [unfilled] week(s) [FreeTextEntry2] : Goal#1: To reduce anxiety \par Goal#1 Objective #1:Pt will explore and process feelings, identifying two triggers of anxiety.\par Goal# 1 Objective #2 :Pt will learn and implement two coping skills in order to reduce anxiety\par Goal #1 Objective #3: Pt will maintain monthly med management appointments.  :\par Goal#1 Reduce Depression\par Goal #1 Objective #1 Pt will explore and process feelings, identifying two triggers of depression.\par Goal#1 Objective #2 Pt will learn and implement two coping skills in order to reduce depression. \par Goal #1 Objective # 3 Pt will maintain monthly med management appointments.  \par \par \par \par \par . \par \par  [de-identified] : The focus of session was on pt's feelings about the intense chronic pain she feels daily.   Therapist provided active listening and empathic response as pt shared how she was worried that her disease had worsened due to the pain however that is not the case. Pt reports a tightening i her chest and sternum.. . Therapist validated pt experience of pain and explored what ways she can cope so that it does not worsen her depression. Pt often distracts herself with audio books and TV and enjoys when her son is over. Therapist continues to  encourages pt to focus on the what can we do as opposed to what we can't boost her mood and outlook. . Pt was again encouraged to speak to her doctors to see if there is something more that can do, . Pt is compliant with medication and therapy and responsive to therapist support and interventions. [FreeTextEntry1] : Pt will adhere to all safety precautions related to COVID and will contact tx team for worsening of symptoms and/or problems with medication. Next appt: 2/6\par \par

## 2023-01-30 NOTE — PATIENT PROFILE ADULT - HOW PATIENT ADDRESSED, PROFILE
Dinora
How Is Your Wound Healing?: healing slowly
Compared To Your Last Visit, How Have Your Findings Changed?: stable
Name Of Surgery: Abdominoplasty

## 2023-01-30 NOTE — REASON FOR VISIT
[Access issues (e.g., transportation, impaired mobility, etc.)] : due to patient's access issues [Continuing, patient not seen in-person within last 12 months (provide details below)] : Telehealth services are continuing, patient not seen in-person within last 12 months.  [Telehealth (audio & video) - Individual/Group] : This visit was provided via telehealth using real-time 2-way audio visual technology. [Other Location: e.g. Home (Enter Location, City,State)___] : The provider was located at [unfilled]. [Home] : The patient, [unfilled], was located at home, [unfilled], at the time of the visit. [Verbal consent obtained from patient/other participant(s)] : Verbal consent for telehealth/telephonic services obtained from patient/other participant(s) [Patient] : Patient [FreeTextEntry4] : 10:15 [FreeTextEntry5] : 11:00

## 2023-02-01 ENCOUNTER — OUTPATIENT (OUTPATIENT)
Dept: OUTPATIENT SERVICES | Facility: HOSPITAL | Age: 53
LOS: 1 days | End: 2023-02-01

## 2023-02-01 DIAGNOSIS — Z98.890 OTHER SPECIFIED POSTPROCEDURAL STATES: Chronic | ICD-10-CM

## 2023-02-01 DIAGNOSIS — G82.20 PARAPLEGIA, UNSPECIFIED: ICD-10-CM

## 2023-02-01 DIAGNOSIS — Z98.891 HISTORY OF UTERINE SCAR FROM PREVIOUS SURGERY: Chronic | ICD-10-CM

## 2023-02-06 ENCOUNTER — APPOINTMENT (OUTPATIENT)
Dept: PSYCHIATRY | Facility: CLINIC | Age: 53
End: 2023-02-06

## 2023-02-06 ENCOUNTER — OUTPATIENT (OUTPATIENT)
Dept: OUTPATIENT SERVICES | Facility: HOSPITAL | Age: 53
LOS: 1 days | End: 2023-02-06
Payer: COMMERCIAL

## 2023-02-06 DIAGNOSIS — F33.1 MAJOR DEPRESSIVE DISORDER, RECURRENT, MODERATE: ICD-10-CM

## 2023-02-06 DIAGNOSIS — Z98.891 HISTORY OF UTERINE SCAR FROM PREVIOUS SURGERY: Chronic | ICD-10-CM

## 2023-02-06 DIAGNOSIS — Z98.890 OTHER SPECIFIED POSTPROCEDURAL STATES: Chronic | ICD-10-CM

## 2023-02-06 PROCEDURE — 90832 PSYTX W PT 30 MINUTES: CPT | Mod: 95

## 2023-02-06 NOTE — REASON FOR VISIT
[Access issues (e.g., transportation, impaired mobility, etc.)] : due to patient's access issues [Continuing, patient not seen in-person within last 12 months (provide details below)] : Telehealth services are continuing, patient not seen in-person within last 12 months.  [Telehealth (audio & video) - Individual/Group] : This visit was provided via telehealth using real-time 2-way audio visual technology. [Other Location: e.g. Home (Enter Location, City,State)___] : The provider was located at [unfilled]. [Home] : The patient, [unfilled], was located at home, [unfilled], at the time of the visit. [Verbal consent obtained from patient/other participant(s)] : Verbal consent for telehealth/telephonic services obtained from patient/other participant(s) [Patient] : Patient [FreeTextEntry4] : 10:15 [FreeTextEntry5] : 10:45

## 2023-02-06 NOTE — BEHAVIORAL HEALTH
[Cognitive and/or Behavior Therapy] : Cognitive and/or Behavior Therapy  [[facility]] : [facility]  [Supportive Therapy] : Supportive Therapy [Return in ____ week(s)] : Return in [unfilled] week(s) [FreeTextEntry2] : Goal#1: To reduce anxiety \par Goal#1 Objective #1:Pt will explore and process feelings, identifying two triggers of anxiety.\par Goal# 1 Objective #2 :Pt will learn and implement two coping skills in order to reduce anxiety\par Goal #1 Objective #3: Pt will maintain monthly med management appointments.  :\par Goal#1 Reduce Depression\par Goal #1 Objective #1 Pt will explore and process feelings, identifying two triggers of depression.\par Goal#1 Objective #2 Pt will learn and implement two coping skills in order to reduce depression. \par Goal #1 Objective # 3 Pt will maintain monthly med management appointments.  \par \par \par \par \par . \par \par  [de-identified] : The focus of session was on pt's feelings about her limitations.   Therapist provided active listening and empathic response as pt shared how she used to love to shop and get dressed up,  having lots of shoes and clothing to choose from and now she will never get to wear these items.   Therapist validated pt feelings  and how difficult an adjustment it is to this new normal she is living. Therapist encouraged pt to focus on the things she can do, which include wearing, not walking in, but wearing some of her favorite shoes. Therapist encouraged ot ot reach out to those who struggle with her condition as well for support and to see how they cope . Pt plans on looking for a new home in the spring so she can move out of her mom;s home. Pt is compliant with medication and therapy and responsive to therapist support and interventions. [FreeTextEntry1] : Pt will adhere to all safety precautions related to COVID and will contact tx team for worsening of symptoms and/or problems with medication. Next appt: 2/13\par \par

## 2023-02-08 ENCOUNTER — OUTPATIENT (OUTPATIENT)
Dept: OUTPATIENT SERVICES | Facility: HOSPITAL | Age: 53
LOS: 1 days | End: 2023-02-08
Payer: COMMERCIAL

## 2023-02-08 DIAGNOSIS — Z98.890 OTHER SPECIFIED POSTPROCEDURAL STATES: Chronic | ICD-10-CM

## 2023-02-08 DIAGNOSIS — Z00.00 ENCOUNTER FOR GENERAL ADULT MEDICAL EXAMINATION WITHOUT ABNORMAL FINDINGS: ICD-10-CM

## 2023-02-08 DIAGNOSIS — Z98.891 HISTORY OF UTERINE SCAR FROM PREVIOUS SURGERY: Chronic | ICD-10-CM

## 2023-02-08 PROCEDURE — 97140 MANUAL THERAPY 1/> REGIONS: CPT | Mod: GP

## 2023-02-08 PROCEDURE — 97110 THERAPEUTIC EXERCISES: CPT | Mod: GP

## 2023-02-09 DIAGNOSIS — Z00.00 ENCOUNTER FOR GENERAL ADULT MEDICAL EXAMINATION WITHOUT ABNORMAL FINDINGS: ICD-10-CM

## 2023-02-11 DIAGNOSIS — G82.20 PARAPLEGIA, UNSPECIFIED: ICD-10-CM

## 2023-02-13 ENCOUNTER — OUTPATIENT (OUTPATIENT)
Dept: OUTPATIENT SERVICES | Facility: HOSPITAL | Age: 53
LOS: 1 days | End: 2023-02-13

## 2023-02-13 ENCOUNTER — RX RENEWAL (OUTPATIENT)
Age: 53
End: 2023-02-13

## 2023-02-13 ENCOUNTER — APPOINTMENT (OUTPATIENT)
Dept: PSYCHIATRY | Facility: CLINIC | Age: 53
End: 2023-02-13

## 2023-02-13 ENCOUNTER — OUTPATIENT (OUTPATIENT)
Dept: OUTPATIENT SERVICES | Facility: HOSPITAL | Age: 53
LOS: 1 days | End: 2023-02-13
Payer: COMMERCIAL

## 2023-02-13 DIAGNOSIS — Z98.891 HISTORY OF UTERINE SCAR FROM PREVIOUS SURGERY: Chronic | ICD-10-CM

## 2023-02-13 DIAGNOSIS — G82.20 PARAPLEGIA, UNSPECIFIED: ICD-10-CM

## 2023-02-13 DIAGNOSIS — Z98.890 OTHER SPECIFIED POSTPROCEDURAL STATES: Chronic | ICD-10-CM

## 2023-02-13 DIAGNOSIS — F33.1 MAJOR DEPRESSIVE DISORDER, RECURRENT, MODERATE: ICD-10-CM

## 2023-02-13 PROCEDURE — 90834 PSYTX W PT 45 MINUTES: CPT | Mod: 95

## 2023-02-13 NOTE — REASON FOR VISIT
[Access issues (e.g., transportation, impaired mobility, etc.)] : due to patient's access issues [Continuing, patient not seen in-person within last 12 months (provide details below)] : Telehealth services are continuing, patient not seen in-person within last 12 months.  [Telehealth (audio & video) - Individual/Group] : This visit was provided via telehealth using real-time 2-way audio visual technology. [Other Location: e.g. Home (Enter Location, City,State)___] : The provider was located at [unfilled]. [Home] : The patient, [unfilled], was located at home, [unfilled], at the time of the visit. [Verbal consent obtained from patient/other participant(s)] : Verbal consent for telehealth/telephonic services obtained from patient/other participant(s) [Patient] : Patient [FreeTextEntry4] : 10:15am [FreeTextEntry5] : 11:00am

## 2023-02-13 NOTE — BEHAVIORAL HEALTH
[Cognitive and/or Behavior Therapy] : Cognitive and/or Behavior Therapy  [[facility]] : [facility]  [Supportive Therapy] : Supportive Therapy [Return in ____ week(s)] : Return in [unfilled] week(s) [FreeTextEntry2] : Goal#1: To reduce anxiety \par Goal#1 Objective #1:Pt will explore and process feelings, identifying two triggers of anxiety.\par Goal# 1 Objective #2 :Pt will learn and implement two coping skills in order to reduce anxiety\par Goal #1 Objective #3: Pt will maintain monthly med management appointments.  :\par Goal#1 Reduce Depression\par Goal #1 Objective #1 Pt will explore and process feelings, identifying two triggers of depression.\par Goal#1 Objective #2 Pt will learn and implement two coping skills in order to reduce depression. \par Goal #1 Objective # 3 Pt will maintain monthly med management appointments.  \par \par \par \par \par . \par \par  [de-identified] : The focus of session was on pt's feelings about the progression of her disease.  Therapist provided active listening and empathic response as pt shared how it took doctors a very long time to diagnose her the disease that caused her paraplegia and that she began to have symptoms in 2016. Pt reports she would have had been able to start tx much earlier and maybe prevented the paralysis if it was dx.   Therapist validated pt feelings and how distressing it is to think of these "what if" scenarios. Therapist pointed out that we don't know for sure if her outcomes would have been better if she was diagnosed sooner  and that focusing can exacerbate her Sxs.. Therapist encouraged pt to focus on the positives and the "can dos"  Therapist again encouraged pt to  reach out to those who struggle with her condition as well for support and to see how they cope . Pt is compliant with medication and therapy and responsive to therapist support and interventions. [FreeTextEntry1] : Pt will adhere to all safety precautions related to COVID and will contact tx team for worsening of symptoms and/or problems with medication. Next appt: 3/1\par \par

## 2023-02-14 ENCOUNTER — APPOINTMENT (OUTPATIENT)
Dept: SURGERY | Facility: CLINIC | Age: 53
End: 2023-02-14
Payer: COMMERCIAL

## 2023-02-14 VITALS
SYSTOLIC BLOOD PRESSURE: 124 MMHG | TEMPERATURE: 97 F | DIASTOLIC BLOOD PRESSURE: 78 MMHG | HEART RATE: 83 BPM | HEIGHT: 67 IN | OXYGEN SATURATION: 98 %

## 2023-02-14 PROCEDURE — 99213 OFFICE O/P EST LOW 20 MIN: CPT

## 2023-02-14 RX ORDER — FAMOTIDINE 40 MG/1
40 TABLET, FILM COATED ORAL
Qty: 90 | Refills: 0 | Status: ACTIVE | COMMUNITY
Start: 2023-01-20

## 2023-02-14 RX ORDER — OXCARBAZEPINE 300 MG/1
300 TABLET, FILM COATED ORAL
Qty: 360 | Refills: 1 | Status: DISCONTINUED | COMMUNITY
Start: 2022-04-22 | End: 2023-02-14

## 2023-02-14 NOTE — PHYSICAL EXAM
[Normal] : normoactive bowel sounds, soft and nontender, no hepatosplenomegaly or masses appreciated. Incisions healing appropriately without erythema or drainage [de-identified] : Soft, nondistended.

## 2023-02-14 NOTE — PLAN
[FreeTextEntry1] : Plan: Dietitian to see patient today and for a follow up appointment.\par          Blood work.\par          RTO for annual visit.\par          Call with concerns.\par

## 2023-02-14 NOTE — HISTORY OF PRESENT ILLNESS
[de-identified] : Patient had surgery approximately 4+ years ago. She reports that she is in weekly therapy and is deriving benefits form her sessions. \par She denies heartburn/reflux/vomiting.\par Her B/P is better controlled. She is c/o chronic generalized pain and requires an electric scooter to get around. We did not obtain a weight this visit as the scooter could not fit on our scale.\par Medications were not reconciled as patient as patient had poor recall of medications and vitamins.

## 2023-02-14 NOTE — ASSESSMENT
[FreeTextEntry1] : ANNMARIE MCCULLOUGH is a 52 year female seen today for bariatric follow up visit. Her appetite has been very poor. Patient reports that her mother and Health Aide prepares her meals and it is difficult for her sometimes to have a choice. I will have our dietitian see patient.\par Breakfast is sometimes n Ensure Max protein shake, eggs or oatmeal.\par Lunch -  Progresso soup and sometimes she will skip lunch as she is either in therapy or at a doctor's appointment.\par Dinner - rice and beans, pork chops/chicken usually fried. Fish and chips with a green salad.\par Fluid intake is adequate with water and warm tea/coffee.\par She is in PT 2 days/week, non weight bearing.\par \par

## 2023-02-15 ENCOUNTER — OUTPATIENT (OUTPATIENT)
Dept: OUTPATIENT SERVICES | Facility: HOSPITAL | Age: 53
LOS: 1 days | End: 2023-02-15

## 2023-02-15 DIAGNOSIS — G82.20 PARAPLEGIA, UNSPECIFIED: ICD-10-CM

## 2023-02-15 DIAGNOSIS — Z98.890 OTHER SPECIFIED POSTPROCEDURAL STATES: Chronic | ICD-10-CM

## 2023-02-15 DIAGNOSIS — Z98.891 HISTORY OF UTERINE SCAR FROM PREVIOUS SURGERY: Chronic | ICD-10-CM

## 2023-02-17 ENCOUNTER — APPOINTMENT (OUTPATIENT)
Age: 53
End: 2023-02-17

## 2023-02-17 ENCOUNTER — APPOINTMENT (OUTPATIENT)
Dept: PSYCHIATRY | Facility: CLINIC | Age: 53
End: 2023-02-17

## 2023-02-20 ENCOUNTER — OUTPATIENT (OUTPATIENT)
Dept: OUTPATIENT SERVICES | Facility: HOSPITAL | Age: 53
LOS: 1 days | End: 2023-02-20

## 2023-02-20 DIAGNOSIS — G82.20 PARAPLEGIA, UNSPECIFIED: ICD-10-CM

## 2023-02-20 DIAGNOSIS — Z98.891 HISTORY OF UTERINE SCAR FROM PREVIOUS SURGERY: Chronic | ICD-10-CM

## 2023-02-20 DIAGNOSIS — Z98.890 OTHER SPECIFIED POSTPROCEDURAL STATES: Chronic | ICD-10-CM

## 2023-02-21 DIAGNOSIS — F33.1 MAJOR DEPRESSIVE DISORDER, RECURRENT, MODERATE: ICD-10-CM

## 2023-02-22 ENCOUNTER — OUTPATIENT (OUTPATIENT)
Dept: OUTPATIENT SERVICES | Facility: HOSPITAL | Age: 53
LOS: 1 days | End: 2023-02-22

## 2023-02-22 DIAGNOSIS — G82.20 PARAPLEGIA, UNSPECIFIED: ICD-10-CM

## 2023-02-22 DIAGNOSIS — Z98.891 HISTORY OF UTERINE SCAR FROM PREVIOUS SURGERY: Chronic | ICD-10-CM

## 2023-02-24 ENCOUNTER — NON-APPOINTMENT (OUTPATIENT)
Age: 53
End: 2023-02-24

## 2023-02-27 ENCOUNTER — APPOINTMENT (OUTPATIENT)
Dept: PULMONOLOGY | Facility: CLINIC | Age: 53
End: 2023-02-27
Payer: COMMERCIAL

## 2023-02-27 ENCOUNTER — FORM ENCOUNTER (OUTPATIENT)
Age: 53
End: 2023-02-27

## 2023-02-27 ENCOUNTER — OUTPATIENT (OUTPATIENT)
Dept: OUTPATIENT SERVICES | Facility: HOSPITAL | Age: 53
LOS: 1 days | End: 2023-02-27
Payer: COMMERCIAL

## 2023-02-27 DIAGNOSIS — Z98.890 OTHER SPECIFIED POSTPROCEDURAL STATES: Chronic | ICD-10-CM

## 2023-02-27 DIAGNOSIS — G82.20 PARAPLEGIA, UNSPECIFIED: ICD-10-CM

## 2023-02-27 DIAGNOSIS — Z98.891 HISTORY OF UTERINE SCAR FROM PREVIOUS SURGERY: Chronic | ICD-10-CM

## 2023-02-27 PROCEDURE — 99213 OFFICE O/P EST LOW 20 MIN: CPT | Mod: 95

## 2023-02-27 PROCEDURE — 97110 THERAPEUTIC EXERCISES: CPT | Mod: GP

## 2023-02-27 PROCEDURE — 97140 MANUAL THERAPY 1/> REGIONS: CPT | Mod: GP

## 2023-02-27 NOTE — HISTORY OF PRESENT ILLNESS
[Home] : at home, [unfilled] , at the time of the visit. [Medical Office: ([facility])___] : at the medical office located in  [Verbal consent obtained from patient] : the patient, [unfilled] [Follow-Up - Routine Clinic] : a routine clinic follow-up of [Excessive Daytime Sleepiness] : excessive daytime sleepiness [Snoring] : snoring [Unrefreshing Sleep] : unrefreshing sleep [Currently Experiencing] : The patient is currently experiencing symptoms. [Intermittent] : intermittent [Doing Well] : doing well [Well Controlled] : Well controlled [Checks Regularly] : The patient checks ~his/her~ peak flow regularly [Good Control] : peak flow has been good [None] : None [Adherent] : the patient is adherent with ~his/her~ medication regimen [Goals--Doing Well] : the patient is doing well with ~his/her~ asthma goals [PFTs] : pulmonary function tests

## 2023-02-27 NOTE — ASSESSMENT
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

## 2023-02-28 ENCOUNTER — NON-APPOINTMENT (OUTPATIENT)
Age: 53
End: 2023-02-28

## 2023-02-28 LAB
ALBUMIN SERPL ELPH-MCNC: 3.6 G/DL
ALP BLD-CCNC: 101 U/L
ALT SERPL-CCNC: 48 U/L
AST SERPL-CCNC: 26 U/L
BILIRUB DIRECT SERPL-MCNC: 0.1 MG/DL
BILIRUB INDIRECT SERPL-MCNC: 0.1 MG/DL
BILIRUB SERPL-MCNC: 0.2 MG/DL
PROT SERPL-MCNC: 6.3 G/DL

## 2023-03-01 ENCOUNTER — OUTPATIENT (OUTPATIENT)
Dept: PREADMISSION | Facility: HOSPITAL | Age: 53
LOS: 1 days | End: 2023-03-01

## 2023-03-01 ENCOUNTER — APPOINTMENT (OUTPATIENT)
Dept: PSYCHIATRY | Facility: CLINIC | Age: 53
End: 2023-03-01

## 2023-03-01 ENCOUNTER — OUTPATIENT (OUTPATIENT)
Dept: OUTPATIENT SERVICES | Facility: HOSPITAL | Age: 53
LOS: 1 days | End: 2023-03-01
Payer: COMMERCIAL

## 2023-03-01 DIAGNOSIS — Z98.891 HISTORY OF UTERINE SCAR FROM PREVIOUS SURGERY: Chronic | ICD-10-CM

## 2023-03-01 DIAGNOSIS — G82.20 PARAPLEGIA, UNSPECIFIED: ICD-10-CM

## 2023-03-01 DIAGNOSIS — Z98.890 OTHER SPECIFIED POSTPROCEDURAL STATES: Chronic | ICD-10-CM

## 2023-03-01 DIAGNOSIS — F33.1 MAJOR DEPRESSIVE DISORDER, RECURRENT, MODERATE: ICD-10-CM

## 2023-03-01 PROCEDURE — 90832 PSYTX W PT 30 MINUTES: CPT | Mod: 95

## 2023-03-01 NOTE — REASON FOR VISIT
[Access issues (e.g., transportation, impaired mobility, etc.)] : due to patient's access issues [Continuing, patient not seen in-person within last 12 months (provide details below)] : Telehealth services are continuing, patient not seen in-person within last 12 months.  [Telehealth (audio & video) - Individual/Group] : This visit was provided via telehealth using real-time 2-way audio visual technology. [Other Location: e.g. Home (Enter Location, City,State)___] : The provider was located at [unfilled]. [Home] : The patient, [unfilled], was located at home, [unfilled], at the time of the visit. [Verbal consent obtained from patient/other participant(s)] : Verbal consent for telehealth/telephonic services obtained from patient/other participant(s) [Patient] : Patient [FreeTextEntry4] : 10:15am [FreeTextEntry5] : 10:52am

## 2023-03-01 NOTE — BEHAVIORAL HEALTH
[Cognitive and/or Behavior Therapy] : Cognitive and/or Behavior Therapy  [[facility]] : [facility]  [Supportive Therapy] : Supportive Therapy [Return in ____ week(s)] : Return in [unfilled] week(s) [FreeTextEntry2] : Goal#1: To reduce anxiety \par Goal#1 Objective #1:Pt will explore and process feelings, identifying two triggers of anxiety.\par Goal# 1 Objective #2 :Pt will learn and implement two coping skills in order to reduce anxiety\par Goal #1 Objective #3: Pt will maintain monthly med management appointments.  :\par Goal#1 Reduce Depression\par Goal #1 Objective #1 Pt will explore and process feelings, identifying two triggers of depression.\par Goal#1 Objective #2 Pt will learn and implement two coping skills in order to reduce depression. \par Goal #1 Objective # 3 Pt will maintain monthly med management appointments.  \par \par \par \par \par . \par \par  [de-identified] : The focus of session was on pt's feeling of being overwhelmed with day to day tasks. Therapist provided active listening and empathic response as pt shared how she feels inundated with calls ti make, bills to pay and things to do which are difficult for her due to her illness and pain level. Pt wishes she had more support to help with these daily asks. Therapist validated pt feelings and  explored whether her home health aide could assist. Therapist also pointed out there may be other case management services that could assist her and research options [facility]. Therapist and pt discusses ways to feel less overwhelmed such as relaxation exercises and organizational tools such as using a planner. Pt is compliant with medication and therapy and responsive to therapist support and interventions. [FreeTextEntry1] : Pt will adhere to all safety precautions related to COVID and will contact tx team for worsening of symptoms and/or problems with medication. Next appt: 3/8\par \par

## 2023-03-02 RX ORDER — DULOXETINE HYDROCHLORIDE 40 MG/1
40 CAPSULE, DELAYED RELEASE PELLETS ORAL EVERY MORNING
Qty: 6 | Refills: 0 | Status: DISCONTINUED | COMMUNITY
Start: 2022-04-07 | End: 2023-03-02

## 2023-03-02 RX ORDER — DULOXETINE HYDROCHLORIDE 30 MG/1
30 CAPSULE, DELAYED RELEASE PELLETS ORAL EVERY MORNING
Qty: 6 | Refills: 0 | Status: DISCONTINUED | COMMUNITY
Start: 2023-01-27 | End: 2023-03-02

## 2023-03-06 ENCOUNTER — NON-APPOINTMENT (OUTPATIENT)
Age: 53
End: 2023-03-06

## 2023-03-06 ENCOUNTER — OUTPATIENT (OUTPATIENT)
Dept: OUTPATIENT SERVICES | Facility: HOSPITAL | Age: 53
LOS: 1 days | End: 2023-03-06
Payer: COMMERCIAL

## 2023-03-06 DIAGNOSIS — Z98.891 HISTORY OF UTERINE SCAR FROM PREVIOUS SURGERY: Chronic | ICD-10-CM

## 2023-03-06 DIAGNOSIS — Z98.890 OTHER SPECIFIED POSTPROCEDURAL STATES: Chronic | ICD-10-CM

## 2023-03-06 DIAGNOSIS — G82.20 PARAPLEGIA, UNSPECIFIED: ICD-10-CM

## 2023-03-06 PROCEDURE — 97110 THERAPEUTIC EXERCISES: CPT | Mod: GP

## 2023-03-06 PROCEDURE — 97140 MANUAL THERAPY 1/> REGIONS: CPT | Mod: GP

## 2023-03-07 ENCOUNTER — APPOINTMENT (OUTPATIENT)
Dept: PSYCHIATRY | Facility: CLINIC | Age: 53
End: 2023-03-07

## 2023-03-07 ENCOUNTER — OUTPATIENT (OUTPATIENT)
Dept: OUTPATIENT SERVICES | Facility: HOSPITAL | Age: 53
LOS: 1 days | End: 2023-03-07
Payer: COMMERCIAL

## 2023-03-07 DIAGNOSIS — F33.1 MAJOR DEPRESSIVE DISORDER, RECURRENT, MODERATE: ICD-10-CM

## 2023-03-07 DIAGNOSIS — Z98.891 HISTORY OF UTERINE SCAR FROM PREVIOUS SURGERY: Chronic | ICD-10-CM

## 2023-03-07 DIAGNOSIS — Z98.890 OTHER SPECIFIED POSTPROCEDURAL STATES: Chronic | ICD-10-CM

## 2023-03-07 PROCEDURE — 90832 PSYTX W PT 30 MINUTES: CPT

## 2023-03-08 ENCOUNTER — FORM ENCOUNTER (OUTPATIENT)
Age: 53
End: 2023-03-08

## 2023-03-08 ENCOUNTER — OUTPATIENT (OUTPATIENT)
Dept: OUTPATIENT SERVICES | Facility: HOSPITAL | Age: 53
LOS: 1 days | End: 2023-03-08
Payer: COMMERCIAL

## 2023-03-08 ENCOUNTER — APPOINTMENT (OUTPATIENT)
Dept: NEUROLOGY | Facility: CLINIC | Age: 53
End: 2023-03-08
Payer: COMMERCIAL

## 2023-03-08 DIAGNOSIS — G82.20 PARAPLEGIA, UNSPECIFIED: ICD-10-CM

## 2023-03-08 DIAGNOSIS — Z98.890 OTHER SPECIFIED POSTPROCEDURAL STATES: Chronic | ICD-10-CM

## 2023-03-08 PROCEDURE — 99215 OFFICE O/P EST HI 40 MIN: CPT | Mod: 95

## 2023-03-08 PROCEDURE — 97110 THERAPEUTIC EXERCISES: CPT | Mod: GP

## 2023-03-08 NOTE — HISTORY OF PRESENT ILLNESS
[Home] : at home, [unfilled] , at the time of the visit. [Medical Office: ([facility])___] : at the medical office located in  [Verbal consent obtained from patient] : the patient, [unfilled] [FreeTextEntry1] : Initial hx 5/2021\par Referred by Dr. Vizcaino.\par 1/2021 - got 1st covid vaccine.\par 2/2021 - RLE weakness, tightness, gait dysfunction, some RLE discomfort. went to ED 4x. Then LLE was numb but without weakness. Went to pain management, referred to neurologist, then went to [facility]. 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 Got IVMP, no improvement.\par Saw Dr. Vizcaino who sent for NMO ab in 3/2021, referred to me, but developed relapse in 4/2021.\par 4/2021 - acute paraplegia. [facility], got 3d IVMP and 5 sessions PLEX. Got rituximab 1g x2 in 5/2021. since then, some sensation coming back but no movement. \par Currently in [facility] re[facility].\par Left rehab in 12/2021.\par Has developed a bad rash in late 2021. This had been attributed to lyrica so stopped it. This definitively started PRIOR to starting trileptal. However, may have been attributed to antibiotic (delayed reaction, per derm).\par 3/2022 - Had "chest spasms", read the HR instead of PO2 so thought her O2 was 65, called 911, went to ED, cleared from cardiac perspective.\par 3/2022 - developed new burning and tightness in head, neck, shoulders. MRI C spine without new activity.\par 5/2022 - switched from wellbutrin to cymbalta. \par late 5/2022 - tested positive for covid on two home tests. got monoclonal ab. just headaches, lasted several days. some congestion, some cough, no fever.\par 1/2023 - had intermittent sleepiness, constant dizziness/vertigo, nausea, no vomiting, and poor appetite. went to ED, had CTH. MRIs did not show new lesions. tried meclizine but didn't help.\par \par \par Subj interval:\par \par Chronic pain - tightness and burning on chest and legs that is severe, happens daily. started on fentanyl patch which has helped only somewhat. on 600tid gabapentin (1200tid gabapentin was not sufficient) and cymbalta 60. Nucynta 75tid hasn't helped much. Oxycodone 15 qid prn doesn't help. Tried lyrica in the past which didn't help. Sees pain management, medical marijuana has not helped with the pain but relaxes her, increasing the dose.\par \par Chronic spasms - decreased trileptal to 600bid from 750bid, and baclofen changed from 20tid to 10-10-40, still with uncomfortable spasms but not frankly painful. \par \par Living with her mother. Waiting to get an elevator.\par \par Got motorized wheelchair.\par \par PMHX:\par NMO\par asthma\par depression\par anxiety\par LBP\par insomnia\par migraines\par gastric sleeve\par \par MEDS:\par qvar\par asa81\par albuterol prn\par protonix\par rituximab\par ubrelvy prn\par b12\par calcium\par MV\par iron\par biotin\par colace\par miralax\par gabapentin 600tid\par trileptal 600bid\par baclofen 10-10-40\par nucynta 75tid, dec to bid over past few days.\par hydroxyzine prn itching/anxiety\par medical marijuana\par wellbutrin (cymbalta was switched back to wellbutrin in early 2023 because of weight management and metallic taste in her mouth)\par fentanyl patch\par \par SHx: no tob\par \par FHx: no neuro, no AI.\par \par \par O:\par AO3. Normally conversant. Follows commands, names, and repeats. Good attention.\par face symm\par moves both arms well\par \par \par MRI C, T, and brain 2/2021 reviewed - thoracic LETM on my review.\par \par MRI C+T 1/2022 - no new lorie+ lesions, however, c/w 3/2021 there was caudal extension on the left lateral aspect down to T8 (likely related to relapse she had in 4/2021). \par \par MRI C spine and MRI brain/sella 4/2022 - unchanged. \par \par MRI brain 2/2023 ([facility]) - no new lesions. 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 Stable from NMO standpoint, on rituximab.\par \par Unclear etiology of new dizziness - brain MRI was unrevealing in 2/2023.\par \par All questions answered, education provided re: dx. management discussed at length. imaging reviewed with pt personally.\par \par - continue rituximab, decrease to 1g once every q6m\par - check blood work q6m\par - cont PT\par \par Pain:\par - cont f/u with pain management\par - cont nucynta 75bid (down from tid), cont fentanyl patch as per pain management, cont gabapentin.\par - cont medical marijuana\par \par Spasms:\par - dec trileptal to 450mg bid (from 600bid) for tonic spasms for now, consider further dec in future.\par - dec baclofen to 10-10-20 (she has been taking 10-10-40)\par \par - RTC 2-3m

## 2023-03-08 NOTE — REASON FOR VISIT
[Access issues (e.g., transportation, impaired mobility, etc.)] : due to patient's access issues [Continuing, patient not seen in-person within last 12 months (provide details below)] : Telehealth services are continuing, patient not seen in-person within last 12 months.  [Telehealth (audio & video) - Individual/Group] : This visit was provided via telehealth using real-time 2-way audio visual technology. [Other Location: e.g. Home (Enter Location, City,State)___] : The provider was located at [unfilled]. [Home] : The patient, [unfilled], was located at home, [unfilled], at the time of the visit. [Verbal consent obtained from patient/other participant(s)] : Verbal consent for telehealth/telephonic services obtained from patient/other participant(s) [Patient] : Patient [FreeTextEntry4] : 11:00AM [FreeTextEntry5] : 11:38AM

## 2023-03-08 NOTE — BEHAVIORAL HEALTH
[Cognitive and/or Behavior Therapy] : Cognitive and/or Behavior Therapy  [[facility]] : [facility]  [Supportive Therapy] : Supportive Therapy [Return in ____ week(s)] : Return in [unfilled] week(s) [FreeTextEntry2] : Goal#1: To reduce anxiety \par Goal#1 Objective #1:Pt will explore and process feelings, identifying two triggers of anxiety.\par Goal# 1 Objective #2 :Pt will learn and implement two coping skills in order to reduce anxiety\par Goal #1 Objective #3: Pt will maintain monthly med management appointments.  :\par Goal#1 Reduce Depression\par Goal #1 Objective #1 Pt will explore and process feelings, identifying two triggers of depression.\par Goal#1 Objective #2 Pt will learn and implement two coping skills in order to reduce depression. \par Goal #1 Objective # 3 Pt will maintain monthly med management appointments.  \par \par \par \par \par . \par \par  [de-identified] : The focus of session was on pt's feelings about her illness and the limitations it causes her. . Therapist provided active listening and empathic response as pt shared how she has a social event she cannot attend due to the pain she feels while traveling and being exposed to other elements. Pt reports she wishes she could go back to how she was prior to illness so she could enjoy these parties. Therapist validated pt feelings and  how difficult it must feel to have limitations and explored if there were anyways around them.  Therapist also suggested that her friends come to her prior to event to which she agreed may be a goo idea.  Therapist allowed pt to experience her grief for a portion of session but then  encouraged her to refocus on what she can do, hats he can still enjoy and participate in.  . Pt is compliant with medication and therapy and responsive to therapist support and interventions. [FreeTextEntry1] : Pt will adhere to all safety precautions related to COVID and will contact tx team for worsening of symptoms and/or problems with medication. Next appt: 3/15\par \par

## 2023-03-09 ENCOUNTER — APPOINTMENT (OUTPATIENT)
Dept: SLEEP CENTER | Facility: HOSPITAL | Age: 53
End: 2023-03-09

## 2023-03-09 ENCOUNTER — NON-APPOINTMENT (OUTPATIENT)
Age: 53
End: 2023-03-09

## 2023-03-11 ENCOUNTER — APPOINTMENT (OUTPATIENT)
Dept: SLEEP CENTER | Facility: HOSPITAL | Age: 53
End: 2023-03-11

## 2023-03-13 ENCOUNTER — OUTPATIENT (OUTPATIENT)
Dept: OUTPATIENT SERVICES | Facility: HOSPITAL | Age: 53
LOS: 1 days | End: 2023-03-13
Payer: COMMERCIAL

## 2023-03-13 DIAGNOSIS — G82.20 PARAPLEGIA, UNSPECIFIED: ICD-10-CM

## 2023-03-13 DIAGNOSIS — Z98.891 HISTORY OF UTERINE SCAR FROM PREVIOUS SURGERY: Chronic | ICD-10-CM

## 2023-03-13 DIAGNOSIS — Z00.00 ENCOUNTER FOR GENERAL ADULT MEDICAL EXAMINATION WITHOUT ABNORMAL FINDINGS: ICD-10-CM

## 2023-03-13 DIAGNOSIS — Z98.890 OTHER SPECIFIED POSTPROCEDURAL STATES: Chronic | ICD-10-CM

## 2023-03-13 PROCEDURE — 97110 THERAPEUTIC EXERCISES: CPT | Mod: GP

## 2023-03-14 ENCOUNTER — NON-APPOINTMENT (OUTPATIENT)
Age: 53
End: 2023-03-14

## 2023-03-14 DIAGNOSIS — G82.20 PARAPLEGIA, UNSPECIFIED: ICD-10-CM

## 2023-03-14 DIAGNOSIS — Z00.00 ENCOUNTER FOR GENERAL ADULT MEDICAL EXAMINATION WITHOUT ABNORMAL FINDINGS: ICD-10-CM

## 2023-03-15 ENCOUNTER — OUTPATIENT (OUTPATIENT)
Dept: OUTPATIENT SERVICES | Facility: HOSPITAL | Age: 53
LOS: 1 days | End: 2023-03-15
Payer: COMMERCIAL

## 2023-03-15 ENCOUNTER — APPOINTMENT (OUTPATIENT)
Dept: PSYCHIATRY | Facility: CLINIC | Age: 53
End: 2023-03-15

## 2023-03-15 DIAGNOSIS — G82.20 PARAPLEGIA, UNSPECIFIED: ICD-10-CM

## 2023-03-15 DIAGNOSIS — Z98.890 OTHER SPECIFIED POSTPROCEDURAL STATES: Chronic | ICD-10-CM

## 2023-03-15 DIAGNOSIS — Z98.891 HISTORY OF UTERINE SCAR FROM PREVIOUS SURGERY: Chronic | ICD-10-CM

## 2023-03-15 DIAGNOSIS — F33.1 MAJOR DEPRESSIVE DISORDER, RECURRENT, MODERATE: ICD-10-CM

## 2023-03-15 DIAGNOSIS — Z00.00 ENCOUNTER FOR GENERAL ADULT MEDICAL EXAMINATION WITHOUT ABNORMAL FINDINGS: ICD-10-CM

## 2023-03-15 LAB
25(OH)D3 SERPL-MCNC: 41 NG/ML
ALBUMIN SERPL ELPH-MCNC: 3.6 G/DL
ALP BLD-CCNC: 102 U/L
ALT SERPL-CCNC: 44 U/L
ANION GAP SERPL CALC-SCNC: 11 MMOL/L
AST SERPL-CCNC: 31 U/L
BASOPHILS # BLD AUTO: 0.02 K/UL
BASOPHILS NFR BLD AUTO: 0.5 %
BILIRUB SERPL-MCNC: <0.2 MG/DL
BUN SERPL-MCNC: 10 MG/DL
CALCIUM SERPL-MCNC: 8.9 MG/DL
CHLORIDE SERPL-SCNC: 99 MMOL/L
CHOLEST SERPL-MCNC: 211 MG/DL
CO2 SERPL-SCNC: 32 MMOL/L
CREAT SERPL-MCNC: 0.5 MG/DL
EGFR: 111 ML/MIN/1.73M2
EOSINOPHIL # BLD AUTO: 0.15 K/UL
EOSINOPHIL NFR BLD AUTO: 4 %
FERRITIN SERPL-MCNC: 337 NG/ML
FOLATE RBC-MCNC: 1324 NG/ML
GLUCOSE SERPL-MCNC: 94 MG/DL
HCT VFR BLD CALC: 31.8 %
HCT VFR BLD CALC: 31.8 %
HDLC SERPL-MCNC: 47 MG/DL
HGB BLD-MCNC: 10 G/DL
IMM GRANULOCYTES NFR BLD AUTO: 1.3 %
IRON SATN MFR SERPL: 19 %
IRON SERPL-MCNC: 37 UG/DL
LDLC SERPL CALC-MCNC: 147 MG/DL
LYMPHOCYTES # BLD AUTO: 0.6 K/UL
LYMPHOCYTES NFR BLD AUTO: 16 %
MAN DIFF?: NORMAL
MCHC RBC-ENTMCNC: 28.5 PG
MCHC RBC-ENTMCNC: 31.4 G/DL
MCV RBC AUTO: 90.6 FL
MONOCYTES # BLD AUTO: 0.48 K/UL
MONOCYTES NFR BLD AUTO: 12.8 %
NEUTROPHILS # BLD AUTO: 2.44 K/UL
NEUTROPHILS NFR BLD AUTO: 65.4 %
NONHDLC SERPL-MCNC: 164 MG/DL
PLATELET # BLD AUTO: 352 K/UL
POTASSIUM SERPL-SCNC: 4 MMOL/L
PROT SERPL-MCNC: 6.3 G/DL
RBC # BLD: 3.51 M/UL
RBC # FLD: 13.9 %
SODIUM SERPL-SCNC: 142 MMOL/L
TIBC SERPL-MCNC: 198 UG/DL
TRIGL SERPL-MCNC: 84 MG/DL
UIBC SERPL-MCNC: 161 UG/DL
VIT B1 SERPL-MCNC: 84.8 NMOL/L
VIT B12 SERPL-MCNC: >2000 PG/ML
WBC # FLD AUTO: 3.74 K/UL

## 2023-03-15 PROCEDURE — 97140 MANUAL THERAPY 1/> REGIONS: CPT | Mod: GP

## 2023-03-15 PROCEDURE — 90832 PSYTX W PT 30 MINUTES: CPT

## 2023-03-15 PROCEDURE — 97110 THERAPEUTIC EXERCISES: CPT | Mod: GP

## 2023-03-15 NOTE — BEHAVIORAL HEALTH
[Cognitive and/or Behavior Therapy] : Cognitive and/or Behavior Therapy  [[facility]] : [facility]  [Supportive Therapy] : Supportive Therapy [Return in ____ week(s)] : Return in [unfilled] week(s) [FreeTextEntry2] : Goal#1: To reduce anxiety \par Goal#1 Objective #1:Pt will explore and process feelings, identifying two triggers of anxiety.\par Goal# 1 Objective #2 :Pt will learn and implement two coping skills in order to reduce anxiety\par Goal #1 Objective #3: Pt will maintain monthly med management appointments.  :\par Goal#1 Reduce Depression\par Goal #1 Objective #1 Pt will explore and process feelings, identifying two triggers of depression.\par Goal#1 Objective #2 Pt will learn and implement two coping skills in order to reduce depression. \par Goal #1 Objective # 3 Pt will maintain monthly med management appointments.  \par \par \par \par \par . \par \par  [de-identified] : The focus of session was on pt's feelings about her latest doctor  appt and the fears this caused her.. Therapist provided active listening and empathic response as pt shared how she was lowered on several of her medications to  reduce side effects but referred to pain management to cope with her nerve pain in her chest. Pt reports worries that Drs. are now giving upand are just trying to make her comfortable, such as with hospice care.. Therapist validated pt feelings and  explored whether this what what the dr said or it was her own fear. Therapist encouraged to to challenge negative thoughts when they arise as opposed to dwelling on them..  Therapist was approved for [facility] CM and she is eagerly awaiting assignment with a CM.  Pt is compliant with medication and therapy and responsive to therapist support and interventions. [FreeTextEntry1] : Pt will adhere to all safety precautions related to COVID and will contact tx team for worsening of symptoms and/or problems with medication. Next appt: 3/20\par \par

## 2023-03-15 NOTE — REASON FOR VISIT
[Access issues (e.g., transportation, impaired mobility, etc.)] : due to patient's access issues [Continuing, patient not seen in-person within last 12 months (provide details below)] : Telehealth services are continuing, patient not seen in-person within last 12 months.  [Telehealth (audio & video) - Individual/Group] : This visit was provided via telehealth using real-time 2-way audio visual technology. [Other Location: e.g. Home (Enter Location, City,State)___] : The provider was located at [unfilled]. [Home] : The patient, [unfilled], was located at home, [unfilled], at the time of the visit. [Verbal consent obtained from patient/other participant(s)] : Verbal consent for telehealth/telephonic services obtained from patient/other participant(s) [Patient] : Patient [FreeTextEntry4] : 10:00AM [FreeTextEntry5] : 10:38AM

## 2023-03-20 ENCOUNTER — OUTPATIENT (OUTPATIENT)
Dept: OUTPATIENT SERVICES | Facility: HOSPITAL | Age: 53
LOS: 1 days | End: 2023-03-20
Payer: COMMERCIAL

## 2023-03-20 ENCOUNTER — APPOINTMENT (OUTPATIENT)
Dept: NEUROLOGY | Facility: CLINIC | Age: 53
End: 2023-03-20

## 2023-03-20 ENCOUNTER — APPOINTMENT (OUTPATIENT)
Dept: PSYCHIATRY | Facility: CLINIC | Age: 53
End: 2023-03-20

## 2023-03-20 DIAGNOSIS — F33.1 MAJOR DEPRESSIVE DISORDER, RECURRENT, MODERATE: ICD-10-CM

## 2023-03-20 PROCEDURE — 90832 PSYTX W PT 30 MINUTES: CPT

## 2023-03-20 NOTE — REASON FOR VISIT
[Access issues (e.g., transportation, impaired mobility, etc.)] : due to patient's access issues [Continuing, patient not seen in-person within last 12 months (provide details below)] : Telehealth services are continuing, patient not seen in-person within last 12 months.  [Telehealth (audio & video) - Individual/Group] : This visit was provided via telehealth using real-time 2-way audio visual technology. [Other Location: e.g. Home (Enter Location, City,State)___] : The provider was located at [unfilled]. [Home] : The patient, [unfilled], was located at home, [unfilled], at the time of the visit. [Verbal consent obtained from patient/other participant(s)] : Verbal consent for telehealth/telephonic services obtained from patient/other participant(s) [Patient] : Patient [FreeTextEntry4] : 11:00am [FreeTextEntry5] : 11:22am

## 2023-03-20 NOTE — BEHAVIORAL HEALTH
[[place] [facility]] : [facility] [place]  [Supportive Therapy] : Supportive Therapy [Return in ____ week(s)] : Return in [unfilled] week(s) [FreeTextEntry2] : Goal#1: To reduce anxiety \par Goal#1 Objective #1:Pt will explore and process feelings, identifying two triggers of anxiety.\par Goal# 1 Objective #2 :Pt will learn and implement two coping skills in order to reduce anxiety\par Goal #1 Objective #3: Pt will maintain monthly med management appointments.  :\par Goal#1 Reduce Depression\par Goal #1 Objective #1 Pt will explore and process feelings, identifying two triggers of depression.\par Goal#1 Objective #2 Pt will learn and implement two coping skills in order to reduce depression. \par Goal #1 Objective # 3 Pt will maintain monthly med management appointments.  \par \par \par \par \par . \par \par  [de-identified] : Pt was in a great deal of pain, she was wincing and moaning during session and as a result session was brief.  Therapist provided active listening and empathic response as pt shared how more intense pain started Saturday and that she slept all day yesterday to avoid it. Pt used one of her CBD gummies during session to help and seems to become drowsy, closing her eyes during sessions. Therapist tried pt take pt's mind off of her pain and redirect her to other topics however this only worked for a short period oft time as pt seemed sleepy and uncomfortable.  Therapist provided support to pt.  Pt is compliant with medication and therapy and responsive to therapist support and interventions. [FreeTextEntry1] : Pt will adhere to all safety precautions related to COVID and will contact tx team for worsening of symptoms and/or problems with medication. Next appt: 3/28\par \par

## 2023-03-22 ENCOUNTER — OUTPATIENT (OUTPATIENT)
Dept: OUTPATIENT SERVICES | Facility: HOSPITAL | Age: 53
LOS: 1 days | End: 2023-03-22
Payer: COMMERCIAL

## 2023-03-22 ENCOUNTER — NON-APPOINTMENT (OUTPATIENT)
Age: 53
End: 2023-03-22

## 2023-03-22 DIAGNOSIS — Z98.891 HISTORY OF UTERINE SCAR FROM PREVIOUS SURGERY: Chronic | ICD-10-CM

## 2023-03-22 DIAGNOSIS — Z00.00 ENCOUNTER FOR GENERAL ADULT MEDICAL EXAMINATION WITHOUT ABNORMAL FINDINGS: ICD-10-CM

## 2023-03-22 DIAGNOSIS — G82.20 PARAPLEGIA, UNSPECIFIED: ICD-10-CM

## 2023-03-22 DIAGNOSIS — Z98.890 OTHER SPECIFIED POSTPROCEDURAL STATES: Chronic | ICD-10-CM

## 2023-03-22 PROCEDURE — 97140 MANUAL THERAPY 1/> REGIONS: CPT | Mod: GP

## 2023-03-22 PROCEDURE — 97110 THERAPEUTIC EXERCISES: CPT | Mod: GP

## 2023-03-23 DIAGNOSIS — Z00.00 ENCOUNTER FOR GENERAL ADULT MEDICAL EXAMINATION WITHOUT ABNORMAL FINDINGS: ICD-10-CM

## 2023-03-23 DIAGNOSIS — G82.20 PARAPLEGIA, UNSPECIFIED: ICD-10-CM

## 2023-03-28 ENCOUNTER — APPOINTMENT (OUTPATIENT)
Dept: PSYCHIATRY | Facility: CLINIC | Age: 53
End: 2023-03-28

## 2023-03-28 ENCOUNTER — OUTPATIENT (OUTPATIENT)
Dept: OUTPATIENT SERVICES | Facility: HOSPITAL | Age: 53
LOS: 1 days | End: 2023-03-28
Payer: COMMERCIAL

## 2023-03-28 DIAGNOSIS — F33.1 MAJOR DEPRESSIVE DISORDER, RECURRENT, MODERATE: ICD-10-CM

## 2023-03-28 DIAGNOSIS — Z98.890 OTHER SPECIFIED POSTPROCEDURAL STATES: Chronic | ICD-10-CM

## 2023-03-28 PROCEDURE — 90834 PSYTX W PT 45 MINUTES: CPT | Mod: 95

## 2023-03-28 NOTE — BEHAVIORAL HEALTH
[[facility] [facility]] : [facility]  [Supportive Therapy] : Supportive Therapy [Return in ____ week(s)] : Return in [unfilled] week(s) [FreeTextEntry2] : Goal#1: To reduce anxiety \par Goal#1 Objective #1:Pt will explore and process feelings, identifying two triggers of anxiety.\par Goal# 1 Objective #2 :Pt will learn and implement two coping skills in order to reduce anxiety\par Goal #1 Objective #3: Pt will maintain monthly med management appointments.  :\par Goal#1 Reduce Depression\par Goal #1 Objective #1 Pt will explore and process feelings, identifying two triggers of depression.\par Goal#1 Objective #2 Pt will learn and implement two coping skills in order to reduce depression. \par Goal #1 Objective # 3 Pt will maintain monthly med management appointments.  \par \par \par \par \par . \par \par  [de-identified] : The focus of the sessionw as on the anniversary approaching of the last day pt was able to walk, 4/14/21. .  Therapist provided active listening and empathic response as pt shared the days leading up the last day she walked and how she came to be paraplegic. Pt reports crying bouts are triggered by pictures of herself from when she was healthy and it is very painful to see.  Therapist validated pt feelings and provided support  to promote healing. Therapist explored plans pt has that she can look forward to in coming weeks and suggested trying the support group again to connect with those with similar struggles. Pt is compliant with medication and therapy and responsive to therapist support and interventions. [FreeTextEntry1] : Pt will adhere to all safety precautions related to COVID and will contact tx team for worsening of symptoms and/or problems with medication. Next appt: 4/5\par \par

## 2023-03-28 NOTE — REASON FOR VISIT
[Access issues (e.g., transportation, impaired mobility, etc.)] : due to patient's access issues [Continuing, patient not seen in-person within last 12 months (provide details below)] : Telehealth services are continuing, patient not seen in-person within last 12 months.  [Telehealth (audio & video) - Individual/Group] : This visit was provided via telehealth using real-time 2-way audio visual technology. [Medical Office: ([facility])___] : The provider was located at the medical office in [unfilled]. [Home] : The patient, [unfilled], was located at home, [unfilled], at the time of the visit. [Verbal consent obtained from patient/other participant(s)] : Verbal consent for telehealth/telephonic services obtained from patient/other participant(s) [Patient] : Patient [FreeTextEntry4] : 2:45pm [FreeTextEntry5] : 3:15pm

## 2023-03-31 ENCOUNTER — APPOINTMENT (OUTPATIENT)
Dept: PSYCHIATRY | Facility: CLINIC | Age: 53
End: 2023-03-31
Payer: COMMERCIAL

## 2023-03-31 ENCOUNTER — OUTPATIENT (OUTPATIENT)
Dept: OUTPATIENT SERVICES | Facility: HOSPITAL | Age: 53
LOS: 1 days | End: 2023-03-31
Payer: COMMERCIAL

## 2023-03-31 DIAGNOSIS — F33.1 MAJOR DEPRESSIVE DISORDER, RECURRENT, MODERATE: ICD-10-CM

## 2023-03-31 DIAGNOSIS — F41.1 GENERALIZED ANXIETY DISORDER: ICD-10-CM

## 2023-03-31 DIAGNOSIS — Z98.891 HISTORY OF UTERINE SCAR FROM PREVIOUS SURGERY: Chronic | ICD-10-CM

## 2023-03-31 DIAGNOSIS — Z98.890 OTHER SPECIFIED POSTPROCEDURAL STATES: Chronic | ICD-10-CM

## 2023-03-31 PROCEDURE — 99214 OFFICE O/P EST MOD 30 MIN: CPT | Mod: 95

## 2023-03-31 RX ORDER — HYDROXYZINE HYDROCHLORIDE 50 MG/1
50 TABLET ORAL DAILY
Qty: 30 | Refills: 1 | Status: DISCONTINUED | COMMUNITY
Start: 2022-08-03 | End: 2023-03-31

## 2023-03-31 NOTE — REASON FOR VISIT
[Patient] : Patient [Home] : at home, [unfilled] , at the time of the visit. [Other Location: e.g. Home (Enter Location, City,State)___] : at [unfilled] [Verbal consent obtained from patient] : the patient, [unfilled] [FreeTextEntry1] : Follow up for mood symptoms and anxiety

## 2023-03-31 NOTE — PLAN
[FreeTextEntry4] : She will continue sleep regimen\par She will experience remission of mood symptoms. \par She will experience improvement in pain symptoms.

## 2023-03-31 NOTE — DISCUSSION/SUMMARY
[FreeTextEntry1] : Dinora is a 51yo AA  female, depression, anxiety, PMHx of Neuromyelitis optima, paralyzed since 4/2021, neurological symptoms began 2/2021, post Moderna vaccine 1/2021.  She tolerated increase in Bupropion without side effects. It has been approximately two years since onset of neurologic illness. She sleeps well with six to eight hours, managing her mood,  intermittent episodes of tearfulness have decreased. She is at low risk, no acute thoughts of harm to self, recent medical condition, positive family support, linked to treatment.\par \par Increase Bupropion XL to 300mg po am\par Hydroxyzine 50mg po prn\par  \par \par

## 2023-03-31 NOTE — PAST MEDICAL HISTORY
[FreeTextEntry1] : No inpatient admissions, trials of Prozac, Zoloft, Wellbutrin, Xanax, Valium, Duloxetine: metallic taste in mouth

## 2023-03-31 NOTE — HISTORY OF PRESENT ILLNESS
[FreeTextEntry1] : She tolerated increase in Bupropion without side effects. It has been approximately two years since onset of neurologic illness, support is provided. She sleeps well with six to eight hours, managing her mood,  intermittent episodes of tearfulness have decreased to twice weekly. Her appetite is fair.  She denies panic symptoms and has not taken Hydroxyzine since the last visit. \par \par She struggles with pain in shoulders, chest, back, arms which tends to exacerbate with external stimuli. Oxycodone was switched to Fentanyl. Neurologist changed medication regimen with improved sedation.\par \par Her pulmonologist has recommended repeat home sleep study.  She continues to engage in social media, joined a Hinduism recently and support groups. Presently, she denies thoughts of harm to self or others, provides her family as protective factors, safety plan is discussed, she would call 911.\par

## 2023-03-31 NOTE — PHYSICAL EXAM
[Cooperative] : cooperative [Depressed] : depressed [Clear] : clear [Linear/Goal Directed] : linear/goal directed [WNL] : within normal limits [Intermittent] : intermittent [Constricted] : constricted [FreeTextEntry1] : Lying in bed

## 2023-03-31 NOTE — SOCIAL HISTORY
[Unemployed] : unemployed [] :  [# Of Children ___] : has [unfilled] children [None] : none [Yes] : yes [No Known Use] : no known use [FreeTextEntry4] : Masters in Social work [TextBox_7] : Socially, last drink 12/31/21 [FreeTextEntry1] : medical marijuana edibles for pain, once to twice daily

## 2023-04-01 DIAGNOSIS — F33.1 MAJOR DEPRESSIVE DISORDER, RECURRENT, MODERATE: ICD-10-CM

## 2023-04-01 DIAGNOSIS — F41.1 GENERALIZED ANXIETY DISORDER: ICD-10-CM

## 2023-04-03 ENCOUNTER — OUTPATIENT (OUTPATIENT)
Dept: OUTPATIENT SERVICES | Facility: HOSPITAL | Age: 53
LOS: 1 days | End: 2023-04-03
Payer: COMMERCIAL

## 2023-04-03 DIAGNOSIS — Z98.891 HISTORY OF UTERINE SCAR FROM PREVIOUS SURGERY: Chronic | ICD-10-CM

## 2023-04-03 DIAGNOSIS — Z98.890 OTHER SPECIFIED POSTPROCEDURAL STATES: Chronic | ICD-10-CM

## 2023-04-03 DIAGNOSIS — G82.20 PARAPLEGIA, UNSPECIFIED: ICD-10-CM

## 2023-04-03 PROCEDURE — 97110 THERAPEUTIC EXERCISES: CPT | Mod: GP

## 2023-04-03 PROCEDURE — 97140 MANUAL THERAPY 1/> REGIONS: CPT | Mod: GP

## 2023-04-05 ENCOUNTER — OUTPATIENT (OUTPATIENT)
Dept: OUTPATIENT SERVICES | Facility: HOSPITAL | Age: 53
LOS: 1 days | End: 2023-04-05
Payer: COMMERCIAL

## 2023-04-05 ENCOUNTER — APPOINTMENT (OUTPATIENT)
Dept: PSYCHIATRY | Facility: CLINIC | Age: 53
End: 2023-04-05

## 2023-04-05 DIAGNOSIS — F33.1 MAJOR DEPRESSIVE DISORDER, RECURRENT, MODERATE: ICD-10-CM

## 2023-04-05 DIAGNOSIS — Z98.890 OTHER SPECIFIED POSTPROCEDURAL STATES: Chronic | ICD-10-CM

## 2023-04-05 DIAGNOSIS — Z98.891 HISTORY OF UTERINE SCAR FROM PREVIOUS SURGERY: Chronic | ICD-10-CM

## 2023-04-05 PROCEDURE — 90834 PSYTX W PT 45 MINUTES: CPT | Mod: 95

## 2023-04-05 NOTE — BEHAVIORAL HEALTH
[[place] [facility]] : [facility]  [Supportive Therapy] : Supportive Therapy [Return in ____ week(s)] : Return in [unfilled] week(s) [FreeTextEntry2] : Goal#1: To reduce anxiety \par Goal#1 Objective #1:Pt will explore and process feelings, identifying two triggers of anxiety.\par Goal# 1 Objective #2 :Pt will learn and implement two coping skills in order to reduce anxiety\par Goal #1 Objective #3: Pt will maintain monthly med management appointments.  :\par Goal#1 Reduce Depression\par Goal #1 Objective #1 Pt will explore and process feelings, identifying two triggers of depression.\par Goal#1 Objective #2 Pt will learn and implement two coping skills in order to reduce depression. \par Goal #1 Objective # 3 Pt will maintain monthly med management appointments.  \par \par \par \par \par . \par \par  [de-identified] : The focus of the session was on pt's first meeting with [facility] who made her 1st home visit this week  .  Therapist provided active listening as pt shared how [facility] listened to want she hopes that they can provide. Pt reports that she was told about a rapid housing program which would place her in a new home , something accessible for her disability,  Therapist pointed out how helpful this would be for her and explored what else they could offer. Pt identified that she will also be able to get help with her Medicaid application and organizing her paperwork. Pt denies crying bouts. Pt continues to use her CBD gummies for pain relief. . Pt is compliant with medication and therapy and responsive to therapist support and interventions. [FreeTextEntry1] : Pt will adhere to all safety precautions related to COVID and will contact tx team for worsening of symptoms and/or problems with medication. Next appt: 4/17\par \par

## 2023-04-05 NOTE — REASON FOR VISIT
[Access issues (e.g., transportation, impaired mobility, etc.)] : due to patient's access issues [Continuing, patient not seen in-person within last 12 months (provide details below)] : Telehealth services are continuing, patient not seen in-person within last 12 months.  [Telehealth (audio & video) - Individual/Group] : This visit was provided via telehealth using real-time 2-way audio visual technology. [Medical Office: ([facility])___] : The provider was located at the medical office in [unfilled]. [Home] : The patient, [unfilled], was located at home, [unfilled], at the time of the visit. [Verbal consent obtained from patient/other participant(s)] : Verbal consent for telehealth/telephonic services obtained from patient/other participant(s) [Patient] : Patient [FreeTextEntry4] : 9:30am [FreeTextEntry5] : 10:15am

## 2023-04-06 ENCOUNTER — OUTPATIENT (OUTPATIENT)
Dept: OUTPATIENT SERVICES | Facility: HOSPITAL | Age: 53
LOS: 1 days | End: 2023-04-06
Payer: COMMERCIAL

## 2023-04-06 DIAGNOSIS — Z98.890 OTHER SPECIFIED POSTPROCEDURAL STATES: Chronic | ICD-10-CM

## 2023-04-06 DIAGNOSIS — G82.20 PARAPLEGIA, UNSPECIFIED: ICD-10-CM

## 2023-04-06 DIAGNOSIS — Z98.891 HISTORY OF UTERINE SCAR FROM PREVIOUS SURGERY: Chronic | ICD-10-CM

## 2023-04-06 PROCEDURE — 97116 GAIT TRAINING THERAPY: CPT | Mod: GP

## 2023-04-06 PROCEDURE — 97110 THERAPEUTIC EXERCISES: CPT | Mod: GP

## 2023-04-11 ENCOUNTER — OUTPATIENT (OUTPATIENT)
Dept: OUTPATIENT SERVICES | Facility: HOSPITAL | Age: 53
LOS: 1 days | End: 2023-04-11
Payer: COMMERCIAL

## 2023-04-11 DIAGNOSIS — G82.20 PARAPLEGIA, UNSPECIFIED: ICD-10-CM

## 2023-04-11 DIAGNOSIS — Z98.891 HISTORY OF UTERINE SCAR FROM PREVIOUS SURGERY: Chronic | ICD-10-CM

## 2023-04-11 DIAGNOSIS — Z98.890 OTHER SPECIFIED POSTPROCEDURAL STATES: Chronic | ICD-10-CM

## 2023-04-11 PROCEDURE — 97110 THERAPEUTIC EXERCISES: CPT | Mod: GP

## 2023-04-13 ENCOUNTER — OUTPATIENT (OUTPATIENT)
Dept: OUTPATIENT SERVICES | Facility: HOSPITAL | Age: 53
LOS: 1 days | End: 2023-04-13
Payer: COMMERCIAL

## 2023-04-13 DIAGNOSIS — Z98.890 OTHER SPECIFIED POSTPROCEDURAL STATES: Chronic | ICD-10-CM

## 2023-04-13 DIAGNOSIS — G82.20 PARAPLEGIA, UNSPECIFIED: ICD-10-CM

## 2023-04-13 DIAGNOSIS — Z98.891 HISTORY OF UTERINE SCAR FROM PREVIOUS SURGERY: Chronic | ICD-10-CM

## 2023-04-13 PROCEDURE — 97140 MANUAL THERAPY 1/> REGIONS: CPT | Mod: GP

## 2023-04-13 PROCEDURE — 97110 THERAPEUTIC EXERCISES: CPT | Mod: GP

## 2023-04-15 ENCOUNTER — APPOINTMENT (OUTPATIENT)
Dept: SLEEP CENTER | Facility: HOSPITAL | Age: 53
End: 2023-04-15

## 2023-04-17 ENCOUNTER — OUTPATIENT (OUTPATIENT)
Dept: OUTPATIENT SERVICES | Facility: HOSPITAL | Age: 53
LOS: 1 days | End: 2023-04-17

## 2023-04-17 ENCOUNTER — APPOINTMENT (OUTPATIENT)
Dept: PSYCHIATRY | Facility: CLINIC | Age: 53
End: 2023-04-17

## 2023-04-17 ENCOUNTER — NON-APPOINTMENT (OUTPATIENT)
Age: 53
End: 2023-04-17

## 2023-04-17 DIAGNOSIS — F33.1 MAJOR DEPRESSIVE DISORDER, RECURRENT, MODERATE: ICD-10-CM

## 2023-04-17 DIAGNOSIS — Z98.890 OTHER SPECIFIED POSTPROCEDURAL STATES: Chronic | ICD-10-CM

## 2023-04-17 DIAGNOSIS — Z98.891 HISTORY OF UTERINE SCAR FROM PREVIOUS SURGERY: Chronic | ICD-10-CM

## 2023-04-28 ENCOUNTER — APPOINTMENT (OUTPATIENT)
Dept: PSYCHIATRY | Facility: CLINIC | Age: 53
End: 2023-04-28

## 2023-04-28 ENCOUNTER — APPOINTMENT (OUTPATIENT)
Dept: INFUSION THERAPY | Facility: CLINIC | Age: 53
End: 2023-04-28

## 2023-04-28 ENCOUNTER — OUTPATIENT (OUTPATIENT)
Dept: OUTPATIENT SERVICES | Facility: HOSPITAL | Age: 53
LOS: 1 days | End: 2023-04-28
Payer: COMMERCIAL

## 2023-04-28 DIAGNOSIS — Z98.890 OTHER SPECIFIED POSTPROCEDURAL STATES: Chronic | ICD-10-CM

## 2023-04-28 DIAGNOSIS — F33.1 MAJOR DEPRESSIVE DISORDER, RECURRENT, MODERATE: ICD-10-CM

## 2023-04-28 PROCEDURE — 90832 PSYTX W PT 30 MINUTES: CPT | Mod: 95

## 2023-04-29 DIAGNOSIS — F33.1 MAJOR DEPRESSIVE DISORDER, RECURRENT, MODERATE: ICD-10-CM

## 2023-04-30 NOTE — BEHAVIORAL HEALTH
[[facility]] : [facility]  [Supportive Therapy] : Supportive Therapy [Return in ____ week(s)] : Return in [unfilled] week(s) [FreeTextEntry2] : Goal#1: To reduce anxiety \par Goal#1 Objective #1:Pt will explore and process feelings, identifying two triggers of anxiety.\par Goal# 1 Objective #2 :Pt will learn and implement two coping skills in order to reduce anxiety\par Goal #1 Objective #3: Pt will maintain monthly med management appointments.  :\par Goal#1 Reduce Depression\par Goal #1 Objective #1 Pt will explore and process feelings, identifying two triggers of depression.\par Goal#1 Objective #2 Pt will learn and implement two coping skills in order to reduce depression. \par Goal #1 Objective # 3 Pt will maintain monthly med management appointments.  \par \par \par \par \par . \par \par  [de-identified] : The focus of the session was on pt's recent hospitalization .  Therapist provided active listening as pt shared how she had a severe UTI and continues to be on IV antibiotics even while at home.  Pt reports that [facility] and that she went home a bed sore that requires wound care via VNS.  Therapist validated how difficult this must have been for pt  and how much of a relief she feels to be back at home, Therapist also used this session to inform pot of the administrative changes that have resulted in caseload reassignment.  Therapist shared how she will no longer be working with pt and that she will be transferred to a new therapist. Pt was clearly distressed, questioned these changes and why she could not change her psychiatrist rather then therapist.. Therapist validated ow hard this will be as she and pt have a strong therapeutic bond. Therapist provided reassurance and informed she would be there to provide ongoing support throughout this transition , period. . [FreeTextEntry1] : Pt will adhere to all safety precautions related to COVID and will contact tx team for worsening of symptoms and/or problems with medication. Next appt: 5/5\par \par

## 2023-04-30 NOTE — REASON FOR VISIT
[Access issues (e.g., transportation, impaired mobility, etc.)] : due to patient's access issues [Continuing, patient not seen in-person within last 12 months (provide details below)] : Telehealth services are continuing, patient not seen in-person within last 12 months.  [Telehealth (audio & video) - Individual/Group] : This visit was provided via telehealth using real-time 2-way audio visual technology. [Medical Office: ([facility])___] : The provider was located at the medical office in [unfilled]. [Home] : The patient, [unfilled], was located at home, [unfilled], at the time of the visit. [Verbal consent obtained from patient/other participant(s)] : Verbal consent for telehealth/telephonic services obtained from patient/other participant(s) [Patient] : Patient [FreeTextEntry4] : 10:30am [FreeTextEntry5] : 11:00am

## 2023-05-05 ENCOUNTER — APPOINTMENT (OUTPATIENT)
Dept: PSYCHIATRY | Facility: CLINIC | Age: 53
End: 2023-05-05

## 2023-05-08 ENCOUNTER — OUTPATIENT (OUTPATIENT)
Dept: OUTPATIENT SERVICES | Facility: HOSPITAL | Age: 53
LOS: 1 days | End: 2023-05-08
Payer: COMMERCIAL

## 2023-05-08 ENCOUNTER — APPOINTMENT (OUTPATIENT)
Dept: PSYCHIATRY | Facility: CLINIC | Age: 53
End: 2023-05-08

## 2023-05-08 DIAGNOSIS — F33.1 MAJOR DEPRESSIVE DISORDER, RECURRENT, MODERATE: ICD-10-CM

## 2023-05-08 DIAGNOSIS — Z98.890 OTHER SPECIFIED POSTPROCEDURAL STATES: Chronic | ICD-10-CM

## 2023-05-08 DIAGNOSIS — Z98.891 HISTORY OF UTERINE SCAR FROM PREVIOUS SURGERY: Chronic | ICD-10-CM

## 2023-05-08 PROCEDURE — 90832 PSYTX W PT 30 MINUTES: CPT | Mod: 95

## 2023-05-08 NOTE — BEHAVIORAL HEALTH
[[facility]] : [facility]  [Supportive Therapy] : Supportive Therapy [Return in ____ week(s)] : Return in [unfilled] week(s) [FreeTextEntry2] : Goal#1: To reduce anxiety \par Goal#1 Objective #1:Pt will explore and process feelings, identifying two triggers of anxiety.\par Goal# 1 Objective #2 :Pt will learn and implement two coping skills in order to reduce anxiety\par Goal #1 Objective #3: Pt will maintain monthly med management appointments.  :\par Goal#1 Reduce Depression\par Goal #1 Objective #1 Pt will explore and process feelings, identifying two triggers of depression.\par Goal#1 Objective #2 Pt will learn and implement two coping skills in order to reduce depression. \par Goal #1 Objective # 3 Pt will maintain monthly med management appointments.  \par \par \par \par \par . \par \par  [de-identified] : The focus of the session was on pt's health as well as case reassignment. Therapist provided active listening as pt shared how she had a bad day over the weekend, how the pain she experiences causes her anxiety and crying bouts,  Therapist validated her feelings and explored the thoughts behind her anxiety as well as how she is coping with this pain.  Therapist reviewed coping skills to combat anxiety such as positive affirmations and distraction. Therapist and pt also discussed her transfer to a new therapist and how she worries she will not have the came connection she has with writer. This was validate and it was explored whether pt could receive her psychiatrist medication from her neurologist instead of , allowing therapist to continue working with her, Pt was agreeable to this plan and provided her neurologist information so that  could consult with hum and coordinate care, [FreeTextEntry1] : Pt will adhere to all safety precautions related to COVID and will contact tx team for worsening of symptoms and/or problems with medication. Next appt: 5/15\par \par

## 2023-05-08 NOTE — REASON FOR VISIT
[Access issues (e.g., transportation, impaired mobility, etc.)] : due to patient's access issues [Continuing, patient not seen in-person within last 12 months (provide details below)] : Telehealth services are continuing, patient not seen in-person within last 12 months.  [Telehealth (audio & video) - Individual/Group] : This visit was provided via telehealth using real-time 2-way audio visual technology. [Medical Office: ([facility])___] : The provider was located at the medical office in [unfilled]. [Home] : The patient, [unfilled], was located at home, [unfilled], at the time of the visit. [Verbal consent obtained from patient/other participant(s)] : Verbal consent for telehealth/telephonic services obtained from patient/other participant(s) [Patient] : Patient [FreeTextEntry4] : 10:00am [FreeTextEntry5] : 10:30am

## 2023-05-09 DIAGNOSIS — F33.1 MAJOR DEPRESSIVE DISORDER, RECURRENT, MODERATE: ICD-10-CM

## 2023-05-11 ENCOUNTER — NON-APPOINTMENT (OUTPATIENT)
Age: 53
End: 2023-05-11

## 2023-05-12 ENCOUNTER — APPOINTMENT (OUTPATIENT)
Dept: INFUSION THERAPY | Facility: CLINIC | Age: 53
End: 2023-05-12

## 2023-05-12 ENCOUNTER — OUTPATIENT (OUTPATIENT)
Dept: OUTPATIENT SERVICES | Facility: HOSPITAL | Age: 53
LOS: 1 days | End: 2023-05-12
Payer: COMMERCIAL

## 2023-05-12 ENCOUNTER — LABORATORY RESULT (OUTPATIENT)
Age: 53
End: 2023-05-12

## 2023-05-12 DIAGNOSIS — Z98.890 OTHER SPECIFIED POSTPROCEDURAL STATES: Chronic | ICD-10-CM

## 2023-05-12 DIAGNOSIS — G36.0 NEUROMYELITIS OPTICA [DEVIC]: ICD-10-CM

## 2023-05-12 DIAGNOSIS — Z98.891 HISTORY OF UTERINE SCAR FROM PREVIOUS SURGERY: Chronic | ICD-10-CM

## 2023-05-12 PROCEDURE — 85027 COMPLETE CBC AUTOMATED: CPT

## 2023-05-12 PROCEDURE — 96367 TX/PROPH/DG ADDL SEQ IV INF: CPT

## 2023-05-12 PROCEDURE — 96413 CHEMO IV INFUSION 1 HR: CPT

## 2023-05-12 PROCEDURE — 96415 CHEMO IV INFUSION ADDL HR: CPT

## 2023-05-12 PROCEDURE — 36415 COLL VENOUS BLD VENIPUNCTURE: CPT

## 2023-05-12 RX ORDER — RITUXIMAB 10 MG/ML
1000 INJECTION, SOLUTION INTRAVENOUS ONCE
Refills: 0 | Status: COMPLETED | OUTPATIENT
Start: 2023-05-12 | End: 2023-05-12

## 2023-05-12 RX ORDER — DIPHENHYDRAMINE HCL 50 MG
50 CAPSULE ORAL ONCE
Refills: 0 | Status: COMPLETED | OUTPATIENT
Start: 2023-05-12 | End: 2023-05-12

## 2023-05-12 RX ORDER — ACETAMINOPHEN 500 MG
650 TABLET ORAL ONCE
Refills: 0 | Status: DISCONTINUED | OUTPATIENT
Start: 2023-05-12 | End: 2023-05-12

## 2023-05-12 RX ORDER — ACETAMINOPHEN 500 MG
1000 TABLET ORAL ONCE
Refills: 0 | Status: COMPLETED | OUTPATIENT
Start: 2023-05-12 | End: 2023-05-12

## 2023-05-12 RX ADMIN — Medication 50 MILLIGRAM(S): at 11:23

## 2023-05-12 RX ADMIN — RITUXIMAB 1000 MILLIGRAM(S): 10 INJECTION, SOLUTION INTRAVENOUS at 16:30

## 2023-05-12 RX ADMIN — Medication 1000 MILLIGRAM(S): at 11:53

## 2023-05-12 RX ADMIN — Medication 1000 MILLIGRAM(S): at 11:30

## 2023-05-12 RX ADMIN — RITUXIMAB 1000 MILLIGRAM(S): 10 INJECTION, SOLUTION INTRAVENOUS at 12:36

## 2023-05-13 DIAGNOSIS — G36.0 NEUROMYELITIS OPTICA [DEVIC]: ICD-10-CM

## 2023-05-15 ENCOUNTER — OUTPATIENT (OUTPATIENT)
Dept: OUTPATIENT SERVICES | Facility: HOSPITAL | Age: 53
LOS: 1 days | End: 2023-05-15
Payer: COMMERCIAL

## 2023-05-15 ENCOUNTER — APPOINTMENT (OUTPATIENT)
Dept: PSYCHIATRY | Facility: CLINIC | Age: 53
End: 2023-05-15

## 2023-05-15 DIAGNOSIS — F33.1 MAJOR DEPRESSIVE DISORDER, RECURRENT, MODERATE: ICD-10-CM

## 2023-05-15 DIAGNOSIS — Z98.891 HISTORY OF UTERINE SCAR FROM PREVIOUS SURGERY: Chronic | ICD-10-CM

## 2023-05-15 DIAGNOSIS — Z98.890 OTHER SPECIFIED POSTPROCEDURAL STATES: Chronic | ICD-10-CM

## 2023-05-15 LAB
HCT VFR BLD CALC: 34.4 %
HGB BLD-MCNC: 10.6 G/DL
MCHC RBC-ENTMCNC: 27.5 PG
MCHC RBC-ENTMCNC: 30.8 G/DL
MCV RBC AUTO: 89.4 FL
PLATELET # BLD AUTO: 188 K/UL
PMV BLD: 10 FL
RBC # BLD: 3.85 M/UL
RBC # FLD: 14.6 %
WBC # FLD AUTO: 3.54 K/UL

## 2023-05-15 PROCEDURE — 90832 PSYTX W PT 30 MINUTES: CPT | Mod: 95

## 2023-05-16 DIAGNOSIS — F33.1 MAJOR DEPRESSIVE DISORDER, RECURRENT, MODERATE: ICD-10-CM

## 2023-05-16 NOTE — BEHAVIORAL HEALTH
[[place] [facility]] : [facility]  [Supportive Therapy] : Supportive Therapy [Return in ____ week(s)] : Return in [unfilled] week(s) [FreeTextEntry2] : Goal#1: To reduce anxiety \par Goal#1 Objective #1:Pt will explore and process feelings, identifying two triggers of anxiety.\par Goal# 1 Objective #2 :Pt will learn and implement two coping skills in order to reduce anxiety\par Goal #1 Objective #3: Pt will maintain monthly med management appointments.  :\par Goal#1 Reduce Depression\par Goal #1 Objective #1 Pt will explore and process feelings, identifying two triggers of depression.\par Goal#1 Objective #2 Pt will learn and implement two coping skills in order to reduce depression. \par Goal #1 Objective # 3 Pt will maintain monthly med management appointments.  \par \par \par \par \par . \par \par  [de-identified] : The focus of the session was on pt's l;ow mood and depressive sx the past week. . Therapist provided active listening as pt shared how she has been distracted from pain because she has been feeling down, upset about her health and her paralysis. Pt described a situation where she could be there fore her son as she had in the past which is very hard for her to cope with.  Therapist validated her feelings and pointed out how helpless and out of control she must feel pt connected to this, explaining that is exactly what she feels and she wonders if she will ever be able to feel that control in again. Therapist helped pt process her depressed feelings and pointed out that pt will need to find a way to accept her new normal as difficult as this may be, she will then  be capable of taking back some control. Therapist also offered support and empathy. Will continue to work on pt  gaining more control  and empowerment.  consulted with pt;s neurologist  and he was agreeable to prescribe pt's psychiatric medications therefore pt will continue to work with current therapist. [FreeTextEntry1] : Pt will adhere to all safety precautions related to COVID and will contact tx team for worsening of symptoms and/or problems with medication. Next appt: 5/24\par \par

## 2023-05-16 NOTE — REASON FOR VISIT
[Access issues (e.g., transportation, impaired mobility, etc.)] : due to patient's access issues [Continuing, patient not seen in-person within last 12 months (provide details below)] : Telehealth services are continuing, patient not seen in-person within last 12 months.  [Telehealth (audio & video) - Individual/Group] : This visit was provided via telehealth using real-time 2-way audio visual technology. [Medical Office: ([facility])___] : The provider was located at the medical office in [unfilled]. [Home] : The patient, [unfilled], was located at home, [unfilled], at the time of the visit. [Verbal consent obtained from patient/other participant(s)] : Verbal consent for telehealth/telephonic services obtained from patient/other participant(s) [Patient] : Patient [FreeTextEntry4] : 10:45am [FreeTextEntry5] : 11:15am

## 2023-05-24 ENCOUNTER — APPOINTMENT (OUTPATIENT)
Dept: PSYCHIATRY | Facility: CLINIC | Age: 53
End: 2023-05-24

## 2023-05-24 ENCOUNTER — OUTPATIENT (OUTPATIENT)
Dept: OUTPATIENT SERVICES | Facility: HOSPITAL | Age: 53
LOS: 1 days | End: 2023-05-24
Payer: COMMERCIAL

## 2023-05-24 DIAGNOSIS — Z98.890 OTHER SPECIFIED POSTPROCEDURAL STATES: Chronic | ICD-10-CM

## 2023-05-24 DIAGNOSIS — Z98.891 HISTORY OF UTERINE SCAR FROM PREVIOUS SURGERY: Chronic | ICD-10-CM

## 2023-05-24 DIAGNOSIS — F33.1 MAJOR DEPRESSIVE DISORDER, RECURRENT, MODERATE: ICD-10-CM

## 2023-05-24 PROCEDURE — 90832 PSYTX W PT 30 MINUTES: CPT | Mod: 95

## 2023-05-25 ENCOUNTER — APPOINTMENT (OUTPATIENT)
Dept: NEUROLOGY | Facility: CLINIC | Age: 53
End: 2023-05-25
Payer: COMMERCIAL

## 2023-05-25 DIAGNOSIS — F33.1 MAJOR DEPRESSIVE DISORDER, RECURRENT, MODERATE: ICD-10-CM

## 2023-05-25 PROCEDURE — 99214 OFFICE O/P EST MOD 30 MIN: CPT | Mod: 95

## 2023-05-25 NOTE — BEHAVIORAL HEALTH
[[facility]] : [facility]  [Supportive Therapy] : Supportive Therapy [Return in ____ week(s)] : Return in [unfilled] week(s) [FreeTextEntry2] : Goal#1: To reduce anxiety \par Goal#1 Objective #1:Pt will explore and process feelings, identifying two triggers of anxiety.\par Goal# 1 Objective #2 :Pt will learn and implement two coping skills in order to reduce anxiety\par Goal #1 Objective #3: Pt will maintain monthly med management appointments.  :\par Goal#1 Reduce Depression\par Goal #1 Objective #1 Pt will explore and process feelings, identifying two triggers of depression.\par Goal#1 Objective #2 Pt will learn and implement two coping skills in order to reduce depression. \par Goal #1 Objective # 3 Pt will maintain monthly med management appointments.  \par \par \par \par \par . \par \par  [de-identified] : The focus of the session was on pt's contouned  l;ow mood and depressive sx the past week. . Therapist provided active listening as pt shared how she has been crying more lately, thinking of all of the things she will never get to do in her life again , things as simple as driving.. Pt reports she reached out to her former therapist for support during the week and she too discussed how accepting her new life is part of healing. Therapist validated her feelings and the triggers which cause her crying bouts and normalized her experience as one of loss and grieving. Therapist discussed how the stages of grief apply here and talked about what stage she feels she is experiencing,  Therapist offered support and empathy. Will continue to work on pt  gaining more control  and empowerment.Pt was responsive to therapist support and intervention. [FreeTextEntry1] : Pt will adhere to all safety precautions related to COVID and will contact tx team for worsening of symptoms and/or problems with medication. Next appt: 5/31\par \par

## 2023-05-25 NOTE — REASON FOR VISIT
[Access issues (e.g., transportation, impaired mobility, etc.)] : due to patient's access issues [Continuing, patient not seen in-person within last 12 months (provide details below)] : Telehealth services are continuing, patient not seen in-person within last 12 months.  [Telehealth (audio & video) - Individual/Group] : This visit was provided via telehealth using real-time 2-way audio visual technology. [Medical Office: ([facility])___] : The provider was located at the medical office in [unfilled]. [Home] : The patient, [unfilled], was located at home, [unfilled], at the time of the visit. [Verbal consent obtained from patient/other participant(s)] : Verbal consent for telehealth/telephonic services obtained from patient/other participant(s) [Patient] : Patient [FreeTextEntry4] : 11:15am [FreeTextEntry5] : 11:45am Principal Discharge DX:	Shortness of breath  Secondary Diagnosis:	Leg pain, diffuse, left

## 2023-05-25 NOTE — DISCUSSION/SUMMARY
[FreeTextEntry1] : Therapist left a VM for pt' [facility] [phone] to discuss housing options for pt.

## 2023-05-25 NOTE — REASON FOR VISIT
[Access issues (e.g., transportation, impaired mobility, etc.)] : due to patient's access issues [Continuing, patient not seen in-person within last 12 months (provide details below)] : Telehealth services are continuing, patient not seen in-person within last 12 months.  [Telehealth (audio & video) - Individual/Group] : This visit was provided via telehealth using real-time 2-way audio visual technology. [Medical Office: ([facility])___] : The provider was located at the medical office in [unfilled]. [Home] : The patient, [unfilled], was located at home, [unfilled], at the time of the visit. [Verbal consent obtained from patient/other participant(s)] : Verbal consent for telehealth/telephonic services obtained from patient/other participant(s) [Patient] : Patient [FreeTextEntry4] : 11:15am [FreeTextEntry5] : 11:45am

## 2023-05-25 NOTE — BEHAVIORAL HEALTH
[[facility]] : [facility]  [Supportive Therapy] : Supportive Therapy [Return in ____ week(s)] : Return in [unfilled] week(s) [FreeTextEntry2] : Goal#1: To reduce anxiety \par Goal#1 Objective #1:Pt will explore and process feelings, identifying two triggers of anxiety.\par Goal# 1 Objective #2 :Pt will learn and implement two coping skills in order to reduce anxiety\par Goal #1 Objective #3: Pt will maintain monthly med management appointments.  :\par Goal#1 Reduce Depression\par Goal #1 Objective #1 Pt will explore and process feelings, identifying two triggers of depression.\par Goal#1 Objective #2 Pt will learn and implement two coping skills in order to reduce depression. \par Goal #1 Objective # 3 Pt will maintain monthly med management appointments.  \par \par \par \par \par . \par \par  [de-identified] : The focus of the session was on pt's contouned  l;ow mood and depressive sx the past week. . Therapist provided active listening as pt shared how she has been crying more lately, thinking of all of the things she will never get to do in her life again , things as simple as driving.. Pt reports she reached out to her former therapist for support during the week and she too discussed how accepting her new life is part of healing. Therapist validated her feelings and the triggers which cause her crying bouts and normalized her experience as one of loss and grieving. Therapist discussed how the stages of grief apply here and talked about what stage she feels she is experiencing,  Therapist offered support and empathy. Will continue to work on pt  gaining more control  and empowerment.Pt was responsive to therapist support and intervention. [FreeTextEntry1] : Pt will adhere to all safety precautions related to COVID and will contact tx team for worsening of symptoms and/or problems with medication. Next appt: 5/31\par \par

## 2023-05-25 NOTE — REASON FOR VISIT
[Home] : at home, [unfilled] , at the time of the visit. [Medical Office: ([facility])___] : at the medical office located in  [Patient] : the patient [Self] : self [Follow-Up: _____] : a [unfilled] follow-up visit

## 2023-05-25 NOTE — DISCUSSION/SUMMARY
[FreeTextEntry1] : Therapist left a VM for pt' [facility] [phone] to discuss housing options for pt.

## 2023-05-25 NOTE — HISTORY OF PRESENT ILLNESS
[FreeTextEntry1] : Initial hx 5/2021\par Referred by Dr. Vizcaino.\par 1/2021 - got 1st covid vaccine.\par 2/2021 - RLE weakness, tightness, gait dysfunction, some RLE discomfort. went to ED 4x. Then LLE was numb but without weakness. Went to pain management, referred to neurologist, then went to [facility]. 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 Got IVMP, no improvement.\par Saw Dr. Vizcaino who sent for NMO ab in 3/2021, referred to me, but developed relapse in 4/2021.\par 4/2021 - acute paraplegia. [facility], got 3d IVMP and 5 sessions PLEX. Got rituximab 1g x2 in 5/2021. since then, some sensation coming back but no movement. \par Currently in [place] rehab.\par Left rehab in 12/2021.\par Has developed a bad rash in late 2021. This had been attributed to lyrica so stopped it. This definitively started PRIOR to starting trileptal. However, may have been attributed to antibiotic (delayed reaction, per derm).\par 3/2022 - Had "chest spasms", read the HR instead of PO2 so thought her O2 was 65, called 911, went to ED, cleared from cardiac perspective.\par 3/2022 - developed new burning and tightness in head, neck, shoulders. MRI C spine without new activity.\par 5/2022 - switched from wellbutrin to cymbalta. \par late 5/2022 - tested positive for covid on two home tests. got monoclonal ab. just headaches, lasted several days. some congestion, some cough, no fever.\par 1/2023 - had intermittent sleepiness, constant dizziness/vertigo, nausea, no vomiting, and poor appetite. went to ED, had CTH. MRIs did not show new lesions. tried meclizine but didn't help.\par 4/2023 - had severe UTI. admitted to [place].\par \par \par Subj interval:\par \par Dizziness has improved since 4/2023.\par \par Chronic pain - A bit more tolerable now. tightness and burning on chest and legs that is severe, happens daily. started on fentanyl patch which has helped only somewhat. on 600tid gabapentin (1200tid gabapentin was not sufficient) and cymbalta 60. Nucynta 75tid hasn't helped much. Oxycodone 15 qid prn doesn't help. Tried lyrica in the past which didn't help. Sees pain management, medical marijuana has not helped with the pain but relaxes her, increasing the dose. Fentanyl patch has helped somewhat.\par \par Chronic spasms - decreased trileptal to 600bid from 750bid, and baclofen changed from 20tid to 10-10-40, still with uncomfortable spasms but not frankly painful. \par \par Living with her mother. Waiting to get an elevator.\par \par Got motorized wheelchair.\par \par PMHX:\par NMO\par asthma\par depression\par anxiety\par LBP\par insomnia\par migraines\par gastric sleeve\par \par MEDS:\par qvar\par asa81\par albuterol prn\par protonix\par rituximab\par ubrelvy prn\par b12\par calcium\par MV\par iron\par biotin\par colace\par miralax\par gabapentin 600tid\par trileptal 450bid\par baclofen 10-10-20\par nucynta 75bid\par hydroxyzine prn itching/anxiety\par medical marijuana\par wellbutrin (cymbalta was switched back to wellbutrin in early 2023 because of weight management and metallic taste in her mouth)\par fentanyl patch\par \par SHx: no tob\par \par FHx: no neuro, no AI.\par \par \par O:\par AO3. Normally conversant. Follows commands, names, and repeats. Good attention.\par face symm\par moves both arms well\par \par \par MRI C, T, and brain 2/2021 reviewed - thoracic LETM on my review.\par \par MRI C+T 1/2022 - no new lorie+ lesions, however, c/w 3/2021 there was caudal extension on the left lateral aspect down to T8 (likely related to relapse she had in 4/2021). \par \par MRI C spine and MRI brain/sella 4/2022 - unchanged. \par \par MRI brain 2/2023 ([facility]) - no new lesions. 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 Stable from NMO standpoint, on rituximab.\par \par Unclear etiology of new dizziness - brain MRI was unrevealing in 2/2023.\par \par All questions answered, education provided re: dx. management discussed at length. imaging reviewed with pt personally.\par \par - continue rituximab, 1g once every q6m\par - check blood work q6m, including a "full T cell subset" to look at CD19 counts.\par - cont home PT, transition to outpatient PT. \par \par Pain:\par - cont f/u with pain management\par - cont nucynta 75bid\par - cont gabapentin.\par - cont medical marijuana\par - cont fentanyl patch per pain management\par \par Spasms:\par - cont trileptal 450mg bid (from 600bid) for tonic spasms\par - cont baclofen 10-10-20 \par \par - RTC 2-3m

## 2023-05-31 ENCOUNTER — NON-APPOINTMENT (OUTPATIENT)
Age: 53
End: 2023-05-31

## 2023-06-02 ENCOUNTER — OUTPATIENT (OUTPATIENT)
Dept: OUTPATIENT SERVICES | Facility: HOSPITAL | Age: 53
LOS: 1 days | End: 2023-06-02
Payer: COMMERCIAL

## 2023-06-02 ENCOUNTER — APPOINTMENT (OUTPATIENT)
Dept: PSYCHIATRY | Facility: CLINIC | Age: 53
End: 2023-06-02

## 2023-06-02 DIAGNOSIS — Z98.891 HISTORY OF UTERINE SCAR FROM PREVIOUS SURGERY: Chronic | ICD-10-CM

## 2023-06-02 DIAGNOSIS — F33.1 MAJOR DEPRESSIVE DISORDER, RECURRENT, MODERATE: ICD-10-CM

## 2023-06-02 DIAGNOSIS — Z98.890 OTHER SPECIFIED POSTPROCEDURAL STATES: Chronic | ICD-10-CM

## 2023-06-02 PROCEDURE — 90832 PSYTX W PT 30 MINUTES: CPT | Mod: 95

## 2023-06-03 DIAGNOSIS — F33.1 MAJOR DEPRESSIVE DISORDER, RECURRENT, MODERATE: ICD-10-CM

## 2023-06-05 NOTE — BEHAVIORAL HEALTH
[[facility]] : [facility]  [Supportive Therapy] : Supportive Therapy [Return in ____ week(s)] : Return in [unfilled] week(s) [FreeTextEntry2] : Goal#1: To reduce anxiety \par Goal#1 Objective #1:Pt will explore and process feelings, identifying two triggers of anxiety.\par Goal# 1 Objective #2 :Pt will learn and implement two coping skills in order to reduce anxiety\par Goal #1 Objective #3: Pt will maintain monthly med management appointments.  :\par Goal#1 Reduce Depression\par Goal #1 Objective #1 Pt will explore and process feelings, identifying two triggers of depression.\par Goal#1 Objective #2 Pt will learn and implement two coping skills in order to reduce depression. \par Goal #1 Objective # 3 Pt will maintain monthly med management appointments.  \par \par \par \par \par . \par \par  [de-identified] : The focus of the session was on pt's feelings about her son, who is on the autism spectrum.  . Therapist provided active listening as pt shared how she went to see her son;s play and he allowed her to introduce herself as his mom, son is often embarrassed by mom being wheelchair bound. . Pt reports while she understands this it is still painful for her. . Therapist validated her feelings and how difficult this must be for him as well as her to accept. Therapist asked ot to focus on how she wa able to be present for the show, and was able to communicate in person with his teachers and para..Pt identified feeling good about all the great things that has to say about him . Pt has not heard from [facility] who planned to assist her with housing in over a month.  Writer reached out last week and has not heard back as well. Will try again and reach out to CM supervisor as well .Pt was responsive to therapist support and intervention. [FreeTextEntry1] : Pt will adhere to all safety precautions related to COVID and will contact tx team for worsening of symptoms and/or problems with medication. Next appt:6/6\par \par

## 2023-06-05 NOTE — REASON FOR VISIT
[Access issues (e.g., transportation, impaired mobility, etc.)] : due to patient's access issues [Continuing, patient not seen in-person within last 12 months (provide details below)] : Telehealth services are continuing, patient not seen in-person within last 12 months.  [Telehealth (audio & video) - Individual/Group] : This visit was provided via telehealth using real-time 2-way audio visual technology. [Medical Office: ([facility])___] : The provider was located at the medical office in [unfilled]. [Verbal consent obtained from patient/other participant(s)] : Verbal consent for telehealth/telephonic services obtained from patient/other participant(s) [Home] : The patient, [unfilled], was located at home, [unfilled], at the time of the visit. [Patient] : Patient [FreeTextEntry4] : 12:00pm [FreeTextEntry5] : 12:30pm

## 2023-06-06 ENCOUNTER — OUTPATIENT (OUTPATIENT)
Dept: OUTPATIENT SERVICES | Facility: HOSPITAL | Age: 53
LOS: 1 days | End: 2023-06-06
Payer: COMMERCIAL

## 2023-06-06 ENCOUNTER — APPOINTMENT (OUTPATIENT)
Dept: PSYCHIATRY | Facility: CLINIC | Age: 53
End: 2023-06-06

## 2023-06-06 DIAGNOSIS — Z98.891 HISTORY OF UTERINE SCAR FROM PREVIOUS SURGERY: Chronic | ICD-10-CM

## 2023-06-06 DIAGNOSIS — Z98.890 OTHER SPECIFIED POSTPROCEDURAL STATES: Chronic | ICD-10-CM

## 2023-06-06 DIAGNOSIS — F33.1 MAJOR DEPRESSIVE DISORDER, RECURRENT, MODERATE: ICD-10-CM

## 2023-06-06 PROCEDURE — 90834 PSYTX W PT 45 MINUTES: CPT | Mod: 95

## 2023-06-06 NOTE — BEHAVIORAL HEALTH
[[facility]] : [facility]  [Supportive Therapy] : Support[facility] Therapy [Return in ____ week(s)] : Return in [unfilled] week(s) [FreeTextEntry2] : Goal#1: To reduce anxiety \par Goal#1 Objective #1:Pt will explore and process feelings, identifying two triggers of anxiety.\par Goal# 1 Objective #2 :Pt will learn and implement two coping skills in order to reduce anxiety\par Goal #1 Objective #3: Pt will maintain monthly med management appointments.  :\par Goal#1 Reduce Depression\par Goal #1 Objective #1 Pt will explore and process feelings, identifying two triggers of depression.\par Goal#1 Objective #2 Pt will learn and implement two coping skills in order to reduce depression. \par Goal #1 Objective # 3 Pt will maintain monthly med management appointments.  \par \par \par \par \par . \par \par  [de-identified] : The focus of the session was on pt's feelings about getting out of the house.  . Therapist provided active listening as pt shared how she pusher herself to go to [place] and how difficult a process it is with regard to travel and getting her 600lb wheelchair into various venues.  Therapist validated her feelings and commended her from getting out,. something she often avoids due to the hassle and hardship.  Therapist asked pt how she felt when she was there and also explored where else she would want to go .Therapist tried to problem solved with pt ways to ease the transportation problems. Pt also discussed the relational issues she has had with  since they  . Therapist provided empathy and support. Therapist ;left a  today for Health [facility] 's  Supervisor Karen Triana [phone]. Pt was responsive to therapist support and intervention. [FreeTextEntry1] : Pt will adhere to all safety precautions related to COVID and will contact tx team for worsening of symptoms and/or problems with medication. Next appt:6/12\par \par

## 2023-06-06 NOTE — REASON FOR VISIT
[Access issues (e.g., transportation, impaired mobility, etc.)] : due to patient's access issues [Continuing, patient not seen in-person within last 12 months (provide details below)] : Telehealth services are continuing, patient not seen in-person within last 12 months.  [Telehealth (audio & video) - Individual/Group] : This visit was provided via telehealth using real-time 2-way audio visual technology. [Medical Office: ([facility])___] : The provider was located at the medical office in [unfilled]. [Home] : The patient, [unfilled], was located at home, [unfilled], at the time of the visit. [Verbal consent obtained from patient/other participant(s)] : Verbal consent for telehealth/telephonic services obtained from patient/other participant(s) [Patient] : Patient [FreeTextEntry4] : 1:30pm [FreeTextEntry5] : 2:15pm

## 2023-06-07 DIAGNOSIS — F33.1 MAJOR DEPRESSIVE DISORDER, RECURRENT, MODERATE: ICD-10-CM

## 2023-06-12 ENCOUNTER — OUTPATIENT (OUTPATIENT)
Dept: OUTPATIENT SERVICES | Facility: HOSPITAL | Age: 53
LOS: 1 days | End: 2023-06-12
Payer: COMMERCIAL

## 2023-06-12 ENCOUNTER — APPOINTMENT (OUTPATIENT)
Dept: PSYCHIATRY | Facility: CLINIC | Age: 53
End: 2023-06-12

## 2023-06-12 DIAGNOSIS — F33.1 MAJOR DEPRESSIVE DISORDER, RECURRENT, MODERATE: ICD-10-CM

## 2023-06-12 DIAGNOSIS — Z98.890 OTHER SPECIFIED POSTPROCEDURAL STATES: Chronic | ICD-10-CM

## 2023-06-12 DIAGNOSIS — Z98.891 HISTORY OF UTERINE SCAR FROM PREVIOUS SURGERY: Chronic | ICD-10-CM

## 2023-06-12 PROCEDURE — 90834 PSYTX W PT 45 MINUTES: CPT | Mod: 95

## 2023-06-13 DIAGNOSIS — F33.1 MAJOR DEPRESSIVE DISORDER, RECURRENT, MODERATE: ICD-10-CM

## 2023-06-13 NOTE — BEHAVIORAL HEALTH
[[facility]] : [facility]  [Supportive Therapy] : [facility] Therapy [Return in ____ week(s)] : Return in [unfilled] week(s) [FreeTextEntry2] : Goal#1: To reduce anxiety \par Goal#1 Objective #1:Pt will explore and process feelings, identifying two triggers of anxiety.\par Goal# 1 Objective #2 :Pt will learn and implement two coping skills in order to reduce anxiety\par Goal #1 Objective #3: Pt will maintain monthly med management appointments.  :\par Goal#1 Reduce Depression\par Goal #1 Objective #1 Pt will explore and process feelings, identifying two triggers of depression.\par Goal#1 Objective #2 Pt will learn and implement two coping skills in order to reduce depression. \par Goal #1 Objective # 3 Pt will maintain monthly med management appointments.  \par \par \par \par \par . \par \par  [de-identified] : The focus of the session was on pt's feelings about her limitations. . Therapist provided active listening as pt shared often times she will avoid going out because it is a hassle to do so. Therapist validated her feelings and  explored how she felt when she did get out recently. Pt identified feeling pretty in her sandals , is able to report nice qualities about herself today and seemed brighter then last session.   .Therapist again tried to problem solved with pt ways to ease the transportation problems. which also inhibit pt from going places she would enjoy.  . Therapist provided empathy and support. After leaving a  for CM Bernadine's supervisor, Bernadine did leave therapist a  but has yet to return therapist's last call. Will phone supervisor again  as pt would benefit from [facility] services.  Pt was responsive to therapist support and intervention. [FreeTextEntry1] : Pt will adhere to all safety precautions related to COVID and will contact tx team for worsening of symptoms and/or problems with medication. Next appt:6/20\par \par

## 2023-06-13 NOTE — REASON FOR VISIT
[Access issues (e.g., transportation, impaired mobility, etc.)] : due to patient's access issues [Continuing, patient not seen in-person within last 12 months (provide details below)] : Telehealth services are continuing, patient not seen in-person within last 12 months.  [Telehealth (audio & video) - Individual/Group] : This visit was provided via telehealth using real-time 2-way audio visual technology. [Other Location: e.g. Home (Enter Location, City,State)___] : The provider was located at [unfilled]. [Home] : The patient, [unfilled], was located at home, [unfilled], at the time of the visit. [Verbal consent obtained from patient/other participant(s)] : Verbal consent for telehealth/telephonic services obtained from patient/other participant(s) [Patient] : Patient [FreeTextEntry4] : 1:00pm [FreeTextEntry5] : 1:45pm [FreeTextEntry1] : depression and anxiety

## 2023-06-16 ENCOUNTER — APPOINTMENT (OUTPATIENT)
Dept: PSYCHIATRY | Facility: CLINIC | Age: 53
End: 2023-06-16
Payer: COMMERCIAL

## 2023-06-16 ENCOUNTER — OUTPATIENT (OUTPATIENT)
Dept: OUTPATIENT SERVICES | Facility: HOSPITAL | Age: 53
LOS: 1 days | End: 2023-06-16
Payer: COMMERCIAL

## 2023-06-16 DIAGNOSIS — F41.1 GENERALIZED ANXIETY DISORDER: ICD-10-CM

## 2023-06-16 DIAGNOSIS — F33.1 MAJOR DEPRESSIVE DISORDER, RECURRENT, MODERATE: ICD-10-CM

## 2023-06-16 DIAGNOSIS — Z98.890 OTHER SPECIFIED POSTPROCEDURAL STATES: Chronic | ICD-10-CM

## 2023-06-16 DIAGNOSIS — Z98.891 HISTORY OF UTERINE SCAR FROM PREVIOUS SURGERY: Chronic | ICD-10-CM

## 2023-06-16 PROCEDURE — 99214 OFFICE O/P EST MOD 30 MIN: CPT | Mod: 95

## 2023-06-16 NOTE — HISTORY OF PRESENT ILLNESS
[FreeTextEntry1] : She sleeps well, experiences bouts of dysphoric mood,  intermittent episodes of tearfulness which she is able to manage. She struggles with pain and medical condition which initially began two years ago, coping mechanisms are discussed at length.  Her appetite is improved.  She takes Hydroxyzine sparingly. She ambulates with motorized vehicle and trying to engage in outdoor activities. She did not complete repeat home sleep study.  She continues to engage in social media, support groups and recently started attending [employer] in person. She has home visiting nurse three times weekly which will end next week and OT ends this week. Presently, she denies thoughts of harm to self. She will continue with therapist and receive Duloxetine prescription from neurologist, Dr. De Los Santos, who is in agreement. Total time in session: thirty minutes\par

## 2023-06-16 NOTE — PLAN
[Medication education provided] : Medication education provided. [Rationale for medication choices, possible risks/precautions, benefits, alternative treatment choices, and consequences of non-treatment discussed] : Rationale for medication choices, possible risks/precautions, benefits, alternative treatment choices, and consequences of non-treatment discussed with patient/family/caregiver  [FreeTextEntry4] : \par She will experience improvement in mood symptoms. \par She will experience improvement in anxiety symptoms.

## 2023-06-16 NOTE — DISCUSSION/SUMMARY
[FreeTextEntry1] : Dinora is a 51yo AA  female, depression, anxiety, PMHx of Neuromyelitis optima, paralyzed since 4/2021, neurological symptoms began 2/2021, post Moderna vaccine 1/2021. She sleeps well, managing her mood and anxiety symptoms. She will continue with therapist and receive Duloxetine prescription from neurologist, Dr. De Los Santos, who is in agreement. She is at low risk, no acute thoughts of harm to self, recent medical condition, positive family support, linked to treatment.\par \par Bupropion XL 300mg po am\par Hydroxyzine 50mg po prn\par  \par \par

## 2023-06-16 NOTE — PHYSICAL EXAM
[Intermittent] : intermittent [Cooperative] : cooperative [Clear] : clear [Linear/Goal Directed] : linear/goal directed [WNL] : within normal limits [Full] : full [FreeTextEntry1] : Seated [FreeTextEntry8] : 'feeling ok'

## 2023-06-17 DIAGNOSIS — F41.1 GENERALIZED ANXIETY DISORDER: ICD-10-CM

## 2023-06-17 DIAGNOSIS — F33.1 MAJOR DEPRESSIVE DISORDER, RECURRENT, MODERATE: ICD-10-CM

## 2023-06-19 ENCOUNTER — RX RENEWAL (OUTPATIENT)
Age: 53
End: 2023-06-19

## 2023-06-20 ENCOUNTER — APPOINTMENT (OUTPATIENT)
Dept: PSYCHIATRY | Facility: CLINIC | Age: 53
End: 2023-06-20

## 2023-06-20 ENCOUNTER — OUTPATIENT (OUTPATIENT)
Dept: OUTPATIENT SERVICES | Facility: HOSPITAL | Age: 53
LOS: 1 days | End: 2023-06-20
Payer: COMMERCIAL

## 2023-06-20 DIAGNOSIS — F33.1 MAJOR DEPRESSIVE DISORDER, RECURRENT, MODERATE: ICD-10-CM

## 2023-06-20 DIAGNOSIS — Z98.891 HISTORY OF UTERINE SCAR FROM PREVIOUS SURGERY: Chronic | ICD-10-CM

## 2023-06-20 DIAGNOSIS — Z98.890 OTHER SPECIFIED POSTPROCEDURAL STATES: Chronic | ICD-10-CM

## 2023-06-20 PROCEDURE — 90834 PSYTX W PT 45 MINUTES: CPT | Mod: 95

## 2023-06-20 NOTE — REASON FOR VISIT
[Access issues (e.g., transportation, impaired mobility, etc.)] : due to patient's access issues [Continuing, patient not seen in-person within last 12 months (provide details below)] : Telehealth services are continuing, patient not seen in-person within last 12 months.  [Telehealth (audio & video) - Individual/Group] : This visit was provided via telehealth using real-time 2-way audio visual technology. [Other Location: e.g. Home (Enter Location, City,State)___] : The provider was located at [unfilled]. [Home] : The patient, [unfilled], was located at home, [unfilled], at the time of the visit. [Verbal consent obtained from patient/other participant(s)] : Verbal consent for telehealth/telephonic services obtained from patient/other participant(s) [Patient] : Patient [FreeTextEntry4] : 12:00pm [FreeTextEntry5] : 12:45pm [FreeTextEntry1] : depression and anxiety

## 2023-06-20 NOTE — BEHAVIORAL HEALTH
[[facility]] : [facility]  [Supportive Therapy] : Supportive Therapy [Return in ____ week(s)] : Return in [unfilled] week(s) [FreeTextEntry2] : Goal#1: To reduce anxiety \par Goal#1 Objective #1:Pt will explore and process feelings, identifying two triggers of anxiety.\par Goal# 1 Objective #2 :Pt will learn and implement two coping skills in order to reduce anxiety\par Goal #1 Objective #3: Pt will maintain monthly med management appointments.  :\par Goal#1 Reduce Depression\par Goal #1 Objective #1 Pt will explore and process feelings, identifying two triggers of depression.\par Goal#1 Objective #2 Pt will learn and implement two coping skills in order to reduce depression. \par Goal #1 Objective # 3 Pt will maintain monthly med management appointments.  \par \par \par \par \par . \par \par  [de-identified] : The focus of the session was on again on pt's feelings about her physical limitations. . Therapist provided active listening as pt shared how she spend great deal of time ruminating and crying over things she will ever again be able to do such as driving a car.Pt reports that when eh cam out of the nursing home she thought she would be able to do much more but sadly she cannot, even lifting herself up is a challenge.  .Therapist validated pt feelings and due to constant rumination on negative thoughts, introduced the idea of "worry time" and explained how to use it. Pt is agreeable to this intervention, reporting she will try anything. It was also discussed how much better pt's life would be if her home was handicap accessible. Pt and therapist problem solved ways to make this happen.  Pt was responsive to therapist support and intervention. [FreeTextEntry1] : Pt will adhere to all safety precautions related to COVID and will contact tx team for worsening of symptoms and/or problems with medication. Next appt:6/26\par \par

## 2023-06-21 ENCOUNTER — APPOINTMENT (OUTPATIENT)
Dept: PULMONOLOGY | Facility: CLINIC | Age: 53
End: 2023-06-21
Payer: COMMERCIAL

## 2023-06-21 DIAGNOSIS — F33.1 MAJOR DEPRESSIVE DISORDER, RECURRENT, MODERATE: ICD-10-CM

## 2023-06-21 PROCEDURE — 99213 OFFICE O/P EST LOW 20 MIN: CPT | Mod: 95

## 2023-06-21 NOTE — PHYSICAL EXAM
[No Acute Distress] : no acute distress [Normal Oropharynx] : normal oropharynx [Normal Appearance] : normal appearance [No Neck Mass] : no neck mass [Normal Rate/Rhythm] : normal rate/rhythm [Normal S1, S2] : normal s1, s2 [No Murmurs] : no murmurs [No Resp Distress] : no resp distress [Clear to Auscultation Bilaterally] : clear to auscultation bilaterally [No Abnormalities] : no abnormalities [Benign] : benign [Normal Gait] : normal gait [No Clubbing] : no clubbing [No Cyanosis] : no cyanosis [No Edema] : no edema [FROM] : FROM [Normal Color/ Pigmentation] : normal color/ pigmentation [No Focal Deficits] : no focal deficits [Oriented x3] : oriented x3 [Normal Affect] : normal affect [TextBox_132] : Paraplegia

## 2023-06-21 NOTE — ASSESSMENT
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  HST inconclusive

## 2023-06-21 NOTE — HISTORY OF PRESENT ILLNESS
[Follow-Up - Routine Clinic] : a routine clinic follow-up of [Excessive Daytime Sleepiness] : excessive daytime sleepiness [Snoring] : snoring [Unrefreshing Sleep] : unrefreshing sleep [Currently Experiencing] : The patient is currently experiencing symptoms. [Intermittent] : intermittent [Doing Well] : doing well [Well Controlled] : Well controlled [Checks Regularly] : The patient checks ~his/her~ peak flow regularly [Good Control] : peak flow has been good [None] : None [Adherent] : the patient is adherent with ~his/her~ medication regimen [Goals--Doing Well] : the patient is doing well with ~his/her~ asthma goals [PFTs] : pulmonary function tests

## 2023-06-26 ENCOUNTER — OUTPATIENT (OUTPATIENT)
Dept: OUTPATIENT SERVICES | Facility: HOSPITAL | Age: 53
LOS: 1 days | End: 2023-06-26
Payer: COMMERCIAL

## 2023-06-26 ENCOUNTER — APPOINTMENT (OUTPATIENT)
Dept: PSYCHIATRY | Facility: CLINIC | Age: 53
End: 2023-06-26

## 2023-06-26 DIAGNOSIS — F33.1 MAJOR DEPRESSIVE DISORDER, RECURRENT, MODERATE: ICD-10-CM

## 2023-06-26 DIAGNOSIS — Z98.890 OTHER SPECIFIED POSTPROCEDURAL STATES: Chronic | ICD-10-CM

## 2023-06-26 DIAGNOSIS — Z98.891 HISTORY OF UTERINE SCAR FROM PREVIOUS SURGERY: Chronic | ICD-10-CM

## 2023-06-26 PROCEDURE — 90832 PSYTX W PT 30 MINUTES: CPT | Mod: 95

## 2023-06-26 NOTE — BEHAVIORAL HEALTH
[[facility]] [facility]  [[facility] Therapy] : Supportive Therapy [Return in ____ week(s)] : Return in [unfilled] week(s) [FreeTextEntry2] : Goal#1: To reduce anxiety \par Goal#1 Objective #1:Pt will explore and process feelings, identifying two triggers of anxiety.\par Goal# 1 Objective #2 :Pt will learn and implement two coping skills in order to reduce anxiety\par Goal #1 Objective #3: Pt will maintain monthly med management appointments.  :\par Goal#1 Reduce Depression\par Goal #1 Objective #1 Pt will explore and process feelings, identifying two triggers of depression.\par Goal#1 Objective #2 Pt will learn and implement two coping skills in order to reduce depression. \par Goal #1 Objective # 3 Pt will maintain monthly med management appointments.  \par \par \par \par \par . \par \par  [de-identified] : The focus of the session was on on pt's feelings about her physical limitations. . Therapist provided active listening as pt shared how her  took her son to sleep away camp and the photos she saw at the camp caused her to feel sad she was not there to experience that with him. Pt had asked to go along with  b ut he did not want to rent an accessible vehicle so that pt could ride along. .Therapist validated pt feelings and how she believe she is missing out on many "happy times" she sees other people having on social media. Therapist encouraged to to identify things she can do and finding a way to pick her son up from this camp in 2 weeks was something she felt she could arrange and enjoy.Pt was able to get in touch with health Home [facility] who said she put in a referral for transitional housing for pt. Pt was responsive to therapist support and intervention. [FreeTextEntry1] : Pt will adhere to all safety precautions related to COVID and will contact tx team for worsening of symptoms and/or problems with medication. Next appt:7/10\par \par

## 2023-06-26 NOTE — REASON FOR VISIT
[Access issues (e.g., transportation, impaired mobility, etc.)] : due to patient's access issues [Continuing, patient not seen in-person within last 12 months (provide details below)] : Telehealth services are continuing, patient not seen in-person within last 12 months.  [Telehealth (audio & video) - Individual/Group] : This visit was provided via telehealth using real-time 2-way audio visual technology. [Other Location: e.g. Home (Enter Location, City,State)___] : The provider was located at [unfilled]. [Home] : The patient, [unfilled], was located at home, [unfilled], at the time of the visit. [Verbal consent obtained from patient/other participant(s)] : Verbal consent for telehealth/telephonic services obtained from patient/other participant(s) [Patient] : Patient [FreeTextEntry4] : 11:00am [FreeTextEntry5] : 11:33am [FreeTextEntry1] : depression and anxiety

## 2023-06-27 DIAGNOSIS — F33.1 MAJOR DEPRESSIVE DISORDER, RECURRENT, MODERATE: ICD-10-CM

## 2023-07-06 ENCOUNTER — OUTPATIENT (OUTPATIENT)
Dept: OUTPATIENT SERVICES | Facility: HOSPITAL | Age: 53
LOS: 1 days | Discharge: HOME | End: 2023-07-06
Payer: COMMERCIAL

## 2023-07-06 ENCOUNTER — APPOINTMENT (OUTPATIENT)
Dept: SLEEP CENTER | Facility: HOSPITAL | Age: 53
End: 2023-07-06

## 2023-07-06 DIAGNOSIS — Z98.891 HISTORY OF UTERINE SCAR FROM PREVIOUS SURGERY: Chronic | ICD-10-CM

## 2023-07-06 DIAGNOSIS — Z98.890 OTHER SPECIFIED POSTPROCEDURAL STATES: Chronic | ICD-10-CM

## 2023-07-06 DIAGNOSIS — G47.33 OBSTRUCTIVE SLEEP APNEA (ADULT) (PEDIATRIC): ICD-10-CM

## 2023-07-06 PROCEDURE — 95800 SLP STDY UNATTENDED: CPT

## 2023-07-06 PROCEDURE — 99213 OFFICE O/P EST LOW 20 MIN: CPT | Mod: 95

## 2023-07-07 LAB
CD16+CD56+ CELLS # BLD: 109 CELLS/UL
CD16+CD56+ CELLS NFR BLD: 18 %
CD19 CELLS NFR BLD: 0 CELLS/UL
CD3 CELLS # BLD: 503 CELLS/UL
CD3 CELLS NFR BLD: 81 %
CD3+CD4+ CELLS # BLD: 376 CELLS/UL
CD3+CD4+ CELLS NFR BLD: 59 %
CD3+CD4+ CELLS/CD3+CD8+ CLL SPEC: 2.94 RATIO
CD3+CD8+ CELLS # SPEC: 128 CELLS/UL
CD3+CD8+ CELLS NFR BLD: 20 %
CELLS.CD3-CD19+/CELLS IN BLOOD: <1 %

## 2023-07-10 ENCOUNTER — APPOINTMENT (OUTPATIENT)
Dept: PSYCHIATRY | Facility: CLINIC | Age: 53
End: 2023-07-10

## 2023-07-10 ENCOUNTER — OUTPATIENT (OUTPATIENT)
Dept: OUTPATIENT SERVICES | Facility: HOSPITAL | Age: 53
LOS: 1 days | End: 2023-07-10
Payer: COMMERCIAL

## 2023-07-10 DIAGNOSIS — Z98.890 OTHER SPECIFIED POSTPROCEDURAL STATES: Chronic | ICD-10-CM

## 2023-07-10 DIAGNOSIS — F33.1 MAJOR DEPRESSIVE DISORDER, RECURRENT, MODERATE: ICD-10-CM

## 2023-07-10 PROCEDURE — 90832 PSYTX W PT 30 MINUTES: CPT | Mod: 95

## 2023-07-11 DIAGNOSIS — F33.1 MAJOR DEPRESSIVE DISORDER, RECURRENT, MODERATE: ICD-10-CM

## 2023-07-11 DIAGNOSIS — G47.33 OBSTRUCTIVE SLEEP APNEA (ADULT) (PEDIATRIC): ICD-10-CM

## 2023-07-11 NOTE — BEHAVIORAL HEALTH
[[facility]] : [facility]  [Supportive Therapy] : Supportive Therapy [Return in ____ week(s)] : Return in [unfilled] week(s) [FreeTextEntry2] : Goal#1: To reduce anxiety \par Goal#1 Objective #1:Pt will explore and process feelings, identifying two triggers of anxiety.\par Goal# 1 Objective #2 :Pt will learn and implement two coping skills in order to reduce anxiety\par Goal #1 Objective #3: Pt will maintain monthly med management appointments.  :\par Goal#1 Reduce Depression\par Goal #1 Objective #1 Pt will explore and process feelings, identifying two triggers of depression.\par Goal#1 Objective #2 Pt will learn and implement two coping skills in order to reduce depression. \par Goal #1 Objective # 3 Pt will maintain monthly med management appointments.  \par \par \par \par \par . \par \par  [de-identified] : The focus of the session was on on pt's feelings about her marriage. Therapist provided active listening as pt shared how she had planned to end her marriage before her paralysis and was going to start dating. Pt felt unfulfilled and that she was not as priority for her . Pt continues to feel this way and feel badly that she did not get the chance to find a more loving caring relationship.  .Therapist validated pt feelings and empathized with how dating would be more challenging for her now but not impossible. Therapist encouraged to to identify things she can do instead of things she cannot to gain a sense of empowerment and boost outlook. Pt was reminded to try to use "worry time" to feel up her mind from constant rumination. Pt continues to exhibit crying bouts and low mood on some days. Pt was responsive to therapist support and intervention. [FreeTextEntry1] : Pt  will contact tx team for worsening of symptoms and/or problems with medication. Next appt:7/17\par \par

## 2023-07-11 NOTE — REASON FOR VISIT
[Access issues (e.g., transportation, impaired mobility, etc.)] : due to patient's access issues [Continuing, patient not seen in-person within last 12 months (provide details below)] : Telehealth services are continuing, patient not seen in-person within last 12 months.  [Telehealth (audio & video) - Individual/Group] : This visit was provided via telehealth using real-time 2-way audio visual technology. [Other Location: e.g. Home (Enter Location, City,State)___] : The provider was located at [unfilled]. [Home] : The patient, [unfilled], was located at home, [unfilled], at the time of the visit. [Verbal consent obtained from patient/other participant(s)] : Verbal consent for telehealth/telephonic services obtained from patient/other participant(s) [Patient] : Patient [FreeTextEntry4] : 12:15pm [FreeTextEntry5] : 12:45pm [FreeTextEntry1] : depression and anxiety

## 2023-07-14 ENCOUNTER — APPOINTMENT (OUTPATIENT)
Dept: PEDIATRIC ALLERGY IMMUNOLOGY | Facility: CLINIC | Age: 53
End: 2023-07-14
Payer: COMMERCIAL

## 2023-07-14 VITALS — BODY MASS INDEX: 46.99 KG/M2 | SYSTOLIC BLOOD PRESSURE: 128 MMHG | WEIGHT: 293 LBS | DIASTOLIC BLOOD PRESSURE: 82 MMHG

## 2023-07-14 DIAGNOSIS — H10.13 ACUTE ATOPIC CONJUNCTIVITIS, BILATERAL: ICD-10-CM

## 2023-07-14 DIAGNOSIS — J30.1 ALLERGIC RHINITIS DUE TO POLLEN: ICD-10-CM

## 2023-07-14 PROCEDURE — 99214 OFFICE O/P EST MOD 30 MIN: CPT

## 2023-07-14 RX ORDER — AZELASTINE HYDROCHLORIDE 137 UG/1
0.1 SPRAY, METERED NASAL
Qty: 60 | Refills: 1 | Status: ACTIVE | COMMUNITY
Start: 2022-04-01 | End: 1900-01-01

## 2023-07-14 RX ORDER — AZELASTINE HYDROCHLORIDE 0.5 MG/ML
0.05 SOLUTION/ DROPS OPHTHALMIC
Qty: 6 | Refills: 0 | Status: ACTIVE | COMMUNITY
Start: 2022-04-01 | End: 1900-01-01

## 2023-07-14 RX ORDER — FEXOFENADINE HYDROCHLORIDE 180 MG/1
180 TABLET ORAL
Qty: 90 | Refills: 1 | Status: ACTIVE | COMMUNITY
Start: 2022-04-01 | End: 1900-01-01

## 2023-07-14 NOTE — PHYSICAL EXAM
[Alert] : alert [Well Nourished] : well nourished [No Acute Distress] : no acute distress [Well Developed] : well developed [Normal Pupil & Iris Size/Symmetry] : normal pupil and iris size and symmetry [No Discharge] : no discharge [No Photophobia] : no photophobia [Sclera Not Icteric] : sclera not icteric [Supple] : the neck was supple [Normal Rate and Effort] : normal respiratory rhythm and effort [No Crackles] : no crackles [No Retractions] : no retractions [Bilateral Audible Breath Sounds] : bilateral audible breath sounds [Normal Rate] : heart rate was normal  [Normal S1, S2] : normal S1 and S2 [No murmur] : no murmur [Regular Rhythm] : with a regular rhythm [Skin Intact] : skin intact  [No Rash] : no rash [No Skin Lesions] : no skin lesions [Normal Mood] : mood was normal [Normal Affect] : affect was normal [Alert, Awake, Oriented as Age-Appropriate] : alert, awake, oriented as age appropriate [de-identified] : Pt in wheelchair, no motion of LE

## 2023-07-14 NOTE — REVIEW OF SYSTEMS
[Nl] : Genitourinary [Immunizations are up to date] : Immunizations are up to date [Received Influenza Vaccine this Past Year] : Patient has received the Influenza vaccine this past year [FreeTextEntry4] : itchy ears.

## 2023-07-14 NOTE — HISTORY OF PRESENT ILLNESS
[de-identified] : ANNMARIE MCCULLOUGH is a 51 year female with a history of nasal itchiness, runny nose, watery and itchy eyes. She states she had sxs for about a 2 week span in the month of May.  She recently had a home sleep study ordered by pulmonologist and waiting on results.  She takes allegra daily and in the spring adds fluticasone nasal spray and azelastine eye drops.  \par \par Current Asthma Symptoms: The patient is currently asymptomatic.

## 2023-07-17 ENCOUNTER — APPOINTMENT (OUTPATIENT)
Dept: PSYCHIATRY | Facility: CLINIC | Age: 53
End: 2023-07-17

## 2023-07-17 ENCOUNTER — OUTPATIENT (OUTPATIENT)
Dept: OUTPATIENT SERVICES | Facility: HOSPITAL | Age: 53
LOS: 1 days | End: 2023-07-17
Payer: COMMERCIAL

## 2023-07-17 DIAGNOSIS — Z98.890 OTHER SPECIFIED POSTPROCEDURAL STATES: Chronic | ICD-10-CM

## 2023-07-17 DIAGNOSIS — F33.1 MAJOR DEPRESSIVE DISORDER, RECURRENT, MODERATE: ICD-10-CM

## 2023-07-17 PROCEDURE — 90832 PSYTX W PT 30 MINUTES: CPT | Mod: 95

## 2023-07-17 NOTE — REASON FOR VISIT
[Access issues (e.g., transportation, impaired mobility, etc.)] : due to patient's access issues [Continuing, patient not seen in-person within last 12 months (provide details below)] : Telehealth services are continuing, patient not seen in-person within last 12 months.  [Telehealth (audio & video) - Individual/Group] : This visit was provided via telehealth using real-time 2-way audio visual technology. [Other Location: e.g. Home (Enter Location, City,State)___] : The provider was located at [unfilled]. [Home] : The patient, [unfilled], was located at home, [unfilled], at the time of the visit. [Verbal consent obtained from patient/other participant(s)] : Verbal consent for telehealth/telephonic services obtained from patient/other participant(s) [Patient] : Patient [FreeTextEntry4] : 11:00am [FreeTextEntry5] : 11:30am [FreeTextEntry1] : depression and anxiety

## 2023-07-17 NOTE — BEHAVIORAL HEALTH
[[facility]] [facility]  [[facility] Therapy] : Support[facility] Therapy [Return in ____ week(s)] : Return in [unfilled] week(s) [FreeTextEntry2] : Goal#1: To reduce anxiety \par Goal#1 Objective #1:Pt will explore and process feelings, identifying two triggers of anxiety.\par Goal# 1 Objective #2 :Pt will learn and implement two coping skills in order to reduce anxiety\par Goal #1 Objective #3: Pt will maintain monthly med management appointments.  :\par Goal#1 Reduce Depression\par Goal #1 Objective #1 Pt will explore and process feelings, identifying two triggers of depression.\par Goal#1 Objective #2 Pt will learn and implement two coping skills in order to reduce depression. \par Goal #1 Objective # 3 Pt will maintain monthly med management appointments.  \par \par \par \par \par . \par \par  [de-identified] : The focus of the session was on fianding pt resources in the community .  Therapist provided active listening as pt shared how she heard from [facility] and it appears she may not  be eligible  for the transitional housing. due to certain circumstances. Pt reported that the person who had her applications as let go and so the process is taking longer  .Therapist suggested that we look into other supports such as [facility] for housing and pt agreed to contact them and pursue this on her own, Pt continues to feel down, focusing on what she cannot do as opposed to what she can and therapist suggested pt try a support group. Therapist provided pt with information on the zoom groups offered by [facility] ( one called "[employer] [facility]") and pt agreed to look into this and that it could help. Pt was responsive to therapist support and i interventions. [FreeTextEntry1] : Pt  will contact tx team for worsening of symptoms and/or problems with medication. Next appt:7/24\par \par

## 2023-07-18 DIAGNOSIS — F33.1 MAJOR DEPRESSIVE DISORDER, RECURRENT, MODERATE: ICD-10-CM

## 2023-07-24 ENCOUNTER — OUTPATIENT (OUTPATIENT)
Dept: OUTPATIENT SERVICES | Facility: HOSPITAL | Age: 53
LOS: 1 days | End: 2023-07-24
Payer: COMMERCIAL

## 2023-07-24 ENCOUNTER — APPOINTMENT (OUTPATIENT)
Dept: PSYCHIATRY | Facility: CLINIC | Age: 53
End: 2023-07-24

## 2023-07-24 DIAGNOSIS — F33.1 MAJOR DEPRESSIVE DISORDER, RECURRENT, MODERATE: ICD-10-CM

## 2023-07-24 DIAGNOSIS — Z98.891 HISTORY OF UTERINE SCAR FROM PREVIOUS SURGERY: Chronic | ICD-10-CM

## 2023-07-24 DIAGNOSIS — Z98.890 OTHER SPECIFIED POSTPROCEDURAL STATES: Chronic | ICD-10-CM

## 2023-07-24 PROCEDURE — 90832 PSYTX W PT 30 MINUTES: CPT | Mod: 95

## 2023-07-24 NOTE — BEHAVIORAL HEALTH
[[facility]] : [facility]  [[facility] Therapy] : Support[facility] Therapy [Return in ____ week(s)] : Return in [unfilled] week(s) [FreeTextEntry2] : Goal#1: To reduce anxiety \par Goal#1 Objective #1:Pt will explore and process feelings, identifying two triggers of anxiety.\par Goal# 1 Objective #2 :Pt will learn and implement two coping skills in order to reduce anxiety\par Goal #1 Objective #3: Pt will maintain monthly med management appointments.  :\par Goal#1 Reduce Depression\par Goal #1 Objective #1 Pt will explore and process feelings, identifying two triggers of depression.\par Goal#1 Objective #2 Pt will learn and implement two coping skills in order to reduce depression. \par Goal #1 Objective # 3 Pt will maintain monthly med management appointments.  \par \par \par \par \par . \par \par  [de-identified] : The focus of the session was on pt's feelings about getting out last weekend.  Therapist provided active listening as pt shared how she went out of the house both days of the weekend and one of the days took a picture she felt good about. Pt reports she took her son to the community pool 2x and engaged in regular activities, even wanting to bake a dessert.  .Therapist validated pt improved mood and pointed out how good it was for her to get out and engage in life again. Pt was again encouraged to focus on hat she can do as opposed to can;t and she seemed to do tat last weekend. 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  Therapist also encouraged to to look into the zoom groups offered by [facility] this week.  Pt was responsive to therapist support and i interventions. [FreeTextEntry1] : Pt  will contact tx team for worsening of symptoms and/or problems with medication. Next appt:7/31\par \par

## 2023-07-25 DIAGNOSIS — F33.1 MAJOR DEPRESSIVE DISORDER, RECURRENT, MODERATE: ICD-10-CM

## 2023-07-31 ENCOUNTER — RX RENEWAL (OUTPATIENT)
Age: 53
End: 2023-07-31

## 2023-07-31 ENCOUNTER — APPOINTMENT (OUTPATIENT)
Dept: PSYCHIATRY | Facility: CLINIC | Age: 53
End: 2023-07-31

## 2023-07-31 ENCOUNTER — OUTPATIENT (OUTPATIENT)
Dept: OUTPATIENT SERVICES | Facility: HOSPITAL | Age: 53
LOS: 1 days | End: 2023-07-31
Payer: COMMERCIAL

## 2023-07-31 DIAGNOSIS — F33.1 MAJOR DEPRESSIVE DISORDER, RECURRENT, MODERATE: ICD-10-CM

## 2023-07-31 DIAGNOSIS — Z98.890 OTHER SPECIFIED POSTPROCEDURAL STATES: Chronic | ICD-10-CM

## 2023-07-31 DIAGNOSIS — Z98.891 HISTORY OF UTERINE SCAR FROM PREVIOUS SURGERY: Chronic | ICD-10-CM

## 2023-07-31 PROCEDURE — 90834 PSYTX W PT 45 MINUTES: CPT | Mod: 95

## 2023-07-31 RX ORDER — FLUTICASONE PROPIONATE 50 UG/1
50 SPRAY, METERED NASAL
Qty: 48 | Refills: 1 | Status: ACTIVE | COMMUNITY
Start: 2022-04-01 | End: 1900-01-01

## 2023-07-31 NOTE — REASON FOR VISIT
[Access issues (e.g., transportation, impaired mobility, etc.)] : due to patient's access issues [Continuing, patient not seen in-person within last 12 months (provide details below)] : Telehealth services are continuing, patient not seen in-person within last 12 months.  [Telehealth (audio & video) - Individual/Group] : This visit was provided via telehealth using real-time 2-way audio visual technology. [Other Location: e.g. Home (Enter Location, City,State)___] : The provider was located at [unfilled]. [Home] : The patient, [unfilled], was located at home, [unfilled], at the time of the visit. [Verbal consent obtained from patient/other participant(s)] : Verbal consent for telehealth/telephonic services obtained from patient/other participant(s) [FreeTextEntry4] : 11:00am [FreeTextEntry5] : 11:45am [Patient] : Patient [FreeTextEntry1] : depression and anxiety

## 2023-07-31 NOTE — BEHAVIORAL HEALTH
[FreeTextEntry2] : Goal#1: To reduce anxiety \par  Goal#1 Objective #1:Pt will explore and process feelings, identifying two triggers of anxiety.\par  Goal# 1 Objective #2 :Pt will learn and implement two coping skills in order to reduce anxiety\par  Goal #1 Objective #3: Pt will maintain monthly med management appointments.  :\par  Goal#1 Reduce Depression\par  Goal #1 Objective #1 Pt will explore and process feelings, identifying two triggers of depression.\par  Goal#1 Objective #2 Pt will learn and implement two coping skills in order to reduce depression. \par  Goal #1 Objective # 3 Pt will maintain monthly med management appointments.  \par  \par  \par  \par  \par  . \par  \par   [[facility]] : [facility]  [Supportive Therapy] : Supportive Therapy [de-identified] : The focus of the session was on pt's feelings about getting out last weekend.  Therapist provided active listening as pt shared how she went out of the house both days of the weekend and one of the days took a picture she felt good about. Pt reports she took her son to the community pool 2x and engaged in regular activities, even wanting to bake a dessert.  .Therapist validated pt improved mood and pointed out how good it was for her to get out and engage in life again. Pt was again encouraged to focus on hat she can do as opposed to can;t and she seemed to do tat last weekend. 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  Therapist also encouraged to to look into the zoom groups offered by [facility] this week.  Pt was responsive to therapist support and i interventions. [Return in ____ week(s)] : Return in [unfilled] week(s) [FreeTextEntry1] : Pt  will contact tx team for worsening of symptoms and/or problems with medication. Next appt:8/7

## 2023-07-31 NOTE — END OF VISIT
[Duration of Psychotherapy Visit (minutes spent in synchronous communication): ____] : Duration of Psychotherapy Visit (minutes spent in synchronous communication): [unfilled] [Individual Psychotherapy for 16-37 minutes] : Individual Psychotherapy for 16-37 minutes [Individual Psychotherapy for 38-52 minutes] : Individual Psychotherapy for 38 - 52 minutes [Licensed Clinician] : Licensed Clinician

## 2023-08-01 DIAGNOSIS — F33.1 MAJOR DEPRESSIVE DISORDER, RECURRENT, MODERATE: ICD-10-CM

## 2023-08-07 ENCOUNTER — APPOINTMENT (OUTPATIENT)
Dept: PSYCHIATRY | Facility: CLINIC | Age: 53
End: 2023-08-07

## 2023-08-07 ENCOUNTER — OUTPATIENT (OUTPATIENT)
Dept: OUTPATIENT SERVICES | Facility: HOSPITAL | Age: 53
LOS: 1 days | End: 2023-08-07
Payer: COMMERCIAL

## 2023-08-07 DIAGNOSIS — Z98.890 OTHER SPECIFIED POSTPROCEDURAL STATES: Chronic | ICD-10-CM

## 2023-08-07 DIAGNOSIS — F33.1 MAJOR DEPRESSIVE DISORDER, RECURRENT, MODERATE: ICD-10-CM

## 2023-08-07 DIAGNOSIS — Z98.891 HISTORY OF UTERINE SCAR FROM PREVIOUS SURGERY: Chronic | ICD-10-CM

## 2023-08-07 PROCEDURE — 90834 PSYTX W PT 45 MINUTES: CPT | Mod: 95

## 2023-08-08 DIAGNOSIS — F33.1 MAJOR DEPRESSIVE DISORDER, RECURRENT, MODERATE: ICD-10-CM

## 2023-08-08 NOTE — BEHAVIORAL HEALTH
[[facility]] : [facility]  [[facility]ive Therapy] : Support[facility] Therapy [Other: ____] : [unfilled] [Return in ____ week(s)] : Return in [unfilled] week(s) [FreeTextEntry2] : Goal 1: Pt will shift her mind-set from focus on what she "can't do" to what she "can do".  Obj A Pt will engage in 1-2 activities per week that create enjoyment in her life.   Obj B Pt will lower her score on the PHQ-9 from 17 (moderately severe depression) to 10-14 (moderate depression)   Goal2 To reduce the amount of time spent worrying on different things. Obj A Pt will reduce score on the GAD7 from 16 (severe anxiety) to between 10-14 ( moderate anxiety) Obj B Pt will learn and implement two coping skills in order to reduce anxiety  Goal 3  To have less pain daily" Obj A Pt's pain level will be reduced from daily to 3-4 days per week Obj B Pt will attend all medical appts and take all medications as prescribed.  [de-identified] : The focus of the session was on pt's treatment plan review.   Therapist collaborated with pt on her treatment plan today and began by uing the PHQ-9 assessment and the INGRID assessment. Pt scored high on both scales indicating that we must continue to work on  reducing symptoms of both  anxiety and depression.. Therapist then used the remaining time to assist pt in identifying measurable treatment goals to work toward for the next year long treatment period. Pt would like to find activities to engage in that she ill allow herself to enjoy rather then deciding she cannot enjoy activites due to her medical condition. Pt will reduce the negative thoughts about herself and her life, will push herself to get out more even though is is difficulty for her and will actively loo for new housing. Therapist will continue to provide encouragement, empathy and possible solutions to pt's stressors and problems. [FreeTextEntry1] : Pt  will contact tx team for worsening of symptoms and/or problems with medication. Next appt:8/14

## 2023-08-08 NOTE — REASON FOR VISIT
[Access issues (e.g., transportation, impaired mobility, etc.)] : due to patient's access issues [Continuing, patient not seen in-person within last 12 months (provide details below)] : Telehealth services are continuing, patient not seen in-person within last 12 months.  [Telehealth (audio & video) - Individual/Group] : This visit was provided via telehealth using real-time 2-way audio visual technology. [Other Location: e.g. Home (Enter Location, City,State)___] : The provider was located at [unfilled]. [Home] : The patient, [unfilled], was located at home, [unfilled], at the time of the visit. [Verbal consent obtained from patient/other participant(s)] : Verbal consent for telehealth/telephonic services obtained from patient/other participant(s) [Patient] : Patient [FreeTextEntry4] : 11:00am [FreeTextEntry5] : 11:45am [FreeTextEntry1] : depression and anxiety

## 2023-08-08 NOTE — PHYSICAL EXAM
[Cooperative] : cooperative [Euthymic] : euthymic [Full] : full [Clear] : clear [Linear/Goal Directed] : linear/goal directed [None Reported] : none reported [Average] : average [WNL] : within normal limits [Individual reports tobacco use during the last 30 days?] : Individual reports tobacco use during the last 30 days? No

## 2023-08-08 NOTE — DISCUSSION/SUMMARY
[Plan Review] : Plan Review [Able to manage surrounding demands and opportunities] : able to manage surrounding demands and opportunities [Able to exercise self-direction] : able to exercise self-direction [Able to set and pursue goals] : able to set and pursue goals [Adherent to treatment recommendations] : adherent to treatment recommendations [Articulate] : articulate [Cognitively intact] : cognitively intact [Good impulse control] : good impulse control [Insightful] : insightful [Intelligent] : intelligent [Motivated to participate in treatment] : motivated to participate in treatment [Motivated and ready for change] : motivated and ready for change [Health literacy] : health literacy [Motivated to maintain or improve physical health] : motivated to maintain or improve physical health [Financially stable] : financially stable [Part of a supportive family] : part of a supportive family [Housing stability] : housing stability [High level of education] : high level of education [English fluency] : English fluency [Connected to healthcare] : connected to healthcare [Social supports] : social supports [Mental Health] : Mental Health [every ___ months] : every [unfilled] months [every ___ weeks] : every [unfilled] weeks [Improvement in symptoms as evidenced by:] : Improvement in symptoms as evidenced by: [Attainment of higher level of functioning as evidenced by:] : Attainment of higher level of functioning as evidenced by: [Treatment is no longer medically necessary as evidenced by:] : Treatment is no longer medically necessary as evidenced by: [A change in level of care is needed as evidenced by:] : A change in level of care is needed as evidenced by: [Other rationale for transition/discharge:] : Other rationale for transition/discharge: [None - Reason others did not participate:] : None - Reason others did not participate:  [Yes] : Yes [Psychiatric Provider/Prescriber] : Psychiatric Provider/Prescriber [Therapist] : Therapist [Tobacco Screening Completed?] : Tobacco Screening Completed: Yes [Continued - Progress made] : Continued - Progress made: [Physical Health] : Physical Health [Initial] : Initial [FreeTextEntry2] : 8/7/23 [FreeTextEntry3] : 3/9/22 [FreeTextEntry8] : paraplegia [FreeTextEntry9] : none [Revised - Rationale] : Revised - Rationale: [de-identified] : This goal is measurable.  [de-identified] : Pt has processed feelings and identified some triggers and now wishes to come up more solutions. [de-identified] : This goal is measurable.   [FreeTextEntry1] : Paraplegia [FreeTextEntry4] : " To have less pain daily"  [de-identified] : Pt's pain level will be reduced from daily to 3-4 days per week [de-identified] : 8/4/24 [de-identified] : Pt will attend all medical appts and take all medications as prescribed.  [de-identified] : 8/4/24 [FreeTextEntry5] : 1) Clinician will explroe pain relief options available to pt. 2) Clinician will teach pt coping tools such as mindfulness strategies to distract pt from pain.  3) Clinician will monitor and encourage pt to attend all medical appt/take her meds.  [de-identified] : The patient expresses improvement in performing activities of daily living and/or says progress with initial complaint symptoms. In addition, the treatment team will conduct diagnose-based screenings as needed. [de-identified] : The patient expresses improvement in performing activities of daily living and/or says progress with initial complaint symptoms. In addition, the treatment team will conduct diagnose-based screenings as needed. [de-identified] :  If the patient is unable to perform activities of daily living and/or expresses suicidal ideation, a higher level of care will be considered.    If the patient is unable to perform activities of daily living and/or expresses suicidal ideation, a higher level of care will be considered. [de-identified] : Other rationales for transition/discharge are granted/applied upon the patient DNC, relocate, self-termination, and/or requests for the treatment termination/transfer to another facility.  Other rationales for transition/discharge are granted/applied upon the patient DNC, relocate, self-termination, and/or requests for the treatment termination/transfer to another facility. [de-identified] : Not clinically indicated

## 2023-08-08 NOTE — DISCUSSION/SUMMARY
[Plan Review] : Plan Review [Able to manage surrounding demands and opportunities] : able to manage surrounding demands and opportunities [Able to exercise self-direction] : able to exercise self-direction [Able to set and pursue goals] : able to set and pursue goals [Adherent to treatment recommendations] : adherent to treatment recommendations [Articulate] : articulate [Cognitively intact] : cognitively intact [Good impulse control] : good impulse control [Insightful] : insightful [Intelligent] : intelligent [Motivated to participate in treatment] : motivated to participate in treatment [Motivated and ready for change] : motivated and ready for change [Health literacy] : health literacy [Motivated to maintain or improve physical health] : motivated to maintain or improve physical health [Financially stable] : financially stable [Part of a supportive family] : part of a supportive family [Housing stability] : housing stability [High level of education] : high level of education [English fluency] : English fluency [Connected to healthcare] : connected to healthcare [Social supports] : social supports [Mental Health] : Mental Health [every ___ months] : every [unfilled] months [every ___ weeks] : every [unfilled] weeks [Improvement in symptoms as evidenced by:] : Improvement in symptoms as evidenced by: [Attainment of higher level of functioning as evidenced by:] : Attainment of higher level of functioning as evidenced by: [Treatment is no longer medically necessary as evidenced by:] : Treatment is no longer medically necessary as evidenced by: [A change in level of care is needed as evidenced by:] : A change in level of care is needed as evidenced by: [Other rationale for transition/discharge:] : Other rationale for transition/discharge: [None - Reason others did not participate:] : None - Reason others did not participate:  [Yes] : Yes [Psychiatric Provider/Prescriber] : Psychiatric Provider/Prescriber [Therapist] : Therapist [Tobacco Screening Completed?] : Tobacco Screening Completed: Yes [Continued - Progress made] : Continued - Progress made: [Physical Health] : Physical Health [Initial] : Initial [FreeTextEntry2] : 8/7/23 [FreeTextEntry3] : 3/9/22 [FreeTextEntry8] : paraplegia [FreeTextEntry9] : none [Revised - Rationale] : Revised - Rationale: [de-identified] : This goal is measurable.  [de-identified] : Pt has processed feelings and identified some triggers and now wishes to come up more solutions. [de-identified] : This goal is measurable.   [FreeTextEntry1] : Paraplegia [FreeTextEntry4] : " To have less pain daily"  [de-identified] : Pt's pain level will be reduced from daily to 3-4 days per week [de-identified] : 8/4/24 [de-identified] : Pt will attend all medical appts and take all medications as prescribed.  [de-identified] : 8/4/24 [FreeTextEntry5] : 1) Clinician will explroe pain relief options available to pt. 2) Clinician will teach pt coping tools such as mindfulness strategies to distract pt from pain.  3) Clinician will monitor and encourage pt to attend all medical appt/take her meds.  [de-identified] : The patient expresses improvement in performing activities of daily living and/or says progress with initial complaint symptoms. In addition, the treatment team will conduct diagnose-based screenings as needed. [de-identified] : The patient expresses improvement in performing activities of daily living and/or says progress with initial complaint symptoms. In addition, the treatment team will conduct diagnose-based screenings as needed. [de-identified] :  If the patient is unable to perform activities of daily living and/or expresses suicidal ideation, a higher level of care will be considered.    If the patient is unable to perform activities of daily living and/or expresses suicidal ideation, a higher level of care will be considered. [de-identified] : Other rationales for transition/discharge are granted/applied upon the patient DNC, relocate, self-termination, and/or requests for the treatment termination/transfer to another facility.  Other rationales for transition/discharge are granted/applied upon the patient DNC, relocate, self-termination, and/or requests for the treatment termination/transfer to another facility. [de-identified] : Not clinically indicated

## 2023-08-08 NOTE — BEHAVIORAL HEALTH
[[facility]] : [facility]  [[facility]ive Therapy] : Supportive Therapy [Other: ____] : [unfilled] [Return in ____ week(s)] : Return in [unfilled] week(s) [FreeTextEntry2] : Goal 1: Pt will shift her mind-set from focus on what she "can't do" to what she "can do".  Obj A Pt will engage in 1-2 activities per week that create enjoyment in her life.   Obj B Pt will lower her score on the PHQ-9 from 17 (moderately severe depression) to 10-14 (moderate depression)   Goal2 To reduce the amount of time spent worrying on different things. Obj A Pt will reduce score on the GAD7 from 16 (severe anxiety) to between 10-14 ( moderate anxiety) Obj B Pt will learn and implement two coping skills in order to reduce anxiety  Goal 3  To have less pain daily" Obj A Pt's pain level will be reduced from daily to 3-4 days per week Obj B Pt will attend all medical appts and take all medications as prescribed.  [de-identified] : The focus of the session was on pt's treatment plan review.   Therapist collaborated with pt on her treatment plan today and began by uing the PHQ-9 assessment and the INGRID assessment. Pt scored high on both scales indicating that we must continue to work on  reducing symptoms of both  anxiety and depression.. Therapist then used the remaining time to assist pt in identifying measurable treatment goals to work toward for the next year long treatment period. Pt would like to find activities to engage in that she ill allow herself to enjoy rather then deciding she cannot enjoy activites due to her medical condition. Pt will reduce the negative thoughts about herself and her life, will push herself to get out more even though is is difficulty for her and will actively loo for new housing. Therapist will continue to provide encouragement, empathy and possible solutions to pt's stressors and problems. [FreeTextEntry1] : Pt  will contact tx team for worsening of symptoms and/or problems with medication. Next appt:8/14

## 2023-08-09 ENCOUNTER — OUTPATIENT (OUTPATIENT)
Dept: OUTPATIENT SERVICES | Facility: HOSPITAL | Age: 53
LOS: 1 days | End: 2023-08-09
Payer: COMMERCIAL

## 2023-08-09 DIAGNOSIS — Z98.890 OTHER SPECIFIED POSTPROCEDURAL STATES: Chronic | ICD-10-CM

## 2023-08-09 DIAGNOSIS — Z98.891 HISTORY OF UTERINE SCAR FROM PREVIOUS SURGERY: Chronic | ICD-10-CM

## 2023-08-09 DIAGNOSIS — Z12.31 ENCOUNTER FOR SCREENING MAMMOGRAM FOR MALIGNANT NEOPLASM OF BREAST: ICD-10-CM

## 2023-08-09 PROCEDURE — 77067 SCR MAMMO BI INCL CAD: CPT | Mod: 26

## 2023-08-09 PROCEDURE — 77063 BREAST TOMOSYNTHESIS BI: CPT | Mod: 26

## 2023-08-09 PROCEDURE — 77063 BREAST TOMOSYNTHESIS BI: CPT

## 2023-08-09 PROCEDURE — 77067 SCR MAMMO BI INCL CAD: CPT

## 2023-08-10 ENCOUNTER — NON-APPOINTMENT (OUTPATIENT)
Age: 53
End: 2023-08-10

## 2023-08-10 DIAGNOSIS — Z12.31 ENCOUNTER FOR SCREENING MAMMOGRAM FOR MALIGNANT NEOPLASM OF BREAST: ICD-10-CM

## 2023-08-14 ENCOUNTER — OUTPATIENT (OUTPATIENT)
Dept: OUTPATIENT SERVICES | Facility: HOSPITAL | Age: 53
LOS: 1 days | End: 2023-08-14
Payer: COMMERCIAL

## 2023-08-14 ENCOUNTER — NON-APPOINTMENT (OUTPATIENT)
Age: 53
End: 2023-08-14

## 2023-08-14 ENCOUNTER — APPOINTMENT (OUTPATIENT)
Dept: PSYCHIATRY | Facility: CLINIC | Age: 53
End: 2023-08-14

## 2023-08-14 DIAGNOSIS — F33.1 MAJOR DEPRESSIVE DISORDER, RECURRENT, MODERATE: ICD-10-CM

## 2023-08-14 DIAGNOSIS — Z98.890 OTHER SPECIFIED POSTPROCEDURAL STATES: Chronic | ICD-10-CM

## 2023-08-14 PROCEDURE — 90834 PSYTX W PT 45 MINUTES: CPT | Mod: 95

## 2023-08-14 NOTE — BEHAVIORAL HEALTH
[[facility]] : [facility]  [[facility]ive Therapy] : Supportive Therapy [Other: ____] : [unfilled] [Return in ____ week(s)] : Return in [unfilled] week(s) [FreeTextEntry2] : Goal 1: Pt will shift her mind-set from focus on what she "can't do" to what she "can do".  Obj A Pt will engage in 1-2 activities per week that create enjoyment in her life.   Obj B Pt will lower her score on the PHQ-9 from 17 (moderately severe depression) to 10-14 (moderate depression)   Goal2 To reduce the amount of time spent worrying on different things. Obj A Pt will reduce score on the GAD7 from 16 (severe anxiety) to between 10-14 ( moderate anxiety) Obj B Pt will learn and implement two coping skills in order to reduce anxiety  Goal 3  To have less pain daily" Obj A Pt's pain level will be reduced from daily to 3-4 days per week Obj B Pt will attend all medical appts and take all medications as prescribed.  [de-identified] : The focus of the session was on pt's feelings about attending an upcoming  for a friend.  Therapist provided active listening as pt shared how she will attend this  in [place] and will see many of her former friends. Pt reports that she will most likely feel overwhelmed as many people are unaware of [place] extent of her disability and will be approaching her. Therapist validated pt feelings and worries and provided support and reassurance. Therapist also pointed out that most people will approach her out of concern and it may feel good for her to reconnect with peers. Therapist encouraged pt to join support group and try to get out as often as possible. Pt was responsive to therapist support and interventions.  [FreeTextEntry1] : Pt  will contact tx team for worsening of symptoms and/or problems with medication. Next appt:8/28

## 2023-08-15 DIAGNOSIS — F33.1 MAJOR DEPRESSIVE DISORDER, RECURRENT, MODERATE: ICD-10-CM

## 2023-08-28 ENCOUNTER — APPOINTMENT (OUTPATIENT)
Dept: PSYCHIATRY | Facility: CLINIC | Age: 53
End: 2023-08-28

## 2023-08-28 ENCOUNTER — OUTPATIENT (OUTPATIENT)
Dept: OUTPATIENT SERVICES | Facility: HOSPITAL | Age: 53
LOS: 1 days | End: 2023-08-28
Payer: COMMERCIAL

## 2023-08-28 DIAGNOSIS — Z98.890 OTHER SPECIFIED POSTPROCEDURAL STATES: Chronic | ICD-10-CM

## 2023-08-28 DIAGNOSIS — F33.1 MAJOR DEPRESSIVE DISORDER, RECURRENT, MODERATE: ICD-10-CM

## 2023-08-28 DIAGNOSIS — Z98.891 HISTORY OF UTERINE SCAR FROM PREVIOUS SURGERY: Chronic | ICD-10-CM

## 2023-08-28 PROCEDURE — 90834 PSYTX W PT 45 MINUTES: CPT | Mod: 95

## 2023-08-29 DIAGNOSIS — F33.1 MAJOR DEPRESSIVE DISORDER, RECURRENT, MODERATE: ICD-10-CM

## 2023-09-05 NOTE — BEHAVIORAL HEALTH
[[facility]] : [facility]  [[facility] Therapy] : [facility] Therapy [Other: ____] : [unfilled] [Return in ____ week(s)] : Return in [unfilled] week(s) [FreeTextEntry2] : Goal 1: Pt will shift her mind-set from focus on what she "can't do" to what she "can do".  Obj A Pt will engage in 1-2 activities per week that create enjoyment in her life.   Obj B Pt will lower her score on the PHQ-9 from 17 (moderately severe depression) to 10-14 (moderate depression)   Goal2 To reduce the amount of time spent worrying on different things. Obj A Pt will reduce score on the GAD7 from 16 (severe anxiety) to between 10-14 ( moderate anxiety) Obj B Pt will learn and implement two coping skills in order to reduce anxiety  Goal 3  To have less pain daily" Obj A Pt's pain level will be reduced from daily to 3-4 days per week Obj B Pt will attend all medical appts and take all medications as prescribed.  [de-identified] : The focus of the session was on pt's feelings about a recent event she attended.  Therapist provided active listening as pt shared how she arrived late due to transportation and how painful the ride was. Pt was unable to see the service but did connect with some of her sorority sisters while there. Pt was overall happy she attended and got to pay her respects.   Therapist validated pt feelings and explored her experience and feelings at this event.. Therapist encouraged pt to join support group and try to get out as often as possible. Pt was responsive to therapist support and interventions.  [FreeTextEntry1] : Pt  will contact tx team for worsening of symptoms and/or problems with medication. Next appt:9/6

## 2023-09-06 ENCOUNTER — OUTPATIENT (OUTPATIENT)
Dept: OUTPATIENT SERVICES | Facility: HOSPITAL | Age: 53
LOS: 1 days | End: 2023-09-06
Payer: COMMERCIAL

## 2023-09-06 ENCOUNTER — APPOINTMENT (OUTPATIENT)
Dept: PSYCHIATRY | Facility: CLINIC | Age: 53
End: 2023-09-06

## 2023-09-06 DIAGNOSIS — Z98.891 HISTORY OF UTERINE SCAR FROM PREVIOUS SURGERY: Chronic | ICD-10-CM

## 2023-09-06 DIAGNOSIS — F33.1 MAJOR DEPRESSIVE DISORDER, RECURRENT, MODERATE: ICD-10-CM

## 2023-09-06 DIAGNOSIS — Z98.890 OTHER SPECIFIED POSTPROCEDURAL STATES: Chronic | ICD-10-CM

## 2023-09-06 PROCEDURE — 90834 PSYTX W PT 45 MINUTES: CPT | Mod: 95

## 2023-09-06 NOTE — REASON FOR VISIT
[Access issues (e.g., transportation, impaired mobility, etc.)] : due to patient's access issues [Continuing, patient not seen in-person within last 12 months (provide details below)] : Telehealth services are continuing, patient not seen in-person within last 12 months.  [Telehealth (audio & video) - Individual/Group] : This visit was provided via telehealth using real-time 2-way audio visual technology. [Other Location: e.g. Home (Enter Location, City,State)___] : The provider was located at [unfilled]. [Home] : The patient, [unfilled], was located at home, [unfilled], at the time of the visit. [Verbal consent obtained from patient/other participant(s)] : Verbal consent for telehealth/telephonic services obtained from patient/other participant(s) [Patient] : Patient [FreeTextEntry4] : 10:00AM [FreeTextEntry5] : 10:45am [FreeTextEntry1] : depression and anxiety

## 2023-09-06 NOTE — BEHAVIORAL HEALTH
[[facility]] : [facility]  [[facility]ive Therapy] : Supportive Therapy [Return in ____ week(s)] : Return in [unfilled] week(s) [FreeTextEntry2] : Goal 1: Pt will shift her mind-set from focus on what she "can't do" to what she "can do".  Obj A Pt will engage in 1-2 activities per week that create enjoyment in her life.   Obj B Pt will lower her score on the PHQ-9 from 17 (moderately severe depression) to 10-14 (moderate depression)   Goal2 To reduce the amount of time spent worrying on different things. Obj A Pt will reduce score on the GAD7 from 16 (severe anxiety) to between 10-14 ( moderate anxiety) Obj B Pt will learn and implement two coping skills in order to reduce anxiety  Goal 3  To have less pain daily" Obj A Pt's pain level will be reduced from daily to 3-4 days per week Obj B Pt will attend all medical appts and take all medications as prescribed.  [de-identified] : The focus of the session was on pt's experience of intrusive thoughts and odd dreams. Therapist provided active listening as pt shared how she has visions and thoughts while she is conscious about people dying/getting hurt and that she dreamed she was at her own . Pt denies a [place] SI and reports the focus of the dream was not on her death but on the friends and family attending the services .  Therapist validated pt experience and explored whether the dream was in part due to the  she recently attended  and the otehr visions seems to be intrusive thoughts. Pt shared how she believes one of her medication may cause these thoughts and will do some research. Pt reported she reached out o support group coordinator  was given a  form to fill out which must be completed to join. Pt agreed to compete thus by net session . Pt was responsive to therapist support and interventions.  [FreeTextEntry1] : Pt  will contact tx team for worsening of symptoms and/or problems with medication. Next appt:9/13

## 2023-09-07 DIAGNOSIS — F33.1 MAJOR DEPRESSIVE DISORDER, RECURRENT, MODERATE: ICD-10-CM

## 2023-09-09 ENCOUNTER — NON-APPOINTMENT (OUTPATIENT)
Age: 53
End: 2023-09-09

## 2023-09-13 ENCOUNTER — OUTPATIENT (OUTPATIENT)
Dept: OUTPATIENT SERVICES | Facility: HOSPITAL | Age: 53
LOS: 1 days | End: 2023-09-13
Payer: MEDICARE

## 2023-09-13 ENCOUNTER — APPOINTMENT (OUTPATIENT)
Dept: PSYCHIATRY | Facility: CLINIC | Age: 53
End: 2023-09-13

## 2023-09-13 DIAGNOSIS — Z98.890 OTHER SPECIFIED POSTPROCEDURAL STATES: Chronic | ICD-10-CM

## 2023-09-13 DIAGNOSIS — Z98.891 HISTORY OF UTERINE SCAR FROM PREVIOUS SURGERY: Chronic | ICD-10-CM

## 2023-09-13 DIAGNOSIS — F33.1 MAJOR DEPRESSIVE DISORDER, RECURRENT, MODERATE: ICD-10-CM

## 2023-09-13 PROCEDURE — 90834 PSYTX W PT 45 MINUTES: CPT | Mod: 95

## 2023-09-14 ENCOUNTER — APPOINTMENT (OUTPATIENT)
Dept: PULMONOLOGY | Facility: CLINIC | Age: 53
End: 2023-09-14
Payer: COMMERCIAL

## 2023-09-14 DIAGNOSIS — F33.1 MAJOR DEPRESSIVE DISORDER, RECURRENT, MODERATE: ICD-10-CM

## 2023-09-14 PROCEDURE — 99213 OFFICE O/P EST LOW 20 MIN: CPT | Mod: 95

## 2023-09-15 ENCOUNTER — NON-APPOINTMENT (OUTPATIENT)
Age: 53
End: 2023-09-15

## 2023-09-15 ENCOUNTER — APPOINTMENT (OUTPATIENT)
Dept: OPHTHALMOLOGY | Facility: CLINIC | Age: 53
End: 2023-09-15
Payer: MEDICARE

## 2023-09-15 PROCEDURE — 92004 COMPRE OPH EXAM NEW PT 1/>: CPT

## 2023-09-15 PROCEDURE — 92133 CPTRZD OPH DX IMG PST SGM ON: CPT

## 2023-09-20 ENCOUNTER — OUTPATIENT (OUTPATIENT)
Dept: OUTPATIENT SERVICES | Facility: HOSPITAL | Age: 53
LOS: 1 days | End: 2023-09-20

## 2023-09-20 ENCOUNTER — APPOINTMENT (OUTPATIENT)
Dept: PSYCHIATRY | Facility: CLINIC | Age: 53
End: 2023-09-20

## 2023-09-20 DIAGNOSIS — F33.1 MAJOR DEPRESSIVE DISORDER, RECURRENT, MODERATE: ICD-10-CM

## 2023-09-20 DIAGNOSIS — Z98.890 OTHER SPECIFIED POSTPROCEDURAL STATES: Chronic | ICD-10-CM

## 2023-09-21 ENCOUNTER — OUTPATIENT (OUTPATIENT)
Dept: OUTPATIENT SERVICES | Facility: HOSPITAL | Age: 53
LOS: 1 days | End: 2023-09-21
Payer: MEDICARE

## 2023-09-21 ENCOUNTER — APPOINTMENT (OUTPATIENT)
Dept: PSYCHIATRY | Facility: CLINIC | Age: 53
End: 2023-09-21

## 2023-09-21 DIAGNOSIS — Z98.891 HISTORY OF UTERINE SCAR FROM PREVIOUS SURGERY: Chronic | ICD-10-CM

## 2023-09-21 DIAGNOSIS — F33.1 MAJOR DEPRESSIVE DISORDER, RECURRENT, MODERATE: ICD-10-CM

## 2023-09-21 PROCEDURE — 90832 PSYTX W PT 30 MINUTES: CPT | Mod: 95

## 2023-09-22 ENCOUNTER — APPOINTMENT (OUTPATIENT)
Dept: NEUROLOGY | Facility: CLINIC | Age: 53
End: 2023-09-22
Payer: COMMERCIAL

## 2023-09-22 DIAGNOSIS — F33.1 MAJOR DEPRESSIVE DISORDER, RECURRENT, MODERATE: ICD-10-CM

## 2023-09-22 PROCEDURE — 99215 OFFICE O/P EST HI 40 MIN: CPT | Mod: 95

## 2023-09-27 ENCOUNTER — APPOINTMENT (OUTPATIENT)
Dept: PSYCHIATRY | Facility: CLINIC | Age: 53
End: 2023-09-27

## 2023-09-28 ENCOUNTER — OUTPATIENT (OUTPATIENT)
Dept: OUTPATIENT SERVICES | Facility: HOSPITAL | Age: 53
LOS: 1 days | End: 2023-09-28
Payer: COMMERCIAL

## 2023-09-28 ENCOUNTER — APPOINTMENT (OUTPATIENT)
Dept: PSYCHIATRY | Facility: CLINIC | Age: 53
End: 2023-09-28

## 2023-09-28 DIAGNOSIS — F33.1 MAJOR DEPRESSIVE DISORDER, RECURRENT, MODERATE: ICD-10-CM

## 2023-09-28 DIAGNOSIS — Z98.891 HISTORY OF UTERINE SCAR FROM PREVIOUS SURGERY: Chronic | ICD-10-CM

## 2023-09-28 DIAGNOSIS — Z98.890 OTHER SPECIFIED POSTPROCEDURAL STATES: Chronic | ICD-10-CM

## 2023-09-28 PROCEDURE — 90834 PSYTX W PT 45 MINUTES: CPT | Mod: 95

## 2023-09-29 DIAGNOSIS — F33.1 MAJOR DEPRESSIVE DISORDER, RECURRENT, MODERATE: ICD-10-CM

## 2023-10-04 ENCOUNTER — APPOINTMENT (OUTPATIENT)
Dept: PSYCHIATRY | Facility: CLINIC | Age: 53
End: 2023-10-04

## 2023-10-04 ENCOUNTER — OUTPATIENT (OUTPATIENT)
Dept: OUTPATIENT SERVICES | Facility: HOSPITAL | Age: 53
LOS: 1 days | End: 2023-10-04
Payer: COMMERCIAL

## 2023-10-04 DIAGNOSIS — Z98.890 OTHER SPECIFIED POSTPROCEDURAL STATES: Chronic | ICD-10-CM

## 2023-10-04 DIAGNOSIS — F33.1 MAJOR DEPRESSIVE DISORDER, RECURRENT, MODERATE: ICD-10-CM

## 2023-10-04 PROCEDURE — 90834 PSYTX W PT 45 MINUTES: CPT | Mod: 95

## 2023-10-05 DIAGNOSIS — F33.1 MAJOR DEPRESSIVE DISORDER, RECURRENT, MODERATE: ICD-10-CM

## 2023-10-12 ENCOUNTER — NON-APPOINTMENT (OUTPATIENT)
Age: 53
End: 2023-10-12

## 2023-10-13 ENCOUNTER — OUTPATIENT (OUTPATIENT)
Dept: OUTPATIENT SERVICES | Facility: HOSPITAL | Age: 53
LOS: 1 days | End: 2023-10-13
Payer: COMMERCIAL

## 2023-10-13 ENCOUNTER — APPOINTMENT (OUTPATIENT)
Dept: PSYCHIATRY | Facility: CLINIC | Age: 53
End: 2023-10-13

## 2023-10-13 DIAGNOSIS — Z98.890 OTHER SPECIFIED POSTPROCEDURAL STATES: Chronic | ICD-10-CM

## 2023-10-13 DIAGNOSIS — Z98.891 HISTORY OF UTERINE SCAR FROM PREVIOUS SURGERY: Chronic | ICD-10-CM

## 2023-10-13 DIAGNOSIS — F33.1 MAJOR DEPRESSIVE DISORDER, RECURRENT, MODERATE: ICD-10-CM

## 2023-10-13 PROCEDURE — 90834 PSYTX W PT 45 MINUTES: CPT | Mod: 95

## 2023-10-14 DIAGNOSIS — F33.1 MAJOR DEPRESSIVE DISORDER, RECURRENT, MODERATE: ICD-10-CM

## 2023-10-16 NOTE — ASU PATIENT PROFILE, ADULT - ANESTHESIA, PREVIOUS REACTION, PROFILE
----- Message from Halie Juliarah sent at 10/16/2023  3:01 PM CDT -----  Regarding: med refill needed  Contact: Patient  Type: Needs Medical Advice  Who Called:  Patient  Symptoms (please be specific): former major marti pt    How long has patient had these symptoms:    Pharmacy name and phone #:      [id number] - [place] - [address] [facility] [address]  [address] [address]  Phone: [phone] Fax: [phone]      Best Call Back Number: [phone]    Additional Information: Needs his medications refilled; call to advise and schedule to be seen thanks!        
none

## 2023-10-18 ENCOUNTER — APPOINTMENT (OUTPATIENT)
Dept: PSYCHIATRY | Facility: CLINIC | Age: 53
End: 2023-10-18

## 2023-10-18 ENCOUNTER — OUTPATIENT (OUTPATIENT)
Dept: OUTPATIENT SERVICES | Facility: HOSPITAL | Age: 53
LOS: 1 days | End: 2023-10-18
Payer: COMMERCIAL

## 2023-10-18 DIAGNOSIS — Z98.890 OTHER SPECIFIED POSTPROCEDURAL STATES: Chronic | ICD-10-CM

## 2023-10-18 DIAGNOSIS — Z98.891 HISTORY OF UTERINE SCAR FROM PREVIOUS SURGERY: Chronic | ICD-10-CM

## 2023-10-18 DIAGNOSIS — F33.1 MAJOR DEPRESSIVE DISORDER, RECURRENT, MODERATE: ICD-10-CM

## 2023-10-18 PROCEDURE — 90834 PSYTX W PT 45 MINUTES: CPT | Mod: 95

## 2023-10-19 DIAGNOSIS — F33.1 MAJOR DEPRESSIVE DISORDER, RECURRENT, MODERATE: ICD-10-CM

## 2023-10-25 ENCOUNTER — OUTPATIENT (OUTPATIENT)
Dept: OUTPATIENT SERVICES | Facility: HOSPITAL | Age: 53
LOS: 1 days | End: 2023-10-25
Payer: MEDICARE

## 2023-10-25 ENCOUNTER — APPOINTMENT (OUTPATIENT)
Dept: PSYCHIATRY | Facility: CLINIC | Age: 53
End: 2023-10-25

## 2023-10-25 DIAGNOSIS — Z98.890 OTHER SPECIFIED POSTPROCEDURAL STATES: Chronic | ICD-10-CM

## 2023-10-25 DIAGNOSIS — F33.1 MAJOR DEPRESSIVE DISORDER, RECURRENT, MODERATE: ICD-10-CM

## 2023-10-25 DIAGNOSIS — Z98.891 HISTORY OF UTERINE SCAR FROM PREVIOUS SURGERY: Chronic | ICD-10-CM

## 2023-10-25 PROCEDURE — 90832 PSYTX W PT 30 MINUTES: CPT | Mod: 95

## 2023-10-26 DIAGNOSIS — F33.1 MAJOR DEPRESSIVE DISORDER, RECURRENT, MODERATE: ICD-10-CM

## 2023-10-30 ENCOUNTER — OUTPATIENT (OUTPATIENT)
Dept: OUTPATIENT SERVICES | Facility: HOSPITAL | Age: 53
LOS: 1 days | End: 2023-10-30
Payer: COMMERCIAL

## 2023-10-30 ENCOUNTER — APPOINTMENT (OUTPATIENT)
Dept: PSYCHIATRY | Facility: CLINIC | Age: 53
End: 2023-10-30

## 2023-10-30 DIAGNOSIS — Z98.891 HISTORY OF UTERINE SCAR FROM PREVIOUS SURGERY: Chronic | ICD-10-CM

## 2023-10-30 DIAGNOSIS — F33.1 MAJOR DEPRESSIVE DISORDER, RECURRENT, MODERATE: ICD-10-CM

## 2023-10-30 DIAGNOSIS — Z98.890 OTHER SPECIFIED POSTPROCEDURAL STATES: Chronic | ICD-10-CM

## 2023-10-30 LAB
25(OH)D3 SERPL-MCNC: 44.2 NG/ML
ALBUMIN SERPL ELPH-MCNC: 4 G/DL
ALP BLD-CCNC: 124 U/L
ALT SERPL-CCNC: 12 U/L
AST SERPL-CCNC: 18 U/L
BASOPHILS # BLD AUTO: 0.01 K/UL
BASOPHILS NFR BLD AUTO: 0.3 %
BILIRUB DIRECT SERPL-MCNC: 0.1 MG/DL
BILIRUB INDIRECT SERPL-MCNC: 0.2 MG/DL
BILIRUB SERPL-MCNC: 0.3 MG/DL
CD16+CD56+ CELLS # BLD: 126 CELLS/UL
CD16+CD56+ CELLS NFR BLD: 15 %
CD19 CELLS NFR BLD: 0 CELLS/UL
CD3 CELLS # BLD: 715 CELLS/UL
CD3 CELLS NFR BLD: 83 %
CD3+CD4+ CELLS # BLD: 550 CELLS/UL
CD3+CD4+ CELLS NFR BLD: 63 %
CD3+CD4+ CELLS/CD3+CD8+ CLL SPEC: 3.51 RATIO
CD3+CD8+ CELLS # SPEC: 157 CELLS/UL
CD3+CD8+ CELLS NFR BLD: 18 %
CELLS.CD3-CD19+/CELLS IN BLOOD: <1 %
DEPRECATED KAPPA LC FREE/LAMBDA SER: 1.52 RATIO
EOSINOPHIL # BLD AUTO: 0.16 K/UL
EOSINOPHIL NFR BLD AUTO: 5.1 %
HCT VFR BLD CALC: 36.2 %
HGB BLD-MCNC: 11.1 G/DL
IGA SER QL IEP: 186 MG/DL
IGG SER QL IEP: 1090 MG/DL
IGM SER QL IEP: 62 MG/DL
IMM GRANULOCYTES NFR BLD AUTO: 0 %
KAPPA LC CSF-MCNC: 1.35 MG/DL
KAPPA LC SERPL-MCNC: 2.05 MG/DL
LYMPHOCYTES # BLD AUTO: 0.85 K/UL
LYMPHOCYTES NFR BLD AUTO: 27.2 %
MAN DIFF?: NORMAL
MCHC RBC-ENTMCNC: 28.7 PG
MCHC RBC-ENTMCNC: 30.7 GM/DL
MCV RBC AUTO: 93.5 FL
MONOCYTES # BLD AUTO: 0.37 K/UL
MONOCYTES NFR BLD AUTO: 11.9 %
NEUTROPHILS # BLD AUTO: 1.73 K/UL
NEUTROPHILS NFR BLD AUTO: 55.5 %
PLATELET # BLD AUTO: 217 K/UL
PROT SERPL-MCNC: 6.3 G/DL
RBC # BLD: 3.87 M/UL
RBC # FLD: 13.2 %
TSH SERPL-ACNC: 2.45 UIU/ML
VIT B12 SERPL-MCNC: >2000 PG/ML
WBC # FLD AUTO: 3.12 K/UL

## 2023-10-30 PROCEDURE — 90834 PSYTX W PT 45 MINUTES: CPT | Mod: 95

## 2023-10-31 DIAGNOSIS — F33.1 MAJOR DEPRESSIVE DISORDER, RECURRENT, MODERATE: ICD-10-CM

## 2023-11-06 ENCOUNTER — APPOINTMENT (OUTPATIENT)
Dept: PSYCHIATRY | Facility: CLINIC | Age: 53
End: 2023-11-06

## 2023-11-06 ENCOUNTER — OUTPATIENT (OUTPATIENT)
Dept: OUTPATIENT SERVICES | Facility: HOSPITAL | Age: 53
LOS: 1 days | End: 2023-11-06
Payer: COMMERCIAL

## 2023-11-06 DIAGNOSIS — F33.1 MAJOR DEPRESSIVE DISORDER, RECURRENT, MODERATE: ICD-10-CM

## 2023-11-06 DIAGNOSIS — Z98.890 OTHER SPECIFIED POSTPROCEDURAL STATES: Chronic | ICD-10-CM

## 2023-11-06 DIAGNOSIS — Z98.891 HISTORY OF UTERINE SCAR FROM PREVIOUS SURGERY: Chronic | ICD-10-CM

## 2023-11-06 PROCEDURE — 90832 PSYTX W PT 30 MINUTES: CPT | Mod: 95

## 2023-11-07 DIAGNOSIS — F33.1 MAJOR DEPRESSIVE DISORDER, RECURRENT, MODERATE: ICD-10-CM

## 2023-11-10 ENCOUNTER — APPOINTMENT (OUTPATIENT)
Dept: INFUSION THERAPY | Facility: CLINIC | Age: 53
End: 2023-11-10

## 2023-11-10 ENCOUNTER — OUTPATIENT (OUTPATIENT)
Dept: OUTPATIENT SERVICES | Facility: HOSPITAL | Age: 53
LOS: 1 days | End: 2023-11-10
Payer: COMMERCIAL

## 2023-11-10 VITALS — TEMPERATURE: 97.6 F | SYSTOLIC BLOOD PRESSURE: 119 MMHG | HEART RATE: 97 BPM | DIASTOLIC BLOOD PRESSURE: 71 MMHG

## 2023-11-10 DIAGNOSIS — Z98.891 HISTORY OF UTERINE SCAR FROM PREVIOUS SURGERY: Chronic | ICD-10-CM

## 2023-11-10 DIAGNOSIS — G36.0 NEUROMYELITIS OPTICA [DEVIC]: ICD-10-CM

## 2023-11-10 DIAGNOSIS — Z98.890 OTHER SPECIFIED POSTPROCEDURAL STATES: Chronic | ICD-10-CM

## 2023-11-10 PROCEDURE — 96367 TX/PROPH/DG ADDL SEQ IV INF: CPT

## 2023-11-10 PROCEDURE — 96415 CHEMO IV INFUSION ADDL HR: CPT

## 2023-11-10 PROCEDURE — 96413 CHEMO IV INFUSION 1 HR: CPT

## 2023-11-10 RX ORDER — RITUXIMAB 10 MG/ML
1000 INJECTION, SOLUTION INTRAVENOUS ONCE
Refills: 0 | Status: COMPLETED | OUTPATIENT
Start: 2023-11-10 | End: 2023-11-10

## 2023-11-10 RX ORDER — DIPHENHYDRAMINE HCL 50 MG
50 CAPSULE ORAL ONCE
Refills: 0 | Status: COMPLETED | OUTPATIENT
Start: 2023-11-10 | End: 2023-11-10

## 2023-11-10 RX ORDER — ACETAMINOPHEN 500 MG
650 TABLET ORAL ONCE
Refills: 0 | Status: COMPLETED | OUTPATIENT
Start: 2023-11-10 | End: 2023-11-10

## 2023-11-10 RX ADMIN — Medication 50 MILLIGRAM(S): at 11:12

## 2023-11-10 RX ADMIN — Medication 650 MILLIGRAM(S): at 11:13

## 2023-11-10 RX ADMIN — Medication 1000 MILLIGRAM(S): at 12:12

## 2023-11-10 RX ADMIN — RITUXIMAB 1000 MILLIGRAM(S): 10 INJECTION, SOLUTION INTRAVENOUS at 12:20

## 2023-11-10 RX ADMIN — Medication 650 MILLIGRAM(S): at 11:12

## 2023-11-10 RX ADMIN — RITUXIMAB 1000 MILLIGRAM(S): 10 INJECTION, SOLUTION INTRAVENOUS at 17:03

## 2023-11-11 DIAGNOSIS — G36.0 NEUROMYELITIS OPTICA [DEVIC]: ICD-10-CM

## 2023-11-13 ENCOUNTER — OUTPATIENT (OUTPATIENT)
Dept: OUTPATIENT SERVICES | Facility: HOSPITAL | Age: 53
LOS: 1 days | End: 2023-11-13
Payer: COMMERCIAL

## 2023-11-13 ENCOUNTER — APPOINTMENT (OUTPATIENT)
Dept: PSYCHIATRY | Facility: CLINIC | Age: 53
End: 2023-11-13

## 2023-11-13 DIAGNOSIS — Z98.890 OTHER SPECIFIED POSTPROCEDURAL STATES: Chronic | ICD-10-CM

## 2023-11-13 DIAGNOSIS — F33.1 MAJOR DEPRESSIVE DISORDER, RECURRENT, MODERATE: ICD-10-CM

## 2023-11-13 PROCEDURE — 90834 PSYTX W PT 45 MINUTES: CPT | Mod: 95

## 2023-11-14 DIAGNOSIS — F33.1 MAJOR DEPRESSIVE DISORDER, RECURRENT, MODERATE: ICD-10-CM

## 2023-11-16 ENCOUNTER — NON-APPOINTMENT (OUTPATIENT)
Age: 53
End: 2023-11-16

## 2023-11-20 ENCOUNTER — OUTPATIENT (OUTPATIENT)
Dept: OUTPATIENT SERVICES | Facility: HOSPITAL | Age: 53
LOS: 1 days | End: 2023-11-20
Payer: COMMERCIAL

## 2023-11-20 ENCOUNTER — APPOINTMENT (OUTPATIENT)
Dept: PSYCHIATRY | Facility: CLINIC | Age: 53
End: 2023-11-20

## 2023-11-20 DIAGNOSIS — Z98.890 OTHER SPECIFIED POSTPROCEDURAL STATES: Chronic | ICD-10-CM

## 2023-11-20 DIAGNOSIS — F41.1 GENERALIZED ANXIETY DISORDER: ICD-10-CM

## 2023-11-20 PROCEDURE — 90834 PSYTX W PT 45 MINUTES: CPT

## 2023-11-21 ENCOUNTER — APPOINTMENT (OUTPATIENT)
Dept: UROLOGY | Facility: CLINIC | Age: 53
End: 2023-11-21
Payer: COMMERCIAL

## 2023-11-21 VITALS
TEMPERATURE: 97.5 F | SYSTOLIC BLOOD PRESSURE: 105 MMHG | DIASTOLIC BLOOD PRESSURE: 69 MMHG | HEART RATE: 77 BPM | OXYGEN SATURATION: 95 %

## 2023-11-21 DIAGNOSIS — F41.1 GENERALIZED ANXIETY DISORDER: ICD-10-CM

## 2023-11-21 PROCEDURE — 99204 OFFICE O/P NEW MOD 45 MIN: CPT

## 2023-11-21 NOTE — ED ADULT TRIAGE NOTE - IDEAL BODY WEIGHT(KG)
Medication: ATORVASTATIN TABS 20MG  Last office visit date: 6/28/23  Next appointment scheduled?: No; Not needed at this time   Number of refills given: 0     62

## 2023-11-26 LAB — BACTERIA UR CULT: NORMAL

## 2023-11-27 ENCOUNTER — OUTPATIENT (OUTPATIENT)
Dept: OUTPATIENT SERVICES | Facility: HOSPITAL | Age: 53
LOS: 1 days | End: 2023-11-27
Payer: COMMERCIAL

## 2023-11-27 ENCOUNTER — APPOINTMENT (OUTPATIENT)
Dept: PSYCHIATRY | Facility: CLINIC | Age: 53
End: 2023-11-27

## 2023-11-27 DIAGNOSIS — F41.1 GENERALIZED ANXIETY DISORDER: ICD-10-CM

## 2023-11-27 DIAGNOSIS — Z98.890 OTHER SPECIFIED POSTPROCEDURAL STATES: Chronic | ICD-10-CM

## 2023-11-27 DIAGNOSIS — Z98.891 HISTORY OF UTERINE SCAR FROM PREVIOUS SURGERY: Chronic | ICD-10-CM

## 2023-11-27 PROCEDURE — 90834 PSYTX W PT 45 MINUTES: CPT

## 2023-11-28 DIAGNOSIS — F41.1 GENERALIZED ANXIETY DISORDER: ICD-10-CM

## 2023-12-04 ENCOUNTER — APPOINTMENT (OUTPATIENT)
Dept: PSYCHIATRY | Facility: CLINIC | Age: 53
End: 2023-12-04

## 2023-12-04 ENCOUNTER — OUTPATIENT (OUTPATIENT)
Dept: OUTPATIENT SERVICES | Facility: HOSPITAL | Age: 53
LOS: 1 days | End: 2023-12-04
Payer: COMMERCIAL

## 2023-12-04 DIAGNOSIS — Z98.890 OTHER SPECIFIED POSTPROCEDURAL STATES: Chronic | ICD-10-CM

## 2023-12-04 DIAGNOSIS — F33.1 MAJOR DEPRESSIVE DISORDER, RECURRENT, MODERATE: ICD-10-CM

## 2023-12-04 DIAGNOSIS — Z98.891 HISTORY OF UTERINE SCAR FROM PREVIOUS SURGERY: Chronic | ICD-10-CM

## 2023-12-04 DIAGNOSIS — F41.1 GENERALIZED ANXIETY DISORDER: ICD-10-CM

## 2023-12-04 PROCEDURE — 90834 PSYTX W PT 45 MINUTES: CPT | Mod: 95

## 2023-12-05 DIAGNOSIS — F33.1 MAJOR DEPRESSIVE DISORDER, RECURRENT, MODERATE: ICD-10-CM

## 2023-12-11 ENCOUNTER — OUTPATIENT (OUTPATIENT)
Dept: OUTPATIENT SERVICES | Facility: HOSPITAL | Age: 53
LOS: 1 days | End: 2023-12-11
Payer: COMMERCIAL

## 2023-12-11 ENCOUNTER — APPOINTMENT (OUTPATIENT)
Dept: PSYCHIATRY | Facility: CLINIC | Age: 53
End: 2023-12-11

## 2023-12-11 VITALS — WEIGHT: 245 LBS | BODY MASS INDEX: 38.45 KG/M2 | HEIGHT: 67 IN

## 2023-12-11 DIAGNOSIS — F41.1 GENERALIZED ANXIETY DISORDER: ICD-10-CM

## 2023-12-11 DIAGNOSIS — F33.1 MAJOR DEPRESSIVE DISORDER, RECURRENT, MODERATE: ICD-10-CM

## 2023-12-11 DIAGNOSIS — Z98.890 OTHER SPECIFIED POSTPROCEDURAL STATES: Chronic | ICD-10-CM

## 2023-12-11 PROCEDURE — 90834 PSYTX W PT 45 MINUTES: CPT | Mod: 95

## 2023-12-12 DIAGNOSIS — F33.1 MAJOR DEPRESSIVE DISORDER, RECURRENT, MODERATE: ICD-10-CM

## 2023-12-18 ENCOUNTER — APPOINTMENT (OUTPATIENT)
Dept: PSYCHIATRY | Facility: CLINIC | Age: 53
End: 2023-12-18

## 2023-12-18 ENCOUNTER — OUTPATIENT (OUTPATIENT)
Dept: OUTPATIENT SERVICES | Facility: HOSPITAL | Age: 53
LOS: 1 days | End: 2023-12-18
Payer: COMMERCIAL

## 2023-12-18 DIAGNOSIS — Z98.890 OTHER SPECIFIED POSTPROCEDURAL STATES: Chronic | ICD-10-CM

## 2023-12-18 DIAGNOSIS — Z98.891 HISTORY OF UTERINE SCAR FROM PREVIOUS SURGERY: Chronic | ICD-10-CM

## 2023-12-18 DIAGNOSIS — F33.1 MAJOR DEPRESSIVE DISORDER, RECURRENT, MODERATE: ICD-10-CM

## 2023-12-18 DIAGNOSIS — F41.1 GENERALIZED ANXIETY DISORDER: ICD-10-CM

## 2023-12-18 PROCEDURE — 90834 PSYTX W PT 45 MINUTES: CPT

## 2023-12-18 NOTE — REASON FOR VISIT
[Access issues (e.g., transportation, impaired mobility, etc.)] : due to patient's access issues [Continuing, patient not seen in-person within last 12 months (provide details below)] : Telehealth services are continuing, patient not seen in-person within last 12 months.  [Telehealth (audio & video) - Individual/Group] : This visit was provided via telehealth using real-time 2-way audio visual technology. [Other Location: e.g. Home (Enter Location, City,State)___] : The provider was located at [unfilled]. [Home] : The patient, [unfilled], was located at home, [unfilled], at the time of the visit. [Verbal consent obtained from patient/other participant(s)] : Verbal consent for telehealth/telephonic services obtained from patient/other participant(s) [Patient] : Patient [FreeTextEntry4] : 10:15 [FreeTextEntry5] : 11:00am [FreeTextEntry1] : depression and anxiety

## 2023-12-18 NOTE — BEHAVIORAL HEALTH
[[facility]] : [facility]  [[facility] Therapy] : [facility] Therapy [Return in ____ week(s)] : Return in [unfilled] week(s) [FreeTextEntry2] : Goal 1: Pt will shift her mind-set from focus on what she "can't do" to what she "can do".  Obj A Pt will engage in 1-2 activities per week that create enjoyment in her life.   Obj B Pt will lower her score on the PHQ-9 from 17 (moderately severe depression) to 10-14 (moderate depression)   Goal2 To reduce the amount of time spent worrying on different things. Obj A Pt will reduce score on the GAD7 from 16 (severe anxiety) to between 10-14 ( moderate anxiety) Obj B Pt will learn and implement two coping skills in order to reduce anxiety  Goal 3  To have less pain daily" Obj A Pt's pain level will be reduced from daily to 3-4 days per week Obj B Pt will attend all medical appts and take all medications as prescribed.  [de-identified] : The focus of the session was on pt's feelings about her career as a  before she became paralyzed.  Therapist provided active listening as pt shared how she felt she never reached her potential with regard to her career and wanted a higher paying more prestigious position . 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 .  Therapist validated pt feelings and explored whether she would want to work again at some point, pointing out she can still put her skills to good use.  Pt identified that she does not feel confident at this time to take on employment or work as  but may in the future.  Pt is compliant with therapy and responsive to interventions and support. [FreeTextEntry1] : Pt  will contact tx team for worsening of symptoms and/or problems with medication. Next appt: 1/2

## 2023-12-19 DIAGNOSIS — F33.1 MAJOR DEPRESSIVE DISORDER, RECURRENT, MODERATE: ICD-10-CM

## 2023-12-20 ENCOUNTER — APPOINTMENT (OUTPATIENT)
Dept: NEUROLOGY | Facility: CLINIC | Age: 53
End: 2023-12-20
Payer: COMMERCIAL

## 2023-12-20 VITALS
TEMPERATURE: 97.3 F | OXYGEN SATURATION: 100 % | WEIGHT: 245 LBS | SYSTOLIC BLOOD PRESSURE: 107 MMHG | BODY MASS INDEX: 38.45 KG/M2 | DIASTOLIC BLOOD PRESSURE: 76 MMHG | HEIGHT: 67 IN | HEART RATE: 108 BPM

## 2023-12-20 PROCEDURE — 99215 OFFICE O/P EST HI 40 MIN: CPT

## 2023-12-20 NOTE — HISTORY OF PRESENT ILLNESS
[FreeTextEntry1] : Initial hx 5/2021 Referred by Dr. Vizcaino. 1/2021 - got 1st covid vaccine. 2/2021 - RLE weakness, tightness, gait dysfunction, some RLE discomfort. went to ED 4x. Then LLE was numb but without weakness. Went to pain management, referred to neurologist, then went to [facility]. 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 Got IVMP, no improvement. Saw Dr. Vizcaino who sent for NMO ab in 3/2021, referred to me, but developed relapse in 4/2021. 4/2021 - acute paraplegia. [facility], got 3d IVMP and 5 sessions PLEX. Got rituximab 1g x2 in 5/2021. since then, some sensation coming back but no movement. Currently in [place] rehab. Left rehab in 12/2021. Has developed a bad rash in late 2021. This had been attributed to lyrica so stopped it. This definitively started PRIOR to starting trileptal. However, may have been attributed to antibiotic (delayed reaction, per derm). 3/2022 - Had "chest spasms", read the HR instead of PO2 so thought her O2 was 65, called 911, went to ED, cleared from cardiac perspective. 3/2022 - developed new burning and tightness in head, neck, shoulders. MRI C spine without new activity. 5/2022 - switched from wellbutrin to cymbalta. late 5/2022 - tested positive for covid on two home tests. got monoclonal ab. just headaches, lasted several days. some congestion, some cough, no fever. 1/2023 - had intermittent sleepiness, constant dizziness/vertigo, nausea, no vomiting, and poor appetite. went to ED, had CTH. MRIs did not show new lesions. tried meclizine but didn't help. 4/2023 - had severe UTI. admitted to [place]. 2023 - Had 2wks of being able to control movement of toes in R foot, but then lost it again.    Subj interval:  Had recent UTI, was on abx for a while. Is being considered for suprapubic catheter. Sees Dr. Mitchell at [place].  Prominent chronic pain - still not tolerable. tightness and burning on chest and legs that is severe, happens daily. started on fentanyl patch which has helped only somewhat. on 600tid gabapentin (1200tid gabapentin was not sufficient). Nucynta 75bid helps somewhat (tid didn't help more in the past). Oxycodone 15 qid prn doesn't help. Tried lyrica in the past which didn't help. Sees pain management, medical marijuana has not helped with the pain but relaxes her, increasing the dose. Fentanyl 5mg patch has helped somewhat, increased to 50mcg in late 2023 which helps a bit.  Worse chronic spasms - had decreased trileptal to 450bid from high of 750bid, and baclofen changed from high of 20tid to 10-10-20, still with uncomfortable spasms but not frankly painful.  Living with her mother. Waiting to get an elevator.  Has motorized wheelchair.  PMHX: NMO asthma depression anxiety LBP insomnia migraines gastric sleeve  MEDS: qvar [id number] albuterol prn protonix rituximab ubrelvy prn b12 calcium MV iron biotin colace miralax gabapentin 600tid trileptal 450bid baclofen 10-10-20 nucynta 75bid hydroxyzine prn itching/anxiety medical marijuana gummies wellbutrin (cymbalta was switched back to wellbutrin in early 2023 because of weight management and metallic taste in her mouth) fentanyl patch  SHx: no tob  FHx: no neuro, no AI.   O:  AO3. Normally conversant. Follows commands, names, and repeats. Good attention. PERRL, VFF, EOMI, subtle end gaze nysagmus on both horizontal gazes, face symmetric, TUP at midline. 5/5 in both UEs throughout, paraplegic. tone 1 in LLE and +/- in RLE Near complete sensory loss in both legs and low torso - some minimal patchy VBS but otherwise absent. normal in UEs. FTN and Karla in UEs intact. DTRs 2+ in UEs, 3 in L knee, 2 in R knee, minimal clonus in both AJs.   MRI C, T, and brain 2/2021 reviewed - thoracic LETM on my review.  MRI C+T 1/2022 - no new lorie+ lesions, however, c/w 3/2021 there was caudal extension on the left lateral aspect down to T8 (likely related to relapse she had in 4/2021).  MRI C spine and MRI brain/sella 4/2022 - unchanged.  MRI brain 2/2023 ([facility]) - no new lesions. lower brain stem has abnormalities, but similar to that seen in c spine in 4/2022.   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 Stable from NMO standpoint, on rituximab.  Persistent spasms and severe chronic pain.  All questions answered, education provided re: dx. management discussed at length. imaging reviewed with pt personally.  - discussed potentially switching to uplizna in 5/2024 given persistent symptoms. she will let me know. - check blood work q6m, including a "full T cell subset" to look at CD19 counts. - again advised restart outpatient PT.  Pain: - cont f/u with pain management - cont nucynta 75bid - cont gabapentin. - cont medical marijuana - cont fentanyl patch per pain management. no evidence of abuse.  Spasms: - cont trileptal 450mg bid (from 600bid) for tonic spasms - cont baclofen 10-10-20  Mood - f/u with therapist.  Bladder: - f/u with Dr. Cifuentes  - RTC

## 2023-12-21 RX ORDER — UBROGEPANT 100 MG/1
100 TABLET ORAL
Qty: 14 | Refills: 11 | Status: ACTIVE | COMMUNITY
Start: 2021-10-01 | End: 1900-01-01

## 2024-01-01 ENCOUNTER — NON-APPOINTMENT (OUTPATIENT)
Age: 54
End: 2024-01-01

## 2024-01-02 ENCOUNTER — APPOINTMENT (OUTPATIENT)
Dept: PSYCHIATRY | Facility: CLINIC | Age: 54
End: 2024-01-02

## 2024-01-05 NOTE — ASU PREOP CHECKLIST - DENTURES
Render Risk Assessment In Note?: no Additional Notes: Pathology with burned out interface dermatitis, favor lichen planus pigmentosus Detail Level: Simple no

## 2024-01-08 ENCOUNTER — OUTPATIENT (OUTPATIENT)
Dept: OUTPATIENT SERVICES | Facility: HOSPITAL | Age: 54
LOS: 1 days | End: 2024-01-08
Payer: COMMERCIAL

## 2024-01-08 ENCOUNTER — APPOINTMENT (OUTPATIENT)
Dept: PSYCHIATRY | Facility: CLINIC | Age: 54
End: 2024-01-08

## 2024-01-08 DIAGNOSIS — Z98.890 OTHER SPECIFIED POSTPROCEDURAL STATES: Chronic | ICD-10-CM

## 2024-01-08 DIAGNOSIS — F33.1 MAJOR DEPRESSIVE DISORDER, RECURRENT, MODERATE: ICD-10-CM

## 2024-01-08 DIAGNOSIS — Z98.891 HISTORY OF UTERINE SCAR FROM PREVIOUS SURGERY: Chronic | ICD-10-CM

## 2024-01-08 PROCEDURE — 90834 PSYTX W PT 45 MINUTES: CPT

## 2024-01-08 NOTE — BEHAVIORAL HEALTH
[[facility]] : [facility]  [[facility] Therapy] : [facility] [Return in ____ week(s)] : Return in [unfilled] week(s) [FreeTextEntry2] : Goal 1: Pt will shift her mind-set from focus on what she "can't do" to what she "can do".  Obj A Pt will engage in 1-2 activities per week that create enjoyment in her life.   Obj B Pt will lower her score on the PHQ-9 from 17 (moderately severe depression) to 10-14 (moderate depression)   Goal2 To reduce the amount of time spent worrying on different things. Obj A Pt will reduce score on the GAD7 from 16 (severe anxiety) to between 10-14 ( moderate anxiety) Obj B Pt will learn and implement two coping skills in order to reduce anxiety  Goal 3  To have less pain daily" Obj A Pt's pain level will be reduced from daily to 3-4 days per week Obj B Pt will attend all medical appts and take all medications as prescribed.  [de-identified] : The focus of the session was on pt's feelings about her living situation.  Therapist provided active listening as pt shared how she applied for supportive housing through her care coordinator however it will most likely take years for her to get a new home because of the scarcity of available apartments, , Pt reports they offered her a realtor but this route would be very pricey.  Therapist validated pt frustration and questioned whether this process could be expedited since she has a disability. Therapist encouraged to explore what resources are available for adults with disabilities. Therapist and pt also  [problem solved other areas such as transportation to [place] and  having her own home adapted to suit her needs. Pt is compliant with therapy and responsive to interventions and support. [FreeTextEntry1] : Pt  will contact tx team for worsening of symptoms and/or problems with medication. Next appt: 1/16

## 2024-01-08 NOTE — PHYSICAL EXAM
ambulatory [Cooperative] : cooperative [Euthymic] : euthymic [Full] : full [Clear] : clear [Linear/Goal Directed] : linear/goal directed [None Reported] : none reported [Average] : average [WNL] : within normal limits

## 2024-01-08 NOTE — REASON FOR VISIT
[Access issues (e.g., transportation, impaired mobility, etc.)] : due to patient's access issues [Continuing, patient not seen in-person within last 12 months (provide details below)] : Telehealth services are continuing, patient not seen in-person within last 12 months.  [Telehealth (audio & video) - Individual/Group] : This visit was provided via telehealth using real-time 2-way audio visual technology. [Other Location: e.g. Home (Enter Location, City,State)___] : The provider was located at [unfilled]. [Home] : The patient, [unfilled], was located at home, [unfilled], at the time of the visit. [Verbal consent obtained from patient/other participant(s)] : Verbal consent for telehealth/telephonic services obtained from patient/other participant(s) [Patient] : Patient [FreeTextEntry4] : 10:00 [FreeTextEntry5] : 10:45AM [FreeTextEntry1] : depression and anxiety

## 2024-01-09 DIAGNOSIS — F33.1 MAJOR DEPRESSIVE DISORDER, RECURRENT, MODERATE: ICD-10-CM

## 2024-01-11 ENCOUNTER — APPOINTMENT (OUTPATIENT)
Dept: UROLOGY | Facility: HOSPITAL | Age: 54
End: 2024-01-11

## 2024-01-11 ENCOUNTER — RX RENEWAL (OUTPATIENT)
Age: 54
End: 2024-01-11

## 2024-01-11 RX ORDER — OXCARBAZEPINE 150 MG/1
150 TABLET, FILM COATED ORAL TWICE DAILY
Qty: 540 | Refills: 1 | Status: ACTIVE | COMMUNITY
Start: 2022-04-29 | End: 1900-01-01

## 2024-01-16 ENCOUNTER — OUTPATIENT (OUTPATIENT)
Dept: OUTPATIENT SERVICES | Facility: HOSPITAL | Age: 54
LOS: 1 days | End: 2024-01-16
Payer: COMMERCIAL

## 2024-01-16 ENCOUNTER — APPOINTMENT (OUTPATIENT)
Dept: PSYCHIATRY | Facility: CLINIC | Age: 54
End: 2024-01-16

## 2024-01-16 DIAGNOSIS — Z98.890 OTHER SPECIFIED POSTPROCEDURAL STATES: Chronic | ICD-10-CM

## 2024-01-16 DIAGNOSIS — F41.1 GENERALIZED ANXIETY DISORDER: ICD-10-CM

## 2024-01-16 DIAGNOSIS — F33.1 MAJOR DEPRESSIVE DISORDER, RECURRENT, MODERATE: ICD-10-CM

## 2024-01-16 DIAGNOSIS — Z98.891 HISTORY OF UTERINE SCAR FROM PREVIOUS SURGERY: Chronic | ICD-10-CM

## 2024-01-16 PROCEDURE — 90834 PSYTX W PT 45 MINUTES: CPT

## 2024-01-16 NOTE — BEHAVIORAL HEALTH
[[facility]] : [facility]  [Supportive Therapy] : Supportive Therapy [Return in ____ week(s)] : Return in [unfilled] week(s) [FreeTextEntry2] : Goal 1: Pt will shift her mind-set from focus on what she "can't do" to what she "can do".  Obj A Pt will engage in 1-2 activities per week that create enjoyment in her life.   Obj B Pt will lower her score on the PHQ-9 from 17 (moderately severe depression) to 10-14 (moderate depression)   Goal2 To reduce the amount of time spent worrying on different things. Obj A Pt will reduce score on the GAD7 from 16 (severe anxiety) to between 10-14 ( moderate anxiety) Obj B Pt will learn and implement two coping skills in order to reduce anxiety  Goal 3  To have less pain daily" Obj A Pt's pain level will be reduced from daily to 3-4 days per week Obj B Pt will attend all medical appts and take all medications as prescribed.  [de-identified] : The focus of the session was on the struggles pt faces in living with paraplegia. .  Therapist provided active listening as pt shared how she missed an appt due to transportation not showing which will delay her in having these important medical tests. Pt reports she hates having to deal with the frustration fo being disabled and not being able to travel as others do, sharing how even ":handicap accessible" apartment are not truly equipped for someone like herself.  Therapist validated pt experience and how unfair it is that  the community does not have more accommodations for the  disabled population. Therapist explored whether pt looked into rights for the disabled to insure she will be provided what she is entitled to under the law. Therapist also provided support understanding and empathy for pt during session.   Pt is compliant with therapy and responsive to interventions and support. [FreeTextEntry1] : Pt  will contact tx team for worsening of symptoms and/or problems with medication. Next appt: 1/24

## 2024-01-17 DIAGNOSIS — F41.1 GENERALIZED ANXIETY DISORDER: ICD-10-CM

## 2024-01-19 ENCOUNTER — APPOINTMENT (OUTPATIENT)
Dept: UROLOGY | Facility: CLINIC | Age: 54
End: 2024-01-19

## 2024-01-23 ENCOUNTER — APPOINTMENT (OUTPATIENT)
Dept: UROLOGY | Facility: CLINIC | Age: 54
End: 2024-01-23

## 2024-01-23 ENCOUNTER — NON-APPOINTMENT (OUTPATIENT)
Age: 54
End: 2024-01-23

## 2024-01-24 ENCOUNTER — OUTPATIENT (OUTPATIENT)
Dept: OUTPATIENT SERVICES | Facility: HOSPITAL | Age: 54
LOS: 1 days | End: 2024-01-24
Payer: COMMERCIAL

## 2024-01-24 ENCOUNTER — APPOINTMENT (OUTPATIENT)
Dept: PSYCHIATRY | Facility: CLINIC | Age: 54
End: 2024-01-24

## 2024-01-24 DIAGNOSIS — F33.1 MAJOR DEPRESSIVE DISORDER, RECURRENT, MODERATE: ICD-10-CM

## 2024-01-24 DIAGNOSIS — Z98.891 HISTORY OF UTERINE SCAR FROM PREVIOUS SURGERY: Chronic | ICD-10-CM

## 2024-01-24 DIAGNOSIS — Z98.890 OTHER SPECIFIED POSTPROCEDURAL STATES: Chronic | ICD-10-CM

## 2024-01-24 PROCEDURE — 90834 PSYTX W PT 45 MINUTES: CPT

## 2024-01-24 NOTE — BEHAVIORAL HEALTH
[[facility]] : [facility]  [Supportive Therapy] : Supportive Therapy [Return in ____ week(s)] : Return in [unfilled] week(s) [FreeTextEntry2] : Goal 1: Pt will shift her mind-set from focus on what she "can't do" to what she "can do".  Obj A Pt will engage in 1-2 activities per week that create enjoyment in her life.   Obj B Pt will lower her score on the PHQ-9 from 17 (moderately severe depression) to 10-14 (moderate depression)   Goal2 To reduce the amount of time spent worrying on different things. Obj A Pt will reduce score on the GAD7 from 16 (severe anxiety) to between 10-14 ( moderate anxiety) Obj B Pt will learn and implement two coping skills in order to reduce anxiety  Goal 3  To have less pain daily" Obj A Pt's pain level will be reduced from daily to 3-4 days per week Obj B Pt will attend all medical appts and take all medications as prescribed.  [FreeTextEntry1] : Pt  will contact tx team for worsening of symptoms and/or problems with medication. Next appt: 2/1   [de-identified] : The focus of the session was on the pt pain the past few days.   Therapist provided active listening as pt shared how her pain has been intense the past few days and she is awaiting approval on her pain medication from insurance. Pt reports she finds it hard to function and had to leave her MRI shelter through it.   Therapist validated pt experience and explored what if anything she could do to alleviate her pain as well as lessen her focus on this pain.  Therapist engaged pt in discussion on other topics which she reported did help an encouraged her to use distraction strategies until she is provide the medication she needs.  Pt is compliant with therapy and responsive to interventions and support.

## 2024-01-25 DIAGNOSIS — F33.1 MAJOR DEPRESSIVE DISORDER, RECURRENT, MODERATE: ICD-10-CM

## 2024-01-26 ENCOUNTER — APPOINTMENT (OUTPATIENT)
Dept: PULMONOLOGY | Facility: CLINIC | Age: 54
End: 2024-01-26
Payer: COMMERCIAL

## 2024-01-26 DIAGNOSIS — G47.33 OBSTRUCTIVE SLEEP APNEA (ADULT) (PEDIATRIC): ICD-10-CM

## 2024-01-26 DIAGNOSIS — J45.909 UNSPECIFIED ASTHMA, UNCOMPLICATED: ICD-10-CM

## 2024-01-26 PROCEDURE — 99213 OFFICE O/P EST LOW 20 MIN: CPT

## 2024-01-26 RX ORDER — ALBUTEROL SULFATE 90 UG/1
108 (90 BASE) AEROSOL, METERED RESPIRATORY (INHALATION)
Qty: 1 | Refills: 2 | Status: ACTIVE | COMMUNITY
Start: 2024-01-26 | End: 1900-01-01

## 2024-01-26 NOTE — HISTORY OF PRESENT ILLNESS
[Follow-Up - Routine Clinic] : a routine clinic follow-up of [Good Compliance] : good compliance with treatment [Good Tolerance] : good tolerance of treatment [Good Symptom Control] : good symptom control [de-identified] : APAP  [Intermittent] : intermittent [Doing Well] : doing well [Well Controlled] : Well controlled [Good Control] : peak flow has been good [Checks Regularly] : The patient checks ~his/her~ peak flow regularly [None] : None [Adherent] : the patient is adherent with ~his/her~ medication regimen [Goals--Doing Well] : the patient is doing well with ~his/her~ asthma goals [PFTs] : pulmonary function tests

## 2024-01-26 NOTE — PHYSICAL EXAM
[No Acute Distress] : no acute distress [Normal Oropharynx] : normal oropharynx [Normal Appearance] : normal appearance [No Neck Mass] : no neck mass [Normal Rate/Rhythm] : normal rate/rhythm [Normal S1, S2] : normal s1, s2 [No Murmurs] : no murmurs [No Resp Distress] : no resp distress [No Abnormalities] : no abnormalities [Clear to Auscultation Bilaterally] : clear to auscultation bilaterally [Benign] : benign [No Clubbing] : no clubbing [No Cyanosis] : no cyanosis [Normal Color/ Pigmentation] : normal color/ pigmentation [Oriented x3] : oriented x3 [Normal Affect] : normal affect

## 2024-01-26 NOTE — ASSESSMENT
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   HST with moderate DON on APAP

## 2024-01-31 ENCOUNTER — APPOINTMENT (OUTPATIENT)
Dept: MRI IMAGING | Facility: CLINIC | Age: 54
End: 2024-01-31

## 2024-01-31 ENCOUNTER — APPOINTMENT (OUTPATIENT)
Dept: MRI IMAGING | Facility: CLINIC | Age: 54
End: 2024-01-31
Payer: MEDICARE

## 2024-01-31 ENCOUNTER — OUTPATIENT (OUTPATIENT)
Dept: OUTPATIENT SERVICES | Facility: HOSPITAL | Age: 54
LOS: 1 days | End: 2024-01-31

## 2024-01-31 DIAGNOSIS — Z98.890 OTHER SPECIFIED POSTPROCEDURAL STATES: Chronic | ICD-10-CM

## 2024-01-31 DIAGNOSIS — Z98.891 HISTORY OF UTERINE SCAR FROM PREVIOUS SURGERY: Chronic | ICD-10-CM

## 2024-01-31 PROCEDURE — 70553 MRI BRAIN STEM W/O & W/DYE: CPT | Mod: 26

## 2024-01-31 PROCEDURE — 72157 MRI CHEST SPINE W/O & W/DYE: CPT | Mod: 26

## 2024-01-31 PROCEDURE — 72156 MRI NECK SPINE W/O & W/DYE: CPT | Mod: 26

## 2024-02-01 ENCOUNTER — APPOINTMENT (OUTPATIENT)
Dept: PSYCHIATRY | Facility: CLINIC | Age: 54
End: 2024-02-01

## 2024-02-01 ENCOUNTER — OUTPATIENT (OUTPATIENT)
Dept: OUTPATIENT SERVICES | Facility: HOSPITAL | Age: 54
LOS: 1 days | End: 2024-02-01
Payer: COMMERCIAL

## 2024-02-01 DIAGNOSIS — Z98.890 OTHER SPECIFIED POSTPROCEDURAL STATES: Chronic | ICD-10-CM

## 2024-02-01 DIAGNOSIS — Z98.891 HISTORY OF UTERINE SCAR FROM PREVIOUS SURGERY: Chronic | ICD-10-CM

## 2024-02-01 DIAGNOSIS — F33.1 MAJOR DEPRESSIVE DISORDER, RECURRENT, MODERATE: ICD-10-CM

## 2024-02-01 PROCEDURE — 90834 PSYTX W PT 45 MINUTES: CPT

## 2024-02-01 NOTE — BEHAVIORAL HEALTH
[[facility]] : [facility]  [Supportive Therapy] : Supportive Therapy [Return in ____ week(s)] : Return in [unfilled] week(s) [FreeTextEntry2] : Goal 1: Pt will shift her mind-set from focus on what she "can't do" to what she "can do".  Obj A Pt will engage in 1-2 activities per week that create enjoyment in her life.   Obj B Pt will lower her score on the PHQ-9 from 17 (moderately severe depression) to 10-14 (moderate depression)   Goal2 To reduce the amount of time spent worrying on different things. Obj A Pt will reduce score on the GAD7 from 16 (severe anxiety) to between 10-14 ( moderate anxiety) Obj B Pt will learn and implement two coping skills in order to reduce anxiety  Goal 3  To have less pain daily" Obj A Pt's pain level will be reduced from daily to 3-4 days per week Obj B Pt will attend all medical appts and take all medications as prescribed.  [de-identified] : The focus of the session was again on pt's increased pain the past 2 weeks.    Therapist provided active listening as pt shared how her pain has been impacting her  and how she is trying combinations of different pain medications until she gets approved for the Nucynta.   Pt reports she was able to get through the MRI she needed but now worries about the results (that the increased pain will show signs of deterioration of her spine in the MRI)Therapist validated pt worries about her health worsening and encouraged her think positively until she has reason to worry. Pt identified that her doctor did not feel anything got worse and that these MRIs were as a precaution. Pt will continue to use dissatisfaction and positive self talk to manage sx.  Pt is compliant with therapy and responsive to interventions and support. [FreeTextEntry1] : Pt  will contact tx team for worsening of symptoms and/or problems with medication. Next appt: 2/7

## 2024-02-02 ENCOUNTER — RX RENEWAL (OUTPATIENT)
Age: 54
End: 2024-02-02

## 2024-02-02 DIAGNOSIS — F33.1 MAJOR DEPRESSIVE DISORDER, RECURRENT, MODERATE: ICD-10-CM

## 2024-02-07 ENCOUNTER — APPOINTMENT (OUTPATIENT)
Dept: PSYCHIATRY | Facility: CLINIC | Age: 54
End: 2024-02-07

## 2024-02-07 ENCOUNTER — OUTPATIENT (OUTPATIENT)
Dept: OUTPATIENT SERVICES | Facility: HOSPITAL | Age: 54
LOS: 1 days | End: 2024-02-07
Payer: COMMERCIAL

## 2024-02-07 DIAGNOSIS — F33.1 MAJOR DEPRESSIVE DISORDER, RECURRENT, MODERATE: ICD-10-CM

## 2024-02-07 DIAGNOSIS — R30.0 DYSURIA: ICD-10-CM

## 2024-02-07 DIAGNOSIS — Z98.890 OTHER SPECIFIED POSTPROCEDURAL STATES: Chronic | ICD-10-CM

## 2024-02-07 DIAGNOSIS — Z98.891 HISTORY OF UTERINE SCAR FROM PREVIOUS SURGERY: Chronic | ICD-10-CM

## 2024-02-07 PROCEDURE — 90834 PSYTX W PT 45 MINUTES: CPT

## 2024-02-08 DIAGNOSIS — F33.1 MAJOR DEPRESSIVE DISORDER, RECURRENT, MODERATE: ICD-10-CM

## 2024-02-08 NOTE — BEHAVIORAL HEALTH
[[facility]] : [facility]  [Supportive Therapy] : Supportive Therapy [Return in ____ week(s)] : Return in [unfilled] week(s) [FreeTextEntry2] : Goal 1: Pt will shift her mind-set from focus on what she "can't do" to what she "can do".  Obj A Pt will engage in 1-2 activities per week that create enjoyment in her life.   Obj B Pt will lower her score on the PHQ-9 from 17 (moderately severe depression) to 10-14 (moderate depression)   Goal2 To reduce the amount of time spent worrying on different things. Obj A Pt will reduce score on the GAD7 from 16 (severe anxiety) to between 10-14 ( moderate anxiety) Obj B Pt will learn and implement two coping skills in order to reduce anxiety  Goal 3  To have less pain daily" Obj A Pt's pain level will be reduced from daily to 3-4 days per week Obj B Pt will attend all medical appts and take all medications as prescribed.  [de-identified] : The focus of the session was pt functioning and needs. Pt appeared drowsy today as she has just taken her morning meds and is not yet used to having the pain medication back yet in her regimen. .    Therapist provided active listening as pt shared how her pain has decreased since starting the Nucynta and by using CBD gummies. Pt reported that her MRI came back negative, there are no changes which gave ehr some relief. Therapist validated pt relief as she was worried her pain indicated worsening of her condition. Pt again expressed the desire to go home to [facility] with her  and son as has not yet heard from the place that will assess her home.  Therapist encouraged pt to call to f/u on this as it is something that would greatly improve her mood . Pt continues to attend the [facility] [employer] [facility] group and was encouraged to make connections and exchange numbers with a few members for increased socialization to which she agreed. Pt will participate in the Distress Tolerance Group that starts on Friday .  Pt is compliant with therapy and responsive to interventions and support. [FreeTextEntry1] : Pt  will contact tx team for worsening of symptoms and/or problems with medication. Next appt: 2/16

## 2024-02-08 NOTE — REASON FOR VISIT
[Access issues (e.g., transportation, impaired mobility, etc.)] : due to patient's access issues [Continuing, patient not seen in-person within last 12 months (provide details below)] : Telehealth services are continuing, patient not seen in-person within last 12 months.  [Telehealth (audio & video) - Individual/Group] : This visit was provided via telehealth using real-time 2-way audio visual technology. [Other Location: e.g. Home (Enter Location, City,State)___] : The provider was located at [unfilled]. [Home] : The patient, [unfilled], was located at home, [unfilled], at the time of the visit. [Verbal consent obtained from patient/other participant(s)] : Verbal consent for telehealth/telephonic services obtained from patient/other participant(s) [Patient] : Patient [FreeTextEntry4] : 9:45 [FreeTextEntry5] : 10:30 [FreeTextEntry1] : depression and anxiety

## 2024-02-09 ENCOUNTER — APPOINTMENT (OUTPATIENT)
Dept: PSYCHIATRY | Facility: CLINIC | Age: 54
End: 2024-02-09

## 2024-02-09 ENCOUNTER — OUTPATIENT (OUTPATIENT)
Dept: OUTPATIENT SERVICES | Facility: HOSPITAL | Age: 54
LOS: 1 days | End: 2024-02-09
Payer: COMMERCIAL

## 2024-02-09 DIAGNOSIS — F33.1 MAJOR DEPRESSIVE DISORDER, RECURRENT, MODERATE: ICD-10-CM

## 2024-02-09 DIAGNOSIS — Z98.890 OTHER SPECIFIED POSTPROCEDURAL STATES: Chronic | ICD-10-CM

## 2024-02-09 DIAGNOSIS — Z98.891 HISTORY OF UTERINE SCAR FROM PREVIOUS SURGERY: Chronic | ICD-10-CM

## 2024-02-09 PROCEDURE — 90853 GROUP PSYCHOTHERAPY: CPT

## 2024-02-10 DIAGNOSIS — F33.1 MAJOR DEPRESSIVE DISORDER, RECURRENT, MODERATE: ICD-10-CM

## 2024-02-11 ENCOUNTER — NON-APPOINTMENT (OUTPATIENT)
Age: 54
End: 2024-02-11

## 2024-02-12 RX ORDER — NITROFURANTOIN (MONOHYDRATE/MACROCRYSTALS) 25; 75 MG/1; MG/1
100 CAPSULE ORAL TWICE DAILY
Qty: 10 | Refills: 0 | Status: ACTIVE | COMMUNITY
Start: 2024-02-12 | End: 1900-01-01

## 2024-02-12 NOTE — REASON FOR VISIT
[Patient preference] : as per patient preference [Telehealth (audio & video) - Individual/Group] : This visit was provided via telehealth using real-time 2-way audio visual technology. [Medical Office: ([facility])___] : The provider was located at the medical office in [unfilled]. [Home] : The patient, [unfilled], was located at home, [unfilled], at the time of the visit. [Verbal consent obtained from patient/other participant(s)] : Verbal consent for telehealth/telephonic services obtained from patient/other participant(s) [FreeTextEntry4] : 12:00pm [FreeTextEntry5] : 1:00pm

## 2024-02-12 NOTE — DISCUSSION/SUMMARY
[Once a week] : once a week [60 minutes] : 60 minutes [de-identified] : Distress Tolerance  [FreeTextEntry8] : Facilitated skills group orientation. Introduction of leader and group members. Explored reasons for attending orientation/ expectations of group. Identified the goal of education/ implementation of distress tolerance/ crisis management skills, reviewing handout making connection to benefits of skills. Reviewed guidelines and expectations of group.  [FreeTextEntry4] : Patient was present for group orientation utilizing tele-health (Zoom). Patient introduced self to the group and verbally agreed to group guidelines/ expectations. Patient shared reasons for committing to the group, seeing the value in gaining skills to be learn strategies and identifying the goal of responding more effectively. Patient was appropriate throughout group, asking questions, appearing engaged and interested.  [de-identified] : Patient is aware of this group being a short-term 8 week commitment based on skills education on mindfulness/ distress tolerance skills. Patient will attend next week's group with the goal of skills education on STOP skill. Next group meeting:

## 2024-02-13 LAB — BACTERIA UR CULT: ABNORMAL

## 2024-02-14 NOTE — ASU PREOP CHECKLIST - SURGICAL CONSENT
Rob Clifton attended Intensive Cardiac Rehab today from 0900 to 1100. During his time he exercised and attended class.   His education today was a nutrition and cooking class titled: Delicious Desserts. Patient received handouts and class discussion pertaining to the topic.      
done

## 2024-02-16 ENCOUNTER — OUTPATIENT (OUTPATIENT)
Dept: OUTPATIENT SERVICES | Facility: HOSPITAL | Age: 54
LOS: 1 days | End: 2024-02-16
Payer: COMMERCIAL

## 2024-02-16 ENCOUNTER — APPOINTMENT (OUTPATIENT)
Dept: PSYCHIATRY | Facility: CLINIC | Age: 54
End: 2024-02-16

## 2024-02-16 DIAGNOSIS — Z98.890 OTHER SPECIFIED POSTPROCEDURAL STATES: Chronic | ICD-10-CM

## 2024-02-16 DIAGNOSIS — F33.1 MAJOR DEPRESSIVE DISORDER, RECURRENT, MODERATE: ICD-10-CM

## 2024-02-16 DIAGNOSIS — Z98.891 HISTORY OF UTERINE SCAR FROM PREVIOUS SURGERY: Chronic | ICD-10-CM

## 2024-02-16 PROCEDURE — 90853 GROUP PSYCHOTHERAPY: CPT

## 2024-02-16 NOTE — DISCUSSION/SUMMARY
[Once a week] : once a week [60 minutes] : 60 minutes [de-identified] : Distress Tolerance  [FreeTextEntry8] : Facilitated continuation of psycho-educational skills group on focused on Mindfulness and Distress Tolerance skills. Introduced mindfulness, reviewing definition and examples, obtaining feedback from participants. Provided education on the goal and use of STOP skill along with providing specific examples, highlighting skill as a means of control in response to emotional intensity/ crisis.      [FreeTextEntry4] : Patient presented to group today as scheduled. She provided examples during discussion pertaining to mindfulness, identifying yoga as a mindful activity. She also noted the difficulty to be mindful in moments of crisis/ emotional intensity. This was acknowledged and the goal of awareness of crisis and use of toleration skills in these moments were highlighted.  Patient was receptive to intervention and skills shaping. She appeared engaged and interested as material on STOP skill was reviewed and identified an example where she utilized components of STOP skill resulting in her walking away in a moment of emotional intensity, where acting on emotion urge would not have effective.   [de-identified] : Patient is aware of this group being a short-term 8-week commitment based on education on mindfulness/ distress tolerance skills. Patient will continue to practice mindfulness daily and complete worksheet with goal of STOP skill practice. Next group meetin/23 @ 12:00PM- 1:00PM

## 2024-02-16 NOTE — REASON FOR VISIT

## 2024-02-17 DIAGNOSIS — F33.1 MAJOR DEPRESSIVE DISORDER, RECURRENT, MODERATE: ICD-10-CM

## 2024-02-22 ENCOUNTER — OUTPATIENT (OUTPATIENT)
Dept: OUTPATIENT SERVICES | Facility: HOSPITAL | Age: 54
LOS: 1 days | End: 2024-02-22
Payer: COMMERCIAL

## 2024-02-22 ENCOUNTER — APPOINTMENT (OUTPATIENT)
Dept: UROLOGY | Facility: HOSPITAL | Age: 54
End: 2024-02-22
Payer: COMMERCIAL

## 2024-02-22 DIAGNOSIS — G82.21 PARAPLEGIA, COMPLETE: ICD-10-CM

## 2024-02-22 DIAGNOSIS — R32 UNSPECIFIED URINARY INCONTINENCE: ICD-10-CM

## 2024-02-22 DIAGNOSIS — Z98.890 OTHER SPECIFIED POSTPROCEDURAL STATES: Chronic | ICD-10-CM

## 2024-02-22 DIAGNOSIS — Z98.891 HISTORY OF UTERINE SCAR FROM PREVIOUS SURGERY: Chronic | ICD-10-CM

## 2024-02-22 DIAGNOSIS — R33.9 RETENTION OF URINE, UNSPECIFIED: ICD-10-CM

## 2024-02-22 DIAGNOSIS — G36.0 NEUROMYELITIS OPTICA [DEVIC]: ICD-10-CM

## 2024-02-22 DIAGNOSIS — N39.498 OTHER SPECIFIED URINARY INCONTINENCE: ICD-10-CM

## 2024-02-22 PROCEDURE — 51600 INJECTION FOR BLADDER X-RAY: CPT

## 2024-02-22 PROCEDURE — 74455 X-RAY URETHRA/BLADDER: CPT | Mod: 26

## 2024-02-22 PROCEDURE — 51797 INTRAABDOMINAL PRESSURE TEST: CPT | Mod: 26

## 2024-02-22 PROCEDURE — 51797 INTRAABDOMINAL PRESSURE TEST: CPT

## 2024-02-22 PROCEDURE — 51728 CYSTOMETROGRAM W/VP: CPT | Mod: 26

## 2024-02-22 PROCEDURE — 51784 ANAL/URINARY MUSCLE STUDY: CPT

## 2024-02-22 PROCEDURE — 76000 FLUOROSCOPY <1 HR PHYS/QHP: CPT

## 2024-02-22 PROCEDURE — 51784 ANAL/URINARY MUSCLE STUDY: CPT | Mod: 26

## 2024-02-22 PROCEDURE — 51728 CYSTOMETROGRAM W/VP: CPT

## 2024-02-23 ENCOUNTER — OUTPATIENT (OUTPATIENT)
Dept: OUTPATIENT SERVICES | Facility: HOSPITAL | Age: 54
LOS: 1 days | End: 2024-02-23
Payer: MEDICARE

## 2024-02-23 ENCOUNTER — APPOINTMENT (OUTPATIENT)
Dept: PSYCHIATRY | Facility: CLINIC | Age: 54
End: 2024-02-23

## 2024-02-23 DIAGNOSIS — Z98.891 HISTORY OF UTERINE SCAR FROM PREVIOUS SURGERY: Chronic | ICD-10-CM

## 2024-02-23 DIAGNOSIS — Z98.890 OTHER SPECIFIED POSTPROCEDURAL STATES: Chronic | ICD-10-CM

## 2024-02-23 DIAGNOSIS — F33.1 MAJOR DEPRESSIVE DISORDER, RECURRENT, MODERATE: ICD-10-CM

## 2024-02-23 PROCEDURE — 90853 GROUP PSYCHOTHERAPY: CPT

## 2024-02-24 DIAGNOSIS — F33.1 MAJOR DEPRESSIVE DISORDER, RECURRENT, MODERATE: ICD-10-CM

## 2024-02-26 ENCOUNTER — APPOINTMENT (OUTPATIENT)
Dept: SURGERY | Facility: CLINIC | Age: 54
End: 2024-02-26
Payer: MEDICARE

## 2024-02-26 VITALS — DIASTOLIC BLOOD PRESSURE: 70 MMHG | SYSTOLIC BLOOD PRESSURE: 120 MMHG | HEART RATE: 96 BPM | OXYGEN SATURATION: 99 %

## 2024-02-26 DIAGNOSIS — E66.9 OBESITY, UNSPECIFIED: ICD-10-CM

## 2024-02-26 DIAGNOSIS — I10 ESSENTIAL (PRIMARY) HYPERTENSION: ICD-10-CM

## 2024-02-26 PROCEDURE — 99213 OFFICE O/P EST LOW 20 MIN: CPT

## 2024-02-27 ENCOUNTER — APPOINTMENT (OUTPATIENT)
Dept: PSYCHIATRY | Facility: CLINIC | Age: 54
End: 2024-02-27

## 2024-02-27 ENCOUNTER — OUTPATIENT (OUTPATIENT)
Dept: OUTPATIENT SERVICES | Facility: HOSPITAL | Age: 54
LOS: 1 days | End: 2024-02-27
Payer: MEDICARE

## 2024-02-27 DIAGNOSIS — Z98.890 OTHER SPECIFIED POSTPROCEDURAL STATES: Chronic | ICD-10-CM

## 2024-02-27 DIAGNOSIS — Z98.891 HISTORY OF UTERINE SCAR FROM PREVIOUS SURGERY: Chronic | ICD-10-CM

## 2024-02-27 DIAGNOSIS — F33.1 MAJOR DEPRESSIVE DISORDER, RECURRENT, MODERATE: ICD-10-CM

## 2024-02-27 PROBLEM — I10 HYPERTENSION: Status: ACTIVE | Noted: 2018-06-13

## 2024-02-27 PROBLEM — E66.9 OBESITY (BMI 30-39.9): Status: ACTIVE | Noted: 2020-06-02

## 2024-02-27 PROCEDURE — 90834 PSYTX W PT 45 MINUTES: CPT

## 2024-02-27 NOTE — BEHAVIORAL HEALTH
[[facility]] : [facility]  [Supportive Therapy] : Supportive Therapy [Return in ____ week(s)] : Return in [unfilled] week(s) [FreeTextEntry2] : Goal 1: Pt will shift her mind-set from focus on what she "can't do" to what she "can do".  Obj A Pt will engage in 1-2 activities per week that create enjoyment in her life.   Obj B Pt will lower her score on the PHQ-9 from 17 (moderately severe depression) to 10-14 (moderate depression)   Goal2 To reduce the amount of time spent worrying on different things. Obj A Pt will reduce score on the GAD7 from 16 (severe anxiety) to between 10-14 ( moderate anxiety) Obj B Pt will learn and implement two coping skills in order to reduce anxiety  Goal 3  To have less pain daily" Obj A Pt's pain level will be reduced from daily to 3-4 days per week Obj B Pt will attend all medical appts and take all medications as prescribed.  [de-identified] : The focus of the session was pt's group experience. Therapist provided active listening as pt shared how she finds it hard to open up about her feelings and that it always triggers a crying bout for her. Pt reported this occurred in the group and that she stayed a few minutes after to talk with  Luana Lester LCSW.  Therapist validated pt feelings and her experience and whether she wants to begin to delve deeper into her emotions. Therapist  suggested we begin to work on helping pt apply the skills to her life during sessions after she also identified that she finds it hard to regulate ehr emotions. "they =go from 1-100 instantly". Pt is compliant with therapy and responsive to interventions and support. [FreeTextEntry1] : Pt  will contact tx team for worsening of symptoms and/or problems with medication. Next appt: 3/4

## 2024-02-27 NOTE — REASON FOR VISIT
[Access issues (e.g., transportation, impaired mobility, etc.)] : due to patient's access issues [Continuing, patient not seen in-person within last 12 months (provide details below)] : Telehealth services are continuing, patient not seen in-person within last 12 months.  [Telehealth (audio & video) - Individual/Group] : This visit was provided via telehealth using real-time 2-way audio visual technology. [Other Location: e.g. Home (Enter Location, City,State)___] : The provider was located at [unfilled]. [Home] : The patient, [unfilled], was located at home, [unfilled], at the time of the visit. [Verbal consent obtained from patient/other participant(s)] : Verbal consent for telehealth/telephonic services obtained from patient/other participant(s) [Patient] : Patient [FreeTextEntry4] : 1:15pm [FreeTextEntry5] : 2:00pm [FreeTextEntry1] : depression and anxiety

## 2024-02-27 NOTE — PHYSICAL EXAM
[Cooperative] : cooperative [Euthymic] : euthymic [Full] : full [Linear/Goal Directed] : linear/goal directed [Clear] : clear [None Reported] : none reported [Average] : average [WNL] : within normal limits

## 2024-02-27 NOTE — PLAN
[FreeTextEntry1] : Plan: Focus on proteins at each meal.           Increase fluid intake.           Follow up with .           RTO for annual visit.           Call with concerns.

## 2024-02-27 NOTE — REVIEW OF SYSTEMS
[Reflux/Heartburn] : reflux/heartburn [Joint Pain] : joint pain [Muscle Pain] : muscle pain [Dizziness] : dizziness [Muscle Weakness] : muscle weakness [Negative] : Allergic/Immunologic

## 2024-02-27 NOTE — ASSESSMENT
[FreeTextEntry1] : ANNMARIE MCCULLOUGH is a 53 year female seen today for bariatric follow up visit. We were unable to obtain her weight at this visit. Patient reported that she often forgets to eat then would snack on cookies and chips. With er permission I spoke with Samia, her Home Health Aide and recommended that she gives her a protein shake in the morning or make a protein coffee as she prefers something warm. I also recommended that she limits the amount of rice that she eats and focus on proteins such as Greek yogurt, chicken, fish pork chops, beef and eggs with green leafy vegetables and a small serving of rice or potato.  Fluid intake is good most days. She is no longer getting PT and I encouraged her to reach out to the  she used in the past to see if he could be of assistance.

## 2024-02-27 NOTE — HISTORY OF PRESENT ILLNESS
[de-identified] : Patient had sleeve gastrectomy over 5 years ago and did very well with her weight loss goals. At today's visit she expressed concerns that she will gain all the weight back but has no control over her meals and is now a quadriplegic which only complicates the problem. She reports that she is in constant pain with tightness and burning on chest and legs that is severe, happens daily. started on fentanyl patch and marijuana gummies with limited relief. Bowel movements are normal with MiraLAX. GERD on Pantoprazole and Famotidine at nights.

## 2024-02-28 ENCOUNTER — APPOINTMENT (OUTPATIENT)
Dept: UROLOGY | Facility: CLINIC | Age: 54
End: 2024-02-28
Payer: MEDICARE

## 2024-02-28 DIAGNOSIS — F33.1 MAJOR DEPRESSIVE DISORDER, RECURRENT, MODERATE: ICD-10-CM

## 2024-02-28 DIAGNOSIS — R32 UNSPECIFIED URINARY INCONTINENCE: ICD-10-CM

## 2024-02-28 PROBLEM — G82.21 PARAPLEGIA, COMPLETE: Status: ACTIVE | Noted: 2021-05-10

## 2024-02-28 PROBLEM — G36.0 NEUROMYELITIS OPTICA SPECTRUM DISORDER: Status: ACTIVE | Noted: 2021-04-16

## 2024-02-28 PROCEDURE — 99215 OFFICE O/P EST HI 40 MIN: CPT

## 2024-03-01 ENCOUNTER — APPOINTMENT (OUTPATIENT)
Dept: PSYCHIATRY | Facility: CLINIC | Age: 54
End: 2024-03-01

## 2024-03-01 ENCOUNTER — OUTPATIENT (OUTPATIENT)
Dept: OUTPATIENT SERVICES | Facility: HOSPITAL | Age: 54
LOS: 1 days | End: 2024-03-01
Payer: COMMERCIAL

## 2024-03-01 DIAGNOSIS — Z98.891 HISTORY OF UTERINE SCAR FROM PREVIOUS SURGERY: Chronic | ICD-10-CM

## 2024-03-01 DIAGNOSIS — Z98.890 OTHER SPECIFIED POSTPROCEDURAL STATES: Chronic | ICD-10-CM

## 2024-03-01 DIAGNOSIS — F33.1 MAJOR DEPRESSIVE DISORDER, RECURRENT, MODERATE: ICD-10-CM

## 2024-03-01 PROCEDURE — 90853 GROUP PSYCHOTHERAPY: CPT

## 2024-03-01 NOTE — REASON FOR VISIT

## 2024-03-01 NOTE — DISCUSSION/SUMMARY
[Once a week] : once a week [60 minutes] : 60 minutes [de-identified] : Distress Tolerance  [FreeTextEntry8] : Facilitated continuation of psycho-educational distress tolerance skills group. Reviewed example of each participant's practice of ACCEPTS skill along with outcomes, providing skills shaping/ reinforcing. Introduced IMPROVE skill, identifying goal and components of skills. Elicited feedback from participants to identify examples.  [FreeTextEntry4] : Patient shared example of ACCEPTS skill, noting movies and spending time with her son as activities that were useful for distracting. Patient was engaged in discussion related to IMPROVE skill, identifying examples of practice and committed to do so.  [de-identified] : Patient is aware of this group being a short-term 8- week commitment based on education on mindfulness/ distress tolerance skills. Patient will continue to practice mindfulness daily, completing worksheet writing description of practice and outcomes of IMPROVE skill. Next group meeting: 3/8/24

## 2024-03-01 NOTE — DISCUSSION/SUMMARY
[Once a week] : once a week [60 minutes] : 60 minutes [de-identified] : Distress Tolerance  [FreeTextEntry8] : Facilitated continuation of psycho-educational distress tolerance skills group.  Encouraged sharing of examples of utilizing STOP skill along with outcomes, reinforcing and shaping skill. Introduced Distract (ACCEPTS) skill, spending time differentiating between distracting and avoiding. Identified components and examples of skills. Coached group members to identify how they would practice new skills over the next week.   [FreeTextEntry4] : Patient shared use of examples of use of STOP skill over the past week with. Patient verbalized an understanding of the difference between distracting and avoiding. Patient actively listened during discussion pertaining to new Distract (ACCEPTS) skill, both committing to and identifying ways to implement practice of new skills.  [de-identified] : Patient is aware of this group being a short-term 8 week commitment based on education on mindfulness/ distress tolerance skills. Patient will continue to practice mindfulness daily and will complete worksheet given utilizing Distract ([employer]) ACCEPTS skill. Next group meeting: 3/1/24

## 2024-03-01 NOTE — REASON FOR VISIT

## 2024-03-02 DIAGNOSIS — F33.1 MAJOR DEPRESSIVE DISORDER, RECURRENT, MODERATE: ICD-10-CM

## 2024-03-04 ENCOUNTER — OUTPATIENT (OUTPATIENT)
Dept: OUTPATIENT SERVICES | Facility: HOSPITAL | Age: 54
LOS: 1 days | End: 2024-03-04
Payer: COMMERCIAL

## 2024-03-04 ENCOUNTER — APPOINTMENT (OUTPATIENT)
Dept: PSYCHIATRY | Facility: CLINIC | Age: 54
End: 2024-03-04

## 2024-03-04 DIAGNOSIS — Z98.891 HISTORY OF UTERINE SCAR FROM PREVIOUS SURGERY: Chronic | ICD-10-CM

## 2024-03-04 DIAGNOSIS — Z98.890 OTHER SPECIFIED POSTPROCEDURAL STATES: Chronic | ICD-10-CM

## 2024-03-04 DIAGNOSIS — F33.1 MAJOR DEPRESSIVE DISORDER, RECURRENT, MODERATE: ICD-10-CM

## 2024-03-04 PROBLEM — R32 URINARY INCONTINENCE: Status: ACTIVE | Noted: 2022-10-06

## 2024-03-04 LAB — BACTERIA UR CULT: NORMAL

## 2024-03-04 PROCEDURE — 90834 PSYTX W PT 45 MINUTES: CPT

## 2024-03-04 NOTE — REASON FOR VISIT
[Access issues (e.g., transportation, impaired mobility, etc.)] : due to patient's access issues [Continuing, patient not seen in-person within last 12 months (provide details below)] : Telehealth services are continuing, patient not seen in-person within last 12 months.  [Telehealth (audio & video) - Individual/Group] : This visit was provided via telehealth using real-time 2-way audio visual technology. [Other Location: e.g. Home (Enter Location, City,State)___] : The provider was located at [unfilled]. [Home] : The patient, [unfilled], was located at home, [unfilled], at the time of the visit. [Verbal consent obtained from patient/other participant(s)] : Verbal consent for telehealth/telephonic services obtained from patient/other participant(s) [Patient] : Patient [FreeTextEntry4] : 10:30AM [FreeTextEntry5] : 11:00AM [FreeTextEntry1] : depression and anxiety

## 2024-03-04 NOTE — BEHAVIORAL HEALTH
[[facility]] : [facility]  [Supportive Therapy] : Supportive Therapy [Return in ____ week(s)] : Return in [unfilled] week(s) [FreeTextEntry2] : Goal 1: Pt will shift her mind-set from focus on what she "can't do" to what she "can do".  Obj A Pt will engage in 1-2 activities per week that create enjoyment in her life.   Obj B Pt will lower her score on the PHQ-9 from 17 (moderately severe depression) to 10-14 (moderate depression)   Goal2 To reduce the amount of time spent worrying on different things. Obj A Pt will reduce score on the GAD7 from 16 (severe anxiety) to between 10-14 ( moderate anxiety) Obj B Pt will learn and implement two coping skills in order to reduce anxiety  Goal 3  To have less pain daily" Obj A Pt's pain level will be reduced from daily to 3-4 days per week Obj B Pt will attend all medical appts and take all medications as prescribed.  [FreeTextEntry1] : Pt  will contact tx team for worsening of symptoms and/or problems with medication. Next appt: 3/11   [de-identified] : The focus of the session was pt's struggle with mindfulness.  Therapist provided active listening as pt shared how when she thinks of relaxing or positive things from her past they triggers sadness because many she will no longer be able to do or engage in in the same way, Pt reports that is why she avoids thinking about and talking ab out her feelings because she usually ends up in tears, Therapist validated pt feelings  and pointed out that mindfulness is also being in the moment and enjoying that moments. Pt was encouraged to focus on and think about the things she can do  as opposed to what she cannot. Pt identified that she needs to learn independent living skills so that she can live and enjoy her life despite her paralysis. Pt is compliant with therapy and responsive to interventions and support.

## 2024-03-04 NOTE — HISTORY OF PRESENT ILLNESS
[FreeTextEntry1] : 53 year old wheelchair bound female diagnosed with neuromyelitis optica (NMO) in  here for [place]. She reports has frequent UTIs in the past year and 1 hospitalization in 2023 in [place] for UTI. Was seen~ 2months ago by urologist Dr. Niko Mitchell where workup of labs, and CT (scanned into chart) were done as well as discussion of UDS, CIC and SPT. She recently met with neurologist in 2023 and was recommended to come here for discussion of possible options regarding her urinary incontinence (unaware/urge). She wears diaper and is changed 4x daytime, 2-3x nighttime. She verbalizes UTI symptoms of discomfort in vagina and pain in lower abdomen. Unable to feel any other sensation d/t NMO. Just completed 5 day course of antibiotics and 1x medication for yeast infection  Has constipation managed with MiraLAX, Colace and sometimes Dulcolax. Has been moving her bowels daily for the past 2 weeks since being on this regimen   ml  PMH HTN, Asthma, sleep apnea, GERD, hiatal hernia, pituitary tumor Surghx csection x1, bariatric surgery, T&A, sinus sx, b/l meniscus repair, left shoulder surgery FH maternal uncles with prostate cancer, maternal uncle throat cancer Social non smoker, no EtOH,  Medications long list of medications Allergies cephalosporins-hives  Creat 0.5  24  53 year old WC bound female diagnosed with NMO in  (currently stable) here for urge/unaware incontinence. The patient is here today to have urodynamics performed, review results and discuss treatment options. Denies significant change in med/surg history since last office visit.    VUDS reviewed -   Complex cystometrogram: Sensation: Hypersensitive Detrusor activity: Terminal/Neurogenic overactive Capacity = 168 ml. Compliance: Normal End detrusor pressure = 4 cm water at capacity Urethra: Competent  EMG: Dyssynergic  Pressure Flow: Opening pressure = 76 cm water Peak pressure = 85 cm water at flow = not measurable Valsalva augmentation: No Detrusor when voiding: Normal Urethra: Obstructed- Likely dyssynergic  Bladder wall = Trabeculated Bladder shape = Normal Reflux = Absent  Bladder Neck: Open  Urethra: Distal urethra very narrow consistent with spinning top urethra   Descent: 1.5 cm Cystocele: no  VCUG- Fluoro time: 0.6 min Cumulative dose: 12.30 mGy  Assessment: 1. 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 Incomplete emptying 3. Terminal neurogenic overactivity 4. No reflux or diverticulae 5. Normal compliance  ? uti now, dysuria main symptom.

## 2024-03-04 NOTE — ASSESSMENT
[FreeTextEntry1] :   Impression/plan: 53 year old WC bound female diagnosed with NMO in 2021 (currently stable) here for urge/unaware incontinence. VUDS reveals DSD and DO.   1. We reviewed her studies, discussed option for CIC. Would recommend trial of Gemtesa 75 mg daily added to regimen of CIC to help with bladder control. Catherine; function wnl, would get a baseline updated upper tract study (US).

## 2024-03-05 DIAGNOSIS — F33.1 MAJOR DEPRESSIVE DISORDER, RECURRENT, MODERATE: ICD-10-CM

## 2024-03-07 ENCOUNTER — NON-APPOINTMENT (OUTPATIENT)
Age: 54
End: 2024-03-07

## 2024-03-08 ENCOUNTER — OUTPATIENT (OUTPATIENT)
Dept: OUTPATIENT SERVICES | Facility: HOSPITAL | Age: 54
LOS: 1 days | End: 2024-03-08
Payer: MEDICARE

## 2024-03-08 ENCOUNTER — APPOINTMENT (OUTPATIENT)
Dept: PSYCHIATRY | Facility: CLINIC | Age: 54
End: 2024-03-08

## 2024-03-08 DIAGNOSIS — Z98.891 HISTORY OF UTERINE SCAR FROM PREVIOUS SURGERY: Chronic | ICD-10-CM

## 2024-03-08 DIAGNOSIS — F33.1 MAJOR DEPRESSIVE DISORDER, RECURRENT, MODERATE: ICD-10-CM

## 2024-03-08 DIAGNOSIS — Z98.890 OTHER SPECIFIED POSTPROCEDURAL STATES: Chronic | ICD-10-CM

## 2024-03-08 PROCEDURE — 90853 GROUP PSYCHOTHERAPY: CPT

## 2024-03-08 NOTE — DISCUSSION/SUMMARY
[Once a week] : once a week [60 minutes] : 60 minutes [FreeTextEntry8] : Facilitated continuation of psycho-educational distress tolerance skills group. Encouraged sharing of examples of utilizing Distract (ACCEPTS) skill along with exploring outcomes. Provided validation/ skills shaping/ reinforcing during homework review. Introduced Self-Soothe skill-mindful participation with the 6 senses, reviewing examples and eliciting feedback from group members regarding ways to practice. Continued to focus on the goal of distress tolerance skills being to 'tolerate' a crisis or distressing situation without making it worse.    [de-identified] : Distress Tolerance  [de-identified] : Patient is aware of this group being a short-term 8- week commitment based on education on mindfulness/ distress tolerance skills. Patient will continue to daily mindfulness practice and will complete worksheet given with goal of practicing Self-Soothing skill daily between sessions.      [FreeTextEntry4] : Patient shared use of Distract (ACCEPTS) skill over the past week with positive outcomes and was receptive to skills shaping/ reinforcing. Patient actively listened during discussion pertaining to new Self- Soothe skill, asking questions and identifying specific ways to implement practice of new skill over the next week.

## 2024-03-08 NOTE — REASON FOR VISIT
[Patient preference] : as per patient preference [Telehealth (audio & video) - Individual/Group] : This visit was provided via telehealth using real-time 2-way audio visual technology. [Continuing, patient seen in-person within last 12 months] : Telehealth services are continuing as patient has been seen in-person within last 12 months. [Medical Office: ([facility])___] : The provider was located at the medical office in [unfilled]. [Home] : The patient, [unfilled], was located at home, [unfilled], at the time of the visit. [Verbal consent obtained from patient/other participant(s)] : Verbal consent for telehealth/telephonic services obtained from patient/other participant(s) [FreeTextEntry4] : 12:00pm [FreeTextEntry5] : 1:00pm

## 2024-03-09 DIAGNOSIS — F33.1 MAJOR DEPRESSIVE DISORDER, RECURRENT, MODERATE: ICD-10-CM

## 2024-03-11 ENCOUNTER — OUTPATIENT (OUTPATIENT)
Dept: OUTPATIENT SERVICES | Facility: HOSPITAL | Age: 54
LOS: 1 days | End: 2024-03-11
Payer: COMMERCIAL

## 2024-03-11 ENCOUNTER — APPOINTMENT (OUTPATIENT)
Dept: PSYCHIATRY | Facility: CLINIC | Age: 54
End: 2024-03-11

## 2024-03-11 DIAGNOSIS — Z98.890 OTHER SPECIFIED POSTPROCEDURAL STATES: Chronic | ICD-10-CM

## 2024-03-11 DIAGNOSIS — Z98.891 HISTORY OF UTERINE SCAR FROM PREVIOUS SURGERY: Chronic | ICD-10-CM

## 2024-03-11 DIAGNOSIS — F33.1 MAJOR DEPRESSIVE DISORDER, RECURRENT, MODERATE: ICD-10-CM

## 2024-03-11 PROCEDURE — 90834 PSYTX W PT 45 MINUTES: CPT

## 2024-03-11 NOTE — BEHAVIORAL HEALTH
[[place] [facility]] : [facility]  [Supportive Therapy] : Supportive Therapy [Return in ____ week(s)] : Return in [unfilled] week(s) [FreeTextEntry2] : Goal 1: Pt will shift her mind-set from focus on what she "can't do" to what she "can do".  Obj A Pt will engage in 1-2 activities per week that create enjoyment in her life.   Obj B Pt will lower her score on the PHQ-9 from 17 (moderately severe depression) to 10-14 (moderate depression)   Goal2 To reduce the amount of time spent worrying on different things. Obj A Pt will reduce score on the GAD7 from 16 (severe anxiety) to between 10-14 ( moderate anxiety) Obj B Pt will learn and implement two coping skills in order to reduce anxiety  Goal 3  To have less pain daily" Obj A Pt's pain level will be reduced from daily to 3-4 days per week Obj B Pt will attend all medical appts and take all medications as prescribed.  [FreeTextEntry1] : Pt  will contact tx team for worsening of symptoms and/or problems with medication. Next appt: 3/18   [de-identified] : The focus of the session was pt's experience in the group and applying the group skills.  Therapist provided active listening as pt shared how when she was more vocal in the last group and was able to share her thoughts related the homework assignment. Pt reports she was glad that she opened up and that the  helped her see things in a different way. Therapist validated pt feelings and explored how she is applying the skills to her life.  Pt identified that she already uses some of the skills without knowing she was such as self soothing techniques. Pt enjoys listening the audio books as well as burning candles and incense.  Pt is compliant with therapy and responsive to interventions and support.

## 2024-03-12 DIAGNOSIS — F33.1 MAJOR DEPRESSIVE DISORDER, RECURRENT, MODERATE: ICD-10-CM

## 2024-03-15 ENCOUNTER — NON-APPOINTMENT (OUTPATIENT)
Age: 54
End: 2024-03-15

## 2024-03-15 ENCOUNTER — APPOINTMENT (OUTPATIENT)
Dept: OPHTHALMOLOGY | Facility: CLINIC | Age: 54
End: 2024-03-15
Payer: MEDICAID

## 2024-03-15 ENCOUNTER — APPOINTMENT (OUTPATIENT)
Dept: PSYCHIATRY | Facility: CLINIC | Age: 54
End: 2024-03-15

## 2024-03-15 PROCEDURE — 92014 COMPRE OPH EXAM EST PT 1/>: CPT

## 2024-03-15 PROCEDURE — 92083 EXTENDED VISUAL FIELD XM: CPT

## 2024-03-15 PROCEDURE — 92133 CPTRZD OPH DX IMG PST SGM ON: CPT

## 2024-03-18 ENCOUNTER — OUTPATIENT (OUTPATIENT)
Dept: OUTPATIENT SERVICES | Facility: HOSPITAL | Age: 54
LOS: 1 days | End: 2024-03-18
Payer: COMMERCIAL

## 2024-03-18 ENCOUNTER — APPOINTMENT (OUTPATIENT)
Dept: PSYCHIATRY | Facility: CLINIC | Age: 54
End: 2024-03-18

## 2024-03-18 DIAGNOSIS — F33.1 MAJOR DEPRESSIVE DISORDER, RECURRENT, MODERATE: ICD-10-CM

## 2024-03-18 DIAGNOSIS — Z98.891 HISTORY OF UTERINE SCAR FROM PREVIOUS SURGERY: Chronic | ICD-10-CM

## 2024-03-18 DIAGNOSIS — Z98.890 OTHER SPECIFIED POSTPROCEDURAL STATES: Chronic | ICD-10-CM

## 2024-03-18 PROCEDURE — 90834 PSYTX W PT 45 MINUTES: CPT

## 2024-03-18 NOTE — PHYSICAL EXAM
[Cooperative] : cooperative [Full] : full [Euthymic] : euthymic [Linear/Goal Directed] : linear/goal directed [Clear] : clear [None Reported] : none reported [Average] : average [WNL] : within normal limits

## 2024-03-18 NOTE — BEHAVIORAL HEALTH
[[facility]] : [facility]  [Supportive Therapy] : Supportive Therapy [Return in ____ week(s)] : Return in [unfilled] week(s) [FreeTextEntry2] : Goal 1: Pt will shift her mind-set from focus on what she "can't do" to what she "can do".  Obj A Pt will engage in 1-2 activities per week that create enjoyment in her life.   Obj B Pt will lower her score on the PHQ-9 from 17 (moderately severe depression) to 10-14 (moderate depression)   Goal2 To reduce the amount of time spent worrying on different things. Obj A Pt will reduce score on the GAD7 from 16 (severe anxiety) to between 10-14 ( moderate anxiety) Obj B Pt will learn and implement two coping skills in order to reduce anxiety  Goal 3  To have less pain daily" Obj A Pt's pain level will be reduced from daily to 3-4 days per week Obj B Pt will attend all medical appts and take all medications as prescribed.  [de-identified] : The focus of the session was pt's low mood last week and crying bouts.   Therapist provided active listening as pt shared how she continues to wish she could move home and live with her son, feeling like a burden in her mother's home.  Pt reports she does not see a way for her to be able to live in her old home, that there are too many obstacles. Therapist validated pt feelings and empathized with her experience. Therapist encouraged pt to love forward with her idea of hiring a contactor to assess the property  to make it accessible and urged her not to give up on this hope.   Pt agreed to make some calls to facilitate this.  Pt missed the last DBT skills group but will continue on Friday and also attends the [facility] group on Wednesdays .  Pt is compliant with therapy and responsive to interventions and support. [FreeTextEntry1] : Pt  will contact tx team for worsening of symptoms and/or problems with medication. Next appt: 3/25

## 2024-03-19 DIAGNOSIS — F33.1 MAJOR DEPRESSIVE DISORDER, RECURRENT, MODERATE: ICD-10-CM

## 2024-03-20 ENCOUNTER — APPOINTMENT (OUTPATIENT)
Dept: NEUROLOGY | Facility: CLINIC | Age: 54
End: 2024-03-20
Payer: MEDICARE

## 2024-03-20 PROCEDURE — G2211 COMPLEX E/M VISIT ADD ON: CPT | Mod: 95

## 2024-03-20 PROCEDURE — 99214 OFFICE O/P EST MOD 30 MIN: CPT | Mod: 95

## 2024-03-20 NOTE — HISTORY OF PRESENT ILLNESS
[Home] : at home, [unfilled] , at the time of the visit. [Medical Office: ([facility])___] : at the medical office located in  [Verbal consent obtained from patient] : the patient, [unfilled] [FreeTextEntry1] : Initial hx 5/2021 Referred by Dr. Vizcaino. 1/2021 - got 1st covid vaccine. 2/2021 - RLE weakness, tightness, gait dysfunction, some RLE discomfort. went to ED 4x. Then LLE was numb but without weakness. Went to pain management, referred to neurologist, then went to [facility]. 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 Got IVMP, no improvement. Saw Dr. Vizcaino who sent for NMO ab in 3/2021, referred to me, but developed relapse in 4/2021. 4/2021 - acute paraplegia. [facility], got 3d IVMP and 5 sessions PLEX. Got rituximab 1g x2 in 5/2021. since then, some sensation coming back but no movement. Currently in [place] rehab. Left rehab in 12/2021. Has developed a bad rash in late 2021. This had been attributed to lyrica so stopped it. This definitively started PRIOR to starting trileptal. However, may have been attributed to antibiotic (delayed reaction, per derm). 3/2022 - Had "chest spasms", read the HR instead of PO2 so thought her O2 was 65, called 911, went to ED, cleared from cardiac perspective. 3/2022 - developed new burning and tightness in head, neck, shoulders. MRI C spine without new activity. 5/2022 - switched from wellbutrin to cymbalta. late 5/2022 - tested positive for covid on two home tests. got monoclonal ab. just headaches, lasted several days. some congestion, some cough, no fever. 1/2023 - had intermittent sleepiness, constant dizziness/vertigo, nausea, no vomiting, and poor appetite. went to ED, had CTH. MRIs did not show new lesions. tried meclizine but didn't help. 4/2023 - had severe UTI. admitted to [facility]. 2023 - Had 2wks of being able to control movement of toes in R foot, but then lost it again.    Subj interval:  Had recent UTI, was on abx for a while. Is being considered for suprapubic catheter. Sees Dr. Mitchell at [facility].  Prominent chronic pain - still not tolerable. tightness and burning on chest and legs that is severe, happens daily. started on fentanyl patch which has helped only somewhat. on 600tid gabapentin (1200tid gabapentin was not sufficient). Nucynta 75bid helps somewhat (tid didn't help more in the past). Oxycodone 15 qid prn doesn't help. Tried lyrica in the past which didn't help. Sees pain management, medical marijuana has not helped with the pain but relaxes her, increasing the dose. Fentanyl 5mg patch has helped somewhat, increased to 50mcg in late 2023 which helps a bit.  Worse chronic spasms - had decreased trileptal to 450bid from high of 750bid, and baclofen changed from high of 20tid to 10-10-20, still with uncomfortable spasms but not frankly painful.  Living with her mother. Waiting to get an elevator.  Has motorized wheelchair.  PMHX: NMO asthma depression anxiety LBP insomnia migraines gastric sleeve  MEDS: qvar asa81 albuterol prn protonix rituximab ubrelvy prn b12 calcium MV iron biotin colace miralax gabapentin 600tid trileptal 450bid baclofen 10-10-20 nucynta 75bid hydroxyzine prn itching/anxiety medical marijuana gummies wellbutrin (cymbalta was switched back to wellbutrin in early 2023 because of weight management and metallic taste in her mouth) fentanyl patch  SHx: no tob  FHx: no neuro, no AI.   O:  AO3. Normally conversant. Follows commands, names, and repeats. Good attention. face symm moves both arms well   MRI C, T, and brain 2/2021 reviewed - thoracic LETM on my review.  MRI C+T 1/2022 - no new lorie+ lesions, however, c/w 3/2021 there was caudal extension on the left lateral aspect down to T8 (likely related to relapse she had in 4/2021).  MRI C spine and MRI brain/sella 4/2022 - unchanged.  MRI brain 2/2023 ([facility]) - no new lesions. lower brain stem has abnormalities, but similar to that seen in c spine in 4/2022.  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 Stable from NMO standpoint, on rituximab.  Persistent spasms and severe chronic pain, but somewhat better tolerated.  All questions answered, education provided re: dx. management discussed at length. imaging reviewed with pt personally.  - cont rituximab - check blood work q6m, including a "full T cell subset" to look at CD19 counts. - again advised restart outpatient PT.  Pain: - cont f/u with pain management - cont nucynta 75bid - cont gabapentin. - cont medical marijuana - cont fentanyl patch per pain management. no evidence of abuse.  Spasms: - cont trileptal 450mg bid (from 600bid) for tonic spasms - cont baclofen 10-10-20  Mood - f/u with therapist.  Bladder: - f/u with Dr. Cifuentes  - RTC 3m

## 2024-03-22 ENCOUNTER — APPOINTMENT (OUTPATIENT)
Dept: PSYCHIATRY | Facility: CLINIC | Age: 54
End: 2024-03-22

## 2024-03-22 ENCOUNTER — OUTPATIENT (OUTPATIENT)
Dept: OUTPATIENT SERVICES | Facility: HOSPITAL | Age: 54
LOS: 1 days | End: 2024-03-22
Payer: MEDICARE

## 2024-03-22 DIAGNOSIS — Z98.890 OTHER SPECIFIED POSTPROCEDURAL STATES: Chronic | ICD-10-CM

## 2024-03-22 DIAGNOSIS — F41.9 ANXIETY DISORDER, UNSPECIFIED: ICD-10-CM

## 2024-03-22 DIAGNOSIS — Z98.891 HISTORY OF UTERINE SCAR FROM PREVIOUS SURGERY: Chronic | ICD-10-CM

## 2024-03-22 PROCEDURE — 90853 GROUP PSYCHOTHERAPY: CPT

## 2024-03-22 RX ORDER — BACLOFEN 10 MG/1
10 TABLET ORAL
Qty: 120 | Refills: 5 | Status: ACTIVE | COMMUNITY
Start: 2021-03-18 | End: 1900-01-01

## 2024-03-22 NOTE — DISCUSSION/SUMMARY
[Once a week] : once a week [60 minutes] : 60 minutes [de-identified] : Distress Tolerance  [FreeTextEntry8] : Facilitated continuation of psycho-educational distress tolerance skills group. Encouraged sharing of examples of TIPP skill along with outcomes, providing skills shaping and validation throughout. Introduced Radical Acceptance skill, identifying goal and implementation of new skill, providing examples and eliciting feedback from group participants.  [FreeTextEntry4] : Patient shared example of TIPP skill use, identifying examples of use and demonstrating understanding/ effectiveness. Patient was actively involved in discussion pertaining to radical acceptance skills, providing a personal example.      [de-identified] : Patient is aware of this group being a short-term 8-week commitment based on education on mindfulness/ distress tolerance skills. Patient will continue to practice mindfulness. Patient will complete worksheet on practice radical acceptance skill.      Next (and final) group meeting: 3/29/24

## 2024-03-22 NOTE — REASON FOR VISIT
[Patient preference] : as per patient preference [Continuing, patient seen in-person within last 12 months] : Telehealth services are continuing as patient has been seen in-person within last 12 months. [Telehealth (audio & video) - Individual/Group] : This visit was provided via telehealth using real-time 2-way audio visual technology. [Medical Office: ([facility])___] : The provider was located at the medical office in [unfilled]. [Home] : The patient, [unfilled], was located at home, [unfilled], at the time of the visit. [Verbal consent obtained from patient/other participant(s)] : Verbal consent for telehealth/telephonic services obtained from patient/other participant(s) [FreeTextEntry4] : 12:00pm [FreeTextEntry5] : 1:00pm

## 2024-03-23 DIAGNOSIS — F41.9 ANXIETY DISORDER, UNSPECIFIED: ICD-10-CM

## 2024-03-25 ENCOUNTER — APPOINTMENT (OUTPATIENT)
Dept: PSYCHIATRY | Facility: CLINIC | Age: 54
End: 2024-03-25

## 2024-03-25 ENCOUNTER — OUTPATIENT (OUTPATIENT)
Dept: OUTPATIENT SERVICES | Facility: HOSPITAL | Age: 54
LOS: 1 days | End: 2024-03-25
Payer: COMMERCIAL

## 2024-03-25 DIAGNOSIS — Z98.890 OTHER SPECIFIED POSTPROCEDURAL STATES: Chronic | ICD-10-CM

## 2024-03-25 DIAGNOSIS — Z98.891 HISTORY OF UTERINE SCAR FROM PREVIOUS SURGERY: Chronic | ICD-10-CM

## 2024-03-25 DIAGNOSIS — F33.1 MAJOR DEPRESSIVE DISORDER, RECURRENT, MODERATE: ICD-10-CM

## 2024-03-25 PROCEDURE — 90834 PSYTX W PT 45 MINUTES: CPT

## 2024-03-25 NOTE — BEHAVIORAL HEALTH
[[facility] [facility]] : [facility]  [Supportive Therapy] : Supportive Therapy [Return in ____ week(s)] : Return in [unfilled] week(s) [FreeTextEntry2] : Goal 1: Pt will shift her mind-set from focus on what she "can't do" to what she "can do".  Obj A Pt will engage in 1-2 activities per week that create enjoyment in her life.   Obj B Pt will lower her score on the PHQ-9 from 17 (moderately severe depression) to 10-14 (moderate depression)   Goal2 To reduce the amount of time spent worrying on different things. Obj A Pt will reduce score on the GAD7 from 16 (severe anxiety) to between 10-14 ( moderate anxiety) Obj B Pt will learn and implement two coping skills in order to reduce anxiety  Goal 3  To have less pain daily" Obj A Pt's pain level will be reduced from daily to 3-4 days per week Obj B Pt will attend all medical appts and take all medications as prescribed.  [de-identified] : The focus of the session was pt's experience of pain in the past week. Therapist provided active listening as pt shared how she has been experiencing severe pain , especially last Saturday and which is making her feel more depressed and sad. Pt reports having trouble seeing any positives when going through these episodes. Therapist validated pt feelings and empathized with her experience. Therapist discusses ways pt can  cope with the pain she cannot treat with medication and including ways to distract herself.   Pt continues to attend [facility] [employer] [facility] group, DBT skills and last week a seminar for people suffering with NMO (the disease that has left her paralyzed) Pt reported finding it motivating to see others in her situation living full lives despite this disease.   Pt is compliant with therapy and responsive to interventions and support. [FreeTextEntry1] : Pt  will contact tx team for worsening of symptoms and/or problems with medication. Next appt: 4/1

## 2024-03-26 DIAGNOSIS — N32.81 OVERACTIVE BLADDER: ICD-10-CM

## 2024-03-26 DIAGNOSIS — F33.1 MAJOR DEPRESSIVE DISORDER, RECURRENT, MODERATE: ICD-10-CM

## 2024-03-26 RX ORDER — VIBEGRON 75 MG/1
75 TABLET, FILM COATED ORAL
Qty: 90 | Refills: 3 | Status: ACTIVE | COMMUNITY
Start: 2024-03-26 | End: 1900-01-01

## 2024-03-28 ENCOUNTER — APPOINTMENT (OUTPATIENT)
Dept: PSYCHIATRY | Facility: CLINIC | Age: 54
End: 2024-03-28
Payer: COMMERCIAL

## 2024-03-28 ENCOUNTER — OUTPATIENT (OUTPATIENT)
Dept: OUTPATIENT SERVICES | Facility: HOSPITAL | Age: 54
LOS: 1 days | End: 2024-03-28
Payer: COMMERCIAL

## 2024-03-28 DIAGNOSIS — Z98.890 OTHER SPECIFIED POSTPROCEDURAL STATES: Chronic | ICD-10-CM

## 2024-03-28 DIAGNOSIS — F41.1 GENERALIZED ANXIETY DISORDER: ICD-10-CM

## 2024-03-28 DIAGNOSIS — F33.1 MAJOR DEPRESSIVE DISORDER, RECURRENT, MODERATE: ICD-10-CM

## 2024-03-28 PROCEDURE — 99214 OFFICE O/P EST MOD 30 MIN: CPT | Mod: 95

## 2024-03-28 NOTE — REASON FOR VISIT
[Patient] : Patient [Verbal consent obtained from patient] : the patient, [unfilled] [Access issues (e.g., transportation, impaired mobility, etc.)] : due to patient's access issues [Telehealth (audio & video) - Individual/Group] : This visit was provided via telehealth using real-time 2-way audio visual technology. [Medical Office: ([facility])___] : The provider was located at the medical office in [unfilled]. [Home] : The patient, [unfilled], was located at home, [unfilled], at the time of the visit. [Verbal consent obtained from patient/other participant(s)] : Verbal consent for telehealth/telephonic services obtained from patient/other participant(s) [FreeTextEntry4] : 456og [FreeTextEnzbz5] : 710rz [FreeTextEntry1] : "I have low energy and motivation."

## 2024-03-28 NOTE — HISTORY OF PRESENT ILLNESS
[FreeTextEntry1] : Per prior visit, "She sleeps well, experiences bouts of dysphoric mood,  intermittent episodes of tearfulness which she is able to manage. She struggles with pain and medical condition which initially began two years ago, coping mechanisms are discussed at length.  Her appetite is improved.  She takes Hydroxyzine sparingly. She ambulates with motorized vehicle and trying to engage in outdoor activities. She did not complete repeat home sleep study.  She continues to engage in social media, support groups and recently started attending [employer] in person. She has home visiting nurse three times weekly which will end next week and OT ends this week. Presently, she denies thoughts of harm to self. She will continue with therapist and receive Duloxetine prescription from neurologist, Dr. De Los Santos, who is in agreement. "  This is a 53 year old patient who presents for medication management.  The patient reports mild depressed mood, but good sleep and appetite.  The patient denies any symptoms of oniel, or psychosis at this time.  The patient denies any anxiety that impairs their daily functioning. The patient denies any suicidal ideation, homicidal ideation, no auditory or visual hallucinations, or paranoid ideation.  The patient has chronic medical complaints. The patient denies any illicit drug or alcohol use, and is not smoking at this time. I requested the patient's updated physical and labs from their PCP.  Coping skills were discussed and a safety plan was provided.  The patient was educated on the risks, benefits, and alternatives of their psychiatric medications.  The patient will engage in psychotherapy at this time.  The patient consents to ongoing medication management.

## 2024-03-28 NOTE — SOCIAL HISTORY
[Unemployed] : unemployed [] :  [# Of Children ___] : has [unfilled] children [None] : none [Yes] : yes [No Known Use] : no known use [FreeTextEntry4] : Masters in Social work [FreeTextEntry1] : medical marijuana edibles for pain, once to twice daily [TextBox_7] : Socially, last drink 12/31/21

## 2024-03-28 NOTE — DISCUSSION/SUMMARY
[FreeTextEntry1] : Dinora is a 54y/o AA  female, depression, anxiety, PMHx of Neuromyelitis optima, paralyzed since 4/2021, neurological symptoms began 2/2021, post Moderna vaccine 1/2021. She sleeps well, managing her mood and anxiety symptoms. She is at low risk, no acute thoughts of harm to self, recent medical condition, positive family support, linked to treatment. The patient reports low energy and motivation.  Raise Bupropion XL 300mg po am to 450mg po qam Hydroxyzine 50mg po prn  Follow up in 4 weeks     [Date of Last Physical Exam: _____] : Date of Last Physical Exam: [unfilled] [Date of Last Annual Labs: _____] : Date of Last Annual Labs: [unfilled] [Annual Review of Systems Completed?] : Annual Review of Systems Completed: Yes [Tobacco Screening Completed?] : Tobacco Screening Completed: Yes [Potential impact of patient’s physical health conditions on psychiatric care?] : Potential impact of patient's physical health conditions on psychiatric care: Yes [Does patient require any additional health services or referrals?] : Does patient require any additional health services or referrals: No

## 2024-03-28 NOTE — PHYSICAL EXAM
[Intermittent] : intermittent [Cooperative] : cooperative [Full] : full [Clear] : clear [Linear/Goal Directed] : linear/goal directed [WNL] : within normal limits [FreeTextEntry1] : Seated [FreeTextEntry8] : little down

## 2024-03-29 ENCOUNTER — APPOINTMENT (OUTPATIENT)
Dept: PSYCHIATRY | Facility: CLINIC | Age: 54
End: 2024-03-29

## 2024-03-29 ENCOUNTER — OUTPATIENT (OUTPATIENT)
Dept: OUTPATIENT SERVICES | Facility: HOSPITAL | Age: 54
LOS: 1 days | End: 2024-03-29
Payer: COMMERCIAL

## 2024-03-29 DIAGNOSIS — Z98.890 OTHER SPECIFIED POSTPROCEDURAL STATES: Chronic | ICD-10-CM

## 2024-03-29 DIAGNOSIS — F41.1 GENERALIZED ANXIETY DISORDER: ICD-10-CM

## 2024-03-29 DIAGNOSIS — F33.1 MAJOR DEPRESSIVE DISORDER, RECURRENT, MODERATE: ICD-10-CM

## 2024-03-29 DIAGNOSIS — Z98.891 HISTORY OF UTERINE SCAR FROM PREVIOUS SURGERY: Chronic | ICD-10-CM

## 2024-03-29 PROCEDURE — 90853 GROUP PSYCHOTHERAPY: CPT

## 2024-03-29 NOTE — DISCUSSION/SUMMARY
[Once a week] : once a week [60 minutes] : 60 minutes [de-identified] : Distress Tolerance  [de-identified] : Patient has completed short-term 8-week commitment based on education on mindfulness/ distress tolerance skills, as this is the final week. Patient shared intention of continued to implement skills learned to have them to utilize in moments of emotional intensity/ ineffective urges.    [FreeTextEntry8] :  facilitated final session of psycho-educational distress tolerance skills group. Conducted mindfulness exercise to reinforce skill use. Encouraged sharing of examples of Radical Acceptance skills along with outcomes, providing skills shaping/ reinforcing and validation throughout. Utilized interactive game to conduct a cumulative review of skills learned over the 8-week group process. Explored skill options in response to specific crisis scenarios given; obtaining feedback from participants. Celebrated the completion of group for each participant, acknowledging and exploring areas of progress.       [FreeTextEntry4] : Patient participated in visualization mindfulness exercise, noting ability to remain present along with a feeling of relaxation. Patient continued to demonstrate commitment and willingness to utilize skills; reporting example of Radical Acceptance skill use and was receptive to skills shaping/ reinforcing provided. Patient actively participated in interactive exercises demonstrating an understanding of the use of goal of skills learned over 8-week skills group. Patient shared that participation in group resulted in improved awareness of self and that she is better able to "recognize the need for skills" to gain control in certain situations.

## 2024-03-29 NOTE — REASON FOR VISIT
[Patient preference] : as per patient preference [Telehealth (audio & video) - Individual/Group] : This visit was provided via telehealth using real-time 2-way audio visual technology. [Continuing, patient seen in-person within last 12 months] : Telehealth services are continuing as patient has been seen in-person within last 12 months. [Medical Office: ([facility])___] : The provider was located at the medical office in [unfilled]. [Home] : The patient, [unfilled], was located at home, [unfilled], at the time of the visit. [Verbal consent obtained from patient/other participant(s)] : Verbal consent for telehealth/telephonic services obtained from patient/other participant(s) [FreeTextEntry4] : 12:00pm [FreeTextEntry5] : 1:00pm

## 2024-03-30 DIAGNOSIS — F41.1 GENERALIZED ANXIETY DISORDER: ICD-10-CM

## 2024-04-01 ENCOUNTER — APPOINTMENT (OUTPATIENT)
Dept: PSYCHIATRY | Facility: CLINIC | Age: 54
End: 2024-04-01

## 2024-04-01 ENCOUNTER — OUTPATIENT (OUTPATIENT)
Dept: OUTPATIENT SERVICES | Facility: HOSPITAL | Age: 54
LOS: 1 days | End: 2024-04-01
Payer: COMMERCIAL

## 2024-04-01 DIAGNOSIS — F33.1 MAJOR DEPRESSIVE DISORDER, RECURRENT, MODERATE: ICD-10-CM

## 2024-04-01 DIAGNOSIS — Z98.891 HISTORY OF UTERINE SCAR FROM PREVIOUS SURGERY: Chronic | ICD-10-CM

## 2024-04-01 DIAGNOSIS — F33.0 MAJOR DEPRESSIVE DISORDER, RECURRENT, MILD: ICD-10-CM

## 2024-04-01 PROCEDURE — 90834 PSYTX W PT 45 MINUTES: CPT

## 2024-04-01 NOTE — REASON FOR VISIT
[Access issues (e.g., transportation, impaired mobility, etc.)] : due to patient's access issues [Continuing, patient not seen in-person within last 12 months (provide details below)] : Telehealth services are continuing, patient not seen in-person within last 12 months.  [Telehealth (audio & video) - Individual/Group] : This visit was provided via telehealth using real-time 2-way audio visual technology. [Other Location: e.g. Home (Enter Location, City,State)___] : The provider was located at [unfilled]. [Home] : The patient, [unfilled], was located at home, [unfilled], at the time of the visit. [Verbal consent obtained from patient/other participant(s)] : Verbal consent for telehealth/telephonic services obtained from patient/other participant(s) [Patient] : Patient [FreeTextEntry5] : 11:45am [FreeTextEntry4] : 11:00am [FreeTextEntry1] : depression and anxiety

## 2024-04-01 NOTE — BEHAVIORAL HEALTH
[[facility]] : [facility]  [Supportive Therapy] : Supportive Therapy [Return in ____ week(s)] : Return in [unfilled] week(s) [FreeTextEntry2] : Goal 1: Pt will shift her mind-set from focus on what she "can't do" to what she "can do".  Obj A Pt will engage in 1-2 activities per week that create enjoyment in her life.   Obj B Pt will lower her score on the PHQ-9 from 17 (moderately severe depression) to 10-14 (moderate depression)   Goal2 To reduce the amount of time spent worrying on different things. Obj A Pt will reduce score on the GAD7 from 16 (severe anxiety) to between 10-14 ( moderate anxiety) Obj B Pt will learn and implement two coping skills in order to reduce anxiety  Goal 3  To have less pain daily" Obj A Pt's pain level will be reduced from daily to 3-4 days per week Obj B Pt will attend all medical appts and take all medications as prescribed.  [de-identified] : The focus of the session was pt's amotivation..  Therapist provided active listening as pt shared how she finds it very hard to get out of bed and get dressed and ready for the day everyday, Pt reports that it involved alot of help from her aide and that washing herself is very painful on her upper extremities and chest. Pt report she knows this would be good for her but she can pt seem to get herself to do it.  Therapist validated pt feelings and and her experience and pointed out that getting ready is much more challenging for her than someone who does not have her condition. Therapist and pt discussed the benefits of her getting up earlier, what she could accomplish and how this would affect her mental health.    Pt continues to attend [facility] group and had her final DBT skills group  last Friday. P:t started working with Dr. Klein last week for medication management. Pt is compliant with therapy and responsive to interventions and support. [FreeTextEntry1] : Pt  will contact tx team for worsening of symptoms and/or problems with medication. Next appt: 4/8

## 2024-04-02 DIAGNOSIS — F33.0 MAJOR DEPRESSIVE DISORDER, RECURRENT, MILD: ICD-10-CM

## 2024-04-08 ENCOUNTER — OUTPATIENT (OUTPATIENT)
Dept: OUTPATIENT SERVICES | Facility: HOSPITAL | Age: 54
LOS: 1 days | End: 2024-04-08

## 2024-04-08 ENCOUNTER — APPOINTMENT (OUTPATIENT)
Dept: PSYCHIATRY | Facility: CLINIC | Age: 54
End: 2024-04-08

## 2024-04-08 DIAGNOSIS — Z98.890 OTHER SPECIFIED POSTPROCEDURAL STATES: Chronic | ICD-10-CM

## 2024-04-08 DIAGNOSIS — Z98.891 HISTORY OF UTERINE SCAR FROM PREVIOUS SURGERY: Chronic | ICD-10-CM

## 2024-04-08 DIAGNOSIS — F33.0 MAJOR DEPRESSIVE DISORDER, RECURRENT, MILD: ICD-10-CM

## 2024-04-08 NOTE — BEHAVIORAL HEALTH
[[facility]] : [facility]  [[facility]ive Therapy] : [facility] Therapy [Return in ____ week(s)] : Return in [unfilled] week(s) [FreeTextEntry2] : Goal 1: Pt will shift her mind-set from focus on what she "can't do" to what she "can do".  Obj A Pt will engage in 1-2 activities per week that create enjoyment in her life.   Obj B Pt will lower her score on the PHQ-9 from 17 (moderately severe depression) to 10-14 (moderate depression)   Goal2 To reduce the amount of time spent worrying on different things. Obj A Pt will reduce score on the GAD7 from 16 (severe anxiety) to between 10-14 ( moderate anxiety) Obj B Pt will learn and implement two coping skills in order to reduce anxiety  Goal 3  To have less pain daily" Obj A Pt's pain level will be reduced from daily to 3-4 days per week Obj B Pt will attend all medical appts and take all medications as prescribed.  [de-identified] : The focus of the session was pt's relationship with her son. Therapist provided active listening as pt shared how her son who is on the autism spectrum, is highly concrete in his thinking and makes comments that are sometimes hurtful to pt without realizing, Pt's son often asks that she come with him and his dad on vacations, says he wants her to love at home with him, etc. Pt reports that this is hard for her to hear as these are thongs she would love to do if she could. Therapist validated pt feelings and how triggering this must be for her each time she hears it .  Therapist encouraged pt to focus on things she can do with her son as opposed to things she cannot do due to her condition. Pt is compliant with therapy and responsive to interventions and support. [FreeTextEntry1] : Pt  will contact tx team for worsening of symptoms and/or problems with medication. Next appt: 4/15

## 2024-04-08 NOTE — REASON FOR VISIT
[Access issues (e.g., transportation, impaired mobility, etc.)] : due to patient's access issues [Continuing, patient not seen in-person within last 12 months (provide details below)] : Telehealth services are continuing, patient not seen in-person within last 12 months.  [Telehealth (audio & video) - Individual/Group] : This visit was provided via telehealth using real-time 2-way audio visual technology. [Other Location: e.g. Home (Enter Location, City,State)___] : The provider was located at [unfilled]. [Home] : The patient, [unfilled], was located at home, [unfilled], at the time of the visit. [Verbal consent obtained from patient/other participant(s)] : Verbal consent for telehealth/telephonic services obtained from patient/other participant(s) [Patient] : Patient [FreeTextEntry4] : 10:30 [FreeTextEntry5] : 11:15AM [FreeTextEntry1] : depression and anxiety

## 2024-04-08 NOTE — RISK ASSESSMENT
[No, patient denies ideation or behavior] : No, patient denies ideation or behavior pt BIBA c/o chills and left sided abdominal pain. hx- colon ca s/p colon resection 1 week ago.

## 2024-04-09 DIAGNOSIS — F33.0 MAJOR DEPRESSIVE DISORDER, RECURRENT, MILD: ICD-10-CM

## 2024-04-15 ENCOUNTER — APPOINTMENT (OUTPATIENT)
Dept: PSYCHIATRY | Facility: CLINIC | Age: 54
End: 2024-04-15

## 2024-04-15 ENCOUNTER — OUTPATIENT (OUTPATIENT)
Dept: OUTPATIENT SERVICES | Facility: HOSPITAL | Age: 54
LOS: 1 days | End: 2024-04-15
Payer: MEDICARE

## 2024-04-15 DIAGNOSIS — F33.1 MAJOR DEPRESSIVE DISORDER, RECURRENT, MODERATE: ICD-10-CM

## 2024-04-15 DIAGNOSIS — Z98.891 HISTORY OF UTERINE SCAR FROM PREVIOUS SURGERY: Chronic | ICD-10-CM

## 2024-04-15 PROCEDURE — 90834 PSYTX W PT 45 MINUTES: CPT | Mod: 95

## 2024-04-15 NOTE — BEHAVIORAL HEALTH
[[facility]] : [facility]  [[facility] Therapy] : [facility] Therapy [Return in ____ week(s)] : Return in [unfilled] week(s) [FreeTextEntry2] : Goal 1: Pt will shift her mind-set from focus on what she "can't do" to what she "can do".  Obj A Pt will engage in 1-2 activities per week that create enjoyment in her life.   Obj B Pt will lower her score on the PHQ-9 from 17 (moderately severe depression) to 10-14 (moderate depression)   Goal2 To reduce the amount of time spent worrying on different things. Obj A Pt will reduce score on the GAD7 from 16 (severe anxiety) to between 10-14 ( moderate anxiety) Obj B Pt will learn and implement two coping skills in order to reduce anxiety  Goal 3  To have less pain daily" Obj A Pt's pain level will be reduced from daily to 3-4 days per week Obj B Pt will attend all medical appts and take all medications as prescribed.  [de-identified] : The focus of the session was about pt's sx of low mood. Therapist provided active listening as pt shared how she continues to struggle to accept her condition as her a part of her life and feels she avoids being "seen' by others. Pt reports she continues to worry about embarrassment for being in a wheelchair as well as pity from others. Therapist validated pt feelings as well as normalized her reaction. Therapist pointed out that pt could be enjoying her life more if she were to engage with others. Pt identified a desire to join the women's committee at her [employer] as well as the choir which she previously enjoyed but that fear holds her back. Therapist  and pt will continue to discuss the pros and cons for allowing herself to be more open about her condition . Pt is compliant with therapy and responsive to interventions and support. [FreeTextEntry1] : Pt  will contact tx team for worsening of symptoms and/or problems with medication. Next appt: 5/6 ( ( writer will be on vacation)

## 2024-04-16 DIAGNOSIS — F33.1 MAJOR DEPRESSIVE DISORDER, RECURRENT, MODERATE: ICD-10-CM

## 2024-04-16 LAB — BACTERIA UR CULT: NORMAL

## 2024-04-17 ENCOUNTER — NON-APPOINTMENT (OUTPATIENT)
Age: 54
End: 2024-04-17

## 2024-04-18 ENCOUNTER — EMERGENCY (EMERGENCY)
Facility: HOSPITAL | Age: 54
LOS: 0 days | Discharge: HOME | End: 2024-04-18
Attending: STUDENT IN AN ORGANIZED HEALTH CARE EDUCATION/TRAINING PROGRAM
Payer: MEDICARE

## 2024-04-18 VITALS
HEART RATE: 75 BPM | TEMPERATURE: 98 F | OXYGEN SATURATION: 99 % | DIASTOLIC BLOOD PRESSURE: 75 MMHG | SYSTOLIC BLOOD PRESSURE: 121 MMHG | RESPIRATION RATE: 20 BRPM

## 2024-04-18 VITALS
OXYGEN SATURATION: 100 % | WEIGHT: 255.07 LBS | HEIGHT: 67 IN | HEART RATE: 83 BPM | TEMPERATURE: 98 F | SYSTOLIC BLOOD PRESSURE: 150 MMHG | DIASTOLIC BLOOD PRESSURE: 80 MMHG | RESPIRATION RATE: 20 BRPM

## 2024-04-18 DIAGNOSIS — Z98.890 OTHER SPECIFIED POSTPROCEDURAL STATES: Chronic | ICD-10-CM
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
## 2024-04-18 DIAGNOSIS — M25.571 PAIN IN RIGHT ANKLE AND JOINTS OF RIGHT FOOT: ICD-10-CM

## 2024-04-18 DIAGNOSIS — S92.001A UNSPECIFIED FRACTURE OF RIGHT CALCANEUS, INITIAL ENCOUNTER FOR CLOSED FRACTURE: ICD-10-CM

## 2024-04-18 DIAGNOSIS — N39.0 URINARY TRACT INFECTION, SITE NOT SPECIFIED: ICD-10-CM

## 2024-04-18 DIAGNOSIS — R50.9 FEVER, UNSPECIFIED: ICD-10-CM

## 2024-04-18 DIAGNOSIS — Z88.1 ALLERGY STATUS TO OTHER ANTIBIOTIC AGENTS STATUS: ICD-10-CM

## 2024-04-18 DIAGNOSIS — I10 ESSENTIAL (PRIMARY) HYPERTENSION: ICD-10-CM

## 2024-04-18 DIAGNOSIS — J45.909 UNSPECIFIED ASTHMA, UNCOMPLICATED: ICD-10-CM

## 2024-04-18 DIAGNOSIS — S82.51XA DISPLACED FRACTURE OF MEDIAL MALLEOLUS OF RIGHT TIBIA, INITIAL ENCOUNTER FOR CLOSED FRACTURE: ICD-10-CM

## 2024-04-18 DIAGNOSIS — Z98.891 HISTORY OF UTERINE SCAR FROM PREVIOUS SURGERY: Chronic | ICD-10-CM

## 2024-04-18 DIAGNOSIS — M79.89 OTHER SPECIFIED SOFT TISSUE DISORDERS: ICD-10-CM
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
## 2024-04-18 DIAGNOSIS — Z74.01 BED CONFINEMENT STATUS: ICD-10-CM

## 2024-04-18 LAB
ALBUMIN SERPL ELPH-MCNC: 3.9 G/DL — SIGNIFICANT CHANGE UP (ref 3.5–5.2)
ALP SERPL-CCNC: 103 U/L — SIGNIFICANT CHANGE UP (ref 30–115)
ALT FLD-CCNC: 10 U/L — SIGNIFICANT CHANGE UP (ref 0–41)
ANION GAP SERPL CALC-SCNC: 13 MMOL/L — SIGNIFICANT CHANGE UP (ref 7–14)
APPEARANCE UR: ABNORMAL
AST SERPL-CCNC: 12 U/L — SIGNIFICANT CHANGE UP (ref 0–41)
BASOPHILS # BLD AUTO: 0.02 K/UL — SIGNIFICANT CHANGE UP (ref 0–0.2)
BASOPHILS NFR BLD AUTO: 0.4 % — SIGNIFICANT CHANGE UP (ref 0–1)
BILIRUB SERPL-MCNC: 0.3 MG/DL — SIGNIFICANT CHANGE UP (ref 0.2–1.2)
BILIRUB UR-MCNC: NEGATIVE — SIGNIFICANT CHANGE UP
BUN SERPL-MCNC: 9 MG/DL — LOW (ref 10–20)
CALCIUM SERPL-MCNC: 8.6 MG/DL — SIGNIFICANT CHANGE UP (ref 8.4–10.4)
CHLORIDE SERPL-SCNC: 97 MMOL/L — LOW (ref 98–110)
CO2 SERPL-SCNC: 26 MMOL/L — SIGNIFICANT CHANGE UP (ref 17–32)
COLOR SPEC: YELLOW — SIGNIFICANT CHANGE UP
CREAT SERPL-MCNC: 0.7 MG/DL — SIGNIFICANT CHANGE UP (ref 0.7–1.5)
DIFF PNL FLD: ABNORMAL
EGFR: 102 ML/MIN/1.73M2 — SIGNIFICANT CHANGE UP
EOSINOPHIL # BLD AUTO: 0.45 K/UL — SIGNIFICANT CHANGE UP (ref 0–0.7)
EOSINOPHIL NFR BLD AUTO: 10 % — HIGH (ref 0–8)
GLUCOSE SERPL-MCNC: 89 MG/DL — SIGNIFICANT CHANGE UP (ref 70–99)
GLUCOSE UR QL: NEGATIVE MG/DL — SIGNIFICANT CHANGE UP
HCG SERPL QL: NEGATIVE — SIGNIFICANT CHANGE UP
HCT VFR BLD CALC: 33.9 % — LOW (ref 37–47)
HGB BLD-MCNC: 11 G/DL — LOW (ref 12–16)
IMM GRANULOCYTES NFR BLD AUTO: 0.4 % — HIGH (ref 0.1–0.3)
KETONES UR-MCNC: NEGATIVE MG/DL — SIGNIFICANT CHANGE UP
LEUKOCYTE ESTERASE UR-ACNC: ABNORMAL
LIDOCAIN IGE QN: 25 U/L — SIGNIFICANT CHANGE UP (ref 7–60)
LYMPHOCYTES # BLD AUTO: 0.59 K/UL — LOW (ref 1.2–3.4)
LYMPHOCYTES # BLD AUTO: 13.1 % — LOW (ref 20.5–51.1)
MCHC RBC-ENTMCNC: 29.2 PG — SIGNIFICANT CHANGE UP (ref 27–31)
MCHC RBC-ENTMCNC: 32.4 G/DL — SIGNIFICANT CHANGE UP (ref 32–37)
MCV RBC AUTO: 89.9 FL — SIGNIFICANT CHANGE UP (ref 81–99)
MONOCYTES # BLD AUTO: 0.58 K/UL — SIGNIFICANT CHANGE UP (ref 0.1–0.6)
MONOCYTES NFR BLD AUTO: 12.9 % — HIGH (ref 1.7–9.3)
NEUTROPHILS # BLD AUTO: 2.83 K/UL — SIGNIFICANT CHANGE UP (ref 1.4–6.5)
NEUTROPHILS NFR BLD AUTO: 63.2 % — SIGNIFICANT CHANGE UP (ref 42.2–75.2)
NITRITE UR-MCNC: POSITIVE
NRBC # BLD: 0 /100 WBCS — SIGNIFICANT CHANGE UP (ref 0–0)
PH UR: 7.5 — SIGNIFICANT CHANGE UP (ref 5–8)
PLATELET # BLD AUTO: 169 K/UL — SIGNIFICANT CHANGE UP (ref 130–400)
PMV BLD: 8.9 FL — SIGNIFICANT CHANGE UP (ref 7.4–10.4)
POTASSIUM SERPL-MCNC: 3.9 MMOL/L — SIGNIFICANT CHANGE UP (ref 3.5–5)
POTASSIUM SERPL-SCNC: 3.9 MMOL/L — SIGNIFICANT CHANGE UP (ref 3.5–5)
PROT SERPL-MCNC: 6.4 G/DL — SIGNIFICANT CHANGE UP (ref 6–8)
PROT UR-MCNC: 100 MG/DL
RBC # BLD: 3.77 M/UL — LOW (ref 4.2–5.4)
RBC # FLD: 12.6 % — SIGNIFICANT CHANGE UP (ref 11.5–14.5)
SODIUM SERPL-SCNC: 136 MMOL/L — SIGNIFICANT CHANGE UP (ref 135–146)
SP GR SPEC: >1.03 — HIGH (ref 1–1.03)
UROBILINOGEN FLD QL: 1 MG/DL — SIGNIFICANT CHANGE UP (ref 0.2–1)
WBC # BLD: 4.49 K/UL — LOW (ref 4.8–10.8)
WBC # FLD AUTO: 4.49 K/UL — LOW (ref 4.8–10.8)

## 2024-04-18 PROCEDURE — 73610 X-RAY EXAM OF ANKLE: CPT | Mod: RT

## 2024-04-18 PROCEDURE — 99282 EMERGENCY DEPT VISIT SF MDM: CPT | Mod: 25

## 2024-04-18 PROCEDURE — 99285 EMERGENCY DEPT VISIT HI MDM: CPT | Mod: 25

## 2024-04-18 PROCEDURE — 36415 COLL VENOUS BLD VENIPUNCTURE: CPT

## 2024-04-18 PROCEDURE — 84703 CHORIONIC GONADOTROPIN ASSAY: CPT

## 2024-04-18 PROCEDURE — 83690 ASSAY OF LIPASE: CPT

## 2024-04-18 PROCEDURE — 81001 URINALYSIS AUTO W/SCOPE: CPT

## 2024-04-18 PROCEDURE — 87186 SC STD MICRODIL/AGAR DIL: CPT

## 2024-04-18 PROCEDURE — 73610 X-RAY EXAM OF ANKLE: CPT | Mod: 26,RT,77

## 2024-04-18 PROCEDURE — 73564 X-RAY EXAM KNEE 4 OR MORE: CPT | Mod: 26,RT

## 2024-04-18 PROCEDURE — 93970 EXTREMITY STUDY: CPT | Mod: 26

## 2024-04-18 PROCEDURE — 93970 EXTREMITY STUDY: CPT

## 2024-04-18 PROCEDURE — 73590 X-RAY EXAM OF LOWER LEG: CPT | Mod: RT

## 2024-04-18 PROCEDURE — 74177 CT ABD & PELVIS W/CONTRAST: CPT | Mod: 26,MC

## 2024-04-18 PROCEDURE — 85025 COMPLETE CBC W/AUTO DIFF WBC: CPT

## 2024-04-18 PROCEDURE — 73610 X-RAY EXAM OF ANKLE: CPT | Mod: 26,RT

## 2024-04-18 PROCEDURE — 87086 URINE CULTURE/COLONY COUNT: CPT

## 2024-04-18 PROCEDURE — 73630 X-RAY EXAM OF FOOT: CPT | Mod: 26,RT

## 2024-04-18 PROCEDURE — 73590 X-RAY EXAM OF LOWER LEG: CPT | Mod: 26,RT

## 2024-04-18 PROCEDURE — 73564 X-RAY EXAM KNEE 4 OR MORE: CPT | Mod: RT

## 2024-04-18 PROCEDURE — 74177 CT ABD & PELVIS W/CONTRAST: CPT | Mod: MC

## 2024-04-18 PROCEDURE — 80053 COMPREHEN METABOLIC PANEL: CPT

## 2024-04-18 PROCEDURE — 29515 APPLICATION SHORT LEG SPLINT: CPT

## 2024-04-18 PROCEDURE — 99285 EMERGENCY DEPT VISIT HI MDM: CPT | Mod: 1L

## 2024-04-18 PROCEDURE — 73630 X-RAY EXAM OF FOOT: CPT | Mod: RT

## 2024-04-18 RX ORDER — CIPROFLOXACIN LACTATE 400MG/40ML
1 VIAL (ML) INTRAVENOUS
Qty: 14 | Refills: 0
Start: 2024-04-18 | End: 2024-04-24

## 2024-04-18 RX ORDER — ACETAMINOPHEN 500 MG
650 TABLET ORAL ONCE
Refills: 0 | Status: COMPLETED | OUTPATIENT
Start: 2024-04-18 | End: 2024-04-18

## 2024-04-18 RX ADMIN — Medication 650 MILLIGRAM(S): at 14:14

## 2024-04-18 NOTE — ED PROVIDER NOTE - NSTIMEPROVIDERCAREINITIATE_GEN_ER
18-Apr-2024 13:48 Alert-The patient is alert, awake and responds to voice. The patient is oriented to time, place, and person. The triage nurse is able to obtain subjective information.

## 2024-04-18 NOTE — ED PROVIDER NOTE - CLINICAL SUMMARY MEDICAL DECISION MAKING FREE TEXT BOX
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  Patient Dors is dysuria.  Denies vomiting or diarrhea.  Patient also here with right lower extremity pain and swelling after she hit her right leg into the wall on her motorized chair.  On exam tenderness to the right lower extremity mild edema noted to the right ankle.  Tenderness to the medial malleolus.  DP pulses 2+.  Neurologically at baseline per patient.  Labs ordered and reviewed appear to be stable.  UA noted with UTI.  CT abdomen pelvis with cystitis.  Urine culture sent.  Duplex negative however xray images independently interpreted by me, Dr. Ventura, medial malleolus fracture as well as possible calcaneus fracture.  Orthopedic surgery evaluated patient, placed patient in a splint.  At this time patient is a good candidate for trial of outpatient oral antibiotics for UTI.  Will treat for presumed pyelonephritis. Patient is a good candidate to attempt outpatient management. Supportive care and home care discussed in detail. Patient aware they may have to return for re-evaluation and possible admission if outpatient treatment fails. Strict return precautions discussed.

## 2024-04-18 NOTE — ED ADULT NURSE NOTE - OBJECTIVE STATEMENT
Pt c/o fever with left flank pain x3 days, states she had urine sent from pmd 2x over the last month with inconclusive results.   Pt also c/o right foot pain/swelling, states she hit into a wall in her motorized chair  recently and has been having issue since.

## 2024-04-18 NOTE — ED PROVIDER NOTE - NSFOLLOWUPINSTRUCTIONS_ED_ALL_ED_FT
Urinary Tract Infection    You were seen and evelauted in the Emergency Department. It was found to you have a Urinary Tract Infection (UTI). It has been determined that it is safe for you to be discharged and attempt outpatient management. Please take antibiotic as prescribed and monitor your symptoms. If your antibiotic needs to be changed based on test results, you will be contacted. There is a chance you may have to return for re-evaluation and possible admission if oral antibiotics are not working. Please monitor your symptoms and see how you feel after 48 hours of antibiotics, unless one of the strict return precautions we discussed happens. Follow up with your doctor and bring all of your results. You may require further work up and management, which may include evaluaiton by a specialist (Urologist)    Overview  A urinary tract infection, or UTI, is a general term for an infection anywhere between the kidneys and the urethra (where urine comes out). Most UTIs are bladder infections. They often cause pain or burning when you urinate.    UTIs are caused by bacteria and can be cured with antibiotics. Be sure to complete your treatment so that the infection does not get worse.    Follow-up care is a key part of your treatment and safety. Be sure to make and go to all appointments, and call your doctor or nurse call line if you are having problems. It's also a good idea to know your test results and keep a list of the medicines you take.    How can you care for yourself at home?  Take your antibiotics as directed. Do not stop taking them just because you feel better. You need to take the full course of antibiotics.  Drink extra water and other fluids for the next day or two. This will help make the urine less concentrated and help wash out the bacteria that are causing the infection. (If you have kidney, heart, or liver disease and have to limit fluids, talk with your doctor before you increase the amount of fluids you drink.)  Avoid drinks that are carbonated or have caffeine. They can irritate the bladder.  Urinate often. Try to empty your bladder each time.  To relieve pain, take a hot bath or lay a heating pad set on low over your lower belly or genital area. Never go to sleep with a heating pad in place.  To prevent UTIs  Drink plenty of water each day. This helps you urinate often, which clears bacteria from your system. (If you have kidney, heart, or liver disease and have to limit fluids, talk with your doctor before you increase the amount of fluids you drink.)  Urinate when you need to.  If you are sexually active, urinate right after you have sex.  Change sanitary pads often.  Avoid douches, bubble baths, feminine hygiene sprays, and other feminine hygiene products that have deodorants.  After you use the toilet, wipe from front to back.  When should you call for help?  	  Call your doctor or nurse call line now or seek immediate medical care if:    Symptoms such as fever, chills, nausea, or vomiting get worse or appear for the first time.  You have new pain in your back just below your rib cage. This is called flank pain.  There is new blood or pus in your urine.  You have any problems with your antibiotic medicine.  Watch closely for changes in your health, and be sure to contact your doctor or nurse call line if:    You do not feel better after 2 days on an antibiotic.  Your symptoms go away but then come back. Ortho  Our Emergency Department Referral Coordinators will be reaching out to you in the next 24-48 hours from 9:00am to 5:00pm with a follow up appointment. Please expect a phone call from the hospital in that time frame. If you do not receive a call or if you have any questions or concerns, you can reach them at   [phone]     Fracture    A fracture is a break in one of your bones. This can occur from a variety of injuries, especially traumatic ones. Symptoms include pain, bruising, or swelling. Do not use the injured limb. If a fracture is in one of the bones below your waist, do not put weight on that limb unless instructed to do so by your healthcare provider. Crutches or a cane may have been provided. A splint or cast may have been applied by your health care provider. Make sure to keep it dry and follow up with an orthopedist as instructed.    SEEK IMMEDIATE MEDICAL CARE IF YOU HAVE ANY OF THE FOLLOWING SYMPTOMS: numbness, tingling, increasing pain, or weakness in any part of the injured limb.   Urinary Tract Infection    You were seen and evelauted in the Emergency Department. It was found to you have a Urinary Tract Infection (UTI). It has been determined that it is safe for you to be discharged and attempt outpatient management. Please take antibiotic as prescribed and monitor your symptoms. If your antibiotic needs to be changed based on test results, you will be contacted. There is a chance you may have to return for re-evaluation and possible admission if oral antibiotics are not working. Please monitor your symptoms and see how you feel after 48 hours of antibiotics, unless one of the strict return precautions we discussed happens. Follow up with your doctor and bring all of your results. You may require further work up and management, which may include evaluaiton by a specialist (Urologist)    Overview  A urinary tract infection, or UTI, is a general term for an infection anywhere between the kidneys and the urethra (where urine comes out). Most UTIs are bladder infections. They often cause pain or burning when you urinate.    UTIs are caused by bacteria and can be cured with antibiotics. Be sure to complete your treatment so that the infection does not get worse.    Follow-up care is a key part of your treatment and safety. Be sure to make and go to all appointments, and call your doctor or nurse call line if you are having problems. It's also a good idea to know your test results and keep a list of the medicines you take.    How can you care for yourself at home?  Take your antibiotics as directed. Do not stop taking them just because you feel better. You need to take the full course of antibiotics.  Drink extra water and other fluids for the next day or two. This will help make the urine less concentrated and help wash out the bacteria that are causing the infection. (If you have kidney, heart, or liver disease and have to limit fluids, talk with your doctor before you increase the amount of fluids you drink.)  Avoid drinks that are carbonated or have caffeine. They can irritate the bladder.  Urinate often. Try to empty your bladder each time.  To relieve pain, take a hot bath or lay a heating pad set on low over your lower belly or genital area. Never go to sleep with a heating pad in place.  To prevent UTIs  Drink plenty of water each day. This helps you urinate often, which clears bacteria from your system. (If you have kidney, heart, or liver disease and have to limit fluids, talk with your doctor before you increase the amount of fluids you drink.)  Urinate when you need to.  If you are sexually active, urinate right after you have sex.  Change sanitary pads often.  Avoid douches, bubble baths, feminine hygiene sprays, and other feminine hygiene products that have deodorants.  After you use the toilet, wipe from front to back.  When should you call for help?  	  Call your doctor or nurse call line now or seek immediate medical care if:    Symptoms such as fever, chills, nausea, or vomiting get worse or appear for the first time.  You have new pain in your back just below your rib cage. This is called flank pain.  There is new blood or pus in your urine.  You have any problems with your antibiotic medicine.  Watch closely for changes in your health, and be sure to contact your doctor or nurse call line if:    You do not feel better after 2 days on an antibiotic.  Your symptoms go away but then come back.

## 2024-04-18 NOTE — CONSULT NOTE ADULT - ASSESSMENT
right ankle fracture medial malleolus and non displaced calcaneous fx   no widening appreciated despite xray reading   nonoperative management   nwb   pt placed in very well padded splint   keep clean and dry   keep foot on pillows   follow up post splint xrays.   if no change can follow up as a outpatient   can follow up in office 2 weeks for repeat xray and skin check   follow up with dr diaz as a outpatient call [phone] for appointment    right ankle fracture medial malleolus and non displaced calcaneous fx   no widening appreciated despite xray reading   nonoperative management   nwb   pt placed in very well padded splint   keep clean and dry   keep foot on pillows   follow up post splint xrays.   if no change can follow up as a outpatient   follow up duplex ordered by ed.     can follow up in office 2 weeks for repeat xray and skin check   follow up with dr diaz as a outpatient call [phone] for appointment

## 2024-04-18 NOTE — ED PROVIDER NOTE - OBJECTIVE STATEMENT
Patient is a 53-year-old female with past medical history of hypertension, asthma, paralysis 2/2 neuromyelitis optica, bedbound presenting to the ED complaining of urinary symptoms.  Patient states that about a month ago she saw her urologist and provided a urine sample that came back inconclusive.  Patient was placed on antibiotics.  Patient states she continues to have symptoms and on Friday saw her urologist again and again her urinalysis came back inconclusive.  Patient started spiking fevers 2 days ago into Tmax 100.9.  Last Tylenol was last night.  Separately, patient is having a right lower extremity swelling after an accident with her motorized chair last week.

## 2024-04-18 NOTE — ED PROVIDER NOTE - PHYSICAL EXAMINATION
No CONSTITUTIONAL: NAD  SKIN: Warm dry, normal skin turgor  HEAD: NCAT  EYES: EOMI, PERRLA, no scleral icterus, conjunctiva pink  ENT: normal pharynx with no erythema or exudates  NECK: Supple; non tender. Full ROM.  CARD: RRR  RESP: clear to ausculation b/l. No crackles or wheezing.  ABD: soft, non-tender, non-distended, no rebound or guarding.  EXT: Tenderness to palpation in LLE ankle, edema present in L lower ankle,   NEURO: pt paralyzed and not ambulatory at baseline. Decreased sensation LE b/l (baseline)  PSYCH: Cooperative, appropriate.

## 2024-04-18 NOTE — CONSULT NOTE ADULT - SUBJECTIVE AND OBJECTIVE BOX
Orthopaedic Surgery Consult Note    For Surgeon:    HPI:  53yFemale  Patient is a 53y old  Female who presents with a chief complaint of right ankle pain.   Pt today was in ed for for uti like symptoms and is being worked up for a uti.  While in ed pt reports that she sustained a injury to her right ankle last week.    Pt has a pmhx of neuromyelitis optica and is bedbound non ambulatory hoya lift oob and uses motorized wheelchair.     Pt lost control of her wheelchair and crashed into a wall causing footplate to push foot back.    Pt reports pain at right ankle after this incident.    Denies previous hx of injury to ankle.    Pt has decreased sensation to lower extremities due  to her condition    HPI:      Allergies    cefpodoxime (Hives)    Intolerances      PAST MEDICAL & SURGICAL HISTORY:  Asthma  STABLE, EPISODIC -AVERAGE USE OF ALBUTEROL ABOUT ONCE A MONTH      Obstructive sleep apnea on CPAP      Obesity      GERD (gastroesophageal reflux disease)      HTN (hypertension)      Arthritis  CHR PAIN B/L KNEES      Fatty liver      Neuromyelitis optica      H/O  section      History of tonsillectomy and adenoidectomy  AND REMOVAL UVULA      H/O arthroscopic knee surgery  LT AND RT        MEDICATIONS  (STANDING):    MEDICATIONS  (PRN):      Vital Signs Last 24 Hrs  T(C): 36.9 (2024 13:35), Max: 36.9 (2024 13:35)  T(F): 98.4 (2024 13:35), Max: 98.4 (2024 13:35)  HR: 83 (2024 13:35) (83 - 83)  BP: 150/80 (2024 13:35) (150/80 - 150/80)  BP(mean): --  RR: 20 (2024 13:35) (20 - 20)  SpO2: 100% (2024 13:35) (100% - 100%)    Parameters below as of 2024 13:35  Patient On (Oxygen Delivery Method): room air        Physical Exam:  right lower extremity:     skin intact, minimal swelling about the ankle, decreased sensation and no motor                         11.0   4.49  )-----------( 169      ( 2024 14:20 )             33.9     04-18    136  |  97<L>  |  9<L>  ----------------------------<  89  3.9   |  26  |  0.7    Ca    8.6      2024 14:20    TPro  6.4  /  Alb  3.9  /  TBili  0.3  /  DBili  x   /  AST  12  /  ALT  10  /  AlkPhos  103        Imaging: < from: Xray Ankle Complete 3 Views, Right (24 @ 14:44) >    Generalized osteopenia with nondisplaced fractures distal tibia. Widening   of ankle mortise suggesting possible instability.    Bimalleolar soft tissue swelling.    Lucencies along posterior calcaneus, nondisplaced fracture cannot be   excluded.    < end of copied text >    pt placed in very well padded posterior and sugar tong splints.    abd pads placed all about the ankle

## 2024-04-18 NOTE — ED PROVIDER NOTE - PATIENT PORTAL LINK FT
You can access the FollowMyHealth Patient Portal offered by [facility] by registering at the following website: [email or web address]. By joining [employer]’s FollowMyHealth portal, you will also be able to view your health information using other applications (apps) compatible with our system.

## 2024-04-18 NOTE — ED PROVIDER NOTE - HIV OFFER
Pulmonary Progress Note    Admit Date: 2021  Hospital day                               PCP: No primary care provider on file. Chief Complaint (s):  Patient Active Problem List   Diagnosis    COVID-19    Acute respiratory failure due to COVID-19 [facility])    Acute respiratory failure with hypoxia ([address]    Pneumonia due to COVID-19 virus    Elevated LFTs    Hypokalemia    Essential hypertension       Subjective:  · Oxygenation remains marginal.  The patient is sleeping soundly this p.m. Apparently some trouble earlier with saturations in the 80s. Overall, the trend is upward. Vitals:  VITALS:  BP (!) 120/55   Pulse 51   Temp 97.9 °F (36.6 °C) (Axillary)   Resp 21   Ht 5' 7\" (1.702 m)   Wt 184 lb 11.9 oz (83.8 kg)   SpO2 91%   BMI 28.94 kg/m²     24HR INTAKE/OUTPUT:    No intake or output data in the 24 hours ending 10/04/21 1750    24HR PULSE OXIMETRY RANGE:    SpO2  Av %  Min: 89 %  Max: 93 %    Medications:  IV:   sodium chloride      dextrose         Scheduled Meds:   ALPRAZolam  0.25 mg Oral Q4H While awake    guaiFENesin-codeine  10 mL Oral Q4H While awake    dexamethasone  10 mg IntraVENous Q12H    baricitinib  4 mg Oral Daily    enoxaparin  1 mg/kg SubCUTAneous BID    albuterol sulfate HFA  2 puff Inhalation 4x daily    sodium chloride flush  5-40 mL IntraVENous 2 times per day    PARoxetine  30 mg Oral BID    amLODIPine  5 mg Oral Daily    insulin lispro  0-6 Units SubCUTAneous TID WC    insulin lispro  0-3 Units SubCUTAneous Nightly       Diet:   ADULT DIET; Regular; 5 carb choices (75 gm/meal)  Adult Oral Nutrition Supplement; Diabetic Oral Supplement     EXAM:  General: No distress. Alert. Eyes: PERRL. No sclera icterus. No conjunctival injection. ENT: No discharge. Pharynx clear. Neck: Trachea midline. Normal thyroid. Resp: No accessory muscle use. No rales. No wheezing. No rhonchi. CV: Regular rate. Regular rhythm. No murmur or rub.    Abd: Non-tender. Non-distended. No masses. No organomegaly. Normal bowel sounds. Skin: Warm and dry. No nodule on exposed extremities. No rash on exposed extremities. Ext: No cyanosis, clubbing, edema  Lymph: No cervical LAD. No supraclavicular LAD. M/S: No cyanosis. No joint deformity. No clubbing. Neuro: Awake. Follows commands. Positive pupils/gag/corneals. Normal pain response. Results:  CBC:   Recent Labs     10/02/21  0502 10/03/21  0310 10/04/21  1147   WBC 10.1 12.0* 8.7   HGB 11.1* 11.2* 10.7*   HCT 35.4* 34.9* 33.6*   MCV 85.1 84.7 83.8    336 273     BMP:   Recent Labs     10/02/21  0502 10/03/21  0310 10/04/21  1147    131* 132   K 4.8 4.7 4.7    98 98   CO2 27 26 25   BUN 24* 23 24*   CREATININE 0.6* 0.6* 0.5*     LIVER PROFILE:   Recent Labs     10/02/21  0502 10/03/21  0310 10/04/21  1147   AST 48* 42* 42*   ALT 95* 101* 94*   BILITOT 0.6 0.5 0.4   ALKPHOS 176* 193* 202*     PT/INR: No results for input(s): PROTIME, INR in the last 72 hours. APTT: No results for input(s): APTT in the last 72 hours. Pathology:  1. N/A      Microbiology:  1. None    Recent ABG:   No results for input(s): PH, PO2, PCO2, HCO3, BE, O2SAT, METHB, O2HB, COHB, O2CON, HHB, THB in the last 72 hours. FiO2 : 100 %       Recent Films:  XR CHEST PORTABLE   Final Result   1. No interval change in the extensive multifocal bilateral airspace disease. XR CHEST PORTABLE   Final Result   No interval change         XR CHEST PORTABLE   Final Result   1. Significant worsening of the multifocal bilateral pneumonia when compared   with the prior CT scan of 09/22/2021         CTA PULMONARY W CONTRAST   Final Result   Within described exam limitations, no evidence of pulmonary embolism. Scattered multifocal low density infiltrates throughout both lungs, most   suggestive of COVID pneumonia in context of current pandemic. At least moderate diffuse hepatic steatosis. Cholecystectomy. Opt out

## 2024-04-25 ENCOUNTER — APPOINTMENT (OUTPATIENT)
Dept: PSYCHIATRY | Facility: CLINIC | Age: 54
End: 2024-04-25
Payer: COMMERCIAL

## 2024-04-25 ENCOUNTER — OUTPATIENT (OUTPATIENT)
Dept: OUTPATIENT SERVICES | Facility: HOSPITAL | Age: 54
LOS: 1 days | End: 2024-04-25
Payer: COMMERCIAL

## 2024-04-25 DIAGNOSIS — Z98.891 HISTORY OF UTERINE SCAR FROM PREVIOUS SURGERY: Chronic | ICD-10-CM

## 2024-04-25 DIAGNOSIS — F33.1 MAJOR DEPRESSIVE DISORDER, RECURRENT, MODERATE: ICD-10-CM

## 2024-04-25 DIAGNOSIS — Z98.890 OTHER SPECIFIED POSTPROCEDURAL STATES: Chronic | ICD-10-CM

## 2024-04-25 DIAGNOSIS — F41.1 GENERALIZED ANXIETY DISORDER: ICD-10-CM

## 2024-04-25 PROCEDURE — 99214 OFFICE O/P EST MOD 30 MIN: CPT | Mod: 95

## 2024-04-25 NOTE — HISTORY OF PRESENT ILLNESS
[FreeTextEntry1] : This is a 53 year old patient who presents for medication management.  The patient reports less depressed mood, but good sleep and appetite.  The patient denies any symptoms of oniel, or psychosis at this time.  The patient denies any anxiety that impairs their daily functioning. The patient denies any suicidal ideation, homicidal ideation, no auditory or visual hallucinations, or paranoid ideation.  The patient has chronic medical complaints. The patient denies any illicit drug or alcohol use, and is not smoking at this time. I requested the patient's updated physical and labs from their PCP.  Coping skills were discussed and a safety plan was provided.  The patient was educated on the risks, benefits, and alternatives of their psychiatric medications.  The patient will engage in psychotherapy at this time.  The patient consents to ongoing medication management.

## 2024-04-25 NOTE — SOCIAL HISTORY
[Unemployed] : unemployed [] :  [# Of Children ___] : has [unfilled] children [None] : none [Yes] : yes [No Known Use] : no known use [TextBox_7] : Socially, last drink 12/31/21 [FreeTextEntry4] : Masters in Social work [FreeTextEntry1] : medical marijuana edibles for pain, once to twice daily

## 2024-04-25 NOTE — DISCUSSION/SUMMARY
[Date of Last Physical Exam: _____] : Date of Last Physical Exam: [unfilled] [Date of Last Annual Labs: _____] : Date of Last Annual Labs: [unfilled] [Annual Review of Systems Completed?] : Annual Review of Systems Completed: Yes [Tobacco Screening Completed?] : Tobacco Screening Completed: Yes [Potential impact of patient’s physical health conditions on psychiatric care?] : Potential impact of patient's physical health conditions on psychiatric care: Yes [FreeTextEntry1] : Dinora is a 54y/o AA  female, depression, anxiety, PMHx of Neuromyelitis optima, paralyzed since 4/2021, neurological symptoms began 2/2021, post Moderna vaccine 1/2021. She sleeps well, managing her mood and anxiety symptoms. She is at low risk, no acute thoughts of harm to self, recent medical condition, positive family support, linked to treatment. The patient reports low energy and motivation.  Raise Bupropion XL 450mg po qam Hydroxyzine 50mg po prn  Follow up in 4 weeks     [Does patient require any additional health services or referrals?] : Does patient require any additional health services or referrals: No

## 2024-04-25 NOTE — PHYSICAL EXAM
[Intermittent] : intermittent [Cooperative] : cooperative [Full] : full [Clear] : clear [Linear/Goal Directed] : linear/goal directed [WNL] : within normal limits [FreeTextEntry1] : Seated [FreeTextEntry8] : better

## 2024-04-25 NOTE — REASON FOR VISIT
[Access issues (e.g., transportation, impaired mobility, etc.)] : due to patient's access issues [Telehealth (audio & video) - Individual/Group] : This visit was provided via telehealth using real-time 2-way audio visual technology. [Medical Office: ([facility])___] : The provider was located at the medical office in [unfilled]. [Home] : The patient, [unfilled], was located at home, [unfilled], at the time of the visit. [Verbal consent obtained from patient/other participant(s)] : Verbal consent for telehealth/telephonic services obtained from patient/other participant(s) [Patient] : Patient [Verbal consent obtained from patient] : the patient, [unfilled] [FreeTextEntry4] : 1100lm [FreeTextEntry5] : [id number] [FreeTextEntry3] : unable to come in person due to limited mobility [FreeTextEntry1] : "I am better, would like to continue medications for now.."

## 2024-04-26 DIAGNOSIS — F41.1 GENERALIZED ANXIETY DISORDER: ICD-10-CM

## 2024-04-26 DIAGNOSIS — F33.1 MAJOR DEPRESSIVE DISORDER, RECURRENT, MODERATE: ICD-10-CM

## 2024-04-30 ENCOUNTER — APPOINTMENT (OUTPATIENT)
Dept: UROLOGY | Facility: CLINIC | Age: 54
End: 2024-04-30
Payer: COMMERCIAL

## 2024-04-30 VITALS
DIASTOLIC BLOOD PRESSURE: 80 MMHG | SYSTOLIC BLOOD PRESSURE: 130 MMHG | TEMPERATURE: 97.5 F | OXYGEN SATURATION: 99 % | HEART RATE: 65 BPM

## 2024-04-30 DIAGNOSIS — N31.9 NEUROMUSCULAR DYSFUNCTION OF BLADDER, UNSPECIFIED: ICD-10-CM

## 2024-04-30 DIAGNOSIS — N39.0 URINARY TRACT INFECTION, SITE NOT SPECIFIED: ICD-10-CM

## 2024-04-30 PROCEDURE — 99214 OFFICE O/P EST MOD 30 MIN: CPT

## 2024-05-02 ENCOUNTER — LABORATORY RESULT (OUTPATIENT)
Age: 54
End: 2024-05-02

## 2024-05-03 LAB
25(OH)D3 SERPL-MCNC: 57.5 NG/ML
ALBUMIN SERPL ELPH-MCNC: 4 G/DL
ALP BLD-CCNC: 133 U/L
ALT SERPL-CCNC: 13 U/L
AST SERPL-CCNC: 16 U/L
BILIRUB DIRECT SERPL-MCNC: 0.1 MG/DL
BILIRUB INDIRECT SERPL-MCNC: 0.1 MG/DL
BILIRUB SERPL-MCNC: 0.2 MG/DL
PROT SERPL-MCNC: 6.4 G/DL

## 2024-05-06 ENCOUNTER — OUTPATIENT (OUTPATIENT)
Dept: OUTPATIENT SERVICES | Facility: HOSPITAL | Age: 54
LOS: 1 days | End: 2024-05-06
Payer: MEDICARE

## 2024-05-06 ENCOUNTER — APPOINTMENT (OUTPATIENT)
Dept: PSYCHIATRY | Facility: CLINIC | Age: 54
End: 2024-05-06

## 2024-05-06 DIAGNOSIS — F33.1 MAJOR DEPRESSIVE DISORDER, RECURRENT, MODERATE: ICD-10-CM

## 2024-05-06 DIAGNOSIS — F33.0 MAJOR DEPRESSIVE DISORDER, RECURRENT, MILD: ICD-10-CM

## 2024-05-06 DIAGNOSIS — Z98.890 OTHER SPECIFIED POSTPROCEDURAL STATES: Chronic | ICD-10-CM

## 2024-05-06 DIAGNOSIS — Z98.891 HISTORY OF UTERINE SCAR FROM PREVIOUS SURGERY: Chronic | ICD-10-CM

## 2024-05-06 PROBLEM — N31.9 NEUROGENIC BLADDER: Status: ACTIVE | Noted: 2024-03-04

## 2024-05-06 PROBLEM — N39.0 RECURRENT UTI: Status: ACTIVE | Noted: 2023-12-11

## 2024-05-06 LAB
BASOPHILS # BLD AUTO: 0.03 K/UL
BASOPHILS NFR BLD AUTO: 1.3 %
CD16+CD56+ CELLS # BLD: 120 /UL
CD16+CD56+ CELLS NFR BLD: 18 %
CD19 CELLS NFR BLD: 0 /UL
CD3 CELLS # BLD: 528 /UL
CD3 CELLS NFR BLD: 81 %
CD3+CD4+ CELLS # BLD: 414 /UL
CD3+CD4+ CELLS NFR BLD: 63 %
CD3+CD4+ CELLS/CD3+CD8+ CLL SPEC: 3.83 RATIO
CD3+CD8+ CELLS # SPEC: 108 /UL
CD3+CD8+ CELLS NFR BLD: 17 %
CELLS.CD3-CD19+/CELLS IN BLOOD: 0 %
DEPRECATED KAPPA LC FREE/LAMBDA SER: 1.29 RATIO
EOSINOPHIL # BLD AUTO: 0.17 K/UL
EOSINOPHIL NFR BLD AUTO: 7.5 %
HCT VFR BLD CALC: 36.6 %
HGB BLD-MCNC: 11.5 G/DL
IGA SER QL IEP: 126 MG/DL
IGG SER QL IEP: 862 MG/DL
IGM SER QL IEP: 46 MG/DL
IMM GRANULOCYTES NFR BLD AUTO: 0.4 %
KAPPA LC CSF-MCNC: 1.37 MG/DL
KAPPA LC SERPL-MCNC: 1.77 MG/DL
LYMPHOCYTES # BLD AUTO: 0.69 K/UL
LYMPHOCYTES NFR BLD AUTO: 30.5 %
MAN DIFF?: NORMAL
MCHC RBC-ENTMCNC: 29.5 PG
MCHC RBC-ENTMCNC: 31.4 GM/DL
MCV RBC AUTO: 93.8 FL
MONOCYTES # BLD AUTO: 0.26 K/UL
MONOCYTES NFR BLD AUTO: 11.5 %
NEUTROPHILS # BLD AUTO: 1.1 K/UL
NEUTROPHILS NFR BLD AUTO: 48.8 %
PLATELET # BLD AUTO: 261 K/UL
RBC # BLD: 3.9 M/UL
RBC # FLD: 13.1 %
WBC # FLD AUTO: 2.26 K/UL

## 2024-05-06 PROCEDURE — 90834 PSYTX W PT 45 MINUTES: CPT

## 2024-05-06 NOTE — BEHAVIORAL HEALTH
[FreeTextEntry2] : Goal 1: Pt will shift her mind-set from focus on what she "can't do" to what she "can do".  Obj A Pt will engage in 1-2 activities per week that create enjoyment in her life.   Obj B Pt will lower her score on the PHQ-9 from 17 (moderately severe depression) to 10-14 (moderate depression)   Goal2 To reduce the amount of time spent worrying on different things. Obj A Pt will reduce score on the GAD7 from 16 (severe anxiety) to between 10-14 ( moderate anxiety) Obj B Pt will learn and implement two coping skills in order to reduce anxiety  Goal 3  To have less pain daily" Obj A Pt's pain level will be reduced from daily to 3-4 days per week Obj B Pt will attend all medical appts and take all medications as prescribed.  [[place] [facility]] : [place] [facility]  [Supportive Therapy] : Supportive Therapy [de-identified] : The focus of the session was about pt's low self esteem and lack of acceptance by others. . Therapist provided active listening as pt shared how she has always felt rejected by others throughout her life n d now that she is paraplegic the feeling has only intensified. Pt reports  many self deprecating feelings and thoughts she experiences  which cause her to avoid going out and having social interactions.  Therapist validated pt feelings and explored the thoughts, helping pt start to challenge the validity of these thoughts,  Therapist pointed out many experiences an events pt has shared where she seemed to have felt very much engaged and accepted.  Pt was encouraged to return to the [facility] [employer] [facility] group and try to engage more with group members, which may help her feel a greater part of it. Pt will focus on what she can do and is working toward doing as opposed to what she cannot. Pt reports less crying bouts in past 2 weeks, Pt is compliant with therapy and responsive to interventions and support. [Return in ____ week(s)] : Return in [unfilled] week(s) [FreeTextEntry1] : Pt  will contact tx team for worsening of symptoms and/or problems with medication. Next appt: 5/13

## 2024-05-06 NOTE — LETTER BODY
[Dear  ___] : Dear  [unfilled], [Courtesy Letter:] : I had the pleasure of seeing your patient, [unfilled], in my office today. [Please see my note below.] : Please see my note below. [Consult Closing:] : Thank you very much for allowing me to participate in the care of this patient.  If you have any questions, please do not hesitate to contact me. [Sincerely,] : Sincerely, [FreeTextEntry3] : Jaja Cifuentes MD System Director Urogynecology/FPS Department of Urology [facility]    at [facility]  of Urology [facility] at [facility]

## 2024-05-06 NOTE — REASON FOR VISIT
[Access issues (e.g., transportation, impaired mobility, etc.)] : due to patient's access issues [Continuing, patient not seen in-person within last 12 months (provide details below)] : Telehealth services are continuing, patient not seen in-person within last 12 months.  [Telehealth (audio & video) - Individual/Group] : This visit was provided via telehealth using real-time 2-way audio visual technology. [Other Location: e.g. Home (Enter Location, City,State)___] : The provider was located at [unfilled]. [Home] : The patient, [unfilled], was located at home, [unfilled], at the time of the visit. [Verbal consent obtained from patient/other participant(s)] : Verbal consent for telehealth/telephonic services obtained from patient/other participant(s) [FreeTextEntry4] : 10:30 [FreeTextEntry5] : 11:15am [Patient] : Patient [FreeTextEntry1] : depression and anxiety

## 2024-05-06 NOTE — HISTORY OF PRESENT ILLNESS
[FreeTextEntry1] : 53 year old wheelchair bound female diagnosed with neuromyelitis optica (NMO) in  here for [place]. She reports has frequent UTIs in the past year and 1 hospitalization in 2023 in [place] for UTI. Was seen~ 2months ago by urologist Dr. Niko Mitchell where workup of labs, and CT (scanned into chart) were done as well as discussion of UDS, CIC and SPT. She recently met with neurologist in 2023 and was recommended to come here for discussion of possible options regarding her urinary incontinence (unaware/urge). She wears diaper and is changed 4x daytime, 2-3x nighttime. She verbalizes UTI symptoms of discomfort in vagina and pain in lower abdomen. Unable to feel any other sensation d/t NMO. Just completed 5 day course of antibiotics and 1x medication for yeast infection  Has constipation managed with MiraLAX, Colace and sometimes Dulcolax. Has been moving her bowels daily for the past 2 weeks since being on this regimen   ml  PMH HTN, Asthma, sleep apnea, GERD, hiatal hernia, pituitary tumor Surghx csection x1, bariatric surgery, T&A, sinus sx, b/l meniscus repair, left shoulder surgery FH maternal uncles with prostate cancer, maternal uncle throat cancer Social non smoker, no EtOH,  Medications long list of medications Allergies cephalosporins-hives  Creat 0.5  24  53 year old WC bound female diagnosed with NMO in  (currently stable) here for urge/unaware incontinence. The patient is here today to have urodynamics performed, review results and discuss treatment options. Denies significant change in med/surg history since last office visit.   VUDS reviewed -  Complex cystometrogram: Sensation: Hypersensitive Detrusor activity: Terminal/Neurogenic overactive Capacity = 168 ml. Compliance: Normal End detrusor pressure = 4 cm water at capacity Urethra: Competent  EMG: Dyssynergic  Pressure Flow: Opening pressure = 76 cm water Peak pressure = 85 cm water at flow = not measurable Valsalva augmentation: No Detrusor when voiding: Normal Urethra: Obstructed- Likely dyssynergic  Bladder wall = Trabeculated Bladder shape = Normal Reflux = Absent  Bladder Neck: Open Urethra: Distal urethra very narrow consistent with spinning top urethra   Descent: 1.5 cm Cystocele: no  VCUG- Fluoro time: 0.6 min Cumulative dose: 12.30 mGy  Assessment: 1. 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 Incomplete emptying 3. Terminal neurogenic overactivity 4. No reflux or diverticulae 5. Normal compliance  24  53 year old WC bound female diagnosed with NMO in  (currently stable) here for urge/unaware incontinence. VUDS reveals DSD and DO.  She has ? uti now, dysuria main symptom.  Recent UTI, on abx.   She had an accident with her motorized wheelchair, fx right ankle - casted.   She has yet to find an aide to perform CIC.   UTI likely secondary to incomplete bladder emptying.   Plan: We reviewed again cic, vs [place] vs spt, all questions answered.  She would like an spt by IR on SI.

## 2024-05-07 DIAGNOSIS — F33.0 MAJOR DEPRESSIVE DISORDER, RECURRENT, MILD: ICD-10-CM

## 2024-05-08 NOTE — END OF VISIT
MEDICARE WELLNESS VISIT NOTE    HISTORY OF PRESENT ILLNESS:   Glynn presents for his First Annual Medicare Wellness Visit.   He has multiple concerns -   Had urolift 2021 - went to urologist at [facility]; told his PSA could continue to elevate; has had some urinary issues  Deep urinary pain/burning x twice; last 2 weeks general discomfort in evening with urination  Recent PSA 18.5    Persistent liver enzyme elevation - has hepatic steatosis 5/31/23 ultrasound - not currently taking any medications for this, just avoiding fatty foods    Prediabetes - A1C 5.7; fasting glucose slightly elevated for several years, low 100's  Hyperlipidemia - taking 20mg x 6 months crestor; was previously prescribed 40mg daily  Elevated Calcium Score - plays aggressive tennis, gym at home, [employer] racquet member, golf - no angina with these activities; calcification mostly in LAD; discussed connotations of this today, had normal stress test in 2021; low threshold to repeat in near future given history     Patient Care Team:  Yair Jarvis MD as PCP - General (Family Practice)  Adrian Serrano OD as Referring Provider (Optometry)        Patient Active Problem List   Diagnosis    Coronary artery calcification seen on CAT scan    Chronic allergic rhinitis    LFT elevation    Hepatic steatosis         Past Medical History:   Diagnosis Date    Allergy     Commotio retinae of right eye 2018    COVID-19 08/2021    Hyperlipemia     Hyphema, right          Past Surgical History:   Procedure Laterality Date    Adenoidectomy      Colonoscopy  2013 F/U 2023    Hemocult x1  02/07/2012    Remove tonsils/adenoids,<9 y/o      T & A    Tonsillectomy           Social History     Tobacco Use    Smoking status: Never    Smokeless tobacco: Never   Vaping Use    Vaping status: never used   Substance Use Topics    Alcohol use: Yes     Alcohol/week: 1.0 - 2.0 standard drink of alcohol     Types: 1 - 2 Standard drinks or equivalent per week     Comment:  Occasional    Drug use: No     Drug use:    Drug Use:    No              Family History   Problem Relation Age of Onset    Cancer Mother         uterine    COPD Mother     Heart Father         CAD    Heart disease Father     Cancer, Breast Maternal Grandmother     Patient is unaware of any medical problems Maternal Grandfather     Patient is unaware of any medical problems Paternal Grandmother     Patient is unaware of any medical problems Paternal Grandfather        Current Outpatient Medications   Medication Sig Dispense Refill    rosuvastatin (CRESTOR) 20 MG tablet Take 1 at bedtime 90 tablet 3     No current facility-administered medications for this visit.        The following items on the Medicare Health Risk Assessment were found to be positive  14.) During the past 4 weeks, was someone available to help if you needed and wanted help?: Yes, some         Vision and Hearing screens: No vision concerns, no hearing difficulties    Advance care planning documents on file - yes     Cognitive/Functional Status: Mini-Cog performed with score of 4    Opioid Review: Jd is not taking opioid medications.    Recent PHQ 2/9 Score:    PHQ 2:  PHQ 2 Score Adult PHQ 2 Score Adult PHQ 2 Interpretation Little interest or pleasure in activity?   5/8/2024   8:36 AM 0 No further screening needed 0       PHQ 9:  PHQ 9 Score Adult PHQ 9 Score   5/8/2024   8:36 AM 0       DEPRESSION ASSESSMENT/PLAN:  Depression screening is negative no further plan needed.     Body mass index is 26.94 kg/m².    BMI FOLLOW-UP/PLAN:  Patient is overweight.    30 minutes of physical activity a day, Caloric restriction, and continue with tennis and        Needed Screening/Treatment:   None  Needed follow up:  None    See orders.   See Patient Instructions section.   Return for 1 year MWV with fasting labs prior.     Family Medicine Clinic Note      Chief Complaint   Patient presents with    Establish Care    Medicare Wellness Visit     Possible UTI          History of Present Illness  Jd Lnin is a 72 year old male who presents to clinic for:    Had urolift 2021 - went to urologist at [facility]; told his PSA could continue to elevate; has had some urinary issues  Deep urinary pain/burning x twice; last 2 weeks general discomfort in evening with urination  Recent PSA 18.5    Persistent liver enzyme elevation - has hepatic steatosis 5/31/23 ultrasound - not currently taking any medications for this, just avoiding fatty foods    Prediabetes - A1C 5.7; fasting glucose slightly elevated for several years, low 100's  Hyperlipidemia - taking 20mg x 6 months crestor; was previously prescribed 40mg daily  Elevated Calcium Score - plays aggressive tennis, gym at home, western racquet member, golf - no angina with these activities; calcification mostly in LAD; discussed connotations of this today, had normal stress test in 2021; low threshold to repeat in near future given history      Review of Systems:  Denies numbness, weakness, tingling, fevers, chills, nightsweats, weight loss  Denies bowel incontinence, urinary retention, saddle anesthesia  Denies nausea, vomiting, shortness of breath, chest pain      Past History    Current Outpatient Medications   Medication Sig Dispense Refill    rosuvastatin (CRESTOR) 20 MG tablet Take 1 at bedtime 90 tablet 3     No current facility-administered medications for this visit.       ALLERGIES:   Allergen Reactions    Seasonal Other (See Comments)    Sulfa Antibiotics HIVES     Hives         Patient Active Problem List   Diagnosis    Coronary artery calcification seen on CAT scan    Chronic allergic rhinitis    LFT elevation    Hepatic steatosis       Past Medical History:   Diagnosis Date    Allergy     Commotio retinae of right eye 2018    COVID-19 08/2021    Hyperlipemia     Hyphema, right        Past Surgical History:   Procedure Laterality Date    Adenoidectomy      Colonoscopy  2013    F/U 2023    Hemocult x1  02/07/2012     Remove tonsils/adenoids,<11 y/o      T & A    Tonsillectomy         Social History     Tobacco Use    Smoking status: Never    Smokeless tobacco: Never   Vaping Use    Vaping status: never used   Substance Use Topics    Alcohol use: Yes     Alcohol/week: 1.0 - 2.0 standard drink of alcohol     Types: 1 - 2 Standard drinks or equivalent per week     Comment: Occasional    Drug use: No         Physical Exam  Visit Vitals  /82   Pulse 70   Resp 14   Ht 6' 4\" (1.93 m)   Wt 100.4 kg (221 lb 4.8 oz)   BMI 26.94 kg/m²    Body mass index is 26.94 kg/m².      Gen: Alert, no acute distress, non-toxic appearing  HEENT: NC/AT, PERRL. Oropharynx w/out exudates or erythema. Moist mucous membranes.  Neck: Supple, no lymphadenopathy, no thyromegaly.  Lungs: Clear to auscultation bilaterally. No wheeze/rales/rhonchi  CV: Regular rate & rhythm, normal S1, S2. No murmurs  Extremities: No cyanosis, erythema or edema.  Neurologic: A&O. Mentation appropriate. CN II-XII grossly normal.    Data Review (personally reviewed)  Lab Studies:    Office Visit on 05/08/2024   Component Date Value Ref Range Status    HM COLONOSCOPY 09/06/2023 Normal   Final   Lab Services on 05/07/2024   Component Date Value Ref Range Status    Fasting Status 05/07/2024 12  0 - 999 Hours Final    Sodium 05/07/2024 139  135 - 145 mmol/L Final    Potassium 05/07/2024 4.4  3.4 - 5.1 mmol/L Final    Chloride 05/07/2024 103  97 - 110 mmol/L Final    Carbon Dioxide 05/07/2024 29  21 - 32 mmol/L Final    Anion Gap 05/07/2024 11  7 - 19 mmol/L Final    Glucose 05/07/2024 107 (H)  70 - 99 mg/dL Final    BUN 05/07/2024 17  6 - 20 mg/dL Final    Creatinine 05/07/2024 1.11  0.67 - 1.17 mg/dL Final    Glomerular Filtration Rate 05/07/2024 70  >=60 Final    BUN/Cr 05/07/2024 15  7 - 25 Final    Calcium 05/07/2024 9.6  8.4 - 10.2 mg/dL Final    Bilirubin, Total 05/07/2024 1.0  0.2 - 1.0 mg/dL Final    GOT/AST 05/07/2024 43 (H)  <=37 Units/L Final    GPT/ALT 05/07/2024 66  (H)  <64 Units/L Final    Alkaline Phosphatase 05/07/2024 67  45 - 117 Units/L Final    Albumin 05/07/2024 3.6  3.6 - 5.1 g/dL Final    Protein, Total 05/07/2024 6.7  6.4 - 8.2 g/dL Final    Globulin 05/07/2024 3.1  2.0 - 4.0 g/dL Final    A/G Ratio 05/07/2024 1.2  1.0 - 2.4 Final    Cholesterol 05/07/2024 127  <=199 mg/dL Final    Triglycerides 05/07/2024 77  <=149 mg/dL Final    HDL 05/07/2024 68  >=40 mg/dL Final    LDL 05/07/2024 44  <=129 mg/dL Final    Non-HDL Cholesterol 05/07/2024 59  mg/dL Final    Cholesterol/ HDL Ratio 05/07/2024 1.9  <=4.4 Final    Prostate Specific Antigen 05/07/2024 18.50 (H)  <=6.50 ng/mL Final    Hemoglobin A1C 05/07/2024 5.7 (H)  4.5 - 5.6 % Final    Sodium 05/07/2024 139  135 - 145 mmol/L Final    Potassium 05/07/2024 4.5  3.4 - 5.1 mmol/L Final    Chloride 05/07/2024 104  97 - 110 mmol/L Final    Carbon Dioxide 05/07/2024 29  21 - 32 mmol/L Final    Anion Gap 05/07/2024 11  7 - 19 mmol/L Final    Glucose 05/07/2024 104 (H)  70 - 99 mg/dL Final    BUN 05/07/2024 17  6 - 20 mg/dL Final    Creatinine 05/07/2024 1.12  0.67 - 1.17 mg/dL Final    Glomerular Filtration Rate 05/07/2024 69  >=60 Final    BUN/Cr 05/07/2024 15  7 - 25 Final    Calcium 05/07/2024 9.2  8.4 - 10.2 mg/dL Final    Bilirubin, Total 05/07/2024 1.0  0.2 - 1.0 mg/dL Final    GOT/AST 05/07/2024 45 (H)  <=37 Units/L Final    GPT/ALT 05/07/2024 66 (H)  <64 Units/L Final    Alkaline Phosphatase 05/07/2024 69  45 - 117 Units/L Final    Albumin 05/07/2024 3.7  3.6 - 5.1 g/dL Final    Protein, Total 05/07/2024 6.7  6.4 - 8.2 g/dL Final    Globulin 05/07/2024 3.0  2.0 - 4.0 g/dL Final    A/G Ratio 05/07/2024 1.2  1.0 - 2.4 Final    Cholesterol 05/07/2024 119  <=199 mg/dL Final    Triglycerides 05/07/2024 78  <=149 mg/dL Final    HDL 05/07/2024 64  >=40 mg/dL Final    LDL 05/07/2024 39  <=129 mg/dL Final    Non-HDL Cholesterol 05/07/2024 55  mg/dL Final    Cholesterol/ HDL Ratio 05/07/2024 1.9  <=4.4 Final    COLOR,  URINALYSIS 05/07/2024 Straw   Final    APPEARANCE, URINALYSIS 05/07/2024 Clear   Final    GLUCOSE, URINALYSIS 05/07/2024 Negative  Negative mg/dL Final    BILIRUBIN, URINALYSIS 05/07/2024 Negative  Negative Final    KETONES, URINALYSIS 05/07/2024 Negative  Negative mg/dL Final    SPECIFIC GRAVITY, URINALYSIS 05/07/2024 1.012  1.005 - 1.030 Final    OCCULT BLOOD, URINALYSIS 05/07/2024 Negative  Negative Final    PH, URINALYSIS 05/07/2024 7.0  5.0 - 7.0 Final    PROTEIN, URINALYSIS 05/07/2024 Negative  Negative mg/dL Final    UROBILINOGEN, URINALYSIS 05/07/2024 0.2  0.2, 1.0 mg/dL Final    NITRITE, URINALYSIS 05/07/2024 Negative  Negative Final    LEUKOCYTE ESTERASE, URINALYSIS 05/07/2024 Negative  Negative Final    SQUAMOUS EPITHELIAL, URINALYSIS 05/07/2024 None Seen  None Seen, 1 to 5 /hpf Final    ERYTHROCYTES, URINALYSIS 05/07/2024 None Seen  None Seen, 1 to 2 /hpf Final    LEUKOCYTES, URINALYSIS 05/07/2024 1 to 5  None Seen, 1 to 5 /hpf Final    BACTERIA, URINALYSIS 05/07/2024 Few (A)  None Seen /hpf Final    HYALINE CASTS, URINALYSIS 05/07/2024 None Seen  None Seen, 1 to 5 /lpf Final    TSH 05/07/2024 2.540  0.350 - 5.000 mcUnits/mL Final    WBC 05/07/2024 5.6  4.2 - 11.0 K/mcL Final    RBC 05/07/2024 4.46 (L)  4.50 - 5.90 mil/mcL Final    HGB 05/07/2024 13.3  13.0 - 17.0 g/dL Final    HCT 05/07/2024 38.6 (L)  39.0 - 51.0 % Final    MCV 05/07/2024 86.5  78.0 - 100.0 fl Final    MCH 05/07/2024 29.8  26.0 - 34.0 pg Final    MCHC 05/07/2024 34.5  32.0 - 36.5 g/dL Final    RDW-CV 05/07/2024 12.8  11.0 - 15.0 % Final    RDW-SD 05/07/2024 40.3  39.0 - 50.0 fL Final    PLT 05/07/2024 213  140 - 450 K/mcL Final    NRBC 05/07/2024 0  <=0 /100 WBC Final    Neutrophil, Percent 05/07/2024 57  % Final    Lymphocytes, Percent 05/07/2024 28  % Final    Mono, Percent 05/07/2024 11  % Final    Eosinophils, Percent 05/07/2024 4  % Final    Basophils, Percent 05/07/2024 0  % Final    Immature Granulocytes 05/07/2024 0  % Final     Absolute Neutrophils 05/07/2024 3.1  1.8 - 7.7 K/mcL Final    Absolute Lymphocytes 05/07/2024 1.6  1.0 - 4.0 K/mcL Final    Absolute Monocytes 05/07/2024 0.6  0.3 - 0.9 K/mcL Final    Absolute Eosinophils  05/07/2024 0.2  0.0 - 0.5 K/mcL Final    Absolute Basophils 05/07/2024 0.0  0.0 - 0.3 K/mcL Final    Absolute Immature Granulocytes 05/07/2024 0.0  0.0 - 0.2 K/mcL Final             Assessment/Plan  Art was seen today for [facility] and medicare wellness visit.    Diagnoses and all orders for this visit:    Pure hypercholesterolemia  Comments:  cont crestor 20mg daily, repeat lipids in 1 yr  Orders:  -     CBC with Automated Differential; Future  -     Comprehensive Metabolic Panel; Future  -     Lipid Panel With Reflex; Future  -     Urinalysis With Microscopy & Culture If Indicated; Future  -     Thyroid Stimulating Hormone Reflex; Future  -     CBC with Automated Differential; Future  -     Comprehensive Metabolic Panel; Future  -     Lipid Panel With Reflex; Future  -     Urinalysis With Microscopy & Culture If Indicated; Future  -     rosuvastatin (CRESTOR) 20 MG tablet; Take 1 at bedtime    Coronary artery calcification seen on CAT scan  Comments:  noted on imaging ; mostly in LAD; disc precautions, rec healthy lifestyle; low threshold to repeat stress test in near future    Agatston coronary artery calcium score between 200 and 399  -     CBC with Automated Differential; Future  -     Comprehensive Metabolic Panel; Future  -     Lipid Panel With Reflex; Future  -     Urinalysis With Microscopy & Culture If Indicated; Future  -     Thyroid Stimulating Hormone Reflex; Future  -     CBC with Automated Differential; Future  -     Comprehensive Metabolic Panel; Future  -     Lipid Panel With Reflex; Future  -     Urinalysis With Microscopy & Culture If Indicated; Future  -     rosuvastatin (CRESTOR) 20 MG tablet; Take 1 at bedtime    Elevated PSA  Comments:  persistent elevation, now having urinary symptoms  [Licensed Clinician] : Licensed Clinician - pt follows with urology at [facility]note 3[facility]ea; recommend close follow up with them given rising PSA    Hepatic steatosis  Comments:  disc recs today for vitamin E supplementation, diet/coffee consumption; can consider repeat US in 1-2 yrs pending course of LFT    Prediabetes  Comments:  disc today in depth; will hold off on meds for now; avoid juices in excess; will ctm annually given stability of fasting glucose    Bacteria in urine  Comments:  few noted incidentally on UA; will repeat UA and culture next week; if cleared, suspect related to prostate/rising PSA - encourage close urology f/u  Orders:  -     Urinalysis & Reflex Microscopy; Future  -     Urine, Bacterial Culture; Future    Screening for thyroid disorder  -     Thyroid Stimulating Hormone Reflex; Future          Follow up    Return for 1 year MWV with fasting labs prior.        Future Appointments   Date Time Provider Department Center   5/6/2025  7:30 AM AMB ACL LAB [facility] 120 [facility]   5/7/2025  8:00 AM Yair Jarvis MD [facility] DEPERE         Medication Review:  Medications reviewed with patient and medication list reconciled.  Over the counter medications, herbal therapies and supplements reviewed.  Patient's understanding and response to medications assessed.    Barriers to medications assessed and addressed.    Risks, benefits, alternatives to medications reviewed.    No barriers to learning, verbalizes understanding of teaching and instructions.    [facility]air Jarvis MD [facility]  Primary Care Sports Medicine  [facility]          [FreeTextEntry1] : Jeffrey Patel LMSW

## 2024-05-09 ENCOUNTER — APPOINTMENT (OUTPATIENT)
Age: 54
End: 2024-05-09

## 2024-05-09 ENCOUNTER — OUTPATIENT (OUTPATIENT)
Dept: OUTPATIENT SERVICES | Facility: HOSPITAL | Age: 54
LOS: 1 days | End: 2024-05-09
Payer: MEDICARE

## 2024-05-09 VITALS — SYSTOLIC BLOOD PRESSURE: 141 MMHG | TEMPERATURE: 98 F | HEART RATE: 88 BPM | DIASTOLIC BLOOD PRESSURE: 63 MMHG

## 2024-05-09 DIAGNOSIS — G36.0 NEUROMYELITIS OPTICA [DEVIC]: ICD-10-CM

## 2024-05-09 DIAGNOSIS — Z98.890 OTHER SPECIFIED POSTPROCEDURAL STATES: Chronic | ICD-10-CM

## 2024-05-09 DIAGNOSIS — Z98.891 HISTORY OF UTERINE SCAR FROM PREVIOUS SURGERY: Chronic | ICD-10-CM

## 2024-05-09 PROCEDURE — 96413 CHEMO IV INFUSION 1 HR: CPT

## 2024-05-09 PROCEDURE — 96415 CHEMO IV INFUSION ADDL HR: CPT

## 2024-05-09 RX ORDER — ACETAMINOPHEN 500 MG
1000 TABLET ORAL ONCE
Refills: 0 | Status: COMPLETED | OUTPATIENT
Start: 2024-05-09 | End: 2024-05-09

## 2024-05-09 RX ORDER — DIPHENHYDRAMINE HCL 50 MG
50 CAPSULE ORAL ONCE
Refills: 0 | Status: COMPLETED | OUTPATIENT
Start: 2024-05-09 | End: 2024-05-09

## 2024-05-09 RX ORDER — RITUXIMAB 10 MG/ML
1000 INJECTION, SOLUTION INTRAVENOUS ONCE
Refills: 0 | Status: COMPLETED | OUTPATIENT
Start: 2024-05-09 | End: 2024-05-09

## 2024-05-09 RX ADMIN — Medication 50 MILLIGRAM(S): at 11:54

## 2024-05-09 RX ADMIN — RITUXIMAB 1000 MILLIGRAM(S): 10 INJECTION, SOLUTION INTRAVENOUS at 15:40

## 2024-05-09 RX ADMIN — Medication 1000 MILLIGRAM(S): at 11:54

## 2024-05-09 RX ADMIN — Medication 1000 MILLIGRAM(S): at 12:20

## 2024-05-09 RX ADMIN — RITUXIMAB 1000 MILLIGRAM(S): 10 INJECTION, SOLUTION INTRAVENOUS at 12:23

## 2024-05-10 DIAGNOSIS — G36.0 NEUROMYELITIS OPTICA [DEVIC]: ICD-10-CM

## 2024-05-13 ENCOUNTER — APPOINTMENT (OUTPATIENT)
Dept: PSYCHIATRY | Facility: CLINIC | Age: 54
End: 2024-05-13

## 2024-05-13 ENCOUNTER — OUTPATIENT (OUTPATIENT)
Dept: OUTPATIENT SERVICES | Facility: HOSPITAL | Age: 54
LOS: 1 days | End: 2024-05-13
Payer: COMMERCIAL

## 2024-05-13 DIAGNOSIS — Z98.890 OTHER SPECIFIED POSTPROCEDURAL STATES: Chronic | ICD-10-CM

## 2024-05-13 DIAGNOSIS — F41.1 GENERALIZED ANXIETY DISORDER: ICD-10-CM

## 2024-05-13 DIAGNOSIS — F33.0 MAJOR DEPRESSIVE DISORDER, RECURRENT, MILD: ICD-10-CM

## 2024-05-13 PROCEDURE — 90834 PSYTX W PT 45 MINUTES: CPT

## 2024-05-13 NOTE — BEHAVIORAL HEALTH
[[facility]] : [facility]  [Supportive Therapy] : Supportive Therapy [Return in ____ week(s)] : Return in [unfilled] week(s) [FreeTextEntry2] : Goal 1: Pt will shift her mind-set from focus on what she "can't do" to what she "can do".  Obj A Pt will engage in 1-2 activities per week that create enjoyment in her life.   Obj B Pt will lower her score on the PHQ-9 from 17 (moderately severe depression) to 10-14 (moderate depression)   Goal2 To reduce the amount of time spent worrying on different things. Obj A Pt will reduce score on the GAD7 from 16 (severe anxiety) to between 10-14 ( moderate anxiety) Obj B Pt will learn and implement two coping skills in order to reduce anxiety  Goal 3  To have less pain daily" Obj A Pt's pain level will be reduced from daily to 3-4 days per week Obj B Pt will attend all medical appts and take all medications as prescribed.  [de-identified] : The focus of the session was about pt's experience with her new physical/occupational therapy facility.  . Therapist provided active listening as pt shared how  she went to the [facility] and  met with her assigned therapist and they discussed what she hopes to achieve from their services. Pt identified several skills such as strengthening her hands, being able to pull herself up and move to a chair on her own. Pt reports that she has low expectations and many worries about the pain the therapy will cause.   Therapist validated pt feelings and pointed out that pt is flooding herself with negative thoughts as opposed to seeing this an an opportunity for greater [id number]. Pt identified being afraid to be hopeful and not make it her goals. Pt will use cautious optimism, thinking of how important this therapy is for her as opposed to the outcome,  Therapist provided support and reassurance. Pt is compliant with therapy and responsive to interventions and support. [FreeTextEntry1] : Pt  will contact tx team for worsening of symptoms and/or problems with medication. Next appt: 5/20

## 2024-05-14 DIAGNOSIS — F41.1 GENERALIZED ANXIETY DISORDER: ICD-10-CM

## 2024-05-20 ENCOUNTER — APPOINTMENT (OUTPATIENT)
Dept: PSYCHIATRY | Facility: CLINIC | Age: 54
End: 2024-05-20

## 2024-05-20 ENCOUNTER — OUTPATIENT (OUTPATIENT)
Dept: OUTPATIENT SERVICES | Facility: HOSPITAL | Age: 54
LOS: 1 days | End: 2024-05-20
Payer: MEDICARE

## 2024-05-20 DIAGNOSIS — F41.1 GENERALIZED ANXIETY DISORDER: ICD-10-CM

## 2024-05-20 DIAGNOSIS — F33.1 MAJOR DEPRESSIVE DISORDER, RECURRENT, MODERATE: ICD-10-CM

## 2024-05-20 DIAGNOSIS — Z98.890 OTHER SPECIFIED POSTPROCEDURAL STATES: Chronic | ICD-10-CM

## 2024-05-20 PROCEDURE — 90834 PSYTX W PT 45 MINUTES: CPT

## 2024-05-20 NOTE — BEHAVIORAL HEALTH
[[facility]] : [facility]  [Supportive Therapy] : Supportive Therapy [Return in ____ week(s)] : Return in [unfilled] week(s) [FreeTextEntry2] : Goal 1: Pt will shift her mind-set from focus on what she "can't do" to what she "can do".  Obj A Pt will engage in 1-2 activities per week that create enjoyment in her life.   Obj B Pt will lower her score on the PHQ-9 from 17 (moderately severe depression) to 10-14 (moderate depression)   Goal2 To reduce the amount of time spent worrying on different things. Obj A Pt will reduce score on the GAD7 from 16 (severe anxiety) to between 10-14 ( moderate anxiety) Obj B Pt will learn and implement two coping skills in order to reduce anxiety  Goal 3  To have less pain daily" Obj A Pt's pain level will be reduced from daily to 3-4 days per week Obj B Pt will attend all medical appts and take all medications as prescribed.  [de-identified] : The focus of the session was about pt's recent experience trying to attend an event. . Therapist provided active listening as pt shared she was unable to see the show because the transportation sent for her was not appropriate for her size and disability. Pt reported this increased her depressive sx and and negative thoughts. Pt thought how difficult life is for her in all aspects and how this will always be the case.  Therapist validated pt feelings and how unfortunate the issues with the show was. Therapist encouraged opt to call the box office for new ticket and offered to help pt in any way. Therapist asked pt to challenge her all-or-nothing thinking and explored barriers that are holding her back from living her life as it is now. . Pt identified wanting to be ion her own home as agreed to phone contractors to help her make it accessible. Therapist provided support and reassurance. Pt is compliant with therapy and responsive to interventions and support. [FreeTextEntry1] : Pt  will contact tx team for worsening of symptoms and/or problems with medication. Next appt: 5/31

## 2024-05-21 DIAGNOSIS — F33.1 MAJOR DEPRESSIVE DISORDER, RECURRENT, MODERATE: ICD-10-CM

## 2024-05-22 ENCOUNTER — APPOINTMENT (OUTPATIENT)
Dept: PSYCHIATRY | Facility: CLINIC | Age: 54
End: 2024-05-22
Payer: MEDICARE

## 2024-05-22 ENCOUNTER — OUTPATIENT (OUTPATIENT)
Dept: OUTPATIENT SERVICES | Facility: HOSPITAL | Age: 54
LOS: 1 days | End: 2024-05-22
Payer: MEDICARE

## 2024-05-22 DIAGNOSIS — F33.1 MAJOR DEPRESSIVE DISORDER, RECURRENT, MODERATE: ICD-10-CM

## 2024-05-22 DIAGNOSIS — Z98.891 HISTORY OF UTERINE SCAR FROM PREVIOUS SURGERY: Chronic | ICD-10-CM

## 2024-05-22 DIAGNOSIS — F41.1 GENERALIZED ANXIETY DISORDER: ICD-10-CM

## 2024-05-22 PROCEDURE — 99214 OFFICE O/P EST MOD 30 MIN: CPT | Mod: 95

## 2024-05-22 RX ORDER — GABAPENTIN 600 MG/1
600 TABLET, COATED ORAL 3 TIMES DAILY
Qty: 180 | Refills: 11 | Status: ACTIVE | COMMUNITY
Start: 2021-12-13 | End: 1900-01-01

## 2024-05-22 NOTE — DISCUSSION/SUMMARY
[Date of Last Physical Exam: _____] : Date of Last Physical Exam: [unfilled] [Date of Last Annual Labs: _____] : Date of Last Annual Labs: [unfilled] [Annual Review of Systems Completed?] : Annual Review of Systems Completed: Yes [Tobacco Screening Completed?] : Tobacco Screening Completed: Yes [Potential impact of patient’s physical health conditions on psychiatric care?] : Potential impact of patient's physical health conditions on psychiatric care: Yes [FreeTextEntry1] : Dinora is a 52y/o AA  female, depression, anxiety, PMHx of Neuromyelitis optima, paralyzed since 4/2021, neurological symptoms began 2/2021, post Moderna vaccine 1/2021. She sleeps well, managing her mood and anxiety symptoms. She is at low risk, no acute thoughts of harm to self, recent medical condition, positive family support, linked to treatment. The patient reports low energy and motivation.  1.Bupropion XL 450mg po qam 2. Hydroxyzine 50mg po prn 3. add abilify 2mg po qam risks,benefits discussed  Follow up in 4 weeks     [Does patient require any additional health services or referrals?] : Does patient require any additional health services or referrals: No

## 2024-05-22 NOTE — REASON FOR VISIT
[Access issues (e.g., transportation, impaired mobility, etc.)] : due to patient's access issues [Telehealth (audio & video) - Individual/Group] : This visit was provided via telehealth using real-time 2-way audio visual technology. [Medical Office: ([facility])___] : The provider was located at the medical office in [unfilled]. [Home] : The patient, [unfilled], was located at home, [unfilled], at the time of the visit. [Verbal consent obtained from patient/other participant(s)] : Verbal consent for telehealth/telephonic services obtained from patient/other participant(s) [Patient] : Patient [Verbal consent obtained from patient] : the patient, [unfilled] [FreeTextEntry4] : 159pm [FreeTextEntry5] : 214pm [FreeTextEntry3] : unable to come in person due to limited mobility [FreeTextEntry1] : "I am better, but still crying and ruminating at times."

## 2024-05-22 NOTE — PHYSICAL EXAM
[Intermittent] : intermittent [Cooperative] : cooperative [Full] : full [Clear] : clear [Linear/Goal Directed] : linear/goal directed [WNL] : within normal limits [FreeTextEntry1] : Seated [FreeTextEntry8] : better [3 - Mildly ill] : 3 - Mildly ill  (clearly established symptoms with minimal, if any, distress or difficulty in social and occupational function) [3 - Minimally improved] : 3 - Minimally improved  (slightly better with little or no clinically meaningful reduction of symptoms. Represents very little change in basic clinical status, level of care, or functional capacity)

## 2024-05-23 DIAGNOSIS — F33.1 MAJOR DEPRESSIVE DISORDER, RECURRENT, MODERATE: ICD-10-CM

## 2024-05-23 DIAGNOSIS — F41.1 GENERALIZED ANXIETY DISORDER: ICD-10-CM

## 2024-05-30 ENCOUNTER — RX RENEWAL (OUTPATIENT)
Age: 54
End: 2024-05-30

## 2024-05-31 ENCOUNTER — OUTPATIENT (OUTPATIENT)
Dept: OUTPATIENT SERVICES | Facility: HOSPITAL | Age: 54
LOS: 1 days | End: 2024-05-31
Payer: MEDICARE

## 2024-05-31 ENCOUNTER — APPOINTMENT (OUTPATIENT)
Dept: PSYCHIATRY | Facility: CLINIC | Age: 54
End: 2024-05-31

## 2024-05-31 DIAGNOSIS — F33.1 MAJOR DEPRESSIVE DISORDER, RECURRENT, MODERATE: ICD-10-CM

## 2024-05-31 PROCEDURE — 90834 PSYTX W PT 45 MINUTES: CPT

## 2024-06-01 DIAGNOSIS — F33.1 MAJOR DEPRESSIVE DISORDER, RECURRENT, MODERATE: ICD-10-CM

## 2024-06-03 NOTE — REASON FOR VISIT
[Access issues (e.g., transportation, impaired mobility, etc.)] : due to patient's access issues [Continuing, patient not seen in-person within last 12 months (provide details below)] : Telehealth services are continuing, patient not seen in-person within last 12 months.  [Telehealth (audio & video) - Individual/Group] : This visit was provided via telehealth using real-time 2-way audio visual technology. [Other Location: e.g. Home (Enter Location, City,State)___] : The provider was located at [unfilled]. [Home] : The patient, [unfilled], was located at home, [unfilled], at the time of the visit. [Verbal consent obtained from patient/other participant(s)] : Verbal consent for telehealth/telephonic services obtained from patient/other participant(s) [Patient] : Patient [FreeTextEntry4] : 10:00 [FreeTextEntry5] : 10:45 [FreeTextEntry1] : depression and anxiety

## 2024-06-03 NOTE — BEHAVIORAL HEALTH
[[facility]] : [facility]  [Supportive Therapy] : Supportive Therapy [Return in ____ week(s)] : Return in [unfilled] week(s) [FreeTextEntry2] : Goal 1: Pt will shift her mind-set from focus on what she "can't do" to what she "can do".  Obj A Pt will engage in 1-2 activities per week that create enjoyment in her life.   Obj B Pt will lower her score on the PHQ-9 from 17 (moderately severe depression) to 10-14 (moderate depression)   Goal2 To reduce the amount of time spent worrying on different things. Obj A Pt will reduce score on the GAD7 from 16 (severe anxiety) to between 10-14 ( moderate anxiety) Obj B Pt will learn and implement two coping skills in order to reduce anxiety  Goal 3  To have less pain daily" Obj A Pt's pain level will be reduced from daily to 3-4 days per week Obj B Pt will attend all medical appts and take all medications as prescribed.  [de-identified] : The focus of the session was about pt managing her pain level.  . Therapist provided active listening as pt shared how she has been relying more and more on her CBD gummies for help with her pain, finding the most relief from these.  Pt reported they are effective in combination with her other pain meds.  Therapist validated pt experience and explored how the pain effects her mental health.  . Pt identified that she feels she cannot relate the [facility] members as they do not seem to suffer as she does and she no longer attends. Therapist and pt discusses coping skills such as distraction and grounding techniques.  Therapist provided support and reassurance. Pt is compliant with therapy and responsive to interventions and support. [FreeTextEntry1] : Pt  will contact tx team for worsening of symptoms and/or problems with medication. Next appt: 6/5

## 2024-06-04 ENCOUNTER — RX RENEWAL (OUTPATIENT)
Age: 54
End: 2024-06-04

## 2024-06-05 ENCOUNTER — APPOINTMENT (OUTPATIENT)
Dept: PSYCHIATRY | Facility: CLINIC | Age: 54
End: 2024-06-05

## 2024-06-05 ENCOUNTER — OUTPATIENT (OUTPATIENT)
Dept: OUTPATIENT SERVICES | Facility: HOSPITAL | Age: 54
LOS: 1 days | End: 2024-06-05
Payer: MEDICARE

## 2024-06-05 DIAGNOSIS — F33.1 MAJOR DEPRESSIVE DISORDER, RECURRENT, MODERATE: ICD-10-CM

## 2024-06-05 DIAGNOSIS — Z98.890 OTHER SPECIFIED POSTPROCEDURAL STATES: Chronic | ICD-10-CM

## 2024-06-05 PROCEDURE — 90834 PSYTX W PT 45 MINUTES: CPT

## 2024-06-06 DIAGNOSIS — F33.1 MAJOR DEPRESSIVE DISORDER, RECURRENT, MODERATE: ICD-10-CM

## 2024-06-06 NOTE — BEHAVIORAL HEALTH
[[facility]] : [facility]  [Supportive Therapy] : Supportive Therapy [Return in ____ week(s)] : Return in [unfilled] week(s) [FreeTextEntry2] : Goal 1: Pt will shift her mind-set from focus on what she "can't do" to what she "can do".  Obj A Pt will engage in 1-2 activities per week that create enjoyment in her life.   Obj B Pt will lower her score on the PHQ-9 from 17 (moderately severe depression) to 10-14 (moderate depression)   Goal2 To reduce the amount of time spent worrying on different things. Obj A Pt will reduce score on the GAD7 from 16 (severe anxiety) to between 10-14 ( moderate anxiety) Obj B Pt will learn and implement two coping skills in order to reduce anxiety  Goal 3  To have less pain daily" Obj A Pt's pain level will be reduced from daily to 3-4 days per week Obj B Pt will attend all medical appts and take all medications as prescribed.  [de-identified] : The focus of the session was about pt 's difficulty socializing  . Therapist provided active listening as pt shared how she went to another sorority event and sat at her table, feeling as though she were outsider and avoiding moving from her spot.   Pt reported she is embarrassed by ehr wheelchair and therefore she opts to stay at her table.   Therapist validated pt experience and how different it is from her past experience however she has many friends and supports at these events. Pt was encouraged to mingle more and to identify a close friend whom she can move around with. Pt identified a friend she feels would be prefect for this job and is agreeable to this plan.  Therapist provided support and reassurance. Pt is compliant with therapy and responsive to interventions and support. [FreeTextEntry1] : Pt  will contact tx team for worsening of symptoms and/or problems with medication. Next appt: 6/14

## 2024-06-06 NOTE — REASON FOR VISIT
[Access issues (e.g., transportation, impaired mobility, etc.)] : due to patient's access issues [Continuing, patient not seen in-person within last 12 months (provide details below)] : Telehealth services are continuing, patient not seen in-person within last 12 months.  [Telehealth (audio & video) - Individual/Group] : This visit was provided via telehealth using real-time 2-way audio visual technology. [Other Location: e.g. Home (Enter Location, City,State)___] : The provider was located at [unfilled]. [Home] : The patient, [unfilled], was located at home, [unfilled], at the time of the visit. [Verbal consent obtained from patient/other participant(s)] : Verbal consent for telehealth/telephonic services obtained from patient/other participant(s) [Patient] : Patient [FreeTextEntry4] : 12:00 [FreeTextEntry5] : 12:45pm [FreeTextEntry1] : depression and anxiety

## 2024-06-14 ENCOUNTER — APPOINTMENT (OUTPATIENT)
Dept: PSYCHIATRY | Facility: CLINIC | Age: 54
End: 2024-06-14

## 2024-06-14 ENCOUNTER — OUTPATIENT (OUTPATIENT)
Dept: OUTPATIENT SERVICES | Facility: HOSPITAL | Age: 54
LOS: 1 days | End: 2024-06-14
Payer: MEDICARE

## 2024-06-14 DIAGNOSIS — Z98.890 OTHER SPECIFIED POSTPROCEDURAL STATES: Chronic | ICD-10-CM

## 2024-06-14 DIAGNOSIS — F33.1 MAJOR DEPRESSIVE DISORDER, RECURRENT, MODERATE: ICD-10-CM

## 2024-06-14 PROCEDURE — 90834 PSYTX W PT 45 MINUTES: CPT

## 2024-06-14 NOTE — BEHAVIORAL HEALTH
[[facility]] : [facility]  [Supportive Therapy] : Supportive Therapy [Return in ____ week(s)] : Return in [unfilled] week(s) [FreeTextEntry2] : Goal 1: Pt will shift her mind-set from focus on what she "can't do" to what she "can do".  Obj A Pt will engage in 1-2 activities per week that create enjoyment in her life.   Obj B Pt will lower her score on the PHQ-9 from 17 (moderately severe depression) to 10-14 (moderate depression)   Goal2 To reduce the amount of time spent worrying on different things. Obj A Pt will reduce score on the GAD7 from 16 (severe anxiety) to between 10-14 ( moderate anxiety) Obj B Pt will learn and implement two coping skills in order to reduce anxiety  Goal 3  To have less pain daily" Obj A Pt's pain level will be reduced from daily to 3-4 days per week Obj B Pt will attend all medical appts and take all medications as prescribed.  [de-identified] : The focus of the session was about pt 's recent social events. . Therapist provided active listening as pt shared how she went to 2 more social events since last week and enjoyed herself.  Pt reported she was able to mingle and felt much  role engaged and less of an outsider.  Therapist validated pt experience and explored how it felt for her as this was outside her comfort zone. .  Pt identified that she had a friend with her for one of them which helped her and also that she is closer with thus group of women so she was naturally more comfortable. Pt has resumed the [employer] [facility] group and got some valuable information from the women in their last meeting. Therapist provided support and reassurance. Pt is compliant with therapy and responsive to interventions and support. [FreeTextEntry1] : Pt  will contact tx team for worsening of symptoms and/or problems with medication. Next appt: 6/24

## 2024-06-15 DIAGNOSIS — F33.1 MAJOR DEPRESSIVE DISORDER, RECURRENT, MODERATE: ICD-10-CM

## 2024-06-19 ENCOUNTER — APPOINTMENT (OUTPATIENT)
Dept: NEUROLOGY | Facility: CLINIC | Age: 54
End: 2024-06-19
Payer: MEDICARE

## 2024-06-19 VITALS
SYSTOLIC BLOOD PRESSURE: 114 MMHG | DIASTOLIC BLOOD PRESSURE: 80 MMHG | BODY MASS INDEX: 42.38 KG/M2 | HEART RATE: 91 BPM | HEIGHT: 67 IN | OXYGEN SATURATION: 98 % | WEIGHT: 270 LBS | TEMPERATURE: 97 F

## 2024-06-19 PROCEDURE — 99215 OFFICE O/P EST HI 40 MIN: CPT

## 2024-06-19 PROCEDURE — G2211 COMPLEX E/M VISIT ADD ON: CPT

## 2024-06-19 RX ORDER — BUPROPION HYDROCHLORIDE 150 MG/1
150 TABLET, EXTENDED RELEASE ORAL DAILY
Refills: 0 | Status: ACTIVE | COMMUNITY

## 2024-06-19 RX ORDER — ARIPIPRAZOLE 2 MG/1
2 TABLET ORAL DAILY
Refills: 0 | Status: ACTIVE | COMMUNITY
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
## 2024-06-20 ENCOUNTER — OUTPATIENT (OUTPATIENT)
Dept: OUTPATIENT SERVICES | Facility: HOSPITAL | Age: 54
LOS: 1 days | End: 2024-06-20
Payer: MEDICARE

## 2024-06-20 ENCOUNTER — APPOINTMENT (OUTPATIENT)
Dept: PSYCHIATRY | Facility: CLINIC | Age: 54
End: 2024-06-20
Payer: MEDICARE

## 2024-06-20 DIAGNOSIS — F41.1 GENERALIZED ANXIETY DISORDER: ICD-10-CM

## 2024-06-20 DIAGNOSIS — F33.1 MAJOR DEPRESSIVE DISORDER, RECURRENT, MODERATE: ICD-10-CM

## 2024-06-20 DIAGNOSIS — Z98.890 OTHER SPECIFIED POSTPROCEDURAL STATES: Chronic | ICD-10-CM

## 2024-06-20 DIAGNOSIS — Z98.891 HISTORY OF UTERINE SCAR FROM PREVIOUS SURGERY: Chronic | ICD-10-CM

## 2024-06-20 PROCEDURE — 99214 OFFICE O/P EST MOD 30 MIN: CPT | Mod: 95

## 2024-06-20 RX ORDER — BUPROPION HYDROCHLORIDE 300 MG/1
300 TABLET, EXTENDED RELEASE ORAL
Qty: 90 | Refills: 0 | Status: ACTIVE | COMMUNITY
Start: 2023-01-27 | End: 1900-01-01

## 2024-06-20 RX ORDER — ARIPIPRAZOLE 2 MG/1
2 TABLET ORAL AT BEDTIME
Qty: 30 | Refills: 1 | Status: ACTIVE | COMMUNITY
Start: 2024-05-22 | End: 1900-01-01

## 2024-06-20 RX ORDER — BUPROPION HYDROCHLORIDE 150 MG/1
150 TABLET, EXTENDED RELEASE ORAL DAILY
Qty: 90 | Refills: 1 | Status: ACTIVE | COMMUNITY
Start: 2024-03-28 | End: 1900-01-01

## 2024-06-20 NOTE — REASON FOR VISIT
[Access issues (e.g., transportation, impaired mobility, etc.)] : due to patient's access issues [Telehealth (audio & video) - Individual/Group] : This visit was provided via telehealth using real-time 2-way audio visual technology. [Medical Office: ([facility])___] : The provider was located at the medical office in [unfilled]. [Home] : The patient, [unfilled], was located at home, [unfilled], at the time of the visit. [Verbal consent obtained from patient/other participant(s)] : Verbal consent for telehealth/telephonic services obtained from patient/other participant(s) [Patient] : Patient [Verbal consent obtained from patient] : the patient, [unfilled] [FreeTextEntry4] : 1133am [FreeTextEntry5] : 4118xu [FreeTextEntry3] : unable to come in person due to limited mobility, phone, then video [FreeTextEntry1] : "I am better, mild daytime sedation."

## 2024-06-20 NOTE — DISCUSSION/SUMMARY
[Date of Last Physical Exam: _____] : Date of Last Physical Exam: [unfilled] [Date of Last Annual Labs: _____] : Date of Last Annual Labs: [unfilled] [Annual Review of Systems Completed?] : Annual Review of Systems Completed: Yes [Tobacco Screening Completed?] : Tobacco Screening Completed: Yes [Potential impact of patient’s physical health conditions on psychiatric care?] : Potential impact of patient's physical health conditions on psychiatric care: Yes [FreeTextEntry1] : Dinora is a 54y/o AA  female, depression, anxiety, PMHx of Neuromyelitis optima, paralyzed since 4/2021, neurological symptoms began 2/2021, post Moderna vaccine 1/2021. She sleeps well, managing her mood and anxiety symptoms. She is at low risk, no acute thoughts of harm to self, recent medical condition, positive family support, linked to treatment. The patient reports improved mood, energy, and motivation.  1.Bupropion XL 450mg po qam 2. Hydroxyzine 50mg po prn 3. Change abilify 2mg po qam to bedtime, risks,benefits discussed  Follow up in 4 weeks     [Does patient require any additional health services or referrals?] : Does patient require any additional health services or referrals: No

## 2024-06-20 NOTE — PAST MEDICAL HISTORY
[FreeTextEntry1] : No inpatient admissions, trials of Prozac, Zoloft, Wellbutrin, Xanax, Valium, Duloxetine: metallic taste in mouth Glycopyrrolate Pregnancy And Lactation Text: This medication is Pregnancy Category B and is considered safe during pregnancy. It is unknown if it is excreted breast milk.

## 2024-06-20 NOTE — PHYSICAL EXAM
[Intermittent] : intermittent [Cooperative] : cooperative [Full] : full [Clear] : clear [Linear/Goal Directed] : linear/goal directed [WNL] : within normal limits [FreeTextEntry1] : Seated [FreeTextEntry8] : better [2 - Borderline mentally ill] : 2 - Borderline mentally ill (subtle or suspected pathology) [2 - Much improved] : 2 - Much improved  (notably better with significant reduction of symptoms; increase in the level of functioning but some symptoms remain)

## 2024-06-20 NOTE — HISTORY OF PRESENT ILLNESS
[FreeTextEntry1] : This is a 53 year old patient who presents for medication management.  The patient reports limproved mood, good sleep and appetite.  The patient denies any symptoms of depression,  oniel, or psychosis at this time.  The patient denies any anxiety that impairs their daily functioning. The patient denies any suicidal ideation, homicidal ideation, no auditory or visual hallucinations, or paranoid ideation.  The patient has chronic medical complaints. The patient denies any illicit drug or alcohol use, and is not smoking at this time. I requested the patient's updated physical and labs from their PCP.  Coping skills were discussed and a safety plan was provided.  The patient was educated on the risks, benefits, and alternatives of their psychiatric medications.  The patient will engage in psychotherapy at this time.  The patient consents to ongoing medication management.

## 2024-06-21 DIAGNOSIS — F41.1 GENERALIZED ANXIETY DISORDER: ICD-10-CM

## 2024-06-21 DIAGNOSIS — F33.1 MAJOR DEPRESSIVE DISORDER, RECURRENT, MODERATE: ICD-10-CM

## 2024-06-24 ENCOUNTER — APPOINTMENT (OUTPATIENT)
Dept: PSYCHIATRY | Facility: CLINIC | Age: 54
End: 2024-06-24

## 2024-06-24 ENCOUNTER — OUTPATIENT (OUTPATIENT)
Dept: OUTPATIENT SERVICES | Facility: HOSPITAL | Age: 54
LOS: 1 days | End: 2024-06-24
Payer: MEDICARE

## 2024-06-24 ENCOUNTER — RX RENEWAL (OUTPATIENT)
Age: 54
End: 2024-06-24

## 2024-06-24 DIAGNOSIS — F33.1 MAJOR DEPRESSIVE DISORDER, RECURRENT, MODERATE: ICD-10-CM

## 2024-06-24 DIAGNOSIS — Z98.890 OTHER SPECIFIED POSTPROCEDURAL STATES: Chronic | ICD-10-CM

## 2024-06-24 DIAGNOSIS — F41.9 ANXIETY DISORDER, UNSPECIFIED: ICD-10-CM

## 2024-06-24 PROCEDURE — 90834 PSYTX W PT 45 MINUTES: CPT

## 2024-06-24 RX ORDER — TAPENTADOL HYDROCHLORIDE 75 MG/1
75 TABLET, FILM COATED ORAL
Qty: 90 | Refills: 0 | Status: ACTIVE | COMMUNITY
Start: 2022-03-31 | End: 1900-01-01

## 2024-06-24 RX ORDER — ALBUTEROL SULFATE 90 UG/1
108 (90 BASE) INHALANT RESPIRATORY (INHALATION)
Qty: 1 | Refills: 1 | Status: ACTIVE | COMMUNITY
Start: 2022-11-29 | End: 1900-01-01

## 2024-06-25 DIAGNOSIS — F33.1 MAJOR DEPRESSIVE DISORDER, RECURRENT, MODERATE: ICD-10-CM

## 2024-07-08 ENCOUNTER — APPOINTMENT (OUTPATIENT)
Dept: PSYCHIATRY | Facility: CLINIC | Age: 54
End: 2024-07-08

## 2024-07-08 ENCOUNTER — OUTPATIENT (OUTPATIENT)
Dept: OUTPATIENT SERVICES | Facility: HOSPITAL | Age: 54
LOS: 1 days | End: 2024-07-08
Payer: MEDICARE

## 2024-07-08 DIAGNOSIS — F33.1 MAJOR DEPRESSIVE DISORDER, RECURRENT, MODERATE: ICD-10-CM

## 2024-07-08 DIAGNOSIS — Z98.890 OTHER SPECIFIED POSTPROCEDURAL STATES: Chronic | ICD-10-CM

## 2024-07-08 PROCEDURE — 90834 PSYTX W PT 45 MINUTES: CPT

## 2024-07-09 DIAGNOSIS — F33.1 MAJOR DEPRESSIVE DISORDER, RECURRENT, MODERATE: ICD-10-CM

## 2024-07-16 ENCOUNTER — RX RENEWAL (OUTPATIENT)
Age: 54
End: 2024-07-16

## 2024-07-17 ENCOUNTER — OUTPATIENT (OUTPATIENT)
Dept: OUTPATIENT SERVICES | Facility: HOSPITAL | Age: 54
LOS: 1 days | End: 2024-07-17
Payer: MEDICARE

## 2024-07-17 ENCOUNTER — APPOINTMENT (OUTPATIENT)
Dept: PSYCHIATRY | Facility: CLINIC | Age: 54
End: 2024-07-17

## 2024-07-17 DIAGNOSIS — Z98.890 OTHER SPECIFIED POSTPROCEDURAL STATES: Chronic | ICD-10-CM

## 2024-07-17 DIAGNOSIS — F33.1 MAJOR DEPRESSIVE DISORDER, RECURRENT, MODERATE: ICD-10-CM

## 2024-07-17 DIAGNOSIS — Z98.891 HISTORY OF UTERINE SCAR FROM PREVIOUS SURGERY: Chronic | ICD-10-CM

## 2024-07-17 PROCEDURE — 90834 PSYTX W PT 45 MINUTES: CPT

## 2024-07-18 ENCOUNTER — APPOINTMENT (OUTPATIENT)
Dept: PSYCHIATRY | Facility: CLINIC | Age: 54
End: 2024-07-18
Payer: MEDICARE

## 2024-07-18 ENCOUNTER — OUTPATIENT (OUTPATIENT)
Dept: OUTPATIENT SERVICES | Facility: HOSPITAL | Age: 54
LOS: 1 days | End: 2024-07-18
Payer: MEDICARE

## 2024-07-18 DIAGNOSIS — F33.1 MAJOR DEPRESSIVE DISORDER, RECURRENT, MODERATE: ICD-10-CM

## 2024-07-18 DIAGNOSIS — Z98.890 OTHER SPECIFIED POSTPROCEDURAL STATES: Chronic | ICD-10-CM

## 2024-07-18 DIAGNOSIS — Z98.891 HISTORY OF UTERINE SCAR FROM PREVIOUS SURGERY: Chronic | ICD-10-CM

## 2024-07-18 DIAGNOSIS — F41.1 GENERALIZED ANXIETY DISORDER: ICD-10-CM

## 2024-07-18 PROCEDURE — 99214 OFFICE O/P EST MOD 30 MIN: CPT | Mod: 95

## 2024-07-19 DIAGNOSIS — F41.1 GENERALIZED ANXIETY DISORDER: ICD-10-CM

## 2024-07-19 DIAGNOSIS — F33.1 MAJOR DEPRESSIVE DISORDER, RECURRENT, MODERATE: ICD-10-CM

## 2024-07-24 ENCOUNTER — APPOINTMENT (OUTPATIENT)
Dept: PSYCHIATRY | Facility: CLINIC | Age: 54
End: 2024-07-24

## 2024-07-24 DIAGNOSIS — F41.9 ANXIETY DISORDER, UNSPECIFIED: ICD-10-CM

## 2024-07-24 NOTE — BEHAVIORAL HEALTH
[[facility]] : [facility]  [[facility]ive Therapy] : Support[facility] Therapy [Return in ____ week(s)] : Return in [unfilled] week(s) [FreeTextEntry2] : Goal 1: Pt will shift her mind-set from focus on what she "can't do" to what she "can do".  Obj A Pt will engage in 1-2 activities per week that create enjoyment in her life.   Obj B Pt will lower her score on the PHQ-9 from 17 (moderately severe depression) to 10-14 (moderate depression)   Goal2 To reduce the amount of time spent worrying on different things. Obj A Pt will reduce score on the GAD7 from 16 (severe anxiety) to between 10-14 ( moderate anxiety) Obj B Pt will learn and implement two coping skills in order to reduce anxiety  Goal 3  To have less pain daily" Obj A Pt's pain level will be reduced from daily to 3-4 days per week Obj B Pt will attend all medical appts and take all medications as prescribed.  [de-identified] : The focus of the session was pt's feeling about medical issues she faces. .  Therapist provided active listening and empathic response as pt shared that she must now move forward with a procedure to assist her with urinary retention that she has been putting off for awhile.  Pt became tearful and shared that this is all part of the NMO disease that she has and she is frustrated with how much it has affected her daily functioning.  Therapist validated pt  feelings and provided support and reassurance.  Pt identified that she is more accepting of what she needs as time goes by and therefore she is having the procedure done. Pt continues to have the outlook that she must try to live her best life with her limitations.    Pt is compliant with therapy and responsive to interventions and support. [FreeTextEntry1] : Pt  will contact tx team for worsening of symptoms and/or problems with medication. Next appt: 7/31

## 2024-07-31 ENCOUNTER — APPOINTMENT (OUTPATIENT)
Dept: PSYCHIATRY | Facility: CLINIC | Age: 54
End: 2024-07-31

## 2024-07-31 ENCOUNTER — OUTPATIENT (OUTPATIENT)
Dept: OUTPATIENT SERVICES | Facility: HOSPITAL | Age: 54
LOS: 1 days | End: 2024-07-31
Payer: MEDICARE

## 2024-07-31 DIAGNOSIS — Z98.890 OTHER SPECIFIED POSTPROCEDURAL STATES: Chronic | ICD-10-CM

## 2024-07-31 DIAGNOSIS — F41.1 GENERALIZED ANXIETY DISORDER: ICD-10-CM

## 2024-07-31 DIAGNOSIS — F33.1 MAJOR DEPRESSIVE DISORDER, RECURRENT, MODERATE: ICD-10-CM

## 2024-07-31 PROCEDURE — 90834 PSYTX W PT 45 MINUTES: CPT

## 2024-07-31 NOTE — BEHAVIORAL HEALTH
[[facility]] : [facility]  [[facility]ive Therapy] : [facility] Therapy [Return in ____ week(s)] : Return in [unfilled] week(s) [FreeTextEntry2] : Goal 1: Pt will shift her mind-set from focus on what she "can't do" to what she "can do".  Obj A Pt will engage in 1-2 activities per week that create enjoyment in her life.   Obj B Pt will lower her score on the PHQ-9 from 17 (moderately severe depression) to 10-14 (moderate depression)   Goal2 To reduce the amount of time spent worrying on different things. Obj A Pt will reduce score on the GAD7 from 16 (severe anxiety) to between 10-14 ( moderate anxiety) Obj B Pt will learn and implement two coping skills in order to reduce anxiety  Goal 3  To have less pain daily" Obj A Pt's pain level will be reduced from daily to 3-4 days per week Obj B Pt will attend all medical appts and take all medications as prescribed.  [de-identified] : The focus of the session was pt's feeling about moving back into her own home  .  Therapist provided active listening and empathic response as she shared how she has not yet called contractors to come assess what can be done in the house. Pt reports she is avoiding this because she fears they will be unable to help .  Therapist validated pt  feelings as well as her fears and provided encouragement and support. Therapist suggested she try once to get an idea and bring someone along for support, .  Pt identified that she realizes she is avoiding and does want to be back in home therefore must take the next step.  Pt will be undergoing a urinary procedure which should allow her to get out more without fear of accidents.    Pt is compliant with therapy and responsive to interventions and support. [FreeTextEntry1] : Pt  will contact tx team for worsening of symptoms and/or problems with medication. Next appt: 8/5

## 2024-08-01 DIAGNOSIS — F33.1 MAJOR DEPRESSIVE DISORDER, RECURRENT, MODERATE: ICD-10-CM

## 2024-08-05 ENCOUNTER — APPOINTMENT (OUTPATIENT)
Dept: PSYCHIATRY | Facility: CLINIC | Age: 54
End: 2024-08-05

## 2024-08-05 ENCOUNTER — OUTPATIENT (OUTPATIENT)
Dept: OUTPATIENT SERVICES | Facility: HOSPITAL | Age: 54
LOS: 1 days | End: 2024-08-05
Payer: MEDICARE

## 2024-08-05 DIAGNOSIS — Z98.890 OTHER SPECIFIED POSTPROCEDURAL STATES: Chronic | ICD-10-CM

## 2024-08-05 DIAGNOSIS — F33.1 MAJOR DEPRESSIVE DISORDER, RECURRENT, MODERATE: ICD-10-CM

## 2024-08-05 DIAGNOSIS — Z98.891 HISTORY OF UTERINE SCAR FROM PREVIOUS SURGERY: Chronic | ICD-10-CM

## 2024-08-05 PROCEDURE — 90834 PSYTX W PT 45 MINUTES: CPT

## 2024-08-05 NOTE — DISCUSSION/SUMMARY
[Plan Review] : Plan Review [Able to manage surrounding demands and opportunities] : able to manage surrounding demands and opportunities [Able to exercise self-direction] : able to exercise self-direction [Adherent to treatment recommendations] : adherent to treatment recommendations [Articulate] : articulate [Cognitively intact] : cognitively intact [Insightful] : insightful [Intelligent] : intelligent [Motivated to participate in treatment] : motivated to participate in treatment [Health literacy] : health literacy [Motivated to maintain or improve physical health] : motivated to maintain or improve physical health [Financially stable] : financially stable [Part of a supportive family] : part of a supportive family [Housing stability] : housing stability [High level of education] : high level of education [English fluency] : English fluency [Connected to healthcare] : connected to healthcare [Social supports] : social supports [FreeTextEntry2] : 8/5/25 [FreeTextEntry3] : 3/9/22 [FreeTextEntry8] : paraplegia [FreeTextEntry9] : none [de-identified] : will collaborate with the psychiatrist as needed.  [Mental Health] : Mental Health [Attained - As evidenced by] : Attained - As evidenced by: [Continued - Progress made] : Continued - Progress made: [Physical Health] : Physical Health [Initial] : Initial [every ___ months] : every [unfilled] months [every ___ weeks] : every [unfilled] weeks [Treatment is no longer medically necessary as evidenced by:] : Treatment is no longer medically necessary as evidenced by: [A change in level of care is needed as evidenced by:] : A change in level of care is needed as evidenced by: [Other rationale for transition/discharge:] : Other rationale for transition/discharge: [None - Reason others did not participate:] : None - Reason others did not participate:  [Yes] : Yes [Psychiatric Provider/Prescriber] : Psychiatric Provider/Prescriber [Therapist] : Therapist [de-identified] : Pt continues to score in range above 14.  [de-identified] : score of 14 [FreeTextEntry1] : Medical health [FreeTextEntry4] : " To figure out how to manage my depression, to stay involved in things I could be involved in and feel happier in  my home environment"  [de-identified] : Pt will attend 90% of all medical appointments. [de-identified] : 8/5/25 [de-identified] : 8/5/25 [de-identified] : Pt will take all medications as prescribed .  [FreeTextEntry5] : 1. Clinician will encourage pt to keep track all medical appts and set up necessary transportation.  2. Clinician will encourage pt to keep track of and take all medications as prescribed.   [de-identified] :  (virtual)  [de-identified] : Bharati Underwood LCSW (virtual)  [de-identified] : The treatment is no longer medically necessary when the patient no longer requires individual psychotherapy and/or medication to perform at baseline. [de-identified] :  If the patient is unable to perform activities of daily living and/or expresses suicidal ideation, a higher level of care will be considered. [de-identified] : Other rationales for transition/discharge are granted/applied upon the patient DNC, relocate, self-termination, and/or requests for the treatment termination/transfer to another facility. [de-identified] : not clinically indicated

## 2024-08-05 NOTE — BEHAVIORAL HEALTH
[[facility]] : [facility]  [Supportive Therapy] : Supportive Therapy [Return in ____ week(s)] : Return in [unfilled] week(s) [Other: ____] : [unfilled] [FreeTextEntry2] :  Problem I: Depression.   Goal (In patient's words): " To figure out how to manage my depression, to stay involved in things I could be involved in and feel happier in my home environment".   Objective A: Pt will engage in 1-2 activities per week that create enjoyment in her life.   Objective B: Pt will lower her score on the PHQ-9 from 17 (moderately severe depression) to 10-14 (moderate depression)  Problem II: Anxiety. Objective A: Pt will reduce score on the GAD7 from 16 (severe anxiety) to between 10-14 ( moderate anxiety) .Objective B: Pt will learn and implement at least 2 coping skills in order to reduce anxiety Problem III: Medical health  Objective A: Pt will attend 90% of all medical appointments. Objective B: Pt will take all medications as prescribed. [de-identified] : The focus of the session was pt's collaborating on pts tx plan review.  Therapist used multiple assessments to help evaluate pt sx and determine if progress was made as well as what goals to continue to work on. Pt verbalized her overall goals as "" To figure out how to manage my depression, to stay involved in things I could be involved in and feel happier in  my home environment"  . Pt continues to struggle with low modo on some days however frequency of crying bouts has been decreased. P:t reports being able to better manage her anxiety and does struggle with rumination on negative thoughts more days than not. Pt sarah with multiple medical issues and suffers with frequency pain, bed sores and other issues related to her  NMO. Pt will continue to be seen for therapy every week and for medication management every 1-3 months with .  [FreeTextEntry1] : Pt  will contact tx team for worsening of symptoms and/or problems with medication. Next appt: 8/14

## 2024-08-05 NOTE — RISK ASSESSMENT
[Low acute suicide risk] : Low acute suicide risk [Unable to determine] : Unable to determine [Not clinically indicated] : Safety Plan completed/updated (for individuals at risk): Not clinically indicated [No, patient denies ideation or behavior] : No, patient denies ideation or behavior

## 2024-08-05 NOTE — DISCUSSION/SUMMARY
[Plan Review] : Plan Review [Able to manage surrounding demands and opportunities] : able to manage surrounding demands and opportunities [Able to exercise self-direction] : able to exercise self-direction [Adherent to treatment recommendations] : adherent to treatment recommendations [Articulate] : articulate [Cognitively intact] : cognitively intact [Insightful] : insightful [Intelligent] : intelligent [Motivated to participate in treatment] : motivated to participate in treatment [Health literacy] : health literacy [Motivated to maintain or improve physical health] : motivated to maintain or improve physical health [Financially stable] : financially stable [Part of a supportive family] : part of a supportive family [Housing stability] : housing stability [High level of education] : high level of education [English fluency] : English fluency [Connected to healthcare] : connected to healthcare [Social supports] : social supports [FreeTextEntry2] : 8/5/25 [FreeTextEntry3] : 3/9/22 [FreeTextEntry8] : paraplegia [FreeTextEntry9] : none [de-identified] : will collaborate with the psychiatrist as needed.  [Mental Health] : Mental Health [Attained - As evidenced by] : Attained - As evidenced by: [Continued - Progress made] : Continued - Progress made: [Physical Health] : Physical Health [Initial] : Initial [every ___ months] : every [unfilled] months [every ___ weeks] : every [unfilled] weeks [Treatment is no longer medically necessary as evidenced by:] : Treatment is no longer medically necessary as evidenced by: [A change in level of care is needed as evidenced by:] : A change in level of care is needed as evidenced by: [Other rationale for transition/discharge:] : Other rationale for transition/discharge: [None - Reason others did not participate:] : None - Reason others did not participate:  [Yes] : Yes [Psychiatric Provider/Prescriber] : Psychiatric Provider/Prescriber [Therapist] : Therapist [de-identified] : Pt continues to score in range above 14.  [de-identified] : score of 14 [FreeTextEntry1] : Medical health [FreeTextEntry4] : " To figure out how to manage my depression, to stay involved in things I could be involved in and feel happier in  my home environment"  [de-identified] : Pt will attend 90% of all medical appointments. [de-identified] : 8/5/25 [de-identified] : Pt will take all medications as prescribed .  [de-identified] : 8/5/25 [FreeTextEntry5] : 1. Clinician will encourage pt to keep track all medical appts and set up necessary transportation.  2. Clinician will encourage pt to keep track of and take all medications as prescribed.   [de-identified] :  (virtual)  [de-identified] : Bharati Underwood LCSW (virtual)  [de-identified] : The treatment is no longer medically necessary when the patient no longer requires individual psychotherapy and/or medication to perform at baseline. [de-identified] :  If the patient is unable to perform activities of daily living and/or expresses suicidal ideation, a higher level of care will be considered. [de-identified] : Other rationales for transition/discharge are granted/applied upon the patient DNC, relocate, self-termination, and/or requests for the treatment termination/transfer to another facility. [de-identified] : not clinically indicated

## 2024-08-05 NOTE — BEHAVIORAL HEALTH
[[facility]] : [facility]  [[facility]ive Therapy] : Supportive Therapy [Return in ____ week(s)] : Return in [unfilled] week(s) [Other: ____] : [unfilled] [FreeTextEntry2] :  Problem I: Depression.   Goal (In patient's words): " To figure out how to manage my depression, to stay involved in things I could be involved in and feel happier in my home environment".   Objective A: Pt will engage in 1-2 activities per week that create enjoyment in her life.   Objective B: Pt will lower her score on the PHQ-9 from 17 (moderately severe depression) to 10-14 (moderate depression)  Problem II: Anxiety. Objective A: Pt will reduce score on the GAD7 from 16 (severe anxiety) to between 10-14 ( moderate anxiety) .Objective B: Pt will learn and implement at least 2 coping skills in order to reduce anxiety Problem III: Medical health  Objective A: Pt will attend 90% of all medical appointments. Objective B: Pt will take all medications as prescribed. [de-identified] : The focus of the session was pt's collaborating on pts tx plan review.  Therapist used multiple assessments to help evaluate pt sx and determine if progress was made as well as what goals to continue to work on. Pt verbalized her overall goals as "" To figure out how to manage my depression, to stay involved in things I could be involved in and feel happier in  my home environment"  . Pt continues to struggle with low modo on some days however frequency of crying bouts has been decreased. P:t reports being able to better manage her anxiety and does struggle with rumination on negative thoughts more days than not. Pt sarah with multiple medical issues and suffers with frequency pain, bed sores and other issues related to her  NMO. Pt will continue to be seen for therapy every week and for medication management every 1-3 months with .  [FreeTextEntry1] : Pt  will contact tx team for worsening of symptoms and/or problems with medication. Next appt: 8/14

## 2024-08-05 NOTE — PHYSICAL EXAM
[2 - Sometimes true (3-4 days)] : 2 - Sometimes true (3-4 days) [1 - Rarely true (1-2 days)] : 1 - Rarely true (1-2 days) [0 - Not at all true (0 days)] : 0 - Not at all true (0 days) [4 - Almost always true (everyday)] : 4 - Almost always true (everyday) [2 - A moderate amount] : 2 - A moderate amount [3 - Pretty bad, most things are going poorly] : 3 - Pretty bad, most things are going poorly [Cooperative] : cooperative [Euthymic] : euthymic [Full] : full [Clear] : clear [Linear/Goal Directed] : linear/goal directed [None Reported] : none reported [Average] : average [WNL] : within normal limits [FreeTextEntry1] : 44

## 2024-08-05 NOTE — REASON FOR VISIT
[Access issues (e.g., transportation, impaired mobility, etc.)] : due to patient's access issues [Continuing, patient not seen in-person within last 12 months (provide details below)] : Telehealth services are continuing, patient not seen in-person within last 12 months.  [Telehealth (audio & video) - Individual/Group] : This visit was provided via telehealth using real-time 2-way audio visual technology. [Other Location: e.g. Home (Enter Location, City,State)___] : The provider was located at [unfilled]. [Home] : The patient, [unfilled], was located at home, [unfilled], at the time of the visit. [Verbal consent obtained from patient/other participant(s)] : Verbal consent for telehealth/telephonic services obtained from patient/other participant(s) [Patient] : Patient [FreeTextEntry4] : 10:30 [FreeTextEntry5] : 11:15 [FreeTextEntry1] : depression and anxiety

## 2024-08-06 DIAGNOSIS — F33.1 MAJOR DEPRESSIVE DISORDER, RECURRENT, MODERATE: ICD-10-CM

## 2024-08-09 ENCOUNTER — APPOINTMENT (OUTPATIENT)
Dept: PEDIATRIC ALLERGY IMMUNOLOGY | Facility: CLINIC | Age: 54
End: 2024-08-09

## 2024-08-14 ENCOUNTER — NON-APPOINTMENT (OUTPATIENT)
Age: 54
End: 2024-08-14

## 2024-08-14 ENCOUNTER — APPOINTMENT (OUTPATIENT)
Dept: PSYCHIATRY | Facility: CLINIC | Age: 54
End: 2024-08-14

## 2024-08-19 ENCOUNTER — APPOINTMENT (OUTPATIENT)
Dept: PSYCHIATRY | Facility: CLINIC | Age: 54
End: 2024-08-19

## 2024-08-23 ENCOUNTER — APPOINTMENT (OUTPATIENT)
Dept: PSYCHIATRY | Facility: CLINIC | Age: 54
End: 2024-08-23

## 2024-08-23 ENCOUNTER — OUTPATIENT (OUTPATIENT)
Dept: OUTPATIENT SERVICES | Facility: HOSPITAL | Age: 54
LOS: 1 days | End: 2024-08-23
Payer: MEDICARE

## 2024-08-23 DIAGNOSIS — Z98.890 OTHER SPECIFIED POSTPROCEDURAL STATES: Chronic | ICD-10-CM

## 2024-08-23 DIAGNOSIS — F33.1 MAJOR DEPRESSIVE DISORDER, RECURRENT, MODERATE: ICD-10-CM

## 2024-08-23 PROCEDURE — 90832 PSYTX W PT 30 MINUTES: CPT

## 2024-08-23 NOTE — REASON FOR VISIT
[Access issues (e.g., transportation, impaired mobility, etc.)] : due to patient's access issues [Continuing, patient not seen in-person within last 12 months (provide details below)] : Telehealth services are continuing, patient not seen in-person within last 12 months.  [Telehealth (audio & video) - Individual/Group] : This visit was provided via telehealth using real-time 2-way audio visual technology. [Other Location: e.g. Home (Enter Location, City,State)___] : The provider was located at [unfilled]. [Home] : The patient, [unfilled], was located at home, [unfilled], at the time of the visit. [Verbal consent obtained from patient/other participant(s)] : Verbal consent for telehealth/telephonic services obtained from patient/other participant(s) [Patient] : Patient [FreeTextEntry4] : 10:00 [FreeTextEntry5] : 10:30 [FreeTextEntry1] : depression and anxiety

## 2024-08-23 NOTE — BEHAVIORAL HEALTH
[[facility]] : [facility]  [Supportive Therapy] : [facility] Therapy [Other: ____] : [unfilled] [Return in ____ week(s)] : Return in [unfilled] week(s) [FreeTextEntry2] :  Problem I: Depression.   Goal (In patient's words): " To figure out how to manage my depression, to stay involved in things I could be involved in and feel happier in my home environment".   Objective A: Pt will engage in 1-2 activities per week that create enjoyment in her life.   Objective B: Pt will lower her score on the PHQ-9 from 17 (moderately severe depression) to 10-14 (moderate depression)  Problem II: Anxiety. Objective A: Pt will reduce score on the GAD7 from 16 (severe anxiety) to between 10-14 ( moderate anxiety) .Objective B: Pt will learn and implement at least 2 coping skills in order to reduce anxiety Problem III: Medical health  Objective A: Pt will attend 90% of all medical appointments. Objective B: Pt will take all medications as prescribed. [de-identified] : The focus of the session was pt's recent hospitalization.  Therapist provided active listening as pt shared that she has a difficult time while in the hospital for a week with a severe UTI. Pt reported some of the issues including developing multiple bed sores that now require a visiting nurse to tend to at home. Therapist validated pt feelings and experience and offered support. Therapist explored pt's mental health aside from physical health and pt identified feeling ok, that her crying bouts still occur but are fleeting and less frequent. Pt know can identify the triggers and knows when they will start as well as how to distract herself to stop them. Pt 's session was cut short today when her nurse came at 10:30 to dress her wounds. Pt is compliant  with therapy and medication management,  [FreeTextEntry1] : Pt  will contact tx team for worsening of symptoms and/or problems with medication. Next appt: 8/30

## 2024-08-24 DIAGNOSIS — F33.1 MAJOR DEPRESSIVE DISORDER, RECURRENT, MODERATE: ICD-10-CM

## 2024-08-28 ENCOUNTER — RX RENEWAL (OUTPATIENT)
Age: 54
End: 2024-08-28

## 2024-08-29 ENCOUNTER — APPOINTMENT (OUTPATIENT)
Dept: PULMONOLOGY | Facility: CLINIC | Age: 54
End: 2024-08-29
Payer: MEDICARE

## 2024-08-29 DIAGNOSIS — G47.33 OBSTRUCTIVE SLEEP APNEA (ADULT) (PEDIATRIC): ICD-10-CM

## 2024-08-29 DIAGNOSIS — J45.909 UNSPECIFIED ASTHMA, UNCOMPLICATED: ICD-10-CM

## 2024-08-29 PROCEDURE — 99213 OFFICE O/P EST LOW 20 MIN: CPT

## 2024-08-29 NOTE — HISTORY OF PRESENT ILLNESS
[Follow-Up - Routine Clinic] : a routine clinic follow-up of [Good Compliance] : good compliance with treatment [Good Tolerance] : good tolerance of treatment [Good Symptom Control] : good symptom control [Intermittent] : intermittent [Doing Well] : doing well [Well Controlled] : Well controlled [Checks Regularly] : The patient checks ~his/her~ peak flow regularly [Good Control] : peak flow has been good [None] : None [Adherent] : the patient is adherent with ~his/her~ medication regimen [Goals--Doing Well] : the patient is doing well with ~his/her~ asthma goals [PFTs] : pulmonary function tests [Home] : at home, [unfilled] , at the time of the visit. [Medical Office: ([facility])___] : at the medical office located in  [Verbal consent obtained from patient] : the patient, [unfilled] [de-identified] : APAP

## 2024-08-29 NOTE — ASSESSMENT
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

## 2024-08-30 ENCOUNTER — OUTPATIENT (OUTPATIENT)
Dept: OUTPATIENT SERVICES | Facility: HOSPITAL | Age: 54
LOS: 1 days | End: 2024-08-30
Payer: MEDICARE

## 2024-08-30 ENCOUNTER — APPOINTMENT (OUTPATIENT)
Dept: PSYCHIATRY | Facility: CLINIC | Age: 54
End: 2024-08-30

## 2024-08-30 DIAGNOSIS — Z98.890 OTHER SPECIFIED POSTPROCEDURAL STATES: Chronic | ICD-10-CM

## 2024-08-30 DIAGNOSIS — F33.1 MAJOR DEPRESSIVE DISORDER, RECURRENT, MODERATE: ICD-10-CM

## 2024-08-30 DIAGNOSIS — F41.1 GENERALIZED ANXIETY DISORDER: ICD-10-CM

## 2024-08-30 DIAGNOSIS — F41.9 ANXIETY DISORDER, UNSPECIFIED: ICD-10-CM

## 2024-08-30 DIAGNOSIS — Z98.891 HISTORY OF UTERINE SCAR FROM PREVIOUS SURGERY: Chronic | ICD-10-CM

## 2024-08-30 PROCEDURE — 90834 PSYTX W PT 45 MINUTES: CPT

## 2024-08-30 NOTE — BEHAVIORAL HEALTH
[[facility]] : [facility]  [Supportive Therapy] : Support[facility] Therapy [Other: ____] : [unfilled] [Return in ____ week(s)] : Return in [unfilled] week(s) [FreeTextEntry2] :  Problem I: Depression.   Goal (In patient's words): " To figure out how to manage my depression, to stay involved in things I could be involved in and feel happier in my home environment".   Objective A: Pt will engage in 1-2 activities per week that create enjoyment in her life.   Objective B: Pt will lower her score on the PHQ-9 from 17 (moderately severe depression) to 10-14 (moderate depression)  Problem II: Anxiety. Objective A: Pt will reduce score on the GAD7 from 16 (severe anxiety) to between 10-14 ( moderate anxiety) .Objective B: Pt will learn and implement at least 2 coping skills in order to reduce anxiety Problem III: Medical health  Objective A: Pt will attend 90% of all medical appointments. Objective B: Pt will take all medications as prescribed. [de-identified] : The focus of the session was pt's feelings about her relationship with her son.  Therapist provided active listening as pt tearfully  shared that her has many regrets when she thinks about the time she spent with her son prior to her paralysis, how she wishes she were more active and participated in more activities with him, etc. Pt reports she no longer has that opportunity and it hurts to think about that and how it affects their bond. Therapist validated pt feelings and experience and normalized pt's parenting regrets and pointed out there is always time to enhance their bond. Pt also pointed out that her son's age plays a role in their current relationship . Pt identified  feeling good about taking him to [place] today and making memories. Pt was hospitalized again last weekend for low blood pressure at [facility] and was d/c on Tuesday. Pt is still being provided  visiting nurse for wound care and now a speech therapist for difficulty with breathing/swallowing, Pt is compliant  with therapy and medication management,  [FreeTextEntry1] : Pt  will contact tx team for worsening of symptoms and/or problems with medication. Next appt: 9/9

## 2024-08-30 NOTE — REASON FOR VISIT
[Access issues (e.g., transportation, impaired mobility, etc.)] : due to patient's access issues [Continuing, patient not seen in-person within last 12 months (provide details below)] : Telehealth services are continuing, patient not seen in-person within last 12 months.  [Telehealth (audio & video) - Individual/Group] : This visit was provided via telehealth using real-time 2-way audio visual technology. [Other Location: e.g. Home (Enter Location, City,State)___] : The provider was located at [unfilled]. [Home] : The patient, [unfilled], was located at home, [unfilled], at the time of the visit. [Verbal consent obtained from patient/other participant(s)] : Verbal consent for telehealth/telephonic services obtained from patient/other participant(s) [Patient] : Patient [FreeTextEntry5] : 10:45 [FreeTextEntry4] : 10:00 [FreeTextEntry1] : depression and anxiety

## 2024-08-31 DIAGNOSIS — F33.1 MAJOR DEPRESSIVE DISORDER, RECURRENT, MODERATE: ICD-10-CM

## 2024-09-09 ENCOUNTER — OUTPATIENT (OUTPATIENT)
Dept: OUTPATIENT SERVICES | Facility: HOSPITAL | Age: 54
LOS: 1 days | End: 2024-09-09
Payer: MEDICARE

## 2024-09-09 ENCOUNTER — APPOINTMENT (OUTPATIENT)
Dept: PSYCHIATRY | Facility: CLINIC | Age: 54
End: 2024-09-09

## 2024-09-09 DIAGNOSIS — Z98.890 OTHER SPECIFIED POSTPROCEDURAL STATES: Chronic | ICD-10-CM

## 2024-09-09 DIAGNOSIS — Z98.891 HISTORY OF UTERINE SCAR FROM PREVIOUS SURGERY: Chronic | ICD-10-CM

## 2024-09-09 DIAGNOSIS — F33.1 MAJOR DEPRESSIVE DISORDER, RECURRENT, MODERATE: ICD-10-CM

## 2024-09-09 PROCEDURE — 90834 PSYTX W PT 45 MINUTES: CPT

## 2024-09-09 NOTE — BEHAVIORAL HEALTH
[[facility]] : [facility]  [[facility]ive Therapy] : [facility] Therapy [Other: ____] : [unfilled] [Return in ____ week(s)] : Return in [unfilled] week(s) [FreeTextEntry2] :  Problem I: Depression.   Goal (In patient's words): " To figure out how to manage my depression, to stay involved in things I could be involved in and feel happier in my home environment".   Objective A: Pt will engage in 1-2 activities per week that create enjoyment in her life.   Objective B: Pt will lower her score on the PHQ-9 from 17 (moderately severe depression) to 10-14 (moderate depression)  Problem II: Anxiety. Objective A: Pt will reduce score on the GAD7 from 16 (severe anxiety) to between 10-14 ( moderate anxiety) .Objective B: Pt will learn and implement at least 2 coping skills in order to reduce anxiety Problem III: Medical health  Objective A: Pt will attend 90% of all medical appointments. Objective B: Pt will take all medications as prescribed. [de-identified] : The focus of the session was pt's feelings about her condition.   Therapist provided active listening as pt  shared how as a result of 2 recent medical hospitalizations her medication regimen became mixed up and she is having alot more pain/spasming. Therapist validated pt experience and agreed that she should wait until she meets with her neurologist before adding/adjusting any meds at this point.  Pt identified this appt is next week and she will also being seeing  .before resuming the higher dose of medication.  Pt's reports her mood is good today and that she resuming attending the [employer] [facility] group on Wednesdays/  Pt and therapist discussed coping skills she has learned from group members.,  Pt is compliant  with therapy and medication management,  [FreeTextEntry1] : Pt  will contact tx team for worsening of symptoms and/or problems with medication. Next appt: 9/16

## 2024-09-10 DIAGNOSIS — F33.1 MAJOR DEPRESSIVE DISORDER, RECURRENT, MODERATE: ICD-10-CM

## 2024-09-16 ENCOUNTER — RX RENEWAL (OUTPATIENT)
Age: 54
End: 2024-09-16

## 2024-09-16 ENCOUNTER — APPOINTMENT (OUTPATIENT)
Dept: PSYCHIATRY | Facility: CLINIC | Age: 54
End: 2024-09-16

## 2024-09-16 ENCOUNTER — OUTPATIENT (OUTPATIENT)
Dept: OUTPATIENT SERVICES | Facility: HOSPITAL | Age: 54
LOS: 1 days | End: 2024-09-16
Payer: MEDICARE

## 2024-09-16 DIAGNOSIS — F41.1 GENERALIZED ANXIETY DISORDER: ICD-10-CM

## 2024-09-16 DIAGNOSIS — Z98.890 OTHER SPECIFIED POSTPROCEDURAL STATES: Chronic | ICD-10-CM

## 2024-09-16 DIAGNOSIS — F33.1 MAJOR DEPRESSIVE DISORDER, RECURRENT, MODERATE: ICD-10-CM

## 2024-09-16 PROCEDURE — 90834 PSYTX W PT 45 MINUTES: CPT

## 2024-09-16 RX ORDER — FEXOFENADINE HYDROCHLORIDE 180 MG/1
180 TABLET ORAL
Qty: 30 | Refills: 0 | Status: ACTIVE | COMMUNITY
Start: 2024-09-16 | End: 1900-01-01

## 2024-09-16 NOTE — BEHAVIORAL HEALTH
[[facility]] : [facility]  [[facility] Therapy] : [facility] Therapy [Other: ____] : [unfilled] [Return in ____ week(s)] : Return in [unfilled] week(s) [FreeTextEntry2] :  Problem I: Depression.   Goal (In patient's words): " To figure out how to manage my depression, to stay involved in things I could be involved in and feel happier in my home environment".   Objective A: Pt will engage in 1-2 activities per week that create enjoyment in her life.   Objective B: Pt will lower her score on the PHQ-9 from 17 (moderately severe depression) to 10-14 (moderate depression)  Problem II: Anxiety. Objective A: Pt will reduce score on the GAD7 from 16 (severe anxiety) to between 10-14 ( moderate anxiety) .Objective B: Pt will learn and implement at least 2 coping skills in order to reduce anxiety Problem III: Medical health  Objective A: Pt will attend 90% of all medical appointments. Objective B: Pt will take all medications as prescribed. [de-identified] : The focus of the session was the new friend that pt met. Therapist provided active listening as pt  shared how  she met another woman this weekend while out running errands who is also living with paralysis and requires a wheelchair. Pt reports they spoke for awhile nd that this woman offered into provide her with support and friendship.  Therapist validated pt experience and explored pt's feelings about this new relationship, pointing out it would be great for het o have in person support. Pt identified how she could see this being a good thong for her as well as source of information as they struggle with some of the same medical and mental health issues. Pt reports her mood was good this week. Pt was back in the ER due to urinary issues.   Pt is compliant  with therapy and medication management,  [FreeTextEntry1] : Pt  will contact tx team for worsening of symptoms and/or problems with medication. Next appt: 9/25

## 2024-09-17 DIAGNOSIS — F33.1 MAJOR DEPRESSIVE DISORDER, RECURRENT, MODERATE: ICD-10-CM

## 2024-09-18 ENCOUNTER — APPOINTMENT (OUTPATIENT)
Dept: NEUROLOGY | Facility: CLINIC | Age: 54
End: 2024-09-18
Payer: MEDICARE

## 2024-09-18 PROCEDURE — G2211 COMPLEX E/M VISIT ADD ON: CPT

## 2024-09-18 PROCEDURE — 99215 OFFICE O/P EST HI 40 MIN: CPT

## 2024-09-18 RX ORDER — INEBILIZUMAB 10 MG/ML
100 INJECTION INTRAVENOUS
Qty: 2 | Refills: 1 | Status: ACTIVE | COMMUNITY
Start: 2024-09-18
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
## 2024-09-25 ENCOUNTER — OUTPATIENT (OUTPATIENT)
Dept: OUTPATIENT SERVICES | Facility: HOSPITAL | Age: 54
LOS: 1 days | End: 2024-09-25
Payer: MEDICARE

## 2024-09-25 ENCOUNTER — APPOINTMENT (OUTPATIENT)
Dept: PSYCHIATRY | Facility: CLINIC | Age: 54
End: 2024-09-25

## 2024-09-25 DIAGNOSIS — Z98.891 HISTORY OF UTERINE SCAR FROM PREVIOUS SURGERY: Chronic | ICD-10-CM

## 2024-09-25 DIAGNOSIS — F33.1 MAJOR DEPRESSIVE DISORDER, RECURRENT, MODERATE: ICD-10-CM

## 2024-09-25 DIAGNOSIS — Z98.890 OTHER SPECIFIED POSTPROCEDURAL STATES: Chronic | ICD-10-CM

## 2024-09-25 PROCEDURE — 90834 PSYTX W PT 45 MINUTES: CPT

## 2024-09-25 NOTE — BEHAVIORAL HEALTH
[[facility]] : [facility]  [[facility]] : [facility] [Other: ____] : [unfilled] [Return in ____ week(s)] : Return in [unfilled] week(s)

## 2024-09-25 NOTE — REASON FOR VISIT
[Access issues (e.g., transportation, impaired mobility, etc.)] : due to patient's access issues [Continuing, patient not seen in-person within last 12 months (provide details below)] : Telehealth services are continuing, patient not seen in-person within last 12 months.  [Telehealth (audio & video) - Individual/Group] : This visit was provided via telehealth using real-time 2-way audio visual technology. [Other Location: e.g. Home (Enter Location, City,State)___] : The provider was located at [unfilled]. [Home] : The patient, [unfilled], was located at home, [unfilled], at the time of the visit. [Verbal consent obtained from patient/other participant(s)] : Verbal consent for telehealth/telephonic services obtained from patient/other participant(s) [Patient] : Patient

## 2024-09-25 NOTE — BEHAVIORAL HEALTH
[[facility]] : [facility]  [[facility]] : [facility] [Other: ____] : [unfilled] [Return in ____ week(s)] : Return in [unfilled] week(s)

## 2024-09-26 DIAGNOSIS — F33.1 MAJOR DEPRESSIVE DISORDER, RECURRENT, MODERATE: ICD-10-CM

## 2024-09-30 ENCOUNTER — RX RENEWAL (OUTPATIENT)
Age: 54
End: 2024-09-30

## 2024-10-02 ENCOUNTER — OUTPATIENT (OUTPATIENT)
Dept: OUTPATIENT SERVICES | Facility: HOSPITAL | Age: 54
LOS: 1 days | End: 2024-10-02
Payer: MEDICARE

## 2024-10-02 ENCOUNTER — APPOINTMENT (OUTPATIENT)
Dept: PSYCHIATRY | Facility: CLINIC | Age: 54
End: 2024-10-02

## 2024-10-02 DIAGNOSIS — Z98.890 OTHER SPECIFIED POSTPROCEDURAL STATES: Chronic | ICD-10-CM

## 2024-10-02 DIAGNOSIS — F33.1 MAJOR DEPRESSIVE DISORDER, RECURRENT, MODERATE: ICD-10-CM

## 2024-10-02 PROCEDURE — 90834 PSYTX W PT 45 MINUTES: CPT

## 2024-10-02 NOTE — BEHAVIORAL HEALTH
[[facility]] : [facility]  [[facility]] : [facility] [Other: ____] : [unfilled] [Return in ____ week(s)] : Return in [unfilled] week(s) [FreeTextEntry2] :  Problem I: Depression.   Goal (In patient's words): " To figure out how to manage my depression, to stay involved in things I could be involved in and feel happier in my home environment".   Objective A: Pt will engage in 1-2 activities per week that create enjoyment in her life.   Objective B: Pt will lower her score on the PHQ-9 from 17 (moderately severe depression) to 10-14 (moderate depression)  Problem II: Anxiety. Objective A: Pt will reduce score on the GAD7 from 16 (severe anxiety) to between 10-14 ( moderate anxiety) .Objective B: Pt will learn and implement at least 2 coping skills in order to reduce anxiety Problem III: Medical health  Objective A: Pt will attend 90% of all medical appointments. Objective B: Pt will take all medications as prescribed. [de-identified] : The focus of the session was pt's recent pain and the origins of her condition. Therapist provided active listening as pt  shared how she had been feeling nerve pain and tingling in her feet as young as childhood as wonders if these were early signs of the condition, Pt report that in 2018 she developed severe stomach pains that she saw a pain management dr for and feels this was also a sign of her NMO. Therapist validated pt feelings of frustration and pain that this could have possibly been caught earlier and that drs made mistakes in her care. Pt report she had both good and bad days this week. Pt will use distraction and DBT skills she learned in group to manage sx.  PT was provided support and empathy,   Pt is compliant  with therapy and medication management,  [FreeTextEntry1] : Pt  will contact tx team for worsening of symptoms and/or problems with medication. Next appt: 10/10

## 2024-10-02 NOTE — REASON FOR VISIT
[Access issues (e.g., transportation, impaired mobility, etc.)] : due to patient's access issues [Continuing, patient not seen in-person within last 12 months (provide details below)] : Telehealth services are continuing, patient not seen in-person within last 12 months.  [Telehealth (audio & video) - Individual/Group] : This visit was provided via telehealth using real-time 2-way audio visual technology. [Other Location: e.g. Home (Enter Location, City,State)___] : The provider was located at [unfilled]. [Home] : The patient, [unfilled], was located at home, [unfilled], at the time of the visit. [Verbal consent obtained from patient/other participant(s)] : Verbal consent for telehealth/telephonic services obtained from patient/other participant(s) [Patient] : Patient [FreeTextEntry4] : 11:15 [FreeTextEntry5] : 12:00 [FreeTextEntry1] : depression and anxiety

## 2024-10-02 NOTE — RISK ASSESSMENT
Is This A New Presentation, Or A Follow-Up?: Nail Discoloration How Severe Is It?: severe [No, patient denies ideation or behavior] : No, patient denies ideation or behavior

## 2024-10-03 DIAGNOSIS — F33.1 MAJOR DEPRESSIVE DISORDER, RECURRENT, MODERATE: ICD-10-CM

## 2024-10-09 ENCOUNTER — OUTPATIENT (OUTPATIENT)
Dept: OUTPATIENT SERVICES | Facility: HOSPITAL | Age: 54
LOS: 1 days | End: 2024-10-09
Payer: MEDICARE

## 2024-10-09 ENCOUNTER — APPOINTMENT (OUTPATIENT)
Dept: PSYCHIATRY | Facility: CLINIC | Age: 54
End: 2024-10-09
Payer: MEDICARE

## 2024-10-09 DIAGNOSIS — Z98.890 OTHER SPECIFIED POSTPROCEDURAL STATES: Chronic | ICD-10-CM

## 2024-10-09 DIAGNOSIS — F41.1 GENERALIZED ANXIETY DISORDER: ICD-10-CM

## 2024-10-09 DIAGNOSIS — F33.1 MAJOR DEPRESSIVE DISORDER, RECURRENT, MODERATE: ICD-10-CM

## 2024-10-09 PROCEDURE — 99214 OFFICE O/P EST MOD 30 MIN: CPT | Mod: 95

## 2024-10-10 ENCOUNTER — APPOINTMENT (OUTPATIENT)
Dept: PSYCHIATRY | Facility: CLINIC | Age: 54
End: 2024-10-10

## 2024-10-10 ENCOUNTER — OUTPATIENT (OUTPATIENT)
Dept: OUTPATIENT SERVICES | Facility: HOSPITAL | Age: 54
LOS: 1 days | End: 2024-10-10
Payer: MEDICARE

## 2024-10-10 DIAGNOSIS — F33.1 MAJOR DEPRESSIVE DISORDER, RECURRENT, MODERATE: ICD-10-CM

## 2024-10-10 DIAGNOSIS — Z98.890 OTHER SPECIFIED POSTPROCEDURAL STATES: Chronic | ICD-10-CM

## 2024-10-10 DIAGNOSIS — Z98.891 HISTORY OF UTERINE SCAR FROM PREVIOUS SURGERY: Chronic | ICD-10-CM

## 2024-10-10 DIAGNOSIS — F41.1 GENERALIZED ANXIETY DISORDER: ICD-10-CM

## 2024-10-10 PROCEDURE — 90834 PSYTX W PT 45 MINUTES: CPT

## 2024-10-11 ENCOUNTER — NON-APPOINTMENT (OUTPATIENT)
Age: 54
End: 2024-10-11

## 2024-10-11 ENCOUNTER — APPOINTMENT (OUTPATIENT)
Dept: OPHTHALMOLOGY | Facility: CLINIC | Age: 54
End: 2024-10-11
Payer: MEDICARE

## 2024-10-11 DIAGNOSIS — F33.1 MAJOR DEPRESSIVE DISORDER, RECURRENT, MODERATE: ICD-10-CM

## 2024-10-11 PROCEDURE — 92133 CPTRZD OPH DX IMG PST SGM ON: CPT

## 2024-10-11 PROCEDURE — 92014 COMPRE OPH EXAM EST PT 1/>: CPT

## 2024-10-11 PROCEDURE — 92060 SENSORIMOTOR EXAMINATION: CPT

## 2024-10-16 ENCOUNTER — OUTPATIENT (OUTPATIENT)
Dept: OUTPATIENT SERVICES | Facility: HOSPITAL | Age: 54
LOS: 1 days | End: 2024-10-16
Payer: MEDICARE

## 2024-10-16 ENCOUNTER — APPOINTMENT (OUTPATIENT)
Dept: PSYCHIATRY | Facility: CLINIC | Age: 54
End: 2024-10-16

## 2024-10-16 DIAGNOSIS — F33.1 MAJOR DEPRESSIVE DISORDER, RECURRENT, MODERATE: ICD-10-CM

## 2024-10-16 DIAGNOSIS — Z98.890 OTHER SPECIFIED POSTPROCEDURAL STATES: Chronic | ICD-10-CM

## 2024-10-16 DIAGNOSIS — F41.9 ANXIETY DISORDER, UNSPECIFIED: ICD-10-CM

## 2024-10-16 DIAGNOSIS — Z98.891 HISTORY OF UTERINE SCAR FROM PREVIOUS SURGERY: Chronic | ICD-10-CM

## 2024-10-16 PROCEDURE — 90834 PSYTX W PT 45 MINUTES: CPT

## 2024-10-17 DIAGNOSIS — F33.1 MAJOR DEPRESSIVE DISORDER, RECURRENT, MODERATE: ICD-10-CM

## 2024-10-23 ENCOUNTER — APPOINTMENT (OUTPATIENT)
Dept: PEDIATRIC ALLERGY IMMUNOLOGY | Facility: CLINIC | Age: 54
End: 2024-10-23

## 2024-10-24 ENCOUNTER — APPOINTMENT (OUTPATIENT)
Age: 54
End: 2024-10-24

## 2024-10-24 ENCOUNTER — OUTPATIENT (OUTPATIENT)
Dept: OUTPATIENT SERVICES | Facility: HOSPITAL | Age: 54
LOS: 1 days | End: 2024-10-24
Payer: MEDICARE

## 2024-10-24 VITALS — TEMPERATURE: 98 F | DIASTOLIC BLOOD PRESSURE: 85 MMHG | SYSTOLIC BLOOD PRESSURE: 129 MMHG | HEART RATE: 87 BPM

## 2024-10-24 DIAGNOSIS — Z98.891 HISTORY OF UTERINE SCAR FROM PREVIOUS SURGERY: Chronic | ICD-10-CM

## 2024-10-24 DIAGNOSIS — M47.14 OTHER SPONDYLOSIS WITH MYELOPATHY, THORACIC REGION: ICD-10-CM

## 2024-10-24 DIAGNOSIS — G36.0 NEUROMYELITIS OPTICA [DEVIC]: ICD-10-CM

## 2024-10-24 DIAGNOSIS — Z98.890 OTHER SPECIFIED POSTPROCEDURAL STATES: Chronic | ICD-10-CM

## 2024-10-24 PROCEDURE — 96415 CHEMO IV INFUSION ADDL HR: CPT

## 2024-10-24 PROCEDURE — 96413 CHEMO IV INFUSION 1 HR: CPT

## 2024-10-24 PROCEDURE — 96367 TX/PROPH/DG ADDL SEQ IV INF: CPT

## 2024-10-24 RX ORDER — DIPHENHYDRAMINE HCL 12.5MG/5ML
50 ELIXIR ORAL ONCE
Refills: 0 | Status: COMPLETED | OUTPATIENT
Start: 2024-10-24 | End: 2024-10-24

## 2024-10-24 RX ORDER — RITUXIMAB 10 MG/ML
1000 INJECTION, SOLUTION INTRAVENOUS ONCE
Refills: 0 | Status: COMPLETED | OUTPATIENT
Start: 2024-10-24 | End: 2024-10-24

## 2024-10-24 RX ORDER — ACETAMINOPHEN 500 MG
1000 TABLET ORAL ONCE
Refills: 0 | Status: COMPLETED | OUTPATIENT
Start: 2024-10-24 | End: 2024-10-24

## 2024-10-24 RX ORDER — METHYLPREDNISOLONE ACETATE 80 MG/ML
1000 INJECTION, SUSPENSION INTRALESIONAL; INTRAMUSCULAR; INTRASYNOVIAL; SOFT TISSUE ONCE
Refills: 0 | Status: COMPLETED | OUTPATIENT
Start: 2024-10-24 | End: 2024-10-24

## 2024-10-24 RX ADMIN — RITUXIMAB 1000 MILLIGRAM(S): 10 INJECTION, SOLUTION INTRAVENOUS at 15:45

## 2024-10-24 RX ADMIN — METHYLPREDNISOLONE ACETATE 50 MILLIGRAM(S): 80 INJECTION, SUSPENSION INTRALESIONAL; INTRAMUSCULAR; INTRASYNOVIAL; SOFT TISSUE at 11:21

## 2024-10-24 RX ADMIN — METHYLPREDNISOLONE ACETATE 1000 MILLIGRAM(S): 80 INJECTION, SUSPENSION INTRALESIONAL; INTRAMUSCULAR; INTRASYNOVIAL; SOFT TISSUE at 11:11

## 2024-10-24 RX ADMIN — Medication 50 MILLIGRAM(S): at 11:21

## 2024-10-24 RX ADMIN — RITUXIMAB 1000 MILLIGRAM(S): 10 INJECTION, SOLUTION INTRAVENOUS at 12:25

## 2024-10-24 RX ADMIN — Medication 1000 MILLIGRAM(S): at 11:21

## 2024-10-25 ENCOUNTER — RESULT REVIEW (OUTPATIENT)
Age: 54
End: 2024-10-25

## 2024-10-25 ENCOUNTER — OUTPATIENT (OUTPATIENT)
Dept: OUTPATIENT SERVICES | Facility: HOSPITAL | Age: 54
LOS: 1 days | End: 2024-10-25
Payer: COMMERCIAL

## 2024-10-25 DIAGNOSIS — G36.0 NEUROMYELITIS OPTICA [DEVIC]: ICD-10-CM

## 2024-10-25 DIAGNOSIS — Z98.891 HISTORY OF UTERINE SCAR FROM PREVIOUS SURGERY: Chronic | ICD-10-CM

## 2024-10-25 DIAGNOSIS — Z98.890 OTHER SPECIFIED POSTPROCEDURAL STATES: Chronic | ICD-10-CM

## 2024-10-25 DIAGNOSIS — M47.14 OTHER SPONDYLOSIS WITH MYELOPATHY, THORACIC REGION: ICD-10-CM

## 2024-10-25 DIAGNOSIS — Z00.8 ENCOUNTER FOR OTHER GENERAL EXAMINATION: ICD-10-CM

## 2024-10-25 PROCEDURE — A9579: CPT

## 2024-10-25 PROCEDURE — 72156 MRI NECK SPINE W/O & W/DYE: CPT

## 2024-10-25 PROCEDURE — 72156 MRI NECK SPINE W/O & W/DYE: CPT | Mod: 26

## 2024-10-25 NOTE — ASU PATIENT PROFILE, ADULT - HISTORY OF COVID-19 VACCINATION
Current patient location: [facility] [address] [place] [id number]    Is the patient currently in the state of [place]? YES    Visit mode:VIDEO    If the visit is dropped, the patient can be reconnected by: VIDEO VISIT: Text to cell phone:   Telephone Information:   Mobile [phone]       Will anyone else be joining the visit? NO  (If patient encounters technical issues they should call [phone]    Are changes needed to the allergy or medication list? No    Are refills needed on medications prescribed by this physician? NO    Rooming Documentation:  Questionnaire(s) completed    Reason for visit: RECHKAMI MERCEDES      
Yes

## 2024-10-26 DIAGNOSIS — G36.0 NEUROMYELITIS OPTICA [DEVIC]: ICD-10-CM

## 2024-10-28 ENCOUNTER — OUTPATIENT (OUTPATIENT)
Dept: OUTPATIENT SERVICES | Facility: HOSPITAL | Age: 54
LOS: 1 days | End: 2024-10-28
Payer: MEDICARE

## 2024-10-28 ENCOUNTER — APPOINTMENT (OUTPATIENT)
Dept: PSYCHIATRY | Facility: CLINIC | Age: 54
End: 2024-10-28

## 2024-10-28 DIAGNOSIS — Z98.891 HISTORY OF UTERINE SCAR FROM PREVIOUS SURGERY: Chronic | ICD-10-CM

## 2024-10-28 DIAGNOSIS — Z98.890 OTHER SPECIFIED POSTPROCEDURAL STATES: Chronic | ICD-10-CM

## 2024-10-28 DIAGNOSIS — F33.1 MAJOR DEPRESSIVE DISORDER, RECURRENT, MODERATE: ICD-10-CM

## 2024-10-28 PROCEDURE — 90834 PSYTX W PT 45 MINUTES: CPT

## 2024-10-29 DIAGNOSIS — F33.1 MAJOR DEPRESSIVE DISORDER, RECURRENT, MODERATE: ICD-10-CM

## 2024-11-04 ENCOUNTER — OUTPATIENT (OUTPATIENT)
Dept: OUTPATIENT SERVICES | Facility: HOSPITAL | Age: 54
LOS: 1 days | End: 2024-11-04
Payer: MEDICARE

## 2024-11-04 ENCOUNTER — APPOINTMENT (OUTPATIENT)
Dept: PSYCHIATRY | Facility: CLINIC | Age: 54
End: 2024-11-04

## 2024-11-04 DIAGNOSIS — F33.1 MAJOR DEPRESSIVE DISORDER, RECURRENT, MODERATE: ICD-10-CM

## 2024-11-04 DIAGNOSIS — Z98.891 HISTORY OF UTERINE SCAR FROM PREVIOUS SURGERY: Chronic | ICD-10-CM

## 2024-11-04 DIAGNOSIS — Z98.890 OTHER SPECIFIED POSTPROCEDURAL STATES: Chronic | ICD-10-CM

## 2024-11-04 DIAGNOSIS — F41.1 GENERALIZED ANXIETY DISORDER: ICD-10-CM

## 2024-11-04 PROCEDURE — 90834 PSYTX W PT 45 MINUTES: CPT

## 2024-11-05 DIAGNOSIS — F33.1 MAJOR DEPRESSIVE DISORDER, RECURRENT, MODERATE: ICD-10-CM

## 2024-11-07 ENCOUNTER — NON-APPOINTMENT (OUTPATIENT)
Age: 54
End: 2024-11-07

## 2024-11-13 ENCOUNTER — OUTPATIENT (OUTPATIENT)
Dept: OUTPATIENT SERVICES | Facility: HOSPITAL | Age: 54
LOS: 1 days | End: 2024-11-13
Payer: MEDICARE

## 2024-11-13 ENCOUNTER — APPOINTMENT (OUTPATIENT)
Dept: PSYCHIATRY | Facility: CLINIC | Age: 54
End: 2024-11-13

## 2024-11-13 DIAGNOSIS — F33.1 MAJOR DEPRESSIVE DISORDER, RECURRENT, MODERATE: ICD-10-CM

## 2024-11-13 DIAGNOSIS — Z98.890 OTHER SPECIFIED POSTPROCEDURAL STATES: Chronic | ICD-10-CM

## 2024-11-13 DIAGNOSIS — F41.9 ANXIETY DISORDER, UNSPECIFIED: ICD-10-CM

## 2024-11-13 PROCEDURE — 90834 PSYTX W PT 45 MINUTES: CPT

## 2024-11-14 DIAGNOSIS — F33.1 MAJOR DEPRESSIVE DISORDER, RECURRENT, MODERATE: ICD-10-CM

## 2024-11-20 ENCOUNTER — OUTPATIENT (OUTPATIENT)
Dept: OUTPATIENT SERVICES | Facility: HOSPITAL | Age: 54
LOS: 1 days | End: 2024-11-20
Payer: MEDICARE

## 2024-11-20 ENCOUNTER — APPOINTMENT (OUTPATIENT)
Dept: PSYCHIATRY | Facility: CLINIC | Age: 54
End: 2024-11-20

## 2024-11-20 DIAGNOSIS — F33.1 MAJOR DEPRESSIVE DISORDER, RECURRENT, MODERATE: ICD-10-CM

## 2024-11-20 DIAGNOSIS — Z98.890 OTHER SPECIFIED POSTPROCEDURAL STATES: Chronic | ICD-10-CM

## 2024-11-20 DIAGNOSIS — F41.1 GENERALIZED ANXIETY DISORDER: ICD-10-CM

## 2024-11-20 PROCEDURE — 90834 PSYTX W PT 45 MINUTES: CPT

## 2024-11-21 DIAGNOSIS — F33.1 MAJOR DEPRESSIVE DISORDER, RECURRENT, MODERATE: ICD-10-CM

## 2024-11-27 ENCOUNTER — OUTPATIENT (OUTPATIENT)
Dept: OUTPATIENT SERVICES | Facility: HOSPITAL | Age: 54
LOS: 1 days | End: 2024-11-27
Payer: MEDICARE

## 2024-11-27 ENCOUNTER — APPOINTMENT (OUTPATIENT)
Dept: PSYCHIATRY | Facility: CLINIC | Age: 54
End: 2024-11-27

## 2024-11-27 DIAGNOSIS — Z98.891 HISTORY OF UTERINE SCAR FROM PREVIOUS SURGERY: Chronic | ICD-10-CM

## 2024-11-27 DIAGNOSIS — F41.9 ANXIETY DISORDER, UNSPECIFIED: ICD-10-CM

## 2024-11-27 DIAGNOSIS — Z98.890 OTHER SPECIFIED POSTPROCEDURAL STATES: Chronic | ICD-10-CM

## 2024-11-27 DIAGNOSIS — F33.1 MAJOR DEPRESSIVE DISORDER, RECURRENT, MODERATE: ICD-10-CM

## 2024-11-27 PROCEDURE — 90834 PSYTX W PT 45 MINUTES: CPT

## 2024-11-28 DIAGNOSIS — F33.1 MAJOR DEPRESSIVE DISORDER, RECURRENT, MODERATE: ICD-10-CM

## 2024-12-02 ENCOUNTER — RX RENEWAL (OUTPATIENT)
Age: 54
End: 2024-12-02

## 2024-12-04 ENCOUNTER — APPOINTMENT (OUTPATIENT)
Dept: PSYCHIATRY | Facility: CLINIC | Age: 54
End: 2024-12-04

## 2024-12-04 ENCOUNTER — OUTPATIENT (OUTPATIENT)
Dept: OUTPATIENT SERVICES | Facility: HOSPITAL | Age: 54
LOS: 1 days | End: 2024-12-04
Payer: MEDICARE

## 2024-12-04 DIAGNOSIS — F33.1 MAJOR DEPRESSIVE DISORDER, RECURRENT, MODERATE: ICD-10-CM

## 2024-12-04 DIAGNOSIS — Z98.891 HISTORY OF UTERINE SCAR FROM PREVIOUS SURGERY: Chronic | ICD-10-CM

## 2024-12-04 DIAGNOSIS — F41.1 GENERALIZED ANXIETY DISORDER: ICD-10-CM

## 2024-12-04 DIAGNOSIS — Z98.890 OTHER SPECIFIED POSTPROCEDURAL STATES: Chronic | ICD-10-CM

## 2024-12-04 PROCEDURE — 90834 PSYTX W PT 45 MINUTES: CPT

## 2024-12-05 DIAGNOSIS — F33.1 MAJOR DEPRESSIVE DISORDER, RECURRENT, MODERATE: ICD-10-CM

## 2024-12-11 ENCOUNTER — APPOINTMENT (OUTPATIENT)
Dept: PSYCHIATRY | Facility: CLINIC | Age: 54
End: 2024-12-11

## 2024-12-11 ENCOUNTER — OUTPATIENT (OUTPATIENT)
Dept: OUTPATIENT SERVICES | Facility: HOSPITAL | Age: 54
LOS: 1 days | End: 2024-12-11
Payer: MEDICARE

## 2024-12-11 DIAGNOSIS — Z98.890 OTHER SPECIFIED POSTPROCEDURAL STATES: Chronic | ICD-10-CM

## 2024-12-11 DIAGNOSIS — F41.9 ANXIETY DISORDER, UNSPECIFIED: ICD-10-CM

## 2024-12-11 DIAGNOSIS — Z98.891 HISTORY OF UTERINE SCAR FROM PREVIOUS SURGERY: Chronic | ICD-10-CM

## 2024-12-11 DIAGNOSIS — F33.1 MAJOR DEPRESSIVE DISORDER, RECURRENT, MODERATE: ICD-10-CM

## 2024-12-11 PROCEDURE — 90834 PSYTX W PT 45 MINUTES: CPT

## 2024-12-12 DIAGNOSIS — F33.1 MAJOR DEPRESSIVE DISORDER, RECURRENT, MODERATE: ICD-10-CM

## 2024-12-18 ENCOUNTER — APPOINTMENT (OUTPATIENT)
Dept: NEUROLOGY | Facility: CLINIC | Age: 54
End: 2024-12-18
Payer: COMMERCIAL

## 2024-12-18 VITALS
SYSTOLIC BLOOD PRESSURE: 120 MMHG | OXYGEN SATURATION: 99 % | HEART RATE: 91 BPM | TEMPERATURE: 98.1 F | DIASTOLIC BLOOD PRESSURE: 86 MMHG | HEIGHT: 67 IN

## 2024-12-18 DIAGNOSIS — L89.312 PRESSURE ULCER OF RIGHT BUTTOCK, STAGE 2: ICD-10-CM

## 2024-12-18 PROCEDURE — G2211 COMPLEX E/M VISIT ADD ON: CPT | Mod: NC

## 2024-12-18 PROCEDURE — 99215 OFFICE O/P EST HI 40 MIN: CPT

## 2024-12-18 RX ORDER — HYDROPHILIC CREAM
PASTE (GRAM) TOPICAL
Qty: 1 | Refills: 5 | Status: ACTIVE | COMMUNITY
Start: 2024-12-18 | End: 1900-01-01

## 2024-12-19 ENCOUNTER — APPOINTMENT (OUTPATIENT)
Dept: PSYCHIATRY | Facility: CLINIC | Age: 54
End: 2024-12-19
Payer: MEDICARE

## 2024-12-19 ENCOUNTER — OUTPATIENT (OUTPATIENT)
Dept: OUTPATIENT SERVICES | Facility: HOSPITAL | Age: 54
LOS: 1 days | End: 2024-12-19
Payer: MEDICARE

## 2024-12-19 DIAGNOSIS — F41.1 GENERALIZED ANXIETY DISORDER: ICD-10-CM

## 2024-12-19 DIAGNOSIS — Z98.890 OTHER SPECIFIED POSTPROCEDURAL STATES: Chronic | ICD-10-CM

## 2024-12-19 DIAGNOSIS — F33.1 MAJOR DEPRESSIVE DISORDER, RECURRENT, MODERATE: ICD-10-CM

## 2024-12-19 PROCEDURE — 99214 OFFICE O/P EST MOD 30 MIN: CPT | Mod: 95

## 2024-12-20 ENCOUNTER — APPOINTMENT (OUTPATIENT)
Dept: PSYCHIATRY | Facility: CLINIC | Age: 54
End: 2024-12-20

## 2024-12-20 ENCOUNTER — OUTPATIENT (OUTPATIENT)
Dept: OUTPATIENT SERVICES | Facility: HOSPITAL | Age: 54
LOS: 1 days | End: 2024-12-20
Payer: MEDICARE

## 2024-12-20 DIAGNOSIS — F33.3 MAJOR DEPRESSIVE DISORDER, RECURRENT, SEVERE WITH PSYCHOTIC SYMPTOMS: ICD-10-CM

## 2024-12-20 DIAGNOSIS — F33.1 MAJOR DEPRESSIVE DISORDER, RECURRENT, MODERATE: ICD-10-CM

## 2024-12-20 DIAGNOSIS — F41.1 GENERALIZED ANXIETY DISORDER: ICD-10-CM

## 2024-12-20 DIAGNOSIS — Z98.890 OTHER SPECIFIED POSTPROCEDURAL STATES: Chronic | ICD-10-CM

## 2024-12-20 DIAGNOSIS — Z98.891 HISTORY OF UTERINE SCAR FROM PREVIOUS SURGERY: Chronic | ICD-10-CM

## 2024-12-20 PROCEDURE — 90834 PSYTX W PT 45 MINUTES: CPT

## 2024-12-21 DIAGNOSIS — F33.1 MAJOR DEPRESSIVE DISORDER, RECURRENT, MODERATE: ICD-10-CM

## 2024-12-26 ENCOUNTER — APPOINTMENT (OUTPATIENT)
Dept: PSYCHIATRY | Facility: CLINIC | Age: 54
End: 2024-12-26

## 2024-12-26 ENCOUNTER — OUTPATIENT (OUTPATIENT)
Dept: OUTPATIENT SERVICES | Facility: HOSPITAL | Age: 54
LOS: 1 days | End: 2024-12-26
Payer: MEDICARE

## 2024-12-26 DIAGNOSIS — F33.1 MAJOR DEPRESSIVE DISORDER, RECURRENT, MODERATE: ICD-10-CM

## 2024-12-26 DIAGNOSIS — Z98.890 OTHER SPECIFIED POSTPROCEDURAL STATES: Chronic | ICD-10-CM

## 2024-12-26 DIAGNOSIS — F41.9 ANXIETY DISORDER, UNSPECIFIED: ICD-10-CM

## 2024-12-26 PROCEDURE — 90834 PSYTX W PT 45 MINUTES: CPT

## 2024-12-27 DIAGNOSIS — F33.1 MAJOR DEPRESSIVE DISORDER, RECURRENT, MODERATE: ICD-10-CM

## 2025-01-02 ENCOUNTER — OUTPATIENT (OUTPATIENT)
Dept: OUTPATIENT SERVICES | Facility: HOSPITAL | Age: 55
LOS: 1 days | Discharge: HOME | End: 2025-01-02

## 2025-01-02 DIAGNOSIS — G36.0 NEUROMYELITIS OPTICA [DEVIC]: ICD-10-CM

## 2025-01-02 DIAGNOSIS — Z98.891 HISTORY OF UTERINE SCAR FROM PREVIOUS SURGERY: Chronic | ICD-10-CM

## 2025-01-02 PROCEDURE — 97542 WHEELCHAIR MNGMENT TRAINING: CPT | Mod: GP

## 2025-01-08 ENCOUNTER — APPOINTMENT (OUTPATIENT)
Dept: PSYCHIATRY | Facility: CLINIC | Age: 55
End: 2025-01-08

## 2025-01-15 ENCOUNTER — APPOINTMENT (OUTPATIENT)
Dept: PSYCHIATRY | Facility: CLINIC | Age: 55
End: 2025-01-15

## 2025-01-15 ENCOUNTER — OUTPATIENT (OUTPATIENT)
Dept: OUTPATIENT SERVICES | Facility: HOSPITAL | Age: 55
LOS: 1 days | End: 2025-01-15
Payer: COMMERCIAL

## 2025-01-15 DIAGNOSIS — Z98.891 HISTORY OF UTERINE SCAR FROM PREVIOUS SURGERY: Chronic | ICD-10-CM

## 2025-01-15 DIAGNOSIS — Z98.890 OTHER SPECIFIED POSTPROCEDURAL STATES: Chronic | ICD-10-CM

## 2025-01-15 DIAGNOSIS — F33.1 MAJOR DEPRESSIVE DISORDER, RECURRENT, MODERATE: ICD-10-CM

## 2025-01-15 PROCEDURE — 90834 PSYTX W PT 45 MINUTES: CPT

## 2025-01-16 DIAGNOSIS — F33.1 MAJOR DEPRESSIVE DISORDER, RECURRENT, MODERATE: ICD-10-CM

## 2025-01-22 ENCOUNTER — OUTPATIENT (OUTPATIENT)
Dept: OUTPATIENT SERVICES | Facility: HOSPITAL | Age: 55
LOS: 1 days | End: 2025-01-22
Payer: COMMERCIAL

## 2025-01-22 ENCOUNTER — APPOINTMENT (OUTPATIENT)
Dept: PSYCHIATRY | Facility: CLINIC | Age: 55
End: 2025-01-22

## 2025-01-22 DIAGNOSIS — Z98.890 OTHER SPECIFIED POSTPROCEDURAL STATES: Chronic | ICD-10-CM

## 2025-01-22 DIAGNOSIS — Z98.891 HISTORY OF UTERINE SCAR FROM PREVIOUS SURGERY: Chronic | ICD-10-CM

## 2025-01-22 DIAGNOSIS — F33.1 MAJOR DEPRESSIVE DISORDER, RECURRENT, MODERATE: ICD-10-CM

## 2025-01-22 PROCEDURE — 90834 PSYTX W PT 45 MINUTES: CPT

## 2025-01-23 DIAGNOSIS — F33.1 MAJOR DEPRESSIVE DISORDER, RECURRENT, MODERATE: ICD-10-CM

## 2025-01-28 DIAGNOSIS — G36.0 NEUROMYELITIS OPTICA [DEVIC]: ICD-10-CM

## 2025-01-29 ENCOUNTER — OUTPATIENT (OUTPATIENT)
Dept: OUTPATIENT SERVICES | Facility: HOSPITAL | Age: 55
LOS: 1 days | End: 2025-01-29
Payer: COMMERCIAL

## 2025-01-29 ENCOUNTER — APPOINTMENT (OUTPATIENT)
Dept: PSYCHIATRY | Facility: CLINIC | Age: 55
End: 2025-01-29

## 2025-01-29 DIAGNOSIS — Z98.891 HISTORY OF UTERINE SCAR FROM PREVIOUS SURGERY: Chronic | ICD-10-CM

## 2025-01-29 DIAGNOSIS — Z98.890 OTHER SPECIFIED POSTPROCEDURAL STATES: Chronic | ICD-10-CM

## 2025-01-29 DIAGNOSIS — F33.1 MAJOR DEPRESSIVE DISORDER, RECURRENT, MODERATE: ICD-10-CM

## 2025-01-29 PROCEDURE — 90834 PSYTX W PT 45 MINUTES: CPT

## 2025-01-30 DIAGNOSIS — F33.1 MAJOR DEPRESSIVE DISORDER, RECURRENT, MODERATE: ICD-10-CM

## 2025-02-05 ENCOUNTER — APPOINTMENT (OUTPATIENT)
Dept: PSYCHIATRY | Facility: CLINIC | Age: 55
End: 2025-02-05

## 2025-02-05 ENCOUNTER — OUTPATIENT (OUTPATIENT)
Dept: OUTPATIENT SERVICES | Facility: HOSPITAL | Age: 55
LOS: 1 days | End: 2025-02-05
Payer: COMMERCIAL

## 2025-02-05 DIAGNOSIS — Z98.890 OTHER SPECIFIED POSTPROCEDURAL STATES: Chronic | ICD-10-CM

## 2025-02-05 DIAGNOSIS — F41.9 ANXIETY DISORDER, UNSPECIFIED: ICD-10-CM

## 2025-02-05 DIAGNOSIS — F33.1 MAJOR DEPRESSIVE DISORDER, RECURRENT, MODERATE: ICD-10-CM

## 2025-02-05 DIAGNOSIS — Z98.891 HISTORY OF UTERINE SCAR FROM PREVIOUS SURGERY: Chronic | ICD-10-CM

## 2025-02-05 PROCEDURE — 90832 PSYTX W PT 30 MINUTES: CPT

## 2025-02-06 DIAGNOSIS — F33.1 MAJOR DEPRESSIVE DISORDER, RECURRENT, MODERATE: ICD-10-CM

## 2025-02-12 ENCOUNTER — APPOINTMENT (OUTPATIENT)
Dept: PSYCHIATRY | Facility: CLINIC | Age: 55
End: 2025-02-12

## 2025-02-12 ENCOUNTER — OUTPATIENT (OUTPATIENT)
Dept: OUTPATIENT SERVICES | Facility: HOSPITAL | Age: 55
LOS: 1 days | End: 2025-02-12
Payer: COMMERCIAL

## 2025-02-12 DIAGNOSIS — F33.1 MAJOR DEPRESSIVE DISORDER, RECURRENT, MODERATE: ICD-10-CM

## 2025-02-12 DIAGNOSIS — Z98.890 OTHER SPECIFIED POSTPROCEDURAL STATES: Chronic | ICD-10-CM

## 2025-02-12 DIAGNOSIS — F41.1 GENERALIZED ANXIETY DISORDER: ICD-10-CM

## 2025-02-12 PROCEDURE — 90834 PSYTX W PT 45 MINUTES: CPT

## 2025-02-13 DIAGNOSIS — F33.1 MAJOR DEPRESSIVE DISORDER, RECURRENT, MODERATE: ICD-10-CM

## 2025-02-25 ENCOUNTER — RX RENEWAL (OUTPATIENT)
Age: 55
End: 2025-02-25

## 2025-02-26 ENCOUNTER — APPOINTMENT (OUTPATIENT)
Dept: PULMONOLOGY | Facility: CLINIC | Age: 55
End: 2025-02-26
Payer: COMMERCIAL

## 2025-02-26 DIAGNOSIS — J45.909 UNSPECIFIED ASTHMA, UNCOMPLICATED: ICD-10-CM

## 2025-02-26 DIAGNOSIS — G47.33 OBSTRUCTIVE SLEEP APNEA (ADULT) (PEDIATRIC): ICD-10-CM

## 2025-02-26 PROCEDURE — 99214 OFFICE O/P EST MOD 30 MIN: CPT | Mod: 95

## 2025-02-28 ENCOUNTER — APPOINTMENT (OUTPATIENT)
Dept: PSYCHIATRY | Facility: CLINIC | Age: 55
End: 2025-02-28

## 2025-02-28 ENCOUNTER — OUTPATIENT (OUTPATIENT)
Dept: OUTPATIENT SERVICES | Facility: HOSPITAL | Age: 55
LOS: 1 days | End: 2025-02-28
Payer: COMMERCIAL

## 2025-02-28 DIAGNOSIS — Z98.891 HISTORY OF UTERINE SCAR FROM PREVIOUS SURGERY: Chronic | ICD-10-CM

## 2025-02-28 DIAGNOSIS — F33.1 MAJOR DEPRESSIVE DISORDER, RECURRENT, MODERATE: ICD-10-CM

## 2025-02-28 DIAGNOSIS — Z98.890 OTHER SPECIFIED POSTPROCEDURAL STATES: Chronic | ICD-10-CM

## 2025-02-28 PROCEDURE — 90834 PSYTX W PT 45 MINUTES: CPT

## 2025-03-01 DIAGNOSIS — F33.1 MAJOR DEPRESSIVE DISORDER, RECURRENT, MODERATE: ICD-10-CM

## 2025-03-05 ENCOUNTER — APPOINTMENT (OUTPATIENT)
Dept: PSYCHIATRY | Facility: CLINIC | Age: 55
End: 2025-03-05

## 2025-03-05 ENCOUNTER — OUTPATIENT (OUTPATIENT)
Dept: OUTPATIENT SERVICES | Facility: HOSPITAL | Age: 55
LOS: 1 days | End: 2025-03-05

## 2025-03-05 DIAGNOSIS — Z98.891 HISTORY OF UTERINE SCAR FROM PREVIOUS SURGERY: Chronic | ICD-10-CM

## 2025-03-05 DIAGNOSIS — Z98.890 OTHER SPECIFIED POSTPROCEDURAL STATES: Chronic | ICD-10-CM

## 2025-03-05 DIAGNOSIS — F33.1 MAJOR DEPRESSIVE DISORDER, RECURRENT, MODERATE: ICD-10-CM

## 2025-03-06 DIAGNOSIS — F33.1 MAJOR DEPRESSIVE DISORDER, RECURRENT, MODERATE: ICD-10-CM

## 2025-03-11 ENCOUNTER — APPOINTMENT (OUTPATIENT)
Dept: PSYCHIATRY | Facility: CLINIC | Age: 55
End: 2025-03-11
Payer: MEDICARE

## 2025-03-11 ENCOUNTER — OUTPATIENT (OUTPATIENT)
Dept: OUTPATIENT SERVICES | Facility: HOSPITAL | Age: 55
LOS: 1 days | End: 2025-03-11
Payer: MEDICARE

## 2025-03-11 DIAGNOSIS — Z98.890 OTHER SPECIFIED POSTPROCEDURAL STATES: Chronic | ICD-10-CM

## 2025-03-11 DIAGNOSIS — F33.1 MAJOR DEPRESSIVE DISORDER, RECURRENT, MODERATE: ICD-10-CM

## 2025-03-11 DIAGNOSIS — F41.1 GENERALIZED ANXIETY DISORDER: ICD-10-CM

## 2025-03-11 DIAGNOSIS — Z98.891 HISTORY OF UTERINE SCAR FROM PREVIOUS SURGERY: Chronic | ICD-10-CM

## 2025-03-11 PROCEDURE — 99214 OFFICE O/P EST MOD 30 MIN: CPT | Mod: 95

## 2025-03-11 PROCEDURE — 98007 SYNCH AUDIO-VIDEO EST HI 40: CPT

## 2025-03-11 RX ORDER — BUPROPION HYDROCHLORIDE 150 MG/1
150 TABLET, EXTENDED RELEASE ORAL DAILY
Qty: 30 | Refills: 1 | Status: ACTIVE | COMMUNITY
Start: 2025-03-11 | End: 1900-01-01

## 2025-03-12 ENCOUNTER — OUTPATIENT (OUTPATIENT)
Dept: OUTPATIENT SERVICES | Facility: HOSPITAL | Age: 55
LOS: 1 days | End: 2025-03-12
Payer: MEDICARE

## 2025-03-12 ENCOUNTER — APPOINTMENT (OUTPATIENT)
Dept: PSYCHIATRY | Facility: CLINIC | Age: 55
End: 2025-03-12

## 2025-03-12 DIAGNOSIS — F33.1 MAJOR DEPRESSIVE DISORDER, RECURRENT, MODERATE: ICD-10-CM

## 2025-03-12 DIAGNOSIS — F41.1 GENERALIZED ANXIETY DISORDER: ICD-10-CM

## 2025-03-12 DIAGNOSIS — Z98.890 OTHER SPECIFIED POSTPROCEDURAL STATES: Chronic | ICD-10-CM

## 2025-03-12 DIAGNOSIS — K21.9 GASTRO-ESOPHAGEAL REFLUX DISEASE W/OUT ESOPHAGITIS: ICD-10-CM

## 2025-03-12 PROCEDURE — 90834 PSYTX W PT 45 MINUTES: CPT

## 2025-03-13 DIAGNOSIS — F33.1 MAJOR DEPRESSIVE DISORDER, RECURRENT, MODERATE: ICD-10-CM

## 2025-03-19 ENCOUNTER — NON-APPOINTMENT (OUTPATIENT)
Age: 55
End: 2025-03-19

## 2025-03-19 ENCOUNTER — APPOINTMENT (OUTPATIENT)
Dept: PSYCHIATRY | Facility: CLINIC | Age: 55
End: 2025-03-19

## 2025-03-19 ENCOUNTER — OUTPATIENT (OUTPATIENT)
Dept: OUTPATIENT SERVICES | Facility: HOSPITAL | Age: 55
LOS: 1 days | End: 2025-03-19
Payer: MEDICARE

## 2025-03-19 ENCOUNTER — APPOINTMENT (OUTPATIENT)
Dept: PEDIATRIC ALLERGY IMMUNOLOGY | Facility: CLINIC | Age: 55
End: 2025-03-19
Payer: MEDICARE

## 2025-03-19 VITALS
DIASTOLIC BLOOD PRESSURE: 80 MMHG | SYSTOLIC BLOOD PRESSURE: 125 MMHG | BODY MASS INDEX: 45.99 KG/M2 | HEIGHT: 67 IN | WEIGHT: 293 LBS

## 2025-03-19 DIAGNOSIS — Z98.891 HISTORY OF UTERINE SCAR FROM PREVIOUS SURGERY: Chronic | ICD-10-CM

## 2025-03-19 DIAGNOSIS — H10.13 ACUTE ATOPIC CONJUNCTIVITIS, BILATERAL: ICD-10-CM

## 2025-03-19 DIAGNOSIS — J30.1 ALLERGIC RHINITIS DUE TO POLLEN: ICD-10-CM

## 2025-03-19 DIAGNOSIS — F33.1 MAJOR DEPRESSIVE DISORDER, RECURRENT, MODERATE: ICD-10-CM

## 2025-03-19 PROCEDURE — 99203 OFFICE O/P NEW LOW 30 MIN: CPT

## 2025-03-19 PROCEDURE — 99213 OFFICE O/P EST LOW 20 MIN: CPT

## 2025-03-19 PROCEDURE — 90834 PSYTX W PT 45 MINUTES: CPT

## 2025-03-20 DIAGNOSIS — F33.1 MAJOR DEPRESSIVE DISORDER, RECURRENT, MODERATE: ICD-10-CM

## 2025-03-26 ENCOUNTER — APPOINTMENT (OUTPATIENT)
Dept: PSYCHIATRY | Facility: CLINIC | Age: 55
End: 2025-03-26

## 2025-03-26 ENCOUNTER — OUTPATIENT (OUTPATIENT)
Dept: OUTPATIENT SERVICES | Facility: HOSPITAL | Age: 55
LOS: 1 days | End: 2025-03-26
Payer: MEDICARE

## 2025-03-26 DIAGNOSIS — F41.9 ANXIETY DISORDER, UNSPECIFIED: ICD-10-CM

## 2025-03-26 DIAGNOSIS — F33.1 MAJOR DEPRESSIVE DISORDER, RECURRENT, MODERATE: ICD-10-CM

## 2025-03-26 DIAGNOSIS — Z98.890 OTHER SPECIFIED POSTPROCEDURAL STATES: Chronic | ICD-10-CM

## 2025-03-26 PROCEDURE — 90834 PSYTX W PT 45 MINUTES: CPT

## 2025-03-27 DIAGNOSIS — F33.1 MAJOR DEPRESSIVE DISORDER, RECURRENT, MODERATE: ICD-10-CM

## 2025-04-02 ENCOUNTER — APPOINTMENT (OUTPATIENT)
Dept: PSYCHIATRY | Facility: CLINIC | Age: 55
End: 2025-04-02

## 2025-04-02 ENCOUNTER — OUTPATIENT (OUTPATIENT)
Dept: OUTPATIENT SERVICES | Facility: HOSPITAL | Age: 55
LOS: 1 days | End: 2025-04-02
Payer: MEDICARE

## 2025-04-02 DIAGNOSIS — Z98.890 OTHER SPECIFIED POSTPROCEDURAL STATES: Chronic | ICD-10-CM

## 2025-04-02 DIAGNOSIS — F33.1 MAJOR DEPRESSIVE DISORDER, RECURRENT, MODERATE: ICD-10-CM

## 2025-04-02 DIAGNOSIS — Z98.891 HISTORY OF UTERINE SCAR FROM PREVIOUS SURGERY: Chronic | ICD-10-CM

## 2025-04-02 PROCEDURE — 90834 PSYTX W PT 45 MINUTES: CPT

## 2025-04-03 DIAGNOSIS — F33.1 MAJOR DEPRESSIVE DISORDER, RECURRENT, MODERATE: ICD-10-CM

## 2025-04-09 ENCOUNTER — OUTPATIENT (OUTPATIENT)
Dept: OUTPATIENT SERVICES | Facility: HOSPITAL | Age: 55
LOS: 1 days | End: 2025-04-09
Payer: MEDICARE

## 2025-04-09 ENCOUNTER — APPOINTMENT (OUTPATIENT)
Dept: PSYCHIATRY | Facility: CLINIC | Age: 55
End: 2025-04-09

## 2025-04-09 DIAGNOSIS — Z98.890 OTHER SPECIFIED POSTPROCEDURAL STATES: Chronic | ICD-10-CM

## 2025-04-09 DIAGNOSIS — F33.1 MAJOR DEPRESSIVE DISORDER, RECURRENT, MODERATE: ICD-10-CM

## 2025-04-09 DIAGNOSIS — Z98.891 HISTORY OF UTERINE SCAR FROM PREVIOUS SURGERY: Chronic | ICD-10-CM

## 2025-04-09 DIAGNOSIS — F41.9 ANXIETY DISORDER, UNSPECIFIED: ICD-10-CM

## 2025-04-09 DIAGNOSIS — F41.1 GENERALIZED ANXIETY DISORDER: ICD-10-CM

## 2025-04-09 PROCEDURE — 90834 PSYTX W PT 45 MINUTES: CPT

## 2025-04-10 DIAGNOSIS — F33.1 MAJOR DEPRESSIVE DISORDER, RECURRENT, MODERATE: ICD-10-CM

## 2025-04-11 ENCOUNTER — NON-APPOINTMENT (OUTPATIENT)
Age: 55
End: 2025-04-11

## 2025-04-11 ENCOUNTER — APPOINTMENT (OUTPATIENT)
Dept: OPHTHALMOLOGY | Facility: CLINIC | Age: 55
End: 2025-04-11
Payer: COMMERCIAL

## 2025-04-11 PROCEDURE — 92133 CPTRZD OPH DX IMG PST SGM ON: CPT

## 2025-04-11 PROCEDURE — 92083 EXTENDED VISUAL FIELD XM: CPT

## 2025-04-11 PROCEDURE — 92060 SENSORIMOTOR EXAMINATION: CPT

## 2025-04-11 PROCEDURE — 92014 COMPRE OPH EXAM EST PT 1/>: CPT

## 2025-04-11 PROCEDURE — 92004 COMPRE OPH EXAM NEW PT 1/>: CPT

## 2025-04-16 ENCOUNTER — APPOINTMENT (OUTPATIENT)
Dept: NEUROLOGY | Facility: CLINIC | Age: 55
End: 2025-04-16
Payer: MEDICARE

## 2025-04-16 VITALS
HEIGHT: 67 IN | TEMPERATURE: 96.3 F | SYSTOLIC BLOOD PRESSURE: 143 MMHG | HEART RATE: 81 BPM | WEIGHT: 293 LBS | DIASTOLIC BLOOD PRESSURE: 92 MMHG | BODY MASS INDEX: 45.99 KG/M2 | OXYGEN SATURATION: 100 %

## 2025-04-16 PROCEDURE — G2211 COMPLEX E/M VISIT ADD ON: CPT

## 2025-04-16 PROCEDURE — 99215 OFFICE O/P EST HI 40 MIN: CPT

## 2025-04-16 RX ORDER — RILUZOLE 50 MG/1
50 TABLET, FILM COATED ORAL
Qty: 60 | Refills: 5 | Status: ACTIVE | COMMUNITY
Start: 2025-04-16 | End: 1900-01-01

## 2025-04-23 ENCOUNTER — APPOINTMENT (OUTPATIENT)
Dept: PSYCHIATRY | Facility: CLINIC | Age: 55
End: 2025-04-23

## 2025-04-23 ENCOUNTER — OUTPATIENT (OUTPATIENT)
Dept: OUTPATIENT SERVICES | Facility: HOSPITAL | Age: 55
LOS: 1 days | End: 2025-04-23
Payer: MEDICARE

## 2025-04-23 DIAGNOSIS — Z98.890 OTHER SPECIFIED POSTPROCEDURAL STATES: Chronic | ICD-10-CM

## 2025-04-23 PROCEDURE — 90834 PSYTX W PT 45 MINUTES: CPT

## 2025-04-24 ENCOUNTER — OUTPATIENT (OUTPATIENT)
Dept: OUTPATIENT SERVICES | Facility: HOSPITAL | Age: 55
LOS: 1 days | End: 2025-04-24
Payer: MEDICARE

## 2025-04-24 ENCOUNTER — APPOINTMENT (OUTPATIENT)
Age: 55
End: 2025-04-24

## 2025-04-24 ENCOUNTER — NON-APPOINTMENT (OUTPATIENT)
Age: 55
End: 2025-04-24

## 2025-04-24 DIAGNOSIS — G36.0 NEUROMYELITIS OPTICA [DEVIC]: ICD-10-CM

## 2025-04-24 DIAGNOSIS — F33.1 MAJOR DEPRESSIVE DISORDER, RECURRENT, MODERATE: ICD-10-CM

## 2025-04-24 DIAGNOSIS — Z98.891 HISTORY OF UTERINE SCAR FROM PREVIOUS SURGERY: Chronic | ICD-10-CM

## 2025-04-24 DIAGNOSIS — Z98.890 OTHER SPECIFIED POSTPROCEDURAL STATES: Chronic | ICD-10-CM

## 2025-04-24 PROCEDURE — 36415 COLL VENOUS BLD VENIPUNCTURE: CPT

## 2025-04-24 PROCEDURE — 86480 TB TEST CELL IMMUN MEASURE: CPT

## 2025-04-24 RX ORDER — INEBILIZUMAB 10 MG/ML
300 INJECTION INTRAVENOUS ONCE
Refills: 0 | Status: DISCONTINUED | OUTPATIENT
Start: 2025-04-24 | End: 2025-04-24

## 2025-04-24 RX ORDER — METHYLPREDNISOLONE ACETATE 80 MG/ML
125 INJECTION, SUSPENSION INTRA-ARTICULAR; INTRALESIONAL; INTRAMUSCULAR; SOFT TISSUE ONCE
Refills: 0 | Status: DISCONTINUED | OUTPATIENT
Start: 2025-04-24 | End: 2025-04-24

## 2025-04-24 RX ORDER — ACETAMINOPHEN 500 MG/5ML
650 LIQUID (ML) ORAL ONCE
Refills: 0 | Status: DISCONTINUED | OUTPATIENT
Start: 2025-04-24 | End: 2025-04-24

## 2025-04-24 RX ORDER — DIPHENHYDRAMINE HCL 12.5MG/5ML
50 ELIXIR ORAL ONCE
Refills: 0 | Status: DISCONTINUED | OUTPATIENT
Start: 2025-04-24 | End: 2025-04-24

## 2025-04-25 DIAGNOSIS — G36.0 NEUROMYELITIS OPTICA [DEVIC]: ICD-10-CM

## 2025-04-30 ENCOUNTER — OUTPATIENT (OUTPATIENT)
Dept: OUTPATIENT SERVICES | Facility: HOSPITAL | Age: 55
LOS: 1 days | End: 2025-04-30
Payer: MEDICARE

## 2025-04-30 ENCOUNTER — APPOINTMENT (OUTPATIENT)
Dept: PSYCHIATRY | Facility: CLINIC | Age: 55
End: 2025-04-30

## 2025-04-30 DIAGNOSIS — F41.9 ANXIETY DISORDER, UNSPECIFIED: ICD-10-CM

## 2025-04-30 DIAGNOSIS — Z98.890 OTHER SPECIFIED POSTPROCEDURAL STATES: Chronic | ICD-10-CM

## 2025-04-30 DIAGNOSIS — Z98.891 HISTORY OF UTERINE SCAR FROM PREVIOUS SURGERY: Chronic | ICD-10-CM

## 2025-04-30 DIAGNOSIS — F33.1 MAJOR DEPRESSIVE DISORDER, RECURRENT, MODERATE: ICD-10-CM

## 2025-04-30 DIAGNOSIS — R42 DIZZINESS AND GIDDINESS: ICD-10-CM

## 2025-04-30 PROCEDURE — 90834 PSYTX W PT 45 MINUTES: CPT

## 2025-05-01 DIAGNOSIS — F33.1 MAJOR DEPRESSIVE DISORDER, RECURRENT, MODERATE: ICD-10-CM

## 2025-05-02 ENCOUNTER — APPOINTMENT (OUTPATIENT)
Age: 55
End: 2025-05-02

## 2025-05-06 ENCOUNTER — APPOINTMENT (OUTPATIENT)
Dept: PSYCHIATRY | Facility: CLINIC | Age: 55
End: 2025-05-06
Payer: MEDICARE

## 2025-05-06 ENCOUNTER — OUTPATIENT (OUTPATIENT)
Dept: OUTPATIENT SERVICES | Facility: HOSPITAL | Age: 55
LOS: 1 days | End: 2025-05-06
Payer: MEDICARE

## 2025-05-06 DIAGNOSIS — Z98.890 OTHER SPECIFIED POSTPROCEDURAL STATES: Chronic | ICD-10-CM

## 2025-05-06 DIAGNOSIS — Z98.891 HISTORY OF UTERINE SCAR FROM PREVIOUS SURGERY: Chronic | ICD-10-CM

## 2025-05-06 DIAGNOSIS — F41.1 GENERALIZED ANXIETY DISORDER: ICD-10-CM

## 2025-05-06 DIAGNOSIS — F33.1 MAJOR DEPRESSIVE DISORDER, RECURRENT, MODERATE: ICD-10-CM

## 2025-05-06 PROCEDURE — 99214 OFFICE O/P EST MOD 30 MIN: CPT | Mod: 95

## 2025-05-06 PROCEDURE — 98007 SYNCH AUDIO-VIDEO EST HI 40: CPT

## 2025-05-07 ENCOUNTER — APPOINTMENT (OUTPATIENT)
Dept: PSYCHIATRY | Facility: CLINIC | Age: 55
End: 2025-05-07

## 2025-05-07 DIAGNOSIS — F41.1 GENERALIZED ANXIETY DISORDER: ICD-10-CM

## 2025-05-07 DIAGNOSIS — F33.1 MAJOR DEPRESSIVE DISORDER, RECURRENT, MODERATE: ICD-10-CM

## 2025-05-08 ENCOUNTER — OUTPATIENT (OUTPATIENT)
Dept: OUTPATIENT SERVICES | Facility: HOSPITAL | Age: 55
LOS: 1 days | End: 2025-05-08
Payer: MEDICARE

## 2025-05-08 ENCOUNTER — APPOINTMENT (OUTPATIENT)
Age: 55
End: 2025-05-08

## 2025-05-08 VITALS
OXYGEN SATURATION: 98 % | HEART RATE: 86 BPM | DIASTOLIC BLOOD PRESSURE: 86 MMHG | SYSTOLIC BLOOD PRESSURE: 121 MMHG | TEMPERATURE: 99 F

## 2025-05-08 DIAGNOSIS — G36.0 NEUROMYELITIS OPTICA [DEVIC]: ICD-10-CM

## 2025-05-08 DIAGNOSIS — Z98.890 OTHER SPECIFIED POSTPROCEDURAL STATES: Chronic | ICD-10-CM

## 2025-05-08 DIAGNOSIS — Z98.891 HISTORY OF UTERINE SCAR FROM PREVIOUS SURGERY: Chronic | ICD-10-CM

## 2025-05-08 PROCEDURE — 96366 THER/PROPH/DIAG IV INF ADDON: CPT

## 2025-05-08 PROCEDURE — 96375 TX/PRO/DX INJ NEW DRUG ADDON: CPT

## 2025-05-08 PROCEDURE — 96365 THER/PROPH/DIAG IV INF INIT: CPT

## 2025-05-08 RX ORDER — DIPHENHYDRAMINE HCL 12.5MG/5ML
50 ELIXIR ORAL ONCE
Refills: 0 | Status: COMPLETED | OUTPATIENT
Start: 2025-05-08 | End: 2025-05-08

## 2025-05-08 RX ORDER — INEBILIZUMAB 10 MG/ML
300 INJECTION INTRAVENOUS ONCE
Refills: 0 | Status: COMPLETED | OUTPATIENT
Start: 2025-05-08 | End: 2025-05-08

## 2025-05-08 RX ORDER — ACETAMINOPHEN 500 MG/5ML
650 LIQUID (ML) ORAL ONCE
Refills: 0 | Status: COMPLETED | OUTPATIENT
Start: 2025-05-08 | End: 2025-05-08

## 2025-05-08 RX ORDER — METHYLPREDNISOLONE ACETATE 80 MG/ML
125 INJECTION, SUSPENSION INTRA-ARTICULAR; INTRALESIONAL; INTRAMUSCULAR; SOFT TISSUE ONCE
Refills: 0 | Status: COMPLETED | OUTPATIENT
Start: 2025-05-08 | End: 2025-05-08

## 2025-05-08 RX ADMIN — INEBILIZUMAB 186.67 MILLIGRAM(S): 10 INJECTION INTRAVENOUS at 14:45

## 2025-05-08 RX ADMIN — Medication 50 MILLIGRAM(S): at 14:23

## 2025-05-08 RX ADMIN — Medication 102 MILLIGRAM(S): at 14:25

## 2025-05-08 RX ADMIN — Medication 650 MILLIGRAM(S): at 14:23

## 2025-05-08 RX ADMIN — METHYLPREDNISOLONE ACETATE 125 MILLIGRAM(S): 80 INJECTION, SUSPENSION INTRA-ARTICULAR; INTRALESIONAL; INTRAMUSCULAR; SOFT TISSUE at 14:20

## 2025-05-08 RX ADMIN — Medication 10 MILLIGRAM(S): at 14:40

## 2025-05-09 DIAGNOSIS — G36.0 NEUROMYELITIS OPTICA [DEVIC]: ICD-10-CM

## 2025-05-14 ENCOUNTER — OUTPATIENT (OUTPATIENT)
Dept: OUTPATIENT SERVICES | Facility: HOSPITAL | Age: 55
LOS: 1 days | End: 2025-05-14
Payer: MEDICARE

## 2025-05-14 ENCOUNTER — APPOINTMENT (OUTPATIENT)
Dept: PSYCHIATRY | Facility: CLINIC | Age: 55
End: 2025-05-14

## 2025-05-14 DIAGNOSIS — F33.1 MAJOR DEPRESSIVE DISORDER, RECURRENT, MODERATE: ICD-10-CM

## 2025-05-14 DIAGNOSIS — F41.9 ANXIETY DISORDER, UNSPECIFIED: ICD-10-CM

## 2025-05-14 DIAGNOSIS — Z98.890 OTHER SPECIFIED POSTPROCEDURAL STATES: Chronic | ICD-10-CM

## 2025-05-14 DIAGNOSIS — Z98.891 HISTORY OF UTERINE SCAR FROM PREVIOUS SURGERY: Chronic | ICD-10-CM

## 2025-05-14 PROCEDURE — 90834 PSYTX W PT 45 MINUTES: CPT

## 2025-05-15 DIAGNOSIS — F33.1 MAJOR DEPRESSIVE DISORDER, RECURRENT, MODERATE: ICD-10-CM

## 2025-05-15 DIAGNOSIS — G36.0 NEUROMYELITIS OPTICA [DEVIC]: ICD-10-CM

## 2025-05-15 DIAGNOSIS — D64.9 ANEMIA, UNSPECIFIED: ICD-10-CM

## 2025-05-16 DIAGNOSIS — G36.0 NEUROMYELITIS OPTICA [DEVIC]: ICD-10-CM

## 2025-05-21 ENCOUNTER — APPOINTMENT (OUTPATIENT)
Dept: PSYCHIATRY | Facility: CLINIC | Age: 55
End: 2025-05-21

## 2025-05-21 ENCOUNTER — OUTPATIENT (OUTPATIENT)
Dept: OUTPATIENT SERVICES | Facility: HOSPITAL | Age: 55
LOS: 1 days | End: 2025-05-21
Payer: MEDICARE

## 2025-05-21 DIAGNOSIS — F33.1 MAJOR DEPRESSIVE DISORDER, RECURRENT, MODERATE: ICD-10-CM

## 2025-05-21 DIAGNOSIS — Z98.890 OTHER SPECIFIED POSTPROCEDURAL STATES: Chronic | ICD-10-CM

## 2025-05-21 PROCEDURE — 90834 PSYTX W PT 45 MINUTES: CPT

## 2025-05-22 ENCOUNTER — APPOINTMENT (OUTPATIENT)
Age: 55
End: 2025-05-22

## 2025-05-22 ENCOUNTER — OUTPATIENT (OUTPATIENT)
Dept: OUTPATIENT SERVICES | Facility: HOSPITAL | Age: 55
LOS: 1 days | End: 2025-05-22
Payer: MEDICARE

## 2025-05-22 VITALS
HEART RATE: 90 BPM | DIASTOLIC BLOOD PRESSURE: 82 MMHG | OXYGEN SATURATION: 100 % | SYSTOLIC BLOOD PRESSURE: 117 MMHG | TEMPERATURE: 98 F

## 2025-05-22 DIAGNOSIS — Z98.890 OTHER SPECIFIED POSTPROCEDURAL STATES: Chronic | ICD-10-CM

## 2025-05-22 DIAGNOSIS — F33.1 MAJOR DEPRESSIVE DISORDER, RECURRENT, MODERATE: ICD-10-CM

## 2025-05-22 DIAGNOSIS — G36.0 NEUROMYELITIS OPTICA [DEVIC]: ICD-10-CM

## 2025-05-22 DIAGNOSIS — Z98.891 HISTORY OF UTERINE SCAR FROM PREVIOUS SURGERY: Chronic | ICD-10-CM

## 2025-05-22 PROCEDURE — 96375 TX/PRO/DX INJ NEW DRUG ADDON: CPT

## 2025-05-22 PROCEDURE — 96366 THER/PROPH/DIAG IV INF ADDON: CPT

## 2025-05-22 PROCEDURE — 96365 THER/PROPH/DIAG IV INF INIT: CPT

## 2025-05-22 RX ORDER — DIPHENHYDRAMINE HCL 12.5MG/5ML
50 ELIXIR ORAL ONCE
Refills: 0 | Status: COMPLETED | OUTPATIENT
Start: 2025-05-22 | End: 2025-05-22

## 2025-05-22 RX ORDER — INEBILIZUMAB 10 MG/ML
300 INJECTION INTRAVENOUS ONCE
Refills: 0 | Status: COMPLETED | OUTPATIENT
Start: 2025-05-22 | End: 2025-05-22

## 2025-05-22 RX ORDER — ACETAMINOPHEN 500 MG/5ML
650 LIQUID (ML) ORAL ONCE
Refills: 0 | Status: COMPLETED | OUTPATIENT
Start: 2025-05-22 | End: 2025-05-22

## 2025-05-22 RX ORDER — METHYLPREDNISOLONE ACETATE 80 MG/ML
125 INJECTION, SUSPENSION INTRA-ARTICULAR; INTRALESIONAL; INTRAMUSCULAR; SOFT TISSUE ONCE
Refills: 0 | Status: COMPLETED | OUTPATIENT
Start: 2025-05-22 | End: 2025-05-22

## 2025-05-22 RX ADMIN — Medication 650 MILLIGRAM(S): at 14:07

## 2025-05-22 RX ADMIN — Medication 102 MILLIGRAM(S): at 14:12

## 2025-05-22 RX ADMIN — METHYLPREDNISOLONE ACETATE 125 MILLIGRAM(S): 80 INJECTION, SUSPENSION INTRA-ARTICULAR; INTRALESIONAL; INTRAMUSCULAR; SOFT TISSUE at 14:07

## 2025-05-22 RX ADMIN — INEBILIZUMAB 186.67 MILLIGRAM(S): 10 INJECTION INTRAVENOUS at 14:25

## 2025-05-22 RX ADMIN — Medication 10 MILLIGRAM(S): at 14:25

## 2025-05-22 RX ADMIN — Medication 50 MILLIGRAM(S): at 14:07

## 2025-05-23 DIAGNOSIS — G36.0 NEUROMYELITIS OPTICA [DEVIC]: ICD-10-CM

## 2025-05-30 ENCOUNTER — OUTPATIENT (OUTPATIENT)
Dept: OUTPATIENT SERVICES | Facility: HOSPITAL | Age: 55
LOS: 1 days | End: 2025-05-30
Payer: MEDICARE

## 2025-05-30 ENCOUNTER — APPOINTMENT (OUTPATIENT)
Dept: PSYCHIATRY | Facility: CLINIC | Age: 55
End: 2025-05-30

## 2025-05-30 DIAGNOSIS — F33.1 MAJOR DEPRESSIVE DISORDER, RECURRENT, MODERATE: ICD-10-CM

## 2025-05-30 DIAGNOSIS — F41.1 GENERALIZED ANXIETY DISORDER: ICD-10-CM

## 2025-05-30 DIAGNOSIS — Z98.890 OTHER SPECIFIED POSTPROCEDURAL STATES: Chronic | ICD-10-CM

## 2025-05-30 DIAGNOSIS — F41.9 ANXIETY DISORDER, UNSPECIFIED: ICD-10-CM

## 2025-05-30 DIAGNOSIS — Z98.891 HISTORY OF UTERINE SCAR FROM PREVIOUS SURGERY: Chronic | ICD-10-CM

## 2025-05-30 PROCEDURE — 90834 PSYTX W PT 45 MINUTES: CPT

## 2025-05-31 DIAGNOSIS — F33.1 MAJOR DEPRESSIVE DISORDER, RECURRENT, MODERATE: ICD-10-CM

## 2025-06-11 ENCOUNTER — NON-APPOINTMENT (OUTPATIENT)
Age: 55
End: 2025-06-11

## 2025-06-11 ENCOUNTER — APPOINTMENT (OUTPATIENT)
Dept: PSYCHIATRY | Facility: CLINIC | Age: 55
End: 2025-06-11

## 2025-06-18 ENCOUNTER — OUTPATIENT (OUTPATIENT)
Dept: OUTPATIENT SERVICES | Facility: HOSPITAL | Age: 55
LOS: 1 days | End: 2025-06-18
Payer: MEDICARE

## 2025-06-18 ENCOUNTER — APPOINTMENT (OUTPATIENT)
Dept: PSYCHIATRY | Facility: CLINIC | Age: 55
End: 2025-06-18

## 2025-06-18 DIAGNOSIS — Z98.890 OTHER SPECIFIED POSTPROCEDURAL STATES: Chronic | ICD-10-CM

## 2025-06-18 DIAGNOSIS — F33.1 MAJOR DEPRESSIVE DISORDER, RECURRENT, MODERATE: ICD-10-CM

## 2025-06-18 DIAGNOSIS — Z98.891 HISTORY OF UTERINE SCAR FROM PREVIOUS SURGERY: Chronic | ICD-10-CM

## 2025-06-18 PROCEDURE — 90834 PSYTX W PT 45 MINUTES: CPT

## 2025-06-19 DIAGNOSIS — F33.1 MAJOR DEPRESSIVE DISORDER, RECURRENT, MODERATE: ICD-10-CM

## 2025-06-25 ENCOUNTER — OUTPATIENT (OUTPATIENT)
Dept: OUTPATIENT SERVICES | Facility: HOSPITAL | Age: 55
LOS: 1 days | End: 2025-06-25
Payer: MEDICARE

## 2025-06-25 ENCOUNTER — APPOINTMENT (OUTPATIENT)
Dept: PSYCHIATRY | Facility: CLINIC | Age: 55
End: 2025-06-25

## 2025-06-25 DIAGNOSIS — Z98.890 OTHER SPECIFIED POSTPROCEDURAL STATES: Chronic | ICD-10-CM

## 2025-06-25 DIAGNOSIS — Z98.891 HISTORY OF UTERINE SCAR FROM PREVIOUS SURGERY: Chronic | ICD-10-CM

## 2025-06-25 DIAGNOSIS — F33.1 MAJOR DEPRESSIVE DISORDER, RECURRENT, MODERATE: ICD-10-CM

## 2025-06-25 PROCEDURE — 90834 PSYTX W PT 45 MINUTES: CPT

## 2025-06-26 DIAGNOSIS — F33.1 MAJOR DEPRESSIVE DISORDER, RECURRENT, MODERATE: ICD-10-CM

## 2025-07-09 ENCOUNTER — OUTPATIENT (OUTPATIENT)
Dept: OUTPATIENT SERVICES | Facility: HOSPITAL | Age: 55
LOS: 1 days | End: 2025-07-09
Payer: COMMERCIAL

## 2025-07-09 ENCOUNTER — APPOINTMENT (OUTPATIENT)
Dept: PSYCHIATRY | Facility: CLINIC | Age: 55
End: 2025-07-09

## 2025-07-09 DIAGNOSIS — F33.1 MAJOR DEPRESSIVE DISORDER, RECURRENT, MODERATE: ICD-10-CM

## 2025-07-09 DIAGNOSIS — Z98.890 OTHER SPECIFIED POSTPROCEDURAL STATES: Chronic | ICD-10-CM

## 2025-07-09 DIAGNOSIS — Z98.891 HISTORY OF UTERINE SCAR FROM PREVIOUS SURGERY: Chronic | ICD-10-CM

## 2025-07-09 PROCEDURE — 90832 PSYTX W PT 30 MINUTES: CPT

## 2025-07-10 DIAGNOSIS — F33.1 MAJOR DEPRESSIVE DISORDER, RECURRENT, MODERATE: ICD-10-CM

## 2025-07-16 ENCOUNTER — APPOINTMENT (OUTPATIENT)
Dept: PSYCHIATRY | Facility: CLINIC | Age: 55
End: 2025-07-16

## 2025-07-16 ENCOUNTER — OUTPATIENT (OUTPATIENT)
Dept: OUTPATIENT SERVICES | Facility: HOSPITAL | Age: 55
LOS: 1 days | End: 2025-07-16
Payer: COMMERCIAL

## 2025-07-16 ENCOUNTER — APPOINTMENT (OUTPATIENT)
Dept: NEUROLOGY | Facility: CLINIC | Age: 55
End: 2025-07-16
Payer: MEDICARE

## 2025-07-16 DIAGNOSIS — Z98.890 OTHER SPECIFIED POSTPROCEDURAL STATES: Chronic | ICD-10-CM

## 2025-07-16 DIAGNOSIS — Z98.891 HISTORY OF UTERINE SCAR FROM PREVIOUS SURGERY: Chronic | ICD-10-CM

## 2025-07-16 DIAGNOSIS — F33.1 MAJOR DEPRESSIVE DISORDER, RECURRENT, MODERATE: ICD-10-CM

## 2025-07-16 PROCEDURE — 90834 PSYTX W PT 45 MINUTES: CPT

## 2025-07-16 PROCEDURE — 99215 OFFICE O/P EST HI 40 MIN: CPT | Mod: 2W

## 2025-07-16 PROCEDURE — G2211 COMPLEX E/M VISIT ADD ON: CPT | Mod: 2W

## 2025-07-16 RX ORDER — OXCARBAZEPINE 150 MG/1
150 TABLET, FILM COATED ORAL TWICE DAILY
Qty: 120 | Refills: 3 | Status: ACTIVE | COMMUNITY
Start: 2025-07-16 | End: 1900-01-01

## 2025-07-17 DIAGNOSIS — F33.1 MAJOR DEPRESSIVE DISORDER, RECURRENT, MODERATE: ICD-10-CM

## 2025-07-23 ENCOUNTER — OUTPATIENT (OUTPATIENT)
Dept: OUTPATIENT SERVICES | Facility: HOSPITAL | Age: 55
LOS: 1 days | End: 2025-07-23
Payer: MEDICARE

## 2025-07-23 ENCOUNTER — APPOINTMENT (OUTPATIENT)
Dept: PSYCHIATRY | Facility: CLINIC | Age: 55
End: 2025-07-23

## 2025-07-23 DIAGNOSIS — F33.1 MAJOR DEPRESSIVE DISORDER, RECURRENT, MODERATE: ICD-10-CM

## 2025-07-23 DIAGNOSIS — Z98.890 OTHER SPECIFIED POSTPROCEDURAL STATES: Chronic | ICD-10-CM

## 2025-07-23 DIAGNOSIS — Z98.891 HISTORY OF UTERINE SCAR FROM PREVIOUS SURGERY: Chronic | ICD-10-CM

## 2025-07-23 PROCEDURE — 90834 PSYTX W PT 45 MINUTES: CPT

## 2025-07-24 DIAGNOSIS — F33.1 MAJOR DEPRESSIVE DISORDER, RECURRENT, MODERATE: ICD-10-CM

## 2025-07-28 ENCOUNTER — APPOINTMENT (OUTPATIENT)
Dept: PSYCHIATRY | Facility: CLINIC | Age: 55
End: 2025-07-28
Payer: MEDICARE

## 2025-07-28 ENCOUNTER — OUTPATIENT (OUTPATIENT)
Dept: OUTPATIENT SERVICES | Facility: HOSPITAL | Age: 55
LOS: 1 days | End: 2025-07-28
Payer: MEDICARE

## 2025-07-28 DIAGNOSIS — Z98.890 OTHER SPECIFIED POSTPROCEDURAL STATES: Chronic | ICD-10-CM

## 2025-07-28 DIAGNOSIS — F33.1 MAJOR DEPRESSIVE DISORDER, RECURRENT, MODERATE: ICD-10-CM

## 2025-07-28 DIAGNOSIS — F41.1 GENERALIZED ANXIETY DISORDER: ICD-10-CM

## 2025-07-28 PROCEDURE — 99214 OFFICE O/P EST MOD 30 MIN: CPT | Mod: 95

## 2025-07-28 PROCEDURE — 98007 SYNCH AUDIO-VIDEO EST HI 40: CPT

## 2025-07-29 DIAGNOSIS — F41.1 GENERALIZED ANXIETY DISORDER: ICD-10-CM

## 2025-07-29 DIAGNOSIS — F33.1 MAJOR DEPRESSIVE DISORDER, RECURRENT, MODERATE: ICD-10-CM

## 2025-07-30 ENCOUNTER — OUTPATIENT (OUTPATIENT)
Dept: OUTPATIENT SERVICES | Facility: HOSPITAL | Age: 55
LOS: 1 days | End: 2025-07-30
Payer: MEDICARE

## 2025-07-30 ENCOUNTER — APPOINTMENT (OUTPATIENT)
Dept: PSYCHIATRY | Facility: CLINIC | Age: 55
End: 2025-07-30

## 2025-07-30 DIAGNOSIS — Z98.891 HISTORY OF UTERINE SCAR FROM PREVIOUS SURGERY: Chronic | ICD-10-CM

## 2025-07-30 DIAGNOSIS — F33.1 MAJOR DEPRESSIVE DISORDER, RECURRENT, MODERATE: ICD-10-CM

## 2025-07-30 PROCEDURE — 90834 PSYTX W PT 45 MINUTES: CPT

## 2025-07-31 DIAGNOSIS — F33.1 MAJOR DEPRESSIVE DISORDER, RECURRENT, MODERATE: ICD-10-CM

## 2025-08-06 ENCOUNTER — APPOINTMENT (OUTPATIENT)
Dept: PSYCHIATRY | Facility: CLINIC | Age: 55
End: 2025-08-06

## 2025-08-06 ENCOUNTER — OUTPATIENT (OUTPATIENT)
Dept: OUTPATIENT SERVICES | Facility: HOSPITAL | Age: 55
LOS: 1 days | End: 2025-08-06
Payer: MEDICARE

## 2025-08-06 DIAGNOSIS — F33.1 MAJOR DEPRESSIVE DISORDER, RECURRENT, MODERATE: ICD-10-CM

## 2025-08-06 PROCEDURE — 90834 PSYTX W PT 45 MINUTES: CPT

## 2025-08-07 DIAGNOSIS — F33.1 MAJOR DEPRESSIVE DISORDER, RECURRENT, MODERATE: ICD-10-CM

## 2025-08-13 ENCOUNTER — APPOINTMENT (OUTPATIENT)
Dept: PSYCHIATRY | Facility: CLINIC | Age: 55
End: 2025-08-13

## 2025-08-13 ENCOUNTER — OUTPATIENT (OUTPATIENT)
Dept: OUTPATIENT SERVICES | Facility: HOSPITAL | Age: 55
LOS: 1 days | End: 2025-08-13
Payer: MEDICARE

## 2025-08-13 DIAGNOSIS — F33.1 MAJOR DEPRESSIVE DISORDER, RECURRENT, MODERATE: ICD-10-CM

## 2025-08-13 DIAGNOSIS — F41.9 ANXIETY DISORDER, UNSPECIFIED: ICD-10-CM

## 2025-08-13 DIAGNOSIS — Z98.891 HISTORY OF UTERINE SCAR FROM PREVIOUS SURGERY: Chronic | ICD-10-CM

## 2025-08-13 DIAGNOSIS — F41.1 GENERALIZED ANXIETY DISORDER: ICD-10-CM

## 2025-08-13 DIAGNOSIS — Z98.890 OTHER SPECIFIED POSTPROCEDURAL STATES: Chronic | ICD-10-CM

## 2025-08-13 PROCEDURE — 90834 PSYTX W PT 45 MINUTES: CPT

## 2025-08-14 DIAGNOSIS — F33.1 MAJOR DEPRESSIVE DISORDER, RECURRENT, MODERATE: ICD-10-CM

## 2025-08-27 ENCOUNTER — APPOINTMENT (OUTPATIENT)
Dept: PSYCHIATRY | Facility: CLINIC | Age: 55
End: 2025-08-27

## 2025-08-27 ENCOUNTER — OUTPATIENT (OUTPATIENT)
Dept: OUTPATIENT SERVICES | Facility: HOSPITAL | Age: 55
LOS: 1 days | End: 2025-08-27
Payer: MEDICARE

## 2025-08-27 DIAGNOSIS — F33.1 MAJOR DEPRESSIVE DISORDER, RECURRENT, MODERATE: ICD-10-CM

## 2025-08-27 DIAGNOSIS — Z98.890 OTHER SPECIFIED POSTPROCEDURAL STATES: Chronic | ICD-10-CM

## 2025-08-27 DIAGNOSIS — Z98.891 HISTORY OF UTERINE SCAR FROM PREVIOUS SURGERY: Chronic | ICD-10-CM

## 2025-08-27 PROCEDURE — 90834 PSYTX W PT 45 MINUTES: CPT

## 2025-08-28 DIAGNOSIS — F33.1 MAJOR DEPRESSIVE DISORDER, RECURRENT, MODERATE: ICD-10-CM

## 2025-09-03 ENCOUNTER — OUTPATIENT (OUTPATIENT)
Dept: OUTPATIENT SERVICES | Facility: HOSPITAL | Age: 55
LOS: 1 days | End: 2025-09-03
Payer: MEDICARE

## 2025-09-03 ENCOUNTER — APPOINTMENT (OUTPATIENT)
Dept: PSYCHIATRY | Facility: CLINIC | Age: 55
End: 2025-09-03

## 2025-09-03 DIAGNOSIS — Z98.890 OTHER SPECIFIED POSTPROCEDURAL STATES: Chronic | ICD-10-CM

## 2025-09-03 DIAGNOSIS — Z98.891 HISTORY OF UTERINE SCAR FROM PREVIOUS SURGERY: Chronic | ICD-10-CM

## 2025-09-03 DIAGNOSIS — F33.1 MAJOR DEPRESSIVE DISORDER, RECURRENT, MODERATE: ICD-10-CM

## 2025-09-03 DIAGNOSIS — F41.9 ANXIETY DISORDER, UNSPECIFIED: ICD-10-CM

## 2025-09-03 PROCEDURE — 90834 PSYTX W PT 45 MINUTES: CPT

## 2025-09-04 DIAGNOSIS — F33.1 MAJOR DEPRESSIVE DISORDER, RECURRENT, MODERATE: ICD-10-CM

## 2025-09-10 ENCOUNTER — APPOINTMENT (OUTPATIENT)
Dept: PSYCHIATRY | Facility: CLINIC | Age: 55
End: 2025-09-10

## 2025-09-10 DIAGNOSIS — F41.9 ANXIETY DISORDER, UNSPECIFIED: ICD-10-CM

## 2025-09-10 DIAGNOSIS — F41.1 GENERALIZED ANXIETY DISORDER: ICD-10-CM

## 2025-09-10 DIAGNOSIS — F33.1 MAJOR DEPRESSIVE DISORDER, RECURRENT, MODERATE: ICD-10-CM

## 2025-09-17 ENCOUNTER — APPOINTMENT (OUTPATIENT)
Dept: PSYCHIATRY | Facility: CLINIC | Age: 55
End: 2025-09-17

## 2025-09-18 ENCOUNTER — APPOINTMENT (OUTPATIENT)
Dept: PULMONOLOGY | Facility: CLINIC | Age: 55
End: 2025-09-18
Payer: COMMERCIAL

## 2025-09-18 DIAGNOSIS — G47.33 OBSTRUCTIVE SLEEP APNEA (ADULT) (PEDIATRIC): ICD-10-CM

## 2025-09-18 DIAGNOSIS — J45.909 UNSPECIFIED ASTHMA, UNCOMPLICATED: ICD-10-CM

## 2025-09-18 PROCEDURE — 99213 OFFICE O/P EST LOW 20 MIN: CPT | Mod: 95

## 2025-09-18 PROCEDURE — G2211 COMPLEX E/M VISIT ADD ON: CPT | Mod: NC,95
